# Patient Record
Sex: FEMALE | Race: WHITE | NOT HISPANIC OR LATINO | Employment: OTHER | URBAN - METROPOLITAN AREA
[De-identification: names, ages, dates, MRNs, and addresses within clinical notes are randomized per-mention and may not be internally consistent; named-entity substitution may affect disease eponyms.]

---

## 2017-01-02 ENCOUNTER — OFFICE VISIT (OUTPATIENT)
Dept: OPHTHALMOLOGY | Facility: CLINIC | Age: 62
End: 2017-01-02
Attending: OPHTHALMOLOGY
Payer: COMMERCIAL

## 2017-01-02 DIAGNOSIS — M06.09 RHEUMATOID ARTHRITIS OF MULTIPLE SITES WITHOUT RHEUMATOID FACTOR (H): Primary | ICD-10-CM

## 2017-01-02 DIAGNOSIS — H20.9 UVEITIS, ANTERIOR: ICD-10-CM

## 2017-01-02 DIAGNOSIS — H25.9 SENILE CATARACTS OF BOTH EYES: ICD-10-CM

## 2017-01-02 PROCEDURE — 92015 DETERMINE REFRACTIVE STATE: CPT | Mod: ZF

## 2017-01-02 PROCEDURE — 99212 OFFICE O/P EST SF 10 MIN: CPT | Mod: ZF

## 2017-01-02 ASSESSMENT — REFRACTION_MANIFEST
OS_CYLINDER: SPHERE
OS_ADD: +2.50
OD_AXIS: 175
OS_SPHERE: +0.50
OD_CYLINDER: +0.50
OD_ADD: +2.50
OD_SPHERE: +0.75

## 2017-01-02 ASSESSMENT — SLIT LAMP EXAM - LIDS
COMMENTS: NORMAL
COMMENTS: NORMAL

## 2017-01-02 ASSESSMENT — VISUAL ACUITY
OD_CC: J1+
OD_CC: 20/30
CORRECTION_TYPE: GLASSES
METHOD: SNELLEN - LINEAR
OS_CC: J1+
OS_CC: 20/20

## 2017-01-02 ASSESSMENT — REFRACTION_WEARINGRX
SPECS_TYPE: PAL
OD_CYLINDER: +0.75
OS_ADD: +2.25
OD_SPHERE: +1.25
OS_CYLINDER: +0.25
OD_AXIS: 105
OD_ADD: +2.25
OS_AXIS: 085
OS_SPHERE: +1.00

## 2017-01-02 ASSESSMENT — CUP TO DISC RATIO
OS_RATIO: 0.3
OD_RATIO: 0.45

## 2017-01-02 ASSESSMENT — CONF VISUAL FIELD
OS_NORMAL: 1
OD_NORMAL: 1

## 2017-01-02 ASSESSMENT — TONOMETRY
IOP_METHOD: ICARE
OD_IOP_MMHG: 21
OS_IOP_MMHG: 20

## 2017-01-02 NOTE — PROGRESS NOTES
CC:  Patient presents with:  Consult For: Uveitis      HPI:   Reina Conway is a 61 year old year-old patient who presents for evaluation due to history of panuveitis with CME OD in 2011 treated at [facility]. Referred here by the [facility] Rheumatology clinic (Dr. Frost). Uveitis was attributed to patients enbrel in setting of RA. This was subsequently stopped and patient has been taking adalimumab and methotrexate since. She was treated at that time with a oral steroid burst followed by PST kenalog and drops over the course of 1 month. She denies a history of recurrence. She notes significant glare and halo. Notes some mild discomfort, dryness and watering OD. Uses no drops.         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 H/o panuveitis and CME OD 2011 (no recurrence)   Quiescent today    Observe - mac OCT at f/u    2. Cataracts OU   Refracts to 20/20 OU, but may be symptomatic from glare OD due to small PSC and posterior synechiae may be contributing   Consider CE IOL with synechialysis OD    3. H/o Graves Disease   Does not appear to have active ANTELMO    Observe    4. Dry Eye Syndrome OU, mild   Artificial tears and WCs    5. Choroidal nevus OS   Small, flat, no fluid   Observe      RTC: 6 months, mac OCT OU    Butch Joy MD MPH   Ophthalmology Resident PGY-3    ~~~~~~~~~~~~~~~~~~~~~~~~~~~~~~~~~~~~~~~~~~~~~~~~~~~~~~~~~~~~~~~~    I have personally examined the patient and agree with the assessment and plan as delineated by the resident / fellow.  I have confirmed the contents of the chief complaint, history of present illness, review of systems, and medical / surgical history sections and edited the note as  needed.    Maximilian Terry MD

## 2017-01-02 NOTE — Clinical Note
1/2/2017       RE: Reina Conway  [address] [facility] [id number]     Dear Colleague,    Thank you for referring your patient, Reina Conway, to the EYE CLINIC at [facility]. Please see a copy of my visit note below.    CC:  Patient presents with:  Consult For: Uveitis      HPI:   Reina Conway is a 61 year old year-old patient who presents for evaluation due to history of panuveitis with CME OD in 2011 treated at [facility]. Referred here by the [facility] Rheumatology clinic (Dr. Frost). Uveitis was attributed to patients enbrel in setting of RA. This was subsequently stopped and patient has been taking adalimumab and methotrexate since. She was treated at that time with a oral steroid burst followed by PST kenalog and drops over the course of 1 month. She denies a history of recurrence. She notes significant glare and halo. Notes some mild discomfort, dryness and watering OD. Uses no drops.         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 H/o panuveitis and CME OD 2011 (no recurrence)   Quiescent today    Observe     2. Cataracts OU   Refracts to 20/20 OU, but may be symptomatic from glare OD due to small PSC and posterior synechiae may be contributing   Consider CE IOL with synechialysis OD    3. H/o Graves Disease   Does not appear to have active ANTELMO    Observe    4. Dry Eye Syndrome OU, mild   Artificial tears and WCs    5. Choroidal nevus OS   Small, flat, no fluid   Observe      RTC: 6 months      ~~~~~~~~~~~~~~~~~~~~~~~~~~~~~~~~~~~~~~~~~~~~~~~~~~~~~~~~~~~~~~~~    Again, thank you for allowing me to participate in the care of your patient.       Sincerely,    Maximilian Terry MD

## 2017-01-02 NOTE — NURSING NOTE
Chief Complaints and History of Present Illnesses   Patient presents with     Consult For     Uveitis     HPI    Affected eye(s):  Right   Symptoms:        Duration:  1 year   Frequency:  Constant       Do you have eye pain now?:  No      Comments:  Pt. States that she was treated at [place] in 2009 for uveitis.   Did have one injection which seemed to take care of all symptoms.   Would like to have a DFE to check retina today.  No c/o [facility].  Night driving has become more difficult.   Kasey Mckeon COT 1:52 PM January 2, 2017

## 2017-01-19 DIAGNOSIS — M25.532 PAIN IN BOTH WRISTS: Primary | ICD-10-CM

## 2017-01-19 DIAGNOSIS — M25.531 PAIN IN BOTH WRISTS: Primary | ICD-10-CM

## 2017-03-02 DIAGNOSIS — M06.00 SERONEGATIVE RHEUMATOID ARTHRITIS (H): ICD-10-CM

## 2017-03-02 DIAGNOSIS — Z79.899 HIGH RISK MEDICATIONS (NOT ANTICOAGULANTS) LONG-TERM USE: ICD-10-CM

## 2017-03-06 ENCOUNTER — TELEPHONE (OUTPATIENT)
Dept: RHEUMATOLOGY | Facility: CLINIC | Age: 62
End: 2017-03-06

## 2017-03-06 DIAGNOSIS — Z79.899 HIGH RISK MEDICATIONS (NOT ANTICOAGULANTS) LONG-TERM USE: ICD-10-CM

## 2017-03-06 DIAGNOSIS — M06.00 SERONEGATIVE RHEUMATOID ARTHRITIS (H): ICD-10-CM

## 2017-03-06 NOTE — TELEPHONE ENCOUNTER
Called patient and left a message regarding rescheduling her appointment [facility] Dr. Frost on 4/13/17 to be seen sooner with Tyson Alicea. LM x1

## 2017-03-11 ENCOUNTER — MYC MEDICAL ADVICE (OUTPATIENT)
Dept: PSYCHIATRY | Facility: CLINIC | Age: 62
End: 2017-03-11

## 2017-03-17 DIAGNOSIS — M06.00 SERONEGATIVE RHEUMATOID ARTHRITIS (H): ICD-10-CM

## 2017-03-17 DIAGNOSIS — Z79.899 ENCOUNTER FOR LONG-TERM CURRENT USE OF MEDICATION: ICD-10-CM

## 2017-03-17 LAB
ALBUMIN SERPL-MCNC: 3.6 G/DL (ref 3.4–5)
ALT SERPL W P-5'-P-CCNC: 23 U/L (ref 0–50)
AST SERPL W P-5'-P-CCNC: 17 U/L (ref 0–45)
CREAT SERPL-MCNC: 0.7 MG/DL (ref 0.52–1.04)
CRP SERPL-MCNC: 8.1 MG/L (ref 0–8)
ERYTHROCYTE [DISTWIDTH] IN BLOOD BY AUTOMATED COUNT: 13.7 % (ref 10–15)
GFR SERPL CREATININE-BSD FRML MDRD: 85 ML/MIN/1.7M2
HCT VFR BLD AUTO: 46.2 % (ref 35–47)
HGB BLD-MCNC: 15.8 G/DL (ref 11.7–15.7)
MCH RBC QN AUTO: 31.9 PG (ref 26.5–33)
MCHC RBC AUTO-ENTMCNC: 34.2 G/DL (ref 31.5–36.5)
MCV RBC AUTO: 93 FL (ref 78–100)
PLATELET # BLD AUTO: 277 10E9/L (ref 150–450)
RBC # BLD AUTO: 4.96 10E12/L (ref 3.8–5.2)
WBC # BLD AUTO: 7.9 10E9/L (ref 4–11)

## 2017-03-17 PROCEDURE — 82040 ASSAY OF SERUM ALBUMIN: CPT | Performed by: INTERNAL MEDICINE

## 2017-03-17 PROCEDURE — 84450 TRANSFERASE (AST) (SGOT): CPT | Performed by: INTERNAL MEDICINE

## 2017-03-17 PROCEDURE — 84460 ALANINE AMINO (ALT) (SGPT): CPT | Performed by: INTERNAL MEDICINE

## 2017-03-17 PROCEDURE — 82565 ASSAY OF CREATININE: CPT | Performed by: INTERNAL MEDICINE

## 2017-03-17 PROCEDURE — 85027 COMPLETE CBC AUTOMATED: CPT | Performed by: INTERNAL MEDICINE

## 2017-03-17 PROCEDURE — 86140 C-REACTIVE PROTEIN: CPT | Performed by: INTERNAL MEDICINE

## 2017-03-17 PROCEDURE — 36415 COLL VENOUS BLD VENIPUNCTURE: CPT | Performed by: INTERNAL MEDICINE

## 2017-04-13 ENCOUNTER — OFFICE VISIT (OUTPATIENT)
Dept: RHEUMATOLOGY | Facility: CLINIC | Age: 62
End: 2017-04-13
Attending: INTERNAL MEDICINE
Payer: COMMERCIAL

## 2017-04-13 VITALS
SYSTOLIC BLOOD PRESSURE: 133 MMHG | BODY MASS INDEX: 25.48 KG/M2 | HEART RATE: 74 BPM | WEIGHT: 172 LBS | DIASTOLIC BLOOD PRESSURE: 88 MMHG | HEIGHT: 69 IN | OXYGEN SATURATION: 95 %

## 2017-04-13 DIAGNOSIS — Z79.899 HIGH RISK MEDICATIONS (NOT ANTICOAGULANTS) LONG-TERM USE: ICD-10-CM

## 2017-04-13 DIAGNOSIS — M06.09 RHEUMATOID ARTHRITIS OF MULTIPLE SITES WITHOUT RHEUMATOID FACTOR (H): Primary | ICD-10-CM

## 2017-04-13 PROCEDURE — 99212 OFFICE O/P EST SF 10 MIN: CPT | Mod: ZF

## 2017-04-13 RX ORDER — FOLIC ACID 1 MG/1
1000 TABLET ORAL DAILY
Qty: 90 TABLET | Refills: 3 | Status: SHIPPED | OUTPATIENT
Start: 2017-04-13 | End: 2017-07-14

## 2017-04-13 ASSESSMENT — PAIN SCALES - GENERAL: PAINLEVEL: MODERATE PAIN (5)

## 2017-04-13 NOTE — NURSING NOTE
"Chief Complaint   Patient presents with     RECHECK     Follow up for RA       Initial /88  Pulse 74  Ht 1.753 m (5' 9\")  Wt 78 kg (172 lb)  SpO2 95%  BMI 25.4 kg/m2 Estimated body mass index is 25.4 kg/(m^2) as calculated from the following:    Height as of this encounter: 1.753 m (5' 9\").    Weight as of this encounter: 78 kg (172 lb).  Medication Reconciliation: complete   Fang Arredondo CMA    "

## 2017-04-13 NOTE — LETTER
4/13/2017      RE: Reina Conway  [address] [facility] [address]       Rheumatology Visit     Reina Conway MRN# [id number]   YOB: 1955 Age: 61 year old     Date of Visit: April 13, 2017  Primary care provider: Diana Orantes          Assessment and Plan:   60 yo female with Seronegative destructive RA (-RF/CCP/LASHAE panel, last repeat xrays [place] 3/2013; deformities, right wrist progressive worse function with dorsiflexion, hammertoes with bilateral toe subluxation, right knee contracture).    Episode of Uveitis that was attributed to Enbrel without recurrence now on Humira. She has not had followup in Ophthalmology due to her not scheduling it, but is asymptomatic  She has had variable symptoms and not in a remission on Humira monotherapy; she restarted Methotrexate at 10 mg weekly because of prior perceived SE of nausea and more joint pain; certainly the former would be common but the latter I think more likely due to ongoing RA activity. We increased to 15 mg due to persistent joint sx and flare and she is tolerating this fine.  She was gradually improving but at a plateau.  It appears that the majority of her symptoms if not all are not directly attributable to active RA.  2. She was abstinent from Alcohol but has returned to drinking wine regularly. She should discuss with Dr. Orantes  3.  Osteopenia by prior DEXA, did not start Fosamax Rx by Dr. Orantes.  Patient noted in past her chart now says Osteoporosis  2. Other medical problems as previously documented and as per EHR     PLAN: Discussed in detail with the patient..We went over many things that we have previously including lack of scientific data on diet or supplements, current RA treatment guidelines favoring combination Methotrexate and biologic, Methotrexate side effects.  1. Continue Humira 40 mg SQ Q 2 weeks.  Continue Methotrexate at 15 mg weekly with Folic acid,    --Keep up-to-date vaccinations per CDC  guidelines with PCP  --Labs today  2. Referral to Ophthalmology to establish care previously placed. Hx Anterior uveitis. She can reschedule this or per her wishes be seen elsewhere for eye care  3. RTC 3 mths, sooner prn with Tyson Alicea and 6 months with me.    4. I encouraged her to follow thru with other recommendations of Dr. Orantes. She will schedule appt with Dr. Orantes specifically to discuss her alcohol use and mental health  5. Prior referral to Orthopedic Hand Surgery for their opinion - she did not follow thru.     Guanakito Frost MD           History of Present Illness:   61 year old female who presents for continuing care for her seronegative RA. Seen by me until 2010 then swtiched to [facility], then re-established 3/2013 (xrays 2/2013 [place]). EPIC reviewed. Last seen by me in December 2016.   HISTORY CARRIED FORWARD:   Prior: Synovitis and joint deformities in 2008 was persistently seronegative but had active synovitis. Methotrexate helped but did not cause a remission/low disease state. She required Prednisone to control symptoms partially but still had significant ADL issues. We had persistently recommended anti TNF therapy which she resisted. She sought a second opinion at [place] who recommended the same things, and she continued care there as of August 2010, then switched back to Dr. Frost 3/2013. Begun on Enbrel at [facility]. 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 As no other etiology found it was felt it might be due to Enbrel and she was switched to Humira. She has remained on Methotrexate, primarily 25 mg weekly, decreased 3/2013 (not to go <15mg week due to risk of further deformities or uveitis) . FRAX 32% at [place]. Prior declined biphosphosphonate, [place] recommended IV.   Xrays 3/2013 [place]: See records. Hallux valgus right, hammertoes L>R, hindfoot valgus, pes planus. Dislocated left 2nd MTP, sublux left 3rd MTP, arth 2-3 MTP,  "rt 1st MTP. Mild DJD hands. Several DIP and 1st CMC. Subluxation left thumb IP and persistant dorsiflexion right wrist.   Previous visits discussed stress in her life. Her father in law passed away and there had been a lot of stress and she put her issues on the back burner. She had an episode of chest/epigastric pain and was seen at [facility]. She had apparent negative cardiac evaluation although did not followup with a stress test. She is taking OTC Zantac prn and thinks that helps. Found allergic to Wheat, avoid gluten and sugars. Feels much improved [heartburn, bloating, blood in stools, irregular stools] this really changed her digestive system. She went on a gluten free diet in December, and stopped all RA meds in early Jan 2015. See My Chart message.   Restarted Humira early April 2015 every 3 weeks. Noticed more migatory joints pains, swelling. Right hand, right knee, right wrist-associated swelling really in right 2nd MCP. 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 Lack of movement and coordinations, using her wrist brace. Uses this at night and daily prn.     At last visit she had continued on Humira without side effects and still feels it is helpful. She does have daily discomfort in several areas with either activity or prior damage, especially knees.     She continued to have stress in her life but is working through this and \"trying to get back on track\". She stopped drinking any alcohol for the past two months and was congratulated on this. She went to  but does not feel she is alcoholic.  Leonardo going to [facility]. She has been  for a long time. Both lost parents, and grieving. She says she's in less denial. She admits  is helping a lot.      She called in November due to increased joint pain and we restarted Methotrexate at 10 mg weekly as previously discussed.  She stopped it two weeks ago.  She noted more joint pain and also some nausea on day of dose.  We discussed this at length " and unlikely that MTX contributed to more joint symptoms.     At 2016 visit we readded Methotrexate to Humira in hopes of decreasing disease activity, using low dose due to prior side effect complaints.  She called later that month with flare symptoms requiring Prednisone bridge, and we increased Methotrexate to more standard dose of 15 mg weekly.      At her 2016 visit she had started improving.. She was tolerating Methotrexate this time.  She weaned off Prednisone.    Interval Hx:      She did have interim lab in March that was normal with mild CRP elevation to 8.1.    She has been less active due to the winter, is looking forward to spring.  She feels depressed some days but does not wish to see anyone for this.     She still has problems with stairs but less than during flares. They do plan to move to one level but this has not happened yet. They are selling properties of their  parents.     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     She is doing home exercise program now.     She did not follow thru on Ophthalmology referral from last visits, and has not seen Dr. Orantes.   She denies interval infections.     She is taking Tumeric supplements Ca++/MG++       Review of Systems:   Review Of Systems  Skin: negative  Eyes: negative  Ears/Nose/Throat: negative  Respiratory: No shortness of breath, dyspnea on exertion, cough, or hemoptysis  Cardiovascular: negative  Gastrointestinal: negative  Genitourinary: negative  Musculoskeletal: see HPI  Neurologic: negative  Psychiatric: negative  Hematologic/Lymphatic/Immunologic: negative  Endocrine: negative          Past Medical History:     Past Medical History:   Diagnosis Date     Anterior uveitis     secondary to Enbrel     Fibroid      high in  then normalized     History of Graves' disease      Hyperlipidemia LDL goal < 130     declined  "meication     Menopause 2008     Osteopenia 1/14/2016     Osteoporosis     Osteopenia borderline osteoporosis     Right posterior uveitis      Seronegative rheumatoid arthritis (H)     dx Dr. Frost     Uveitis        Patient Active Problem List    Diagnosis Date Noted     Mechanical limb problems 10/14/2016     Priority: Medium     Pain in both wrists 10/07/2016     Priority: Medium     Rheumatoid arthritis of multiple sites without rheumatoid factor (H) 02/19/2016     Priority: Medium     Osteopenia 01/14/2016     Priority: Medium     Situational mixed anxiety and depressive disorder 09/18/2014     Priority: Medium     Lump or mass in breast 09/02/2014     Priority: Medium     Bunion 09/02/2014     Priority: Medium     Hemorrhoids 07/15/2014     Priority: Medium     Uveitis, anterior 06/21/2013     Priority: Medium     Encounter for long-term current use of medication 03/21/2013     Priority: Medium     Problem list name updated by automated process. Provider to review       Hammer toe 02/21/2013     Priority: Medium             Past Surgical History:     Past Surgical History:   Procedure Laterality Date     Fibroidadenoma Left Breast       NO HISTORY OF SURGERY               Social History:     Social History   Substance Use Topics     Smoking status: Never Smoker     Smokeless tobacco: Never Used     Alcohol use 3.5 - 7.0 oz/week     7 - 14 Standard drinks or equivalent per week             Family History:     Family History   Problem Relation Age of Onset     Breast Cancer Mother      parkinsons     Breast Cancer Maternal Aunt      Other - See Comments       Inflammation joint in brother, maternal cousin      Glaucoma No family hx of      Macular Degeneration No family hx of      Retinal detachment No family hx of             Allergies:     Allergies   Allergen Reactions     Fosamax      Throat discomfort     No Clinical Screening - See Comments      Joints flared after getting possible \"white drink\" after " "CT/MRI in 2007              Medications:     Current Outpatient Prescriptions   Medication Sig Dispense Refill     methotrexate 2.5 MG tablet CHEMO Take 6 tablets (15 mg) by mouth once a week Monitoring labs required every 8 - 12 weeks for medication refills. 24 tablet 3     adalimumab (HUMIRA PEN) 40 MG/0.8ML pen kit Inject 0.8 mLs (40 mg) Subcutaneous every 14 days Hold for signs of infection, and then seek medical attention. SURECLICK PEN. 1 kit 1     folic acid (FOLVITE) 1 MG tablet Take 1 tablet (1,000 mcg) by mouth daily 90 tablet 3     ACETAMINOPHEN PO Take 500 mg by mouth daily as needed 2 tablets bid prn              Physical Exam:   /88  Pulse 74  Ht 1.753 m (5' 9\")  Wt 78 kg (172 lb)  SpO2 95%  BMI 25.4 kg/m2  Constitutional: WD-[id number] cooperative   Eyes: nl conjunctiva, sclera   ENT: nl external ears, nose, throat   No mucous membrane lesions, normal saliva pool   Neck: no mass or thyroid enlargement   MS: She has previously noted subluxation at the R wrist/mild in L wrist with decreased ROM, minimal pannus, swan in right hand right thumb deformity unchanged. Right 2-3nd MCP pannus no active swelling. Right hand ulnar deviation, right thumb drop , right 5th DIP subluxation, right wrist subluxation, hammertoes. Bunions bilaterally. Deformities of MTPs with palpable MTP heads. Pes planus. FROM hips. Right knee 1+ swelling with contracture but with 30 extension lag.--before was R knee cool with trace effusion but with 10 extension lag. Right foot bunion. All TMJ, neck, shoulder, elbow, wrist, MCP/PIP/DIP, spine, hip, knee, ankle, and foot MTP/IP joints were examined.   Skin: no nail pitting, alopecia, rash, nodules or lesions.   Neuro: nl cranial nerves, strength   Psych: nl affect.       Data:     Lab Results   Component Value Date    WBC 7.9 03/17/2017    WBC 6.6 12/08/2016    WBC 7.4 09/16/2016    HGB 15.8 (H) 03/17/2017    HGB 15.1 12/08/2016    HGB 14.6 09/16/2016    HCT 46.2 03/17/2017    " HCT 45.1 12/08/2016    HCT 44.2 09/16/2016    MCV 93 03/17/2017    MCV 94 12/08/2016    MCV 92 09/16/2016     03/17/2017     12/08/2016     09/16/2016     Lab Results   Component Value Date    BUN 13 09/14/2010    BUN 20 10/06/2009    BUN 14 04/03/2007     No components found for: SEDRATE  Lab Results   Component Value Date    TSH 0.58 09/14/2010    TSH 0.42 10/06/2009    TSH 0.60 09/11/2007     Lab Results   Component Value Date    AST 17 03/17/2017    AST 16 12/08/2016    AST 18 09/16/2016    ALT 23 03/17/2017    ALT 30 12/08/2016    ALT 34 09/16/2016    GGT 32 09/25/2014    ALKPHOS 82 09/25/2014    ALKPHOS 83 12/22/2010     Reviewed Rheumatology lab flowsheet        Guanakito Frost MD

## 2017-04-13 NOTE — PROGRESS NOTES
Rheumatology Visit     Reina Conway MRN# [id number]   YOB: 1955 Age: 61 year old     Date of Visit: April 13, 2017  Primary care provider: Diana Orantes          Assessment and Plan:   62 yo female with Seronegative destructive RA (-RF/CCP/LASHAE panel, last repeat xrays [place] 3/2013; deformities, right wrist progressive worse function with dorsiflexion, hammertoes with bilateral toe subluxation, right knee contracture).    Episode of Uveitis that was attributed to Enbrel without recurrence now on Humira. She has not had followup in Ophthalmology due to her not scheduling it, but is asymptomatic  She has had variable symptoms and not in a remission on Humira monotherapy; she restarted Methotrexate at 10 mg weekly because of prior perceived SE of nausea and more joint pain; certainly the former would be common but the latter I think more likely due to ongoing RA activity. We increased to 15 mg due to persistent joint sx and flare and she is tolerating this fine.  She was gradually improving but at a plateau.  It appears that the majority of her symptoms if not all are not directly attributable to active RA.  2. She was abstinent from Alcohol but has returned to drinking wine regularly. She should discuss with Dr. Orantes  3.  Osteopenia by prior DEXA, did not start Fosamax Rx by Dr. Orantes.  Patient noted in past her chart now says Osteoporosis  2. Other medical problems as previously documented and as per EHR     PLAN: Discussed in detail with the patient..We went over many things that we have previously including lack of scientific data on diet or supplements, current RA treatment guidelines favoring combination Methotrexate and biologic, Methotrexate side effects.  1. Continue Humira 40 mg SQ Q 2 weeks.  Continue Methotrexate at 15 mg weekly with Folic acid,    --Keep up-to-date vaccinations per CDC guidelines with PCP  --Labs today  2. Referral to Ophthalmology to establish care previously  placed. Hx Anterior uveitis. She can reschedule this or per her wishes be seen elsewhere for eye care  3. RTC 3 mths, sooner prn with Tyson Alicea and 6 months with me.    4. I encouraged her to follow thru with other recommendations of Dr. Orantes. She will schedule appt with Dr. Orantes specifically to discuss her alcohol use and mental health  5. Prior referral to Orthopedic Hand Surgery for their opinion - she did not follow thru.     Guanakito Frost MD           History of Present Illness:   61 year old female who presents for continuing care for her seronegative RA. Seen by me until 2010 then swtiched to [facility], then re-established 3/2013 (xrays 2/2013 [place]). EPIC reviewed. Last seen by me in December 2016.   HISTORY CARRIED FORWARD:   Prior: Synovitis and joint deformities in 2008 was persistently seronegative but had active synovitis. Methotrexate helped but did not cause a remission/low disease state. She required Prednisone to control symptoms partially but still had significant ADL issues. We had persistently recommended anti TNF therapy which she resisted. She sought a second opinion at [place] who recommended the same things, and she continued care there as of August 2010, then switched back to Dr. Frost 3/2013. Begun on Enbrel at [facility]. 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 As no other etiology found it was felt it might be due to Enbrel and she was switched to Humira. She has remained on Methotrexate, primarily 25 mg weekly, decreased 3/2013 (not to go <15mg week due to risk of further deformities or uveitis) . FRAX 32% at [place]. Prior declined biphosphosphonate, [place] recommended IV.   Xrays 3/2013 [place]: See records. Hallux valgus right, hammertoes L>R, hindfoot valgus, pes planus. Dislocated left 2nd MTP, sublux left 3rd MTP, arth 2-3 MTP, rt 1st MTP. Mild DJD hands. Several DIP and 1st CMC. Subluxation left thumb IP and persistant  "dorsiflexion right wrist.   Previous visits discussed stress in her life. Her father in law passed away and there had been a lot of stress and she put her issues on the back burner. She had an episode of chest/epigastric pain and was seen at [facility]. She had apparent negative cardiac evaluation although did not followup with a stress test. She is taking OTC Zantac prn and thinks that helps. Found allergic to Wheat, avoid gluten and sugars. Feels much improved [heartburn, bloating, blood in stools, irregular stools] this really changed her digestive system. She went on a gluten free diet in December, and stopped all RA meds in early Jan 2015. See My Chart message.   Restarted Humira early April 2015 every 3 weeks. Noticed more migatory joints pains, swelling. Right hand, right knee, right wrist-associated swelling really in right 2nd MCP. 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 Lack of movement and coordinations, using her wrist brace. Uses this at night and daily prn.     At last visit she had continued on Humira without side effects and still feels it is helpful. She does have daily discomfort in several areas with either activity or prior damage, especially knees.     She continued to have stress in her life but is working through this and \"trying to get back on track\". She stopped drinking any alcohol for the past two months and was congratulated on this. She went to  but does not feel she is alcoholic.  Leonardo going to [facility]. She has been  for a long time. Both lost parents, and grieving. She says she's in less denial. She admits  is helping a lot.      She called in November due to increased joint pain and we restarted Methotrexate at 10 mg weekly as previously discussed.  She stopped it two weeks ago.  She noted more joint pain and also some nausea on day of dose.  We discussed this at length and unlikely that MTX contributed to more joint symptoms.     At January 2016 visit we " readded Methotrexate to Humira in hopes of decreasing disease activity, using low dose due to prior side effect complaints.  She called later that month with flare symptoms requiring Prednisone bridge, and we increased Methotrexate to more standard dose of 15 mg weekly.      At her 2016 visit she had started improving.. She was tolerating Methotrexate this time.  She weaned off Prednisone.    Interval Hx:      She did have interim lab in March that was normal with mild CRP elevation to 8.1.    She has been less active due to the winter, is looking forward to spring.  She feels depressed some days but does not wish to see anyone for this.     She still has problems with stairs but less than during flares. They do plan to move to one level but this has not happened yet. They are selling properties of their  parents.     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     She is doing home exercise program now.     She did not follow thru on Ophthalmology referral from last visits, and has not seen Dr. Orantes.   She denies interval infections.     She is taking Tumeric supplements Ca++/MG++       Review of Systems:   Review Of Systems  Skin: negative  Eyes: negative  Ears/Nose/Throat: negative  Respiratory: No shortness of breath, dyspnea on exertion, cough, or hemoptysis  Cardiovascular: negative  Gastrointestinal: negative  Genitourinary: negative  Musculoskeletal: see HPI  Neurologic: negative  Psychiatric: negative  Hematologic/Lymphatic/Immunologic: negative  Endocrine: negative          Past Medical History:     Past Medical History:   Diagnosis Date     Anterior uveitis     secondary to Enbrel     Fibroid      high in  then normalized     History of Graves' disease      Hyperlipidemia LDL goal < 130     declined meication     Menopause 2008     Osteopenia 2016     Osteoporosis     Osteopenia  "borderline osteoporosis     Right posterior uveitis      Seronegative rheumatoid arthritis (H)     dx Dr. Frost     Uveitis        Patient Active Problem List    Diagnosis Date Noted     Mechanical limb problems 10/14/2016     Priority: Medium     Pain in both wrists 10/07/2016     Priority: Medium     Rheumatoid arthritis of multiple sites without rheumatoid factor (H) 02/19/2016     Priority: Medium     Osteopenia 01/14/2016     Priority: Medium     Situational mixed anxiety and depressive disorder 09/18/2014     Priority: Medium     Lump or mass in breast 09/02/2014     Priority: Medium     Bunion 09/02/2014     Priority: Medium     Hemorrhoids 07/15/2014     Priority: Medium     Uveitis, anterior 06/21/2013     Priority: Medium     Encounter for long-term current use of medication 03/21/2013     Priority: Medium     Problem list name updated by automated process. Provider to review       Hammer toe 02/21/2013     Priority: Medium             Past Surgical History:     Past Surgical History:   Procedure Laterality Date     Fibroidadenoma Left Breast       NO HISTORY OF SURGERY               Social History:     Social History   Substance Use Topics     Smoking status: Never Smoker     Smokeless tobacco: Never Used     Alcohol use 3.5 - 7.0 oz/week     7 - 14 Standard drinks or equivalent per week             Family History:     Family History   Problem Relation Age of Onset     Breast Cancer Mother      parkinsons     Breast Cancer Maternal Aunt      Other - See Comments       Inflammation joint in brother, maternal cousin      Glaucoma No family hx of      Macular Degeneration No family hx of      Retinal detachment No family hx of             Allergies:     Allergies   Allergen Reactions     Fosamax      Throat discomfort     No Clinical Screening - See Comments      Joints flared after getting possible \"white drink\" after CT/MRI in 2007              Medications:     Current Outpatient Prescriptions " "  Medication Sig Dispense Refill     methotrexate 2.5 MG tablet CHEMO Take 6 tablets (15 mg) by mouth once a week Monitoring labs required every 8 - 12 weeks for medication refills. 24 tablet 3     adalimumab (HUMIRA PEN) 40 MG/0.8ML pen kit Inject 0.8 mLs (40 mg) Subcutaneous every 14 days Hold for signs of infection, and then seek medical attention. SURECLICK PEN. 1 kit 1     folic acid (FOLVITE) 1 MG tablet Take 1 tablet (1,000 mcg) by mouth daily 90 tablet 3     ACETAMINOPHEN PO Take 500 mg by mouth daily as needed 2 tablets bid prn              Physical Exam:   /88  Pulse 74  Ht 1.753 m (5' 9\")  Wt 78 kg (172 lb)  SpO2 95%  BMI 25.4 kg/m2  Constitutional: WD-WN-WG cooperative   Eyes: nl conjunctiva, sclera   ENT: nl external ears, nose, throat   No mucous membrane lesions, normal saliva pool   Neck: no mass or thyroid enlargement   MS: She has previously noted subluxation at the R wrist/mild in L wrist with decreased ROM, minimal pannus, swan in right hand right thumb deformity unchanged. Right 2-3nd MCP pannus no active swelling. Right hand ulnar deviation, right thumb drop , right 5th DIP subluxation, right wrist subluxation, hammertoes. Bunions bilaterally. Deformities of MTPs with palpable MTP heads. Pes planus. FROM hips. Right knee 1+ swelling with contracture but with 30 extension lag.--before was R knee cool with trace effusion but with 10 extension lag. Right foot bunion. All TMJ, neck, shoulder, elbow, wrist, MCP/PIP/DIP, spine, hip, knee, ankle, and foot MTP/IP joints were examined.   Skin: no nail pitting, alopecia, rash, nodules or lesions.   Neuro: nl cranial nerves, strength   Psych: nl affect.       Data:     Lab Results   Component Value Date    WBC 7.9 03/17/2017    WBC 6.6 12/08/2016    WBC 7.4 09/16/2016    HGB 15.8 (H) 03/17/2017    HGB 15.1 12/08/2016    HGB 14.6 09/16/2016    HCT 46.2 03/17/2017    HCT 45.1 12/08/2016    HCT 44.2 09/16/2016    MCV 93 03/17/2017    MCV 94 " 12/08/2016    MCV 92 09/16/2016     03/17/2017     12/08/2016     09/16/2016     Lab Results   Component Value Date    BUN 13 09/14/2010    BUN 20 10/06/2009    BUN 14 04/03/2007     No components found for: SEDRATE  Lab Results   Component Value Date    TSH 0.58 09/14/2010    TSH 0.42 10/06/2009    TSH 0.60 09/11/2007     Lab Results   Component Value Date    AST 17 03/17/2017    AST 16 12/08/2016    AST 18 09/16/2016    ALT 23 03/17/2017    ALT 30 12/08/2016    ALT 34 09/16/2016    GGT 32 09/25/2014    ALKPHOS 82 09/25/2014    ALKPHOS 83 12/22/2010     Reviewed Rheumatology lab flowsheet    Guanakito Frost

## 2017-04-13 NOTE — MR AVS SNAPSHOT
After Visit Summary   4/13/2017    Reina Cnoway    MRN: [id number]           Patient Information     Date Of Birth          1955        Visit Information        Provider Department      4/13/2017 3:00 PM Guanakito Frost MD [facility] Rheumatology        Today's Diagnoses     Rheumatoid arthritis of multiple sites without rheumatoid factor (H)    -  1    High risk medications (not anticoagulants) long-term use           Follow-ups after your visit        Follow-up notes from your care team     Return in about 3 months (around 7/13/2017).      Your next 10 appointments already scheduled     Jul 14, 2017  3:30 PM CDT   (Arrive by 3:15 PM)   Return Visit with CASEY Galo CNP   [facility] Rheumatology ([facility])    [address] [place] [address]  [address]rd [facility] [address] [phone]            Jul 14, 2017  4:30 PM CDT   Lab with  LAB   [facility] ([facility])    [address] [place] [id number] [phone]            Oct 12, 2017  3:30 PM CDT   (Arrive by 3:15 PM)   Return Visit with Guanakito Frost MD   [facility] Rheumatology ([facility])    [address] [facility]  [address] [facility] [id number] [phone]              Future tests that were ordered for you today     Open Standing Orders        Priority Remaining Interval Expires Ordered    Albumin level Routine 6/6 Intervals 4/13/2018 4/13/2017    CRP inflammation Routine 6/6 Intervals 4/13/2018 4/13/2017    ALT Routine 6/6 Intervals 4/13/2018 4/13/2017    AST Routine 6/6 Intervals 4/13/2018 4/13/2017    CBC with platelets Routine 6/6 Intervals 4/13/2018 4/13/2017    Creatinine Routine 6/6 Intervals 4/13/2018 4/13/2017            Who to contact     If you have questions or need follow up information about today's clinic visit or your schedule please contact [facility] RHEUMATOLOGY directly at [phone].  Normal or  "non-critical lab and imaging results will be communicated to you by MyChart, letter or phone within 4 business days after the clinic has received the results. If you do not hear from us within 7 days, please contact the clinic through [facility] or phone. If you have a critical or abnormal lab result, we will notify you by phone as soon as possible.  Submit refill requests through [facility] or call your pharmacy and they will forward the refill request to us. Please allow 3 business days for your refill to be completed.          Additional Information About Your Visit        [employer] Information     [employer] gives you secure access to your electronic health record. If you see a primary care provider, you can also send messages to your care team and make appointments. If you have questions, please call your primary care clinic.  If you do not have a primary care provider, please call [phone] and they will assist you.        Care EveryWhere ID     This is your Care EveryWhere ID. This could be used by other organizations to access your [facility] medical records  [id number]        Your Vitals Were     Pulse Height Pulse Oximetry BMI (Body Mass Index)          74 1.753 m (5' 9\") 95% 25.4 kg/m2         Blood Pressure from Last 3 Encounters:   04/13/17 133/88   12/08/16 138/89   05/31/16 134/80    Weight from Last 3 Encounters:   04/13/17 78 kg (172 lb)   12/08/16 76.6 kg (168 lb 12.8 oz)   05/31/16 75.3 kg (165 lb 14.4 oz)                 Where to get your medicines      These medications were sent to [id number] - [place], [place] - [address] [facility]  [address] [facility] [address]     Phone:  [phone]     folic acid 1 MG tablet    methotrexate 2.5 MG tablet CHEMO          Primary Care Provider Office Phone # Fax #    Diana Orantes [phone]       [facility] [address] [address]        Thank you!     Thank you for choosing  [facility] " RHE[facility]AT[facility]  for your care. Our goal is always to provide you with excellent care. Hearing back from our patients is one way we can continue to improve our services. Please take a few minutes to complete the written survey that you may receive in the mail after your visit with us. Thank you!             Your Updated Medication List - Protect others around you: Learn how to safely use, store and throw away your medicines at www.disposemymeds.org.          This list is accurate as of: 4/13/17  3:35 PM.  Always use your most recent med list.                   Brand Name Dispense Instructions for use    ACETAMINOPHEN PO      Take 500 mg by mouth daily as needed 2 tablets bid prn       adalimumab 40 MG/0.8ML pen kit    HUMIRA PEN    1 kit    Inject 0.8 mLs (40 mg) Subcutaneous every 14 days Hold for signs of infection, and then seek medical attention. SURECLICK PEN.       folic acid 1 MG tablet    FOLVITE    90 tablet    Take 1 tablet (1,000 mcg) by mouth daily       methotrexate 2.5 MG tablet CHEMO     24 tablet    Take 6 tablets (15 mg) by mouth once a week Monitoring labs required every 8 - 12 weeks for medication refills.

## 2017-07-14 ENCOUNTER — OFFICE VISIT (OUTPATIENT)
Dept: RHEUMATOLOGY | Facility: CLINIC | Age: 62
End: 2017-07-14
Attending: NURSE PRACTITIONER
Payer: COMMERCIAL

## 2017-07-14 VITALS
HEIGHT: 69 IN | WEIGHT: 169.8 LBS | BODY MASS INDEX: 25.15 KG/M2 | DIASTOLIC BLOOD PRESSURE: 91 MMHG | HEART RATE: 94 BPM | SYSTOLIC BLOOD PRESSURE: 154 MMHG | TEMPERATURE: 98.4 F

## 2017-07-14 DIAGNOSIS — R53.82 CHRONIC FATIGUE: ICD-10-CM

## 2017-07-14 DIAGNOSIS — M06.09 RHEUMATOID ARTHRITIS OF MULTIPLE SITES WITHOUT RHEUMATOID FACTOR (H): ICD-10-CM

## 2017-07-14 DIAGNOSIS — R53.82 CHRONIC FATIGUE: Primary | ICD-10-CM

## 2017-07-14 DIAGNOSIS — Z79.899 HIGH RISK MEDICATIONS (NOT ANTICOAGULANTS) LONG-TERM USE: ICD-10-CM

## 2017-07-14 DIAGNOSIS — M06.00 SERONEGATIVE RHEUMATOID ARTHRITIS (H): ICD-10-CM

## 2017-07-14 LAB
ALBUMIN SERPL-MCNC: 3.8 G/DL (ref 3.4–5)
ALT SERPL W P-5'-P-CCNC: 19 U/L (ref 0–50)
AST SERPL W P-5'-P-CCNC: 13 U/L (ref 0–45)
CREAT SERPL-MCNC: 0.84 MG/DL (ref 0.52–1.04)
CRP SERPL-MCNC: 7.6 MG/L (ref 0–8)
ERYTHROCYTE [DISTWIDTH] IN BLOOD BY AUTOMATED COUNT: 14 % (ref 10–15)
GFR SERPL CREATININE-BSD FRML MDRD: 69 ML/MIN/1.7M2
HCT VFR BLD AUTO: 46.5 % (ref 35–47)
HGB BLD-MCNC: 15.3 G/DL (ref 11.7–15.7)
MCH RBC QN AUTO: 31.1 PG (ref 26.5–33)
MCHC RBC AUTO-ENTMCNC: 32.9 G/DL (ref 31.5–36.5)
MCV RBC AUTO: 95 FL (ref 78–100)
PLATELET # BLD AUTO: 254 10E9/L (ref 150–450)
RBC # BLD AUTO: 4.92 10E12/L (ref 3.8–5.2)
TSH SERPL DL<=0.005 MIU/L-ACNC: 0.62 MU/L (ref 0.4–4)
WBC # BLD AUTO: 7.7 10E9/L (ref 4–11)

## 2017-07-14 PROCEDURE — 99212 OFFICE O/P EST SF 10 MIN: CPT | Mod: ZF

## 2017-07-14 PROCEDURE — 82040 ASSAY OF SERUM ALBUMIN: CPT | Performed by: INTERNAL MEDICINE

## 2017-07-14 PROCEDURE — 85027 COMPLETE CBC AUTOMATED: CPT | Performed by: INTERNAL MEDICINE

## 2017-07-14 PROCEDURE — 84443 ASSAY THYROID STIM HORMONE: CPT | Performed by: INTERNAL MEDICINE

## 2017-07-14 PROCEDURE — 86140 C-REACTIVE PROTEIN: CPT | Performed by: INTERNAL MEDICINE

## 2017-07-14 PROCEDURE — 36415 COLL VENOUS BLD VENIPUNCTURE: CPT | Performed by: INTERNAL MEDICINE

## 2017-07-14 PROCEDURE — 82565 ASSAY OF CREATININE: CPT | Performed by: INTERNAL MEDICINE

## 2017-07-14 PROCEDURE — 84460 ALANINE AMINO (ALT) (SGPT): CPT | Performed by: INTERNAL MEDICINE

## 2017-07-14 PROCEDURE — 84450 TRANSFERASE (AST) (SGOT): CPT | Performed by: INTERNAL MEDICINE

## 2017-07-14 RX ORDER — FOLIC ACID 1 MG/1
2 TABLET ORAL DAILY
Qty: 180 TABLET | Refills: 3 | Status: SHIPPED | OUTPATIENT
Start: 2017-07-14 | End: 2018-07-18

## 2017-07-14 ASSESSMENT — JOINT PAIN: TOTAL NUMBER OF SWOLLEN JOINTS: 0

## 2017-07-14 ASSESSMENT — PAIN SCALES - GENERAL: PAINLEVEL: NO PAIN (0)

## 2017-07-14 ASSESSMENT — CLINICAL DISEASE ACTIVITY INDEX (CDAI): TOTAL_SCORE: 4

## 2017-07-14 NOTE — LETTER
"7/14/2017       RE: Reina Conway  [address] [facility] [address]     Dear Colleague,    Thank you for referring your patient, Reina Conway, to the [facility] RHEUMATOLOGY at [facility]. Please see a copy of my visit note below.    Rheumatology Visit     Reina Conway MRN# [id number]   YOB: 1955 Age: 62 year old     Date of Visit: July 14, 2017  Last visit: April 13, 2017  Primary care provider: Diana Orantes          Assessment and Plan:   1. Seronegative destructive RA (-RF/CCP/LASHAE panel, last repeat xrays [place] 3/2013; deformities, right wrist progressive worse function with dorsiflexion, hammertoes with bilateral toe subluxation, right knee contracture).    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 Seen ophth Dr. Terry 1-2017 (neg exam). No sx today   RA activity=low. Exam shows prior deformities, no RA flaring and no active arthritis. Labs today pending. Mild nausea, hairloss. Will ask her to routinely take FA 1 mg day (missing doses) but for 2 mths go to 2 mg day then report if improved. We will continue the humira and methotrexate at current doses.   2. Hairloss with fatigue. Due for physical for complete evaluation which she will do this should include cancer screening, mamogram, bone health, skin check and over check. Will check thyroid.   3. Alcohol use. Reports abstinent from Alcohol, but then \"occasionally\". She should discuss with Dr. Orantes. I discussed the risks with her today on the liver and MTX. She understands.   3.  Osteopenia by prior DEXA (done 2009), did not start Fosamax Rx by Dr. Orantes.  Patient noted in past her chart now says Osteoporosis. F/U PCP at annual. She agrees   2. Other medical problems as previously documented and as per EHR     PLAN: Discussed in detail with the patient.   1. Continue Humira 40 mg SQ Q 2 weeks.  Continue Methotrexate at 15 mg weekly " with 2mg day Folic acid    --Keep up-to-date vaccinations per CDC guidelines with PCP  --Labs every 8-12 weeks--discussed importance of labs every 12 weeks and hold MTX for any infections.    2 F/U Ophthalmology yearly and prn sx. Hx Anterior uveitis.   3. RTC 3 mths, sooner prn      4. I encouraged her to follow thru with other recommendations of Dr. Orantes. She will schedule appt with Dr. Orantes specifically to discuss her alcohol use and mental health  5. Prior referral to Orthopedic Hand Surgery for their opinion - she did not follow thru.Does not wish          History of Present Illness:   62 year old female who presents for continuing care for her seronegative RA. Seen Dr. Frost until 2010 then swtiched to [facility], then re-established 3/2013 (xrays 2/2013 [facility]). EPIC reviewed.    HISTORY CARRIED FORWARD:   Prior: Synovitis and joint deformities in 2008 was persistently seronegative but had active synovitis. Methotrexate helped but did not cause a remission/low disease state. She required Prednisone to control symptoms partially but still had significant ADL issues. We had persistently recommended anti TNF therapy which she resisted. She sought a second opinion at [facility] who recommended the same things, and she continued care there as of August 2010, then switched back to Dr. Frost 3/2013. Begun on Enbrel at [facility]. 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 As no other etiology found it was felt it might be due to Enbrel and she was switched to Humira. She has remained on Methotrexate, primarily 25 mg weekly, decreased 3/2013 (not to go <15mg week due to risk of further deformities or uveitis) . FRAX 32% at [facility]. Prior declined biphosphosphonate, [facility] recommended IV.   Xrays 3/2013 [facility]: See records. Hallux valgus right, hammertoes L>R, hindfoot valgus, pes planus. Dislocated left 2nd MTP, sublux left 3rd MTP, arth 2-3 MTP, rt 1st MTP. Mild  "DJD hands. Several DIP and 1st CMC. Subluxation left thumb IP and persistant dorsiflexion right wrist.   Previous visits discussed stress in her life. Her father in law passed away and there had been a lot of stress and she put her issues on the back burner. She had an episode of chest/epigastric pain and was seen at [facility]. She had apparent negative cardiac evaluation although did not followup with a stress test. She is taking OTC Zantac prn and thinks that helps. Found allergic to Wheat, avoid gluten and sugars. Feels much improved [heartburn, bloating, blood in stools, irregular stools] this really changed her digestive system. She went on a gluten free diet in December, and stopped all RA meds in early Jan 2015. See My Chart message.   Restarted Humira early April 2015 every 3 weeks. Noticed more migatory joints pains, swelling. Right hand, right knee, right wrist-associated swelling really in right 2nd MCP. 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 Lack of movement and coordinations, using her wrist brace. Uses this at night and daily prn.     At last visit she had continued on Humira without side effects and still feels it is helpful. She does have daily discomfort in several areas with either activity or prior damage, especially knees.     She continued to have stress in her life but is working through this and \"trying to get back on track\". She stopped drinking any alcohol for the past two months and was congratulated on this. She went to  but does not feel she is alcoholic.  Leonardo going to [facility]. She has been  for a long time. Both lost parents, and grieving. She says she's in less denial. She admits  is helping a lot.      She called in November due to increased joint pain and we restarted Methotrexate at 10 mg weekly as previously discussed.  She stopped it two weeks ago.  She noted more joint pain and also some nausea on day of dose.  We discussed this at length and unlikely " that MTX contributed to more joint symptoms.     At January 2016 visit we readded Methotrexate to Humira in hopes of decreasing disease activity, using low dose due to prior side effect complaints.  She called later that month with flare symptoms requiring Prednisone bridge, and we increased Methotrexate to more standard dose of 15 mg weekly.      At her April 2016 visit she had started improving.. She was tolerating Methotrexate this time.  She weaned off Prednisone.    July 14, 2017  Last seen Dr. Frost in April. MTX and humira were continued. Patient had not followed-up with ophthalmology nor Dr. Orantes  Humira 40 mg injections Q 2 weeks, Methotrexate 15 mg once Q Monday (slight appetite loss, hairloss mild, nausea x2 days after--no mouth sores), FA 1 mg day. No SLE symptoms. No cyclical wearing down actually improves end of cyclical. Will stretch out the the medications a few days out the end of the 4 weeks since doing so well. No SLE symptoms. No EAS, no joint pain or swelling. No changes in function of her hands. No changes in appearance joints. No infections. No changes in her health. Not been in the hospital. No uveititis symptoms. No eye pain no change in visions. Right hand is still  The hardest, stable but over the long term had progressed. Some stiffness in the morning right hand at time. MIld pain in left wrists, more harder to carry or lift with this hand. Knees are good. Feet are more pain under her hammertoes. No swelling more this pain due ot her prior deformities. Doing her artistry still. Due for her physical --some fatigue which she agrees to schedule. No falls or injuries. No night sweats. Appetite is good. No changes to her skin. Appetite is good. No NSAID. Tylenol prn           Review of Systems:   Review Of Systems  Skin: negative  Eyes: negative  Ears/Nose/Throat: negative  Respiratory: No shortness of breath, dyspnea on exertion, cough, or hemoptysis  Cardiovascular:  negative  Gastrointestinal: negative  Genitourinary: negative  Musculoskeletal: see HPI  Neurologic: negative  Psychiatric: negative  Hematologic/Lymphatic/Immunologic: negative  Endocrine: negative          Past Medical History:     Past Medical History:   Diagnosis Date     Anterior uveitis     secondary to Enbrel     Fibroid      high in 2007 then normalized     History of Graves' disease      Hyperlipidemia LDL goal < 130     declined meication     Menopause 2008     Osteopenia 1/14/2016     Osteoporosis     Osteopenia borderline osteoporosis     Right posterior uveitis      Seronegative rheumatoid arthritis (H)     dx Dr. Frost     Uveitis        Patient Active Problem List    Diagnosis Date Noted     Mechanical limb problems 10/14/2016     Priority: Medium     Pain in both wrists 10/07/2016     Priority: Medium     Rheumatoid arthritis of multiple sites without rheumatoid factor (H) 02/19/2016     Priority: Medium     Osteopenia 01/14/2016     Priority: Medium     Situational mixed anxiety and depressive disorder 09/18/2014     Priority: Medium     Lump or mass in breast 09/02/2014     Priority: Medium     Bunion 09/02/2014     Priority: Medium     Hemorrhoids 07/15/2014     Priority: Medium     Uveitis, anterior 06/21/2013     Priority: Medium     Encounter for long-term current use of medication 03/21/2013     Priority: Medium     Problem list name updated by automated process. Provider to review       Hammer toe 02/21/2013     Priority: Medium             Past Surgical History:     Past Surgical History:   Procedure Laterality Date     Fibroidadenoma Left Breast       NO HISTORY OF SURGERY               Social History:     Social History   Substance Use Topics     Smoking status: Never Smoker     Smokeless tobacco: Never Used     Alcohol use 3.5 - 7.0 oz/week     7 - 14 Standard drinks or equivalent per week     No alcohol use --occasional 2x weeks. Never smoker. . Retired           Family  "History:     Family History   Problem Relation Age of Onset     Breast Cancer Mother      parkinsons     Breast Cancer Maternal Aunt      Other - See Comments       Inflammation joint in brother, maternal cousin      Glaucoma No family hx of      Macular Degeneration No family hx of      Retinal detachment No family hx of             Allergies:     Allergies   Allergen Reactions     Fosamax      Throat discomfort     No Clinical Screening - See Comments      Joints flared after getting possible \"white drink\" after CT/MRI in 2007              Medications:     Current Outpatient Prescriptions   Medication Sig Dispense Refill     folic acid (FOLVITE) 1 MG tablet Take 1 tablet (1,000 mcg) by mouth daily 90 tablet 3     methotrexate 2.5 MG tablet CHEMO Take 6 tablets (15 mg) by mouth once a week Monitoring labs required every 8 - 12 weeks for medication refills. 24 tablet 4     adalimumab (HUMIRA PEN) 40 MG/0.8ML pen kit Inject 0.8 mLs (40 mg) Subcutaneous every 14 days Hold for signs of infection, and then seek medical attention. SURECLICK PEN. 1 kit 1     ACETAMINOPHEN PO Take 500 mg by mouth daily as needed 2 tablets bid prn              Physical Exam:   BP (!) 154/91  Pulse 94  Temp 98.4  F (36.9  C) (Oral)  Ht 1.753 m (5' 9\")  Wt 77 kg (169 lb 12.8 oz)  BMI 25.08 kg/m2  Wt Readings from Last 4 Encounters:   07/14/17 77 kg (169 lb 12.8 oz)   04/13/17 78 kg (172 lb)   12/08/16 76.6 kg (168 lb 12.8 oz)   05/31/16 75.3 kg (165 lb 14.4 oz)   Constitutional: WD-WN-[id number] cooperative   Eyes: nl conjunctiva, sclera   ENT: nl external ears, nose, throat   No mucous membrane lesions, normal saliva pool   LUNGS; CTA posteriorly  Cardiac: S1S2 no murmurs rubs or gallops   Neck: no mass or thyroid enlargement   MS: She has previously noted subluxation at the R wrist/mild in L wrist with decreased ROM, minimal pannus, swan in right hand right thumb deformity unchanged. Right 2-3nd MCP pannus no active swelling. Right hand ulnar " deviation, right thumb drop , right 5th DIP subluxation, right wrist subluxation, hammertoes. Bunions bilaterally. Deformities of MTPs with palpable MTP heads. Pes planus. FROM hips. Right knee 1+ cool (not red) swelling with contracture but with 30 extension lag.--before was R knee cool with trace effusion but with 10 extension lag. Right foot bunion. All TMJ, neck, shoulder, elbow, wrist, MCP/PIP/DIP, spine, hip, knee, ankle, and foot MTP/IP joints were examined.   Skin: no nail pitting, alopecia, rash, nodules or lesions.   Neuro: nl cranial nerves, strength   Psych: nl affect.       Data:     Reviewed Rheumatology lab flowsheet  Results for orders placed or performed in visit on 07/14/17   CBC with platelets   Result Value Ref Range    WBC 7.7 4.0 - 11.0 10e9/L    RBC Count 4.92 3.8 - 5.2 10e12/L    Hemoglobin 15.3 11.7 - 15.7 g/dL    Hematocrit 46.5 35.0 - 47.0 %    MCV 95 78 - 100 fl    MCH 31.1 26.5 - 33.0 pg    MCHC 32.9 31.5 - 36.5 g/dL    RDW 14.0 10.0 - 15.0 %    Platelet Count 254 150 - 450 10e9/L     Thank-you for allowing me to participate in your care.     Tyson PEÑA, CNP, MSN  [facility]  Department of Rheumatology & Autoimmune Disorders  [facility] Rheumatology: [phone] Opt 2, then Opt 1 Scheduling   [facility]: [phone]; [phone] Option 7 scheduling

## 2017-07-14 NOTE — MR AVS SNAPSHOT
After Visit Summary   7/14/2017    Reina Conway    MRN: [id number]           Patient Information     Date Of Birth          1955        Visit Information        Provider Department      7/14/2017 3:30 PM Tyson Alicea APRN CNP [facility] Rheumatology        Today's Diagnoses     Chronic fatigue    -  1    Rheumatoid arthritis of multiple sites without rheumatoid factor (H)        Seronegative rheumatoid arthritis (H)        High risk medications (not anticoagulants) long-term use           Follow-ups after your visit        Follow-up notes from your care team     Return for Labs every 8-12 weeks, Labs Today.      Your next 10 appointments already scheduled     Jul 14, 2017  4:00 PM CDT   Lab with  LAB   [facility] ([facility])    [address]  1st Floor  [facility] [id number] [phone]            Jul 24, 2017  2:20 PM CDT   (Arrive by 2:05 PM)   RETURN FOOT/ANKLE with Kenny Gao DPM   [facility] ([facility])    [address]  4th Floor  [facility] [id number] [phone]            Oct 12, 2017  3:30 PM CDT   (Arrive by 3:15 PM)   Return Visit with Guanakito Frost MD   [facility] Rheumatology ([facility])    [address]  3rd Floor  [facility] [id number] [phone]            Oct 12, 2017  4:00 PM CDT   Lab with  LAB   [facility] ([facility])    [address]  1st Floor  [facility] [id number] [phone]              Future tests that were ordered for you today     Open Future Orders        Priority Expected Expires Ordered    TSH with free T4 reflex Routine 7/14/2017 8/3/2017 7/14/2017            Who to contact     If you have questions or need follow up information about today's clinic visit or your schedule please contact [facility] RHEUMATOLOGY directly at [phone].  Normal or non-critical lab and  "imaging results will be communicated to you by MyChart, letter or phone within 4 business days after the clinic has received the results. If you do not hear from us within 7 days, please contact the clinic through [employer] or phone. If you have a critical or abnormal lab result, we will notify you by phone as soon as possible.  Submit refill requests through [employer] or call your pharmacy and they will forward the refill request to us. Please allow 3 business days for your refill to be completed.          Additional Information About Your Visit        [employer] Information     [employer] gives you secure access to your electronic health record. If you see a primary care provider, you can also send messages to your care team and make appointments. If you have questions, please call your primary care clinic.  If you do not have a primary care provider, please call [phone] and they will assist you.        Care EveryWhere ID     This is your Care EveryWhere ID. This could be used by other organizations to access your [place] medical records  [id number]        Your Vitals Were     Pulse Temperature Height BMI (Body Mass Index)          94 98.4  F (36.9  C) (Oral) 1.753 m (5' 9\") 25.08 kg/m2         Blood Pressure from Last 3 Encounters:   07/14/17 (!) 154/91   04/13/17 133/88   12/08/16 138/89    Weight from Last 3 Encounters:   07/14/17 77 kg (169 lb 12.8 oz)   04/13/17 78 kg (172 lb)   12/08/16 76.6 kg (168 lb 12.8 oz)              We Performed the Following     Albumin level     ALT     AST     CBC with platelets     Creatinine     CRP inflammation          Today's Medication Changes          These changes are accurate as of: 7/14/17  3:59 PM.  If you have any questions, ask your nurse or doctor.               These medicines have changed or have updated prescriptions.        Dose/Directions    folic acid 1 MG tablet   Commonly known as:  FOLVITE   This may have changed:  how much to take   Used for:  High risk " medications (not anticoagulants) long-term use, Rheumatoid arthritis of multiple sites without rheumatoid factor (H)   Changed by:  Tyson Alicea APRN CNP        Dose:  2 mg   Take 2 tablets (2 mg) by mouth daily   Quantity:  180 tablet   Refills:  3            Where to get your medicines      These medications were sent to [id number] - [place], [place] - [address] [facility]  [address] [facility] [address]     Phone:  [phone]     folic acid 1 MG tablet    methotrexate 2.5 MG tablet CHEMO         Call your pharmacy to confirm that your medication is ready for pickup. It may take up to 24 hours for them to receive the prescription. If the prescription is not ready within 3 business days, please contact your clinic or your provider.     We will let you know when these medications are ready. If you don't hear back within 3 business days, please contact us.     adalimumab 40 MG/0.8ML pen kit                Primary Care Provider Office Phone # Fax #    Diana Orantes [phone]       [facility] [address] [id number]        Equal Access to Services     [facility]: Hadii blake Browne, keyla cleaning, christine berry, chandana walden . So [facility] [phone].    ATENCIÓN: Si habla español, tiene a martinez disposición servicios gratuitos de asistencia lingüística. [facility] [phone].    We comply with applicable federal civil rights laws and [place] laws. We do not discriminate on the basis of race, color, national origin, age, disability sex, sexual orientation or gender identity.            Thank you!     Thank you for choosing [facility] RHEUMATOLOGY  for your care. Our goal is always to provide you with excellent care. Hearing back from our patients is one way we can continue to improve our services. Please take a few minutes to complete the written survey that you may receive in the mail  after your visit with us. Thank you!             Your Updated Medication List - Protect others around you: Learn how to safely use, store and throw away your medicines at www.disposemymeds.org.          This list is accurate as of: 7/14/17  3:59 PM.  Always use your most recent med list.                   Brand Name Dispense Instructions for use Diagnosis    ACETAMINOPHEN PO      Take 500 mg by mouth daily as needed 2 tablets bid prn    Seronegative rheumatoid arthritis (H), Encounter for long-term (current) use of other medications       adalimumab 40 MG/0.8ML pen kit    HUMIRA PEN    1 kit    Inject 0.8 mLs (40 mg) Subcutaneous every 14 days Hold for signs of infection, and then seek medical attention. SURECLICK PEN.    Seronegative rheumatoid arthritis (H)       folic acid 1 MG tablet    FOLVITE    180 tablet    Take 2 tablets (2 mg) by mouth daily    High risk medications (not anticoagulants) long-term use, Rheumatoid arthritis of multiple sites without rheumatoid factor (H)       methotrexate 2.5 MG tablet CHEMO     24 tablet    Take 6 tablets (15 mg) by mouth once a week Monitoring labs required every 8 - 12 weeks for medication refills.    High risk medications (not anticoagulants) long-term use, Rheumatoid arthritis of multiple sites without rheumatoid factor (H)

## 2017-07-14 NOTE — NURSING NOTE
"Chief Complaint   Patient presents with     RECHECK     3 month f/u   RA       Initial BP (!) 154/91  Pulse 94  Temp 98.4  F (36.9  C) (Oral)  Ht 1.753 m (5' 9\")  Wt 77 kg (169 lb 12.8 oz)  BMI 25.08 kg/m2 Estimated body mass index is 25.08 kg/(m^2) as calculated from the following:    Height as of this encounter: 1.753 m (5' 9\").    Weight as of this encounter: 77 kg (169 lb 12.8 oz).  Medication Reconciliation: complete  "

## 2017-07-14 NOTE — PROGRESS NOTES
"Rheumatology Visit     Reina Conway MRN# [id number]   YOB: 1955 Age: 62 year old     Date of Visit: July 14, 2017  Last visit: April 13, 2017  Primary care provider: Diana Orantes          Assessment and Plan:   1. Seronegative destructive RA (-RF/CCP/LASHAE panel, last repeat xrays [place] 3/2013; deformities, right wrist progressive worse function with dorsiflexion, hammertoes with bilateral toe subluxation, right knee contracture).    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 Seen ophth Dr. Terry 1-2017 (neg exam). No sx today   RA activity=low. Exam shows prior deformities, no RA flaring and no active arthritis. Labs today pending. Mild nausea, hairloss. Will ask her to routinely take FA 1 mg day (missing doses) but for 2 mths go to 2 mg day then report if improved. We will continue the humira and methotrexate at current doses.   2. Hairloss with fatigue. Due for physical for complete evaluation which she will do this should include cancer screening, mamogram, bone health, skin check and over check. Will check thyroid.   3. Alcohol use. Reports abstinent from Alcohol, but then \"occasionally\". She should discuss with Dr. Orantes. I discussed the risks with her today on the liver and MTX. She understands.   3.  Osteopenia by prior DEXA (done 2009), did not start Fosamax Rx by Dr. Orantes.  Patient noted in past her chart now says Osteoporosis. F/U PCP at annual. She agrees   2. Other medical problems as previously documented and as per EHR     PLAN: Discussed in detail with the patient.   1. Continue Humira 40 mg SQ Q 2 weeks.  Continue Methotrexate at 15 mg weekly with 2mg day Folic acid    --Keep up-to-date vaccinations per CDC guidelines with PCP  --Labs every 8-12 weeks--discussed importance of labs every 12 weeks and hold MTX for any infections.    2 F/U Ophthalmology yearly and prn sx. Hx Anterior uveitis.   3. RTC 3 mths, sooner prn      4. " I encouraged her to follow thru with other recommendations of Dr. Orantes. She will schedule appt with Dr. Orantes specifically to discuss her alcohol use and mental health  5. Prior referral to Orthopedic Hand Surgery for their opinion - she did not follow thru.Does not wish          History of Present Illness:   62 year old female who presents for continuing care for her seronegative RA. Seen Dr. Frost until 2010 then swtiched to [facility], then re-established 3/2013 (xrays 2/2013 [place]). EPIC reviewed.    HISTORY CARRIED FORWARD:   Prior: Synovitis and joint deformities in 2008 was persistently seronegative but had active synovitis. Methotrexate helped but did not cause a remission/low disease state. She required Prednisone to control symptoms partially but still had significant ADL issues. We had persistently recommended anti TNF therapy which she resisted. She sought a second opinion at [place] who recommended the same things, and she continued care there as of August 2010, then switched back to Dr. Frost 3/2013. Begun on Enbrel at [facility]. 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 As no other etiology found it was felt it might be due to Enbrel and she was switched to Humira. She has remained on Methotrexate, primarily 25 mg weekly, decreased 3/2013 (not to go <15mg week due to risk of further deformities or uveitis) . FRAX 32% at [place]. Prior declined biphosphosphonate, [place] recommended IV.   Xrays 3/2013 [place]: See records. Hallux valgus right, hammertoes L>R, hindfoot valgus, pes planus. Dislocated left 2nd MTP, sublux left 3rd MTP, arth 2-3 MTP, rt 1st MTP. Mild DJD hands. Several DIP and 1st CMC. Subluxation left thumb IP and persistant dorsiflexion right wrist.   Previous visits discussed stress in her life. Her father in law passed away and there had been a lot of stress and she put her issues on the back burner. She had an episode of  "chest/epigastric pain and was seen at [facility]. She had apparent negative cardiac evaluation although did not followup with a stress test. She is taking OTC Zantac prn and thinks that helps. Found allergic to Wheat, avoid gluten and sugars. Feels much improved [heartburn, bloating, blood in stools, irregular stools] this really changed her digestive system. She went on a gluten free diet in December, and stopped all RA meds in early Jan 2015. See My Chart message.   Restarted Humira early April 2015 every 3 weeks. Noticed more migatory joints pains, swelling. Right hand, right knee, right wrist-associated swelling really in right 2nd MCP. 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 Lack of movement and coordinations, using her wrist brace. Uses this at night and daily prn.     At last visit she had continued on Humira without side effects and still feels it is helpful. She does have daily discomfort in several areas with either activity or prior damage, especially knees.     She continued to have stress in her life but is working through this and \"trying to get back on track\". She stopped drinking any alcohol for the past two months and was congratulated on this. She went to  but does not feel she is alcoholic.  Leonardo going to [facility]. She has been  for a long time. Both lost parents, and grieving. She says she's in less denial. She admits  is helping a lot.      She called in November due to increased joint pain and we restarted Methotrexate at 10 mg weekly as previously discussed.  She stopped it two weeks ago.  She noted more joint pain and also some nausea on day of dose.  We discussed this at length and unlikely that MTX contributed to more joint symptoms.     At January 2016 visit we readded Methotrexate to Humira in hopes of decreasing disease activity, using low dose due to prior side effect complaints.  She called later that month with flare symptoms requiring Prednisone bridge, and we " increased Methotrexate to more standard dose of 15 mg weekly.      At her April 2016 visit she had started improving.. She was tolerating Methotrexate this time.  She weaned off Prednisone.    July 14, 2017  Last seen Dr. Frost in April. MTX and humira were continued. Patient had not followed-up with ophthalmology nor Dr. Orantes  Humira 40 mg injections Q 2 weeks, Methotrexate 15 mg once Q Monday (slight appetite loss, hairloss mild, nausea x2 days after--no mouth sores), FA 1 mg day. No SLE symptoms. No cyclical wearing down actually improves end of cyclical. Will stretch out the the medications a few days out the end of the 4 weeks since doing so well. No SLE symptoms. No EAS, no joint pain or swelling. No changes in function of her hands. No changes in appearance joints. No infections. No changes in her health. Not been in the hospital. No uveititis symptoms. No eye pain no change in visions. Right hand is still  The hardest, stable but over the long term had progressed. Some stiffness in the morning right hand at time. MIld pain in left wrists, more harder to carry or lift with this hand. Knees are good. Feet are more pain under her hammertoes. No swelling more this pain due ot her prior deformities. Doing her artistry still. Due for her physical --some fatigue which she agrees to schedule. No falls or injuries. No night sweats. Appetite is good. No changes to her skin. Appetite is good. No NSAID. Tylenol prn           Review of Systems:   Review Of Systems  Skin: negative  Eyes: negative  Ears/Nose/Throat: negative  Respiratory: No shortness of breath, dyspnea on exertion, cough, or hemoptysis  Cardiovascular: negative  Gastrointestinal: negative  Genitourinary: negative  Musculoskeletal: see HPI  Neurologic: negative  Psychiatric: negative  Hematologic/Lymphatic/Immunologic: negative  Endocrine: negative          Past Medical History:     Past Medical History:   Diagnosis Date     Anterior uveitis      secondary to Enbrel     Fibroid      high in 2007 then normalized     History of Graves' disease      Hyperlipidemia LDL goal < 130     declined meication     Menopause 2008     Osteopenia 1/14/2016     Osteoporosis     Osteopenia borderline osteoporosis     Right posterior uveitis      Seronegative rheumatoid arthritis (H)     dx Dr. Frost     Uveitis        Patient Active Problem List    Diagnosis Date Noted     Mechanical limb problems 10/14/2016     Priority: Medium     Pain in both wrists 10/07/2016     Priority: Medium     Rheumatoid arthritis of multiple sites without rheumatoid factor (H) 02/19/2016     Priority: Medium     Osteopenia 01/14/2016     Priority: Medium     Situational mixed anxiety and depressive disorder 09/18/2014     Priority: Medium     Lump or mass in breast 09/02/2014     Priority: Medium     Bunion 09/02/2014     Priority: Medium     Hemorrhoids 07/15/2014     Priority: Medium     Uveitis, anterior 06/21/2013     Priority: Medium     Encounter for long-term current use of medication 03/21/2013     Priority: Medium     Problem list name updated by automated process. Provider to review       Hammer toe 02/21/2013     Priority: Medium             Past Surgical History:     Past Surgical History:   Procedure Laterality Date     Fibroidadenoma Left Breast       NO HISTORY OF SURGERY               Social History:     Social History   Substance Use Topics     Smoking status: Never Smoker     Smokeless tobacco: Never Used     Alcohol use 3.5 - 7.0 oz/week     7 - 14 Standard drinks or equivalent per week     No alcohol use --occasional 2x weeks. Never smoker. . Retired           Family History:     Family History   Problem Relation Age of Onset     Breast Cancer Mother      parkinsons     Breast Cancer Maternal Aunt      Other - See Comments       Inflammation joint in brother, maternal cousin      Glaucoma No family hx of      Macular Degeneration No family hx of      Retinal  "detachment No family hx of             Allergies:     Allergies   Allergen Reactions     Fosamax      Throat discomfort     No Clinical Screening - See Comments      Joints flared after getting possible \"white drink\" after CT/MRI in 2007              Medications:     Current Outpatient Prescriptions   Medication Sig Dispense Refill     folic acid (FOLVITE) 1 MG tablet Take 1 tablet (1,000 mcg) by mouth daily 90 tablet 3     methotrexate 2.5 MG tablet CHEMO Take 6 tablets (15 mg) by mouth once a week Monitoring labs required every 8 - 12 weeks for medication refills. 24 tablet 4     adalimumab (HUMIRA PEN) 40 MG/0.8ML pen kit Inject 0.8 mLs (40 mg) Subcutaneous every 14 days Hold for signs of infection, and then seek medical attention. SURECLICK PEN. 1 kit 1     ACETAMINOPHEN PO Take 500 mg by mouth daily as needed 2 tablets bid prn              Physical Exam:   BP (!) 154/91  Pulse 94  Temp 98.4  F (36.9  C) (Oral)  Ht 1.753 m (5' 9\")  Wt 77 kg (169 lb 12.8 oz)  BMI 25.08 kg/m2  Wt Readings from Last 4 Encounters:   07/14/17 77 kg (169 lb 12.8 oz)   04/13/17 78 kg (172 lb)   12/08/16 76.6 kg (168 lb 12.8 oz)   05/31/16 75.3 kg (165 lb 14.4 oz)   Constitutional: WD-WN-WG cooperative   Eyes: nl conjunctiva, sclera   ENT: nl external ears, nose, throat   No mucous membrane lesions, normal saliva pool   LUNGS; CTA posteriorly  Cardiac: S1S2 no murmurs rubs or gallops   Neck: no mass or thyroid enlargement   MS: She has previously noted subluxation at the R wrist/mild in L wrist with decreased ROM, minimal pannus, swan in right hand right thumb deformity unchanged. Right 2-3nd MCP pannus no active swelling. Right hand ulnar deviation, right thumb drop , right 5th DIP subluxation, right wrist subluxation, hammertoes. Bunions bilaterally. Deformities of MTPs with palpable MTP heads. Pes planus. FROM hips. Right knee 1+ cool (not red) swelling with contracture but with 30 extension lag.--before was R knee cool with " trace effusion but with 10 extension lag. Right foot bunion. All TMJ, neck, shoulder, elbow, wrist, MCP/PIP/DIP, spine, hip, knee, ankle, and foot MTP/IP joints were examined.   Skin: no nail pitting, alopecia, rash, nodules or lesions.   Neuro: nl cranial nerves, strength   Psych: nl affect.       Data:     Reviewed Rheumatology lab flowsheet  Results for orders placed or performed in visit on 07/14/17   CBC with platelets   Result Value Ref Range    WBC 7.7 4.0 - 11.0 10e9/L    RBC Count 4.92 3.8 - 5.2 10e12/L    Hemoglobin 15.3 11.7 - 15.7 g/dL    Hematocrit 46.5 35.0 - 47.0 %    MCV 95 78 - 100 fl    MCH 31.1 26.5 - 33.0 pg    MCHC 32.9 31.5 - 36.5 g/dL    RDW 14.0 10.0 - 15.0 %    Platelet Count 254 150 - 450 10e9/L     Thank-you for allowing me to participate in your care.     Tyson Alicea APRN, CNP, MSN  [facility]  Department of Rheumatology & Autoimmune Disorders  [facility]: [phone] Opt 2, then Opt 1 Scheduling   [facility]: [phone]; [phone] Option 7 scheduling

## 2017-07-24 ENCOUNTER — OFFICE VISIT (OUTPATIENT)
Dept: ORTHOPEDICS | Facility: CLINIC | Age: 62
End: 2017-07-24

## 2017-07-24 DIAGNOSIS — L98.9 SKIN LESION: Primary | ICD-10-CM

## 2017-07-24 DIAGNOSIS — L84 TYLOMA: ICD-10-CM

## 2017-07-24 DIAGNOSIS — B35.1 DERMATOPHYTOSIS OF NAIL: ICD-10-CM

## 2017-07-24 DIAGNOSIS — M79.605 PAIN IN BOTH LOWER EXTREMITIES: ICD-10-CM

## 2017-07-24 DIAGNOSIS — M79.604 PAIN IN BOTH LOWER EXTREMITIES: ICD-10-CM

## 2017-07-24 NOTE — LETTER
7/24/2017       RE: Reina Conway  [address] [facility] [address]     Dear Colleague,    Thank you for referring your patient, Reina Conway, to the [facility] at [facility]. Please see a copy of my visit note below.    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 Frost     Uveitis      Patient Active Problem List   Diagnosis     Hammer toe     Encounter for long-term current use of medication     Uveitis, anterior     Hemorrhoids     Lump or mass in breast     Bunion     Situational mixed anxiety and depressive disorder     Osteopenia     Rheumatoid arthritis of multiple sites without rheumatoid factor (H)     Pain in both wrists     Mechanical limb problems     Past Surgical History:   Procedure Laterality Date     Fibroidadenoma Left Breast       NO HISTORY OF SURGERY       Social History     Social History     Marital status:      Spouse name: N/A     Number of children: N/A     Years of education: N/A     Occupational History     Not on file.     Social History Main Topics     Smoking status: Never Smoker     Smokeless tobacco: Never Used     Alcohol use 3.5 - 7.0 oz/week      Comment: social use prn     Drug use: No     Sexual activity: Not on file     Other Topics Concern     Not on file     Social History Narrative    How much exercise per week? Walking, stairs    How much calcium per day? 2-3 servings       How much caffeine per day? 2-3 cups coffee    How much vitamin D per day?      Do you/your family wear seatbelts?  Yes    Do you/your  family use safety helmets? No    Do you/your family use sunscreen? No    Do you/your family keep firearms in the home? No    Do you/your family have a smoke detector(s)? Yes        Do you feel safe in your home? Yes    Has anyone ever touched you in an unwanted manner? No     Explain         September 2, 2014 Kavonprudencio Villa TIMMY                 Family History   Problem Relation Age of Onset     Breast Cancer Mother      parkinsons     OSTEOPOROSIS Mother      Low Back Problems Mother      Breast Cancer Maternal Aunt      Other - See Comments Other      Inflammation joint in brother, maternal cousin      DIABETES Maternal Grandmother      Anxiety Disorder Father      MENTAL ILLNESS Cousin      Alcoholism Paternal Grandfather      Glaucoma No family hx of      Macular Degeneration No family hx of      Retinal detachment No family hx of      SUBJECTIVE FINDINGS: Reina Conway is a 62 year old female presents for toenails and calluses. Patient relates she has been having some pain secondary to callus buildup. She has been preforming self debridement of the calluses. She also relates that she has been having ongoing issues with toenail fungus. She has been using Tea tree oil.      OBJECTIVE FINDINGS: DP and PT 2/4, bilaterally. Nucleated hyperkeratotic tissue buildup along the 2nd-4th MPJs, left greater than the right, with pain on palpation of the left. Underlying ecchymosis of the left 2nd MPJ. Dorsally contracted digits, 2-5, bilaterally. Laterally deviated hallux with dorsal medial 1st MPJ, right foot. Dystrophic, discolored nails 1-5, right foot, and to a lesser degree the left. She has non raised skin dyscoloration on right dorsal foot, and hperkeratotic raised lesion on distal left Hallux.  No erythema, no edema, no odor, no calor bilaterally.        ASSESSMENT AND PLAN: Tylomas causing pain. Onychomycosis. Diagnosis and treatment options discussed with patient. Tylomas, bilaterally, were debrided with a #15 blade  upon consent. Right hallux nail was debrided and other nails reduced with a tissue cutter upon consent. The right Hallux nail bled upon debridement, lwc done upon consent and use d/w her.  Patient will return to clinic as needed.  Referral to Dermatology for skin lesions given and use d/w patient.      Sincerely,    Kenny Gao DPM

## 2017-07-24 NOTE — MR AVS SNAPSHOT
After Visit Summary   7/24/2017    Reina Conway    MRN: [id number]           Patient Information     Date Of Birth          1955        Visit Information        Provider Department      7/24/2017 2:20 PM Kenny Gao DPM [facility]        Today's Diagnoses     Skin lesion    -  1    Tyloma        Pain in both lower extremities        Dermatophytosis of nail           Follow-ups after your visit        Additional Services     DERMATOLOGY REFERRAL       Please evaluate and manage for skin lesion right dorsal foot and left distal Hallux.                  Your next 10 appointments already scheduled     Oct 12, 2017  3:30 PM CDT   (Arrive by 3:15 PM)   Return Visit with Guanakito Frost MD   [facility] Rheumat[facility] ([facility])    [address] [place] [address]  3rd [facility] [address] [phone]            Oct 12, 2017  4:00 PM CDT   Lab with  LAB   [facility] Lab ([facility])    [address] [place] [address] [phone]            Oct 17, 2017  1:30 PM CDT   (Arrive by 1:15 PM)   New Patient Visit with Xochitl Mendenhall MD   [facility] Dermatology ([facility])    [address] [place] [address] [phone]              Who to contact     Please call your clinic at [phone] to:    Ask questions about your health    Make or cancel appointments    Discuss your medicines    Learn about your test results    Speak to your doctor   If you have compliments or concerns about an experience at your clinic, or if you wish to file a complaint, please contact [facility] Patient Relations at [phone] or email us at [email or web address]         Additional Information About Your Visit        MyChart Information     MyChart gives you secure access to your electronic health record. If you see a primary  care provider, you can also send messages to your care team and make appointments. If you have questions, please call your primary care clinic.  If you do not have a primary care provider, please call [phone] and they will assist you.      [employer] is an electronic gateway that provides easy, online access to your medical records. With [employer], you can request a clinic appointment, read your test results, renew a prescription or communicate with your care team.     To access your existing account, please contact your [facility] or call [phone] for assistance.        Care EveryWhere ID     This is your Care EveryWhere ID. This could be used by other organizations to access your [facility] medical records  [id number]         Blood Pressure from Last 3 Encounters:   07/14/17 (!) 154/91   04/13/17 133/88   12/08/16 138/89    Weight from Last 3 Encounters:   07/14/17 77 kg (169 lb 12.8 oz)   04/13/17 78 kg (172 lb)   12/08/16 76.6 kg (168 lb 12.8 oz)              We Performed the Following     DEBRIDEMENT OF NAIL(S), 1-5     DERMATOLOGY REFERRAL     TRIM HYPERKERATOTIC SKIN LESION,2-4        Primary Care Provider Office Phone # Fax #    Diana Orantes [phone]       [facility] [address] [place] [address]        Equal Access to Services     CHRISTINE MONROE : Hadii blake ku hadasho Sodelaneyali, waaxda luqadaha, qaybta kaalmada jamal, chandana bruno. So [facility] [phone].    ATENCIÓN: Si habla español, tiene a martinez disposición servicios gratuitos de asistencia lingüística. Llcorrie al [phone].    We comply with applicable federal civil rights laws and [place] laws. We do not discriminate on the basis of race, color, national origin, age, disability sex, sexual orientation or gender identity.            Thank you!     Thank you for choosing [facility]  for your care. Our goal is always to  provide you with excellent care. Hearing back from our patients is one way we can continue to improve our services. Please take a few minutes to complete the written survey that you may receive in the mail after your visit with us. Thank you!             Your Updated Medication List - Protect others around you: Learn how to safely use, store and throw away your medicines at www.disposemymeds.org.          This list is accurate as of: 7/24/17  2:31 PM.  Always use your most recent med list.                   Brand Name Dispense Instructions for use Diagnosis    adalimumab 40 MG/0.8ML pen kit    HUMIRA PEN    1 kit    Inject 0.8 mLs (40 mg) Subcutaneous every 14 days Hold for signs of infection, and then seek medical attention. SURECLICK PEN.    Seronegative rheumatoid arthritis (H)       folic acid 1 MG tablet    FOLVITE    180 tablet    Take 2 tablets (2 mg) by mouth daily    High risk medications (not anticoagulants) long-term use, Rheumatoid arthritis of multiple sites without rheumatoid factor (H)       methotrexate 2.5 MG tablet CHEMO     24 tablet    Take 6 tablets (15 mg) by mouth once a week Monitoring labs required every 8 - 12 weeks for medication refills.    High risk medications (not anticoagulants) long-term use, Rheumatoid arthritis of multiple sites without rheumatoid factor (H)

## 2017-07-24 NOTE — NURSING NOTE
"Reason For Visit:   Chief Complaint   Patient presents with     RECHECK     Foot check. Pt stated that she has some concerns that she would like to talk about. Calluses, Bilateral feet. Toe naul fungus. Pt was last seen on 11-.        Pain Assessment  Patient Currently in Pain: Yes  Primary Pain Location: Foot  Pain Orientation: Right, Left  Aggravating Factors: Walking         Current Outpatient Prescriptions   Medication Sig Dispense Refill     methotrexate 2.5 MG tablet CHEMO Take 6 tablets (15 mg) by mouth once a week Monitoring labs required every 8 - 12 weeks for medication refills. 24 tablet 4     folic acid (FOLVITE) 1 MG tablet Take 2 tablets (2 mg) by mouth daily 180 tablet 3     adalimumab (HUMIRA PEN) 40 MG/0.8ML pen kit Inject 0.8 mLs (40 mg) Subcutaneous every 14 days Hold for signs of infection, and then seek medical attention. SURECLICK PEN. 1 kit 5          Allergies   Allergen Reactions     Fosamax      Throat discomfort     No Clinical Screening - See Comments      Joints flared after getting possible \"white drink\" after CT/MRI in 2007                "

## 2017-07-24 NOTE — PROGRESS NOTES
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 Frost     Uveitis      Patient Active Problem List   Diagnosis     Hammer toe     Encounter for long-term current use of medication     Uveitis, anterior     Hemorrhoids     Lump or mass in breast     Bunion     Situational mixed anxiety and depressive disorder     Osteopenia     Rheumatoid arthritis of multiple sites without rheumatoid factor (H)     Pain in both wrists     Mechanical limb problems     Past Surgical History:   Procedure Laterality Date     Fibroidadenoma Left Breast       NO HISTORY OF SURGERY       Social History     Social History     Marital status:      Spouse name: N/A     Number of children: N/A     Years of education: N/A     Occupational History     Not on file.     Social History Main Topics     Smoking status: Never Smoker     Smokeless tobacco: Never Used     Alcohol use 3.5 - 7.0 oz/week      Comment: social use prn     Drug use: No     Sexual activity: Not on file     Other Topics Concern     Not on file     Social History Narrative    How much exercise per week? Walking, stairs    How much calcium per day? 2-3 servings       How much caffeine per day? 2-3 cups coffee    How much vitamin D per day?      Do you/your family wear seatbelts?  Yes    Do you/your family use safety helmets? No    Do you/your family use sunscreen? No    Do you/your family keep firearms in the home? No    Do you/your family have a smoke detector(s)? Yes        Do you feel safe in your home? Yes    Has anyone ever touched you in an unwanted manner? No     Explain          September 2, 2014 Tamiko Villa TIMMY                 Family History   Problem Relation Age of Onset     Breast Cancer Mother      parkinsons     OSTEOPOROSIS Mother      Low Back Problems Mother      Breast Cancer Maternal Aunt      Other - See Comments Other      Inflammation joint in brother, maternal cousin      DIABETES Maternal Grandmother      Anxiety Disorder Father      MENTAL ILLNESS Cousin      Alcoholism Paternal Grandfather      Glaucoma No family hx of      Macular Degeneration No family hx of      Retinal detachment No family hx of      SUBJECTIVE FINDINGS: Reina Conway is a 62 year old female presents for toenails and calluses. Patient relates she has been having some pain secondary to callus buildup. She has been preforming self debridement of the calluses. She also relates that she has been having ongoing issues with toenail fungus. She has been using Tea tree oil.      OBJECTIVE FINDINGS: DP and PT 2/4, bilaterally. Nucleated hyperkeratotic tissue buildup along the 2nd-4th MPJs, left greater than the right, with pain on palpation of the left. Underlying ecchymosis of the left 2nd MPJ. Dorsally contracted digits, 2-5, bilaterally. Laterally deviated hallux with dorsal medial 1st MPJ, right foot. Dystrophic, discolored nails 1-5, right foot, and to a lesser degree the left. She has non raised skin dyscoloration on right dorsal foot, and hperkeratotic raised lesion on distal left Hallux.  No erythema, no edema, no odor, no calor bilaterally.        ASSESSMENT AND PLAN: Tylomas causing pain. Onychomycosis. Diagnosis and treatment options discussed with patient. Tylomas, bilaterally, were debrided with a #15 blade upon consent. Right hallux nail was debrided and other nails reduced with a tissue cutter upon consent. The right Hallux nail bled upon debridement, lwc done upon consent and use d/w her.  Patient will return to clinic as needed.  Referral to Dermatology for skin lesions given and use d/w  patient.

## 2017-10-12 ENCOUNTER — OFFICE VISIT (OUTPATIENT)
Dept: RHEUMATOLOGY | Facility: CLINIC | Age: 62
End: 2017-10-12
Attending: INTERNAL MEDICINE
Payer: COMMERCIAL

## 2017-10-12 VITALS
DIASTOLIC BLOOD PRESSURE: 81 MMHG | WEIGHT: 173.1 LBS | TEMPERATURE: 98 F | BODY MASS INDEX: 25.64 KG/M2 | RESPIRATION RATE: 18 BRPM | HEART RATE: 82 BPM | SYSTOLIC BLOOD PRESSURE: 139 MMHG | HEIGHT: 69 IN | OXYGEN SATURATION: 93 %

## 2017-10-12 DIAGNOSIS — Z79.899 HIGH RISK MEDICATIONS (NOT ANTICOAGULANTS) LONG-TERM USE: ICD-10-CM

## 2017-10-12 DIAGNOSIS — M06.09 RHEUMATOID ARTHRITIS OF MULTIPLE SITES WITHOUT RHEUMATOID FACTOR (H): ICD-10-CM

## 2017-10-12 LAB
ALBUMIN SERPL-MCNC: 3.8 G/DL (ref 3.4–5)
ALT SERPL W P-5'-P-CCNC: 20 U/L (ref 0–50)
AST SERPL W P-5'-P-CCNC: 15 U/L (ref 0–45)
CREAT SERPL-MCNC: 0.81 MG/DL (ref 0.52–1.04)
CRP SERPL-MCNC: 5.1 MG/L (ref 0–8)
ERYTHROCYTE [DISTWIDTH] IN BLOOD BY AUTOMATED COUNT: 13.2 % (ref 10–15)
GFR SERPL CREATININE-BSD FRML MDRD: 72 ML/MIN/1.7M2
HCT VFR BLD AUTO: 42.9 % (ref 35–47)
HGB BLD-MCNC: 14.3 G/DL (ref 11.7–15.7)
MCH RBC QN AUTO: 31.6 PG (ref 26.5–33)
MCHC RBC AUTO-ENTMCNC: 33.3 G/DL (ref 31.5–36.5)
MCV RBC AUTO: 95 FL (ref 78–100)
PLATELET # BLD AUTO: 234 10E9/L (ref 150–450)
RBC # BLD AUTO: 4.52 10E12/L (ref 3.8–5.2)
WBC # BLD AUTO: 8.4 10E9/L (ref 4–11)

## 2017-10-12 PROCEDURE — 82040 ASSAY OF SERUM ALBUMIN: CPT | Performed by: INTERNAL MEDICINE

## 2017-10-12 PROCEDURE — 86140 C-REACTIVE PROTEIN: CPT | Performed by: INTERNAL MEDICINE

## 2017-10-12 PROCEDURE — 36415 COLL VENOUS BLD VENIPUNCTURE: CPT | Performed by: INTERNAL MEDICINE

## 2017-10-12 PROCEDURE — 84450 TRANSFERASE (AST) (SGOT): CPT | Performed by: INTERNAL MEDICINE

## 2017-10-12 PROCEDURE — 82565 ASSAY OF CREATININE: CPT | Performed by: INTERNAL MEDICINE

## 2017-10-12 PROCEDURE — 85027 COMPLETE CBC AUTOMATED: CPT | Performed by: INTERNAL MEDICINE

## 2017-10-12 PROCEDURE — 99212 OFFICE O/P EST SF 10 MIN: CPT | Mod: ZF

## 2017-10-12 PROCEDURE — 84460 ALANINE AMINO (ALT) (SGPT): CPT | Performed by: INTERNAL MEDICINE

## 2017-10-12 RX ORDER — ACETAMINOPHEN 500 MG
500-1000 TABLET ORAL EVERY 6 HOURS PRN
COMMUNITY
End: 2020-09-22

## 2017-10-12 ASSESSMENT — PAIN SCALES - GENERAL: PAINLEVEL: NO PAIN (0)

## 2017-10-12 NOTE — PROGRESS NOTES
"Rheumatology Visit     Reina Conway MRN# [id number]   YOB: 1955 Age: 62 year old     Date of Visit: October 12, 2017  Primary care provider: Diana Orantes          Assessment and Plan:   1. Seronegative destructive RA (-RF/CCP/LASHAE panel, last repeat xrays [place] 3/2013; deformities, right wrist progressive worse function with dorsiflexion, hammertoes with bilateral toe subluxation, right knee contracture).    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 Seen ophth Dr. Terry 1-2017 (neg exam). No sx today   RA activity=low. Exam shows prior deformities, no RA flaring and no active arthritis. Labs today pending. Mild nausea, hairloss. Will ask her to routinely take FA 1 mg day (missing doses) but for 2 mths go to 2 mg day then report if improved. We will continue the humira and methotrexate at current doses.   2. Hairloss with fatigue. Due for physical for complete evaluation which she will do this should include cancer screening, mamogram, bone health, skin check and over check. Will check thyroid.   3. Alcohol use. Reports abstinent from Alcohol, but then \"occasionally\". She should discuss with Dr. Orantes. I discussed the risks with her today on the liver and MTX. She understands.   3.  Osteopenia by prior DEXA (done 2009), did not start Fosamax Rx by Dr. Orantes.  Patient noted in past her chart now says Osteoporosis. F/U PCP at annual. She agrees   2. Other medical problems as previously documented and as per EHR      PLAN: Discussed in detail with the patient.   1. Continue Humira 40 mg SQ Q 2 weeks.  Continue Methotrexate at 15 mg weekly with 2mg day Folic acid    --Keep up-to-date vaccinations per CDC guidelines with PCP. Flu shot this fall  --Labs every 8-12 weeks--discussed importance of labs every 12 weeks and hold MTX for any infections.    2 F/U Ophthalmology yearly and prn sx.   3. RTC 3 mths, sooner prn      4. I encouraged her to " follow thru with other recommendations of Dr. Orantes. She will schedule appt with Dr. Orantes specifically to discuss her alcohol use and mental health  5. Prior referral to Orthopedic Hand Surgery for their opinion - she did not follow thru.    Guanakito Frost MD           History of Present Illness:   62 year old female who presents for continuing care for her seronegative RA. Seen by Dr. Frost until 2010 then swtiched to [facility], then re-established 3/2013 (xrays 2/2013 [facility]). EPIC reviewed.  I saw her last in April, by Tyson in July.  HISTORY CARRIED FORWARD:   Prior: Synovitis and joint deformities in 2008 was persistently seronegative but had active synovitis. Methotrexate helped but did not cause a remission/low disease state. She required Prednisone to control symptoms partially but still had significant ADL issues. We had persistently recommended anti TNF therapy which she resisted. She sought a second opinion at [facility] who recommended the same things, and she continued care there as of August 2010, then switched back to Dr. Frost 3/2013. Begun on Enbrel at [facility]. 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 As no other etiology found it was felt it might be due to Enbrel and she was switched to Humira. She has remained on Methotrexate, primarily 25 mg weekly, decreased 3/2013 (not to go <15mg week due to risk of further deformities or uveitis) . FRAX 32% at [facility]. Prior declined biphosphosphonate, [facility] recommended IV.   Xrays 3/2013 [facility]: See records. Hallux valgus right, hammertoes L>R, hindfoot valgus, pes planus. Dislocated left 2nd MTP, sublux left 3rd MTP, arth 2-3 MTP, rt 1st MTP. Mild DJD hands. Several DIP and 1st CMC. Subluxation left thumb IP and persistant dorsiflexion right wrist.   Previous visits discussed stress in her life. Her father in law passed away and there had been a lot of stress and she put her issues on the back  "burner. She had an episode of chest/epigastric pain and was seen at [facility]. She had apparent negative cardiac evaluation although did not followup with a stress test. She is taking OTC Zantac prn and thinks that helps. Found allergic to Wheat, avoid gluten and sugars. Feels much improved [heartburn, bloating, blood in stools, irregular stools] this really changed her digestive system. She went on a gluten free diet in December, and stopped all RA meds in early Jan 2015. See My Chart message.   Restarted Humira early April 2015 every 3 weeks. Noticed more migatory joints pains, swelling. Right hand, right knee, right wrist-associated swelling really in right 2nd MCP. 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 Lack of movement and coordinations, using her wrist brace. Uses this at night and daily prn.      At last visit she had continued on Humira without side effects and still feels it is helpful. She does have daily discomfort in several areas with either activity or prior damage, especially knees.      She continued to have stress in her life but is working through this and \"trying to get back on track\". She stopped drinking any alcohol for the past two months and was congratulated on this. She went to  but does not feel she is alcoholic.  Leonardo going to [facility]. She has been  for a long time. Both lost parents, and grieving. She says she's in less denial. She admits  is helping a lot.       She called in November due to increased joint pain and we restarted Methotrexate at 10 mg weekly as previously discussed.  She stopped it two weeks ago.  She noted more joint pain and also some nausea on day of dose.  We discussed this at length and unlikely that MTX contributed to more joint symptoms.      At January 2016 visit we readded Methotrexate to Humira in hopes of decreasing disease activity, using low dose due to prior side effect complaints.  She called later that month with flare symptoms " requiring Prednisone bridge, and we increased Methotrexate to more standard dose of 15 mg weekly.       At her April 2016 visit she had started improving.. She was tolerating Methotrexate this time.  She weaned off Prednisone.     July 14, 2017  Last seen Dr. Frost in April. MTX and humira were continued. Patient had not followed-up with ophthalmology nor Dr. Orantes  Humira 40 mg injections Q 2 weeks, Methotrexate 15 mg once Q Monday (slight appetite loss, hairloss mild, nausea x2 days after--no mouth sores), FA 1 mg day. No SLE symptoms. No cyclical wearing down actually improves end of cyclical. Will stretch out the the medications a few days out the end of the 4 weeks since doing so well. No SLE symptoms. No EAS, no joint pain or swelling. No changes in function of her hands. No changes in appearance joints. No infections. No changes in her health. Not been in the hospital. No uveititis symptoms. No eye pain no change in visions. Right hand is still  The hardest, stable but over the long term had progressed. Some stiffness in the morning right hand at time. MIld pain in left wrists, more harder to carry or lift with this hand. Knees are good. Feet are more pain under her hammertoes. No swelling more this pain due ot her prior deformities. Doing her artistry still. Due for her physical --some fatigue which she agrees to schedule. No falls or injuries. No night sweats. Appetite is good. No changes to her skin. Appetite is good. No NSAID. Tylenol prn      INTERVAL HISTORY 10/12/17:  Lab in July was normal on DMARDs/Humira.  Her symptoms are little changed.  She denies side effects on the medications.   She denies interval illnesses.   ADLs stable.       Review of Systems:   Review Of Systems  Skin: negative  Eyes: negative  Ears/Nose/Throat: negative  Respiratory: No shortness of breath, dyspnea on exertion, cough, or hemoptysis  Cardiovascular: negative  Gastrointestinal: negative  Genitourinary:  negative  Musculoskeletal:see HPI  Neurologic: negative  Psychiatric: negative  Hematologic/Lymphatic/Immunologic: negative  Endocrine: negative          Past Medical History:     Past Medical History:   Diagnosis Date     Anterior uveitis     secondary to Enbrel     Fibroid      high in 2007 then normalized     History of Graves' disease      Hyperlipidemia LDL goal < 130     declined meication     Menopause 2008     Osteopenia 1/14/2016     Osteoporosis     Osteopenia borderline osteoporosis     RA (rheumatoid arthritis) (H)      Right posterior uveitis      Seronegative rheumatoid arthritis (H)     dx Dr. Frost     Uveitis        Patient Active Problem List    Diagnosis Date Noted     Mechanical limb problems 10/14/2016     Priority: Medium     Pain in both wrists 10/07/2016     Priority: Medium     Rheumatoid arthritis of multiple sites without rheumatoid factor (H) 02/19/2016     Priority: Medium     Osteopenia 01/14/2016     Priority: Medium     Situational mixed anxiety and depressive disorder 09/18/2014     Priority: Medium     Lump or mass in breast 09/02/2014     Priority: Medium     Bunion 09/02/2014     Priority: Medium     Hemorrhoids 07/15/2014     Priority: Medium     Uveitis, anterior 06/21/2013     Priority: Medium     Encounter for long-term current use of medication 03/21/2013     Priority: Medium     Problem list name updated by automated process. Provider to review       Hammer toe 02/21/2013     Priority: Medium             Past Surgical History:     Past Surgical History:   Procedure Laterality Date     Fibroidadenoma Left Breast       NO HISTORY OF SURGERY               Social History:     Social History   Substance Use Topics     Smoking status: Never Smoker     Smokeless tobacco: Never Used     Alcohol use 3.5 - 7.0 oz/week      Comment: social use prn             Family History:     Family History   Problem Relation Age of Onset     Breast Cancer Mother      parkinsons      "OSTEOPOROSIS Mother      Low Back Problems Mother      Breast Cancer Maternal Aunt      Other - See Comments Other      Inflammation joint in brother, maternal cousin      DIABETES Maternal Grandmother      Anxiety Disorder Father      MENTAL ILLNESS Cousin      Alcoholism Paternal Grandfather      Glaucoma No family hx of      Macular Degeneration No family hx of      Retinal detachment No family hx of             Allergies:     Allergies   Allergen Reactions     Fosamax      Throat discomfort     No Clinical Screening - See Comments      Joints flared after getting possible \"white drink\" after CT/MRI in 2007              Medications:     Current Outpatient Prescriptions   Medication Sig Dispense Refill     methotrexate 2.5 MG tablet CHEMO Take 6 tablets (15 mg) by mouth once a week Monitoring labs required every 8 - 12 weeks for medication refills. 24 tablet 4     folic acid (FOLVITE) 1 MG tablet Take 2 tablets (2 mg) by mouth daily 180 tablet 3     adalimumab (HUMIRA PEN) 40 MG/0.8ML pen kit Inject 0.8 mLs (40 mg) Subcutaneous every 14 days Hold for signs of infection, and then seek medical attention. SURECLICK PEN. 1 kit 5            Physical Exam:   /81 (BP Location: Right arm, Patient Position: Sitting, Cuff Size: Adult Regular)  Pulse 82  Temp 98  F (36.7  C) (Oral)  Resp 18  Ht 1.753 m (5' 9.02\")  Wt 78.5 kg (173 lb 1.6 oz)  SpO2 93%  BMI 25.55 kg/m2    Constitutional: [id number] cooperative   Eyes: nl conjunctiva, sclera   ENT: nl external ears, nose, throat   No mucous membrane lesions, normal saliva pool   Neck: no mass or thyroid enlargement   MS: She has previously noted subluxation at the R wrist/mild in L wrist with decreased ROM, minimal pannus, swan in right hand right thumb deformity unchanged. Right 2-3nd MCP pannus no active swelling. Right hand ulnar deviation, right thumb drop , right 5th DIP subluxation, right wrist subluxation, hammertoes. Bunions bilaterally. Deformities of MTPs " with palpable MTP heads. Right knee 1+ cool (not red) swelling with contracture but with 30 extension lag.--before was R knee cool with trace effusion but with 10 extension lag. Right foot bunion. All TMJ, neck, shoulder, elbow, wrist, MCP/PIP/DIP, spine, hip, knee, ankle, and foot MTP/IP joints were examined.   Skin: no nail pitting, alopecia, rash, nodules or lesions.   Neuro: nl cranial nerves, strength   Psych: nl affect.       Data:     Lab Results   Component Value Date    WBC 7.7 07/14/2017    WBC 7.9 03/17/2017    WBC 6.6 12/08/2016    HGB 15.3 07/14/2017    HGB 15.8 (H) 03/17/2017    HGB 15.1 12/08/2016    HCT 46.5 07/14/2017    HCT 46.2 03/17/2017    HCT 45.1 12/08/2016    MCV 95 07/14/2017    MCV 93 03/17/2017    MCV 94 12/08/2016     07/14/2017     03/17/2017     12/08/2016     Lab Results   Component Value Date    BUN 13 09/14/2010    BUN 20 10/06/2009    BUN 14 04/03/2007     No components found for: SEDRATE  Lab Results   Component Value Date    TSH 0.62 07/14/2017    TSH 0.58 09/14/2010    TSH 0.42 10/06/2009     Lab Results   Component Value Date    AST 13 07/14/2017    AST 17 03/17/2017    AST 16 12/08/2016    ALT 19 07/14/2017    ALT 23 03/17/2017    ALT 30 12/08/2016    GGT 32 09/25/2014    ALKPHOS 82 09/25/2014    ALKPHOS 83 12/22/2010     Reviewed Rheumatology lab flowsheet    Guanakito Frost

## 2017-10-12 NOTE — LETTER
"10/12/2017    RE: Reina Conway  [address] [facility] [address]       Rheumatology Visit     Reina Conway MRN# [id number]   YOB: 1955 Age: 62 year old     Date of Visit: October 12, 2017  Primary care provider: Diana Orantes          Assessment and Plan:   1. Seronegative destructive RA (-RF/CCP/LASHAE panel, last repeat xrays [place] 3/2013; deformities, right wrist progressive worse function with dorsiflexion, hammertoes with bilateral toe subluxation, right knee contracture).    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 Seen ophth Dr. Terry 1-2017 (neg exam). No sx today   RA activity=low. Exam shows prior deformities, no RA flaring and no active arthritis. Labs today pending. Mild nausea, hairloss. Will ask her to routinely take FA 1 mg day (missing doses) but for 2 mths go to 2 mg day then report if improved. We will continue the humira and methotrexate at current doses.   2. Hairloss with fatigue. Due for physical for complete evaluation which she will do this should include cancer screening, mamogram, bone health, skin check and over check. Will check thyroid.   3. Alcohol use. Reports abstinent from Alcohol, but then \"occasionally\". She should discuss with Dr. Orantes. I discussed the risks with her today on the liver and MTX. She understands.   3.  Osteopenia by prior DEXA (done 2009), did not start Fosamax Rx by Dr. Orantes.  Patient noted in past her chart now says Osteoporosis. F/U PCP at annual. She agrees   2. Other medical problems as previously documented and as per EHR      PLAN: Discussed in detail with the patient.   1. Continue Humira 40 mg SQ Q 2 weeks.  Continue Methotrexate at 15 mg weekly with 2mg day Folic acid    --Keep up-to-date vaccinations per CDC guidelines with PCP  --Labs every 8-12 weeks--discussed importance of labs every 12 weeks and hold MTX for any infections.    2 F/U Ophthalmology yearly and prn " sx. Hx Anterior uveitis.   3. RTC 3 mths, sooner prn      4. I encouraged her to follow thru with other recommendations of Dr. Orantes. She will schedule appt with Dr. Orantes specifically to discuss her alcohol use and mental health  5. Prior referral to Orthopedic Hand Surgery for their opinion - she did not follow thru.Does not wish at this time    Guanakito Frost MD           History of Present Illness:   62 year old female who presents for continuing care for her seronegative RA. Seen by Dr. Frost until 2010 then swtiched to [facility], then re-established 3/2013 (xrays 2/2013 [place]). EPIC reviewed.   I saw her last in April, by Tyson in July.  HISTORY CARRIED FORWARD:   Prior: Synovitis and joint deformities in 2008 was persistently seronegative but had active synovitis. Methotrexate helped but did not cause a remission/low disease state. She required Prednisone to control symptoms partially but still had significant ADL issues. We had persistently recommended anti TNF therapy which she resisted. She sought a second opinion at [facility] who recommended the same things, and she continued care there as of August 2010, then switched back to Dr. Frost 3/2013. Begun on Enbrel at [facility]. 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 As no other etiology found it was felt it might be due to Enbrel and she was switched to Humira. She has remained on Methotrexate, primarily 25 mg weekly, decreased 3/2013 (not to go <15mg week due to risk of further deformities or uveitis) . FRAX 32% at [facility]. Prior declined biphosphosphonate, [facility] recommended IV.   Xrays 3/2013 [place]: See records. Hallux valgus right, hammertoes L>R, hindfoot valgus, pes planus. Dislocated left 2nd MTP, sublux left 3rd MTP, arth 2-3 MTP, rt 1st MTP. Mild DJD hands. Several DIP and 1st CMC. Subluxation left thumb IP and persistant dorsiflexion right wrist.   Previous visits discussed stress in her  "life. Her father in law passed away and there had been a lot of stress and she put her issues on the back burner. She had an episode of chest/epigastric pain and was seen at [facility]. She had apparent negative cardiac evaluation although did not followup with a stress test. She is taking OTC Zantac prn and thinks that helps. Found allergic to Wheat, avoid gluten and sugars. Feels much improved [heartburn, bloating, blood in stools, irregular stools] this really changed her digestive system. She went on a gluten free diet in December, and stopped all RA meds in early Jan 2015. See My Chart message.   Restarted Humira early April 2015 every 3 weeks. Noticed more migatory joints pains, swelling. Right hand, right knee, right wrist-associated swelling really in right 2nd MCP. 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 Lack of movement and coordinations, using her wrist brace. Uses this at night and daily prn.      At last visit she had continued on Humira without side effects and still feels it is helpful. She does have daily discomfort in several areas with either activity or prior damage, especially knees.      She continued to have stress in her life but is working through this and \"trying to get back on track\". She stopped drinking any alcohol for the past two months and was congratulated on this. She went to  but does not feel she is alcoholic.  Leonardo going to [facility]. She has been  for a long time. Both lost parents, and grieving. She says she's in less denial. She admits  is helping a lot.       She called in November due to increased joint pain and we restarted Methotrexate at 10 mg weekly as previously discussed.  She stopped it two weeks ago.  She noted more joint pain and also some nausea on day of dose.  We discussed this at length and unlikely that MTX contributed to more joint symptoms.      At January 2016 visit we readded Methotrexate to Humira in hopes of decreasing disease " activity, using low dose due to prior side effect complaints.  She called later that month with flare symptoms requiring Prednisone bridge, and we increased Methotrexate to more standard dose of 15 mg weekly.       At her April 2016 visit she had started improving.. She was tolerating Methotrexate this time.  She weaned off Prednisone.     July 14, 2017  Last seen Dr. Frost in April. MTX and humira were continued. Patient had not followed-up with ophthalmology nor Dr. Orantes  Humira 40 mg injections Q 2 weeks, Methotrexate 15 mg once Q Monday (slight appetite loss, hairloss mild, nausea x2 days after--no mouth sores), FA 1 mg day. No SLE symptoms. No cyclical wearing down actually improves end of cyclical. Will stretch out the the medications a few days out the end of the 4 weeks since doing so well. No SLE symptoms. No EAS, no joint pain or swelling. No changes in function of her hands. No changes in appearance joints. No infections. No changes in her health. Not been in the hospital. No uveititis symptoms. No eye pain no change in visions. Right hand is still  The hardest, stable but over the long term had progressed. Some stiffness in the morning right hand at time. MIld pain in left wrists, more harder to carry or lift with this hand. Knees are good. Feet are more pain under her hammertoes. No swelling more this pain due ot her prior deformities. Doing her artistry still. Due for her physical --some fatigue which she agrees to schedule. No falls or injuries. No night sweats. Appetite is good. No changes to her skin. Appetite is good. No NSAID. Tylenol prn      INTERVAL HISTORY 10/11/17:  Lab in July was normal on DMARDs/Humira.       Review of Systems:   Review Of Systems  Skin: negative  Eyes: negative  Ears/Nose/Throat: negative  Respiratory: No shortness of breath, dyspnea on exertion, cough, or hemoptysis  Cardiovascular: negative  Gastrointestinal: negative  Genitourinary: negative  Musculoskeletal:see  HPI  Neurologic: negative  Psychiatric: negative  Hematologic/Lymphatic/Immunologic: negative  Endocrine: negative          Past Medical History:     Past Medical History:   Diagnosis Date     Anterior uveitis     secondary to Enbrel     Fibroid      high in 2007 then normalized     History of Graves' disease      Hyperlipidemia LDL goal < 130     declined meication     Menopause 2008     Osteopenia 1/14/2016     Osteoporosis     Osteopenia borderline osteoporosis     RA (rheumatoid arthritis) (H)      Right posterior uveitis      Seronegative rheumatoid arthritis (H)     dx Dr. Frost     Uveitis        Patient Active Problem List    Diagnosis Date Noted     Mechanical limb problems 10/14/2016     Priority: Medium     Pain in both wrists 10/07/2016     Priority: Medium     Rheumatoid arthritis of multiple sites without rheumatoid factor (H) 02/19/2016     Priority: Medium     Osteopenia 01/14/2016     Priority: Medium     Situational mixed anxiety and depressive disorder 09/18/2014     Priority: Medium     Lump or mass in breast 09/02/2014     Priority: Medium     Bunion 09/02/2014     Priority: Medium     Hemorrhoids 07/15/2014     Priority: Medium     Uveitis, anterior 06/21/2013     Priority: Medium     Encounter for long-term current use of medication 03/21/2013     Priority: Medium     Problem list name updated by automated process. Provider to review       Hammer toe 02/21/2013     Priority: Medium             Past Surgical History:     Past Surgical History:   Procedure Laterality Date     Fibroidadenoma Left Breast       NO HISTORY OF SURGERY               Social History:     Social History   Substance Use Topics     Smoking status: Never Smoker     Smokeless tobacco: Never Used     Alcohol use 3.5 - 7.0 oz/week      Comment: social use prn             Family History:     Family History   Problem Relation Age of Onset     Breast Cancer Mother      parkinsons     OSTEOPOROSIS Mother      Low Back  "Problems Mother      Breast Cancer Maternal Aunt      Other - See Comments Other      Inflammation joint in brother, maternal cousin      DIABETES Maternal Grandmother      Anxiety Disorder Father      MENTAL ILLNESS Cousin      Alcoholism Paternal Grandfather      Glaucoma No family hx of      Macular Degeneration No family hx of      Retinal detachment No family hx of             Allergies:     Allergies   Allergen Reactions     Fosamax      Throat discomfort     No Clinical Screening - See Comments      Joints flared after getting possible \"white drink\" after CT/MRI in 2007              Medications:     Current Outpatient Prescriptions   Medication Sig Dispense Refill     methotrexate 2.5 MG tablet CHEMO Take 6 tablets (15 mg) by mouth once a week Monitoring labs required every 8 - 12 weeks for medication refills. 24 tablet 4     folic acid (FOLVITE) 1 MG tablet Take 2 tablets (2 mg) by mouth daily 180 tablet 3     adalimumab (HUMIRA PEN) 40 MG/0.8ML pen kit Inject 0.8 mLs (40 mg) Subcutaneous every 14 days Hold for signs of infection, and then seek medical attention. SURECLICK PEN. 1 kit 5            Physical Exam:     Constitutional: [id number] cooperative   Eyes: nl conjunctiva, sclera   ENT: nl external ears, nose, throat   No mucous membrane lesions, normal saliva pool   LUNGS; CTA posteriorly  Cardiac: S1S2 no murmurs rubs or gallops   Neck: no mass or thyroid enlargement   MS: She has previously noted subluxation at the R wrist/mild in L wrist with decreased ROM, minimal pannus, swan in right hand right thumb deformity unchanged. Right 2-3nd MCP pannus no active swelling. Right hand ulnar deviation, right thumb drop , right 5th DIP subluxation, right wrist subluxation, hammertoes. Bunions bilaterally. Deformities of MTPs with palpable MTP heads. Pes planus. FROM hips. Right knee 1+ cool (not red) swelling with contracture but with 30 extension lag.--before was R knee cool with trace effusion but with 10 " extension lag. Right foot bunion. All TMJ, neck, shoulder, elbow, wrist, MCP/PIP/DIP, spine, hip, knee, ankle, and foot MTP/IP joints were examined.   Skin: no nail pitting, alopecia, rash, nodules or lesions.   Neuro: nl cranial nerves, strength   Psych: nl affect.       Data:     Lab Results   Component Value Date    WBC 7.7 07/14/2017    WBC 7.9 03/17/2017    WBC 6.6 12/08/2016    HGB 15.3 07/14/2017    HGB 15.8 (H) 03/17/2017    HGB 15.1 12/08/2016    HCT 46.5 07/14/2017    HCT 46.2 03/17/2017    HCT 45.1 12/08/2016    MCV 95 07/14/2017    MCV 93 03/17/2017    MCV 94 12/08/2016     07/14/2017     03/17/2017     12/08/2016     Lab Results   Component Value Date    BUN 13 09/14/2010    BUN 20 10/06/2009    BUN 14 04/03/2007     No components found for: SEDRATE  Lab Results   Component Value Date    TSH 0.62 07/14/2017    TSH 0.58 09/14/2010    TSH 0.42 10/06/2009     Lab Results   Component Value Date    AST 13 07/14/2017    AST 17 03/17/2017    AST 16 12/08/2016    ALT 19 07/14/2017    ALT 23 03/17/2017    ALT 30 12/08/2016    GGT 32 09/25/2014    ALKPHOS 82 09/25/2014    ALKPHOS 83 12/22/2010     Reviewed Rheumatology lab flowsheet    Guanakito Frost MD

## 2017-10-12 NOTE — NURSING NOTE
"Chief Complaint   Patient presents with     RECHECK     RA       Initial /81 (BP Location: Right arm, Patient Position: Sitting, Cuff Size: Adult Regular)  Pulse 82  Temp 98  F (36.7  C) (Oral)  Resp 18  Ht 1.753 m (5' 9.02\")  Wt 78.5 kg (173 lb 1.6 oz)  SpO2 93%  BMI 25.55 kg/m2 Estimated body mass index is 25.55 kg/(m^2) as calculated from the following:    Height as of this encounter: 1.753 m (5' 9.02\").    Weight as of this encounter: 78.5 kg (173 lb 1.6 oz).  Medication Reconciliation: complete     Eileen Cisneros CMA  10/12/2017 3:38 PM          "

## 2017-10-12 NOTE — MR AVS SNAPSHOT
After Visit Summary   10/12/2017    Reina Conway    MRN: [id number]           Patient Information     Date Of Birth          1955        Visit Information        Provider Department      10/12/2017 3:30 PM Guanakito Frost MD [facility] Rheumatology        Today's Diagnoses     High risk medications (not anticoagulants) long-term use        Rheumatoid arthritis of multiple sites without rheumatoid factor (H)           Follow-ups after your visit        Follow-up notes from your care team     Return in about 3 months (around 1/12/2018).      Your next 10 appointments already scheduled     Oct 17, 2017  1:30 PM CDT   (Arrive by 1:15 PM)   New Patient Visit with Xochitl Mendenhall MD   [facility] Dermatology ([facility])    [address] [place] [address] [phone]            Jan 11, 2018  3:00 PM CST   (Arrive by 2:45 PM)   Return Visit with CASEY Galo CNP   [facility] Rheumatology ([facility])    [address] [place] [id number] [phone]            Apr 12, 2018  3:00 PM CDT   (Arrive by 2:45 PM)   Return Visit with Guanakito Frost MD   [facility] Rheumatology ([facility])    [address] [place] [address] [phone]              Who to contact     If you have questions or need follow up information about today's clinic visit or your schedule please contact [facility] RHEUMATOLOGY directly at [phone].  Normal or non-critical lab and imaging results will be communicated to you by MyChart, letter or phone within 4 business days after the clinic has received the results. If you do not hear from us within 7 days, please contact the clinic through MyChart or phone. If you have a critical or abnormal lab result, we will notify you by phone as soon as possible.  Submit refill requests through [employer] or call your  "pharmacy and they will forward the refill request to us. Please allow 3 business days for your refill to be completed.          Additional Information About Your Visit        MyChart Information     [employer] gives you secure access to your electronic health record. If you see a primary care provider, you can also send messages to your care team and make appointments. If you have questions, please call your primary care clinic.  If you do not have a primary care provider, please call [phone] and they will assist you.        Care EveryWhere ID     This is your Care EveryWhere ID. This could be used by other organizations to access your [facility] medical records  [id number]        Your Vitals Were     Pulse Temperature Respirations Height Pulse Oximetry BMI (Body Mass Index)    82 98  F (36.7  C) (Oral) 18 1.753 m (5' 9.02\") 93% 25.55 kg/m2       Blood Pressure from Last 3 Encounters:   10/12/17 139/81   07/14/17 (!) 154/91   04/13/17 133/88    Weight from Last 3 Encounters:   10/12/17 78.5 kg (173 lb 1.6 oz)   07/14/17 77 kg (169 lb 12.8 oz)   04/13/17 78 kg (172 lb)              We Performed the Following     Albumin level     ALT     AST     CBC with platelets     Creatinine     CRP inflammation        Primary Care Provider Office Phone # Fax #    Diana Orantes [phone]       [address] [facility] [address]  [facility] [address]        Equal Access to Services     [facility] Hospital[facility] : Hadii aad ku hadasho Sodelaneyali, waaxda luqadaha, qaybta kaalmada adepadminiyada, waxay stefano walden . So [facility] [phone].    ATENCIÓN: Si habla español, tiene a martinez disposición servicios gratuitos de asistencia lingüística. Llame al [phone].    We comply with applicable federal civil rights laws and [place] laws. We do not discriminate on the basis of race, color, national origin, age, disability, sex, sexual orientation, or gender identity.            Thank you!     Thank you for choosing [facility] " RHEUMATOLOGY  for your care. Our goal is always to provide you with excellent care. Hearing back from our patients is one way we can continue to improve our services. Please take a few minutes to complete the written survey that you may receive in the mail after your visit with us. Thank you!             Your Updated Medication List - Protect others around you: Learn how to safely use, store and throw away your medicines at www.disposemymeds.org.          This list is accurate as of: 10/12/17  4:03 PM.  Always use your most recent med list.                   Brand Name Dispense Instructions for use Diagnosis    acetaminophen 500 MG tablet    TYLENOL     Take 500-1,000 mg by mouth every 6 hours as needed for mild pain (rarely takes)        adalimumab 40 MG/0.8ML pen kit    HUMIRA PEN    1 kit    Inject 0.8 mLs (40 mg) Subcutaneous every 14 days Hold for signs of infection, and then seek medical attention. SURECLICK PEN.    Seronegative rheumatoid arthritis (H)       folic acid 1 MG tablet    FOLVITE    180 tablet    Take 2 tablets (2 mg) by mouth daily    High risk medications (not anticoagulants) long-term use, Rheumatoid arthritis of multiple sites without rheumatoid factor (H)       methotrexate 2.5 MG tablet CHEMO     24 tablet    Take 6 tablets (15 mg) by mouth once a week Monitoring labs required every 8 - 12 weeks for medication refills.    High risk medications (not anticoagulants) long-term use, Rheumatoid arthritis of multiple sites without rheumatoid factor (H)

## 2017-10-17 ENCOUNTER — OFFICE VISIT (OUTPATIENT)
Dept: DERMATOLOGY | Facility: CLINIC | Age: 62
End: 2017-10-17

## 2017-10-17 VITALS — HEART RATE: 76 BPM | SYSTOLIC BLOOD PRESSURE: 136 MMHG | DIASTOLIC BLOOD PRESSURE: 88 MMHG

## 2017-10-17 DIAGNOSIS — L30.4 INTERTRIGO: ICD-10-CM

## 2017-10-17 DIAGNOSIS — B35.1 ONYCHOMYCOSIS: ICD-10-CM

## 2017-10-17 DIAGNOSIS — L81.4 SOLAR LENTIGINOSIS: ICD-10-CM

## 2017-10-17 DIAGNOSIS — L82.1 SK (SEBORRHEIC KERATOSIS): Primary | ICD-10-CM

## 2017-10-17 DIAGNOSIS — L85.3 XEROSIS CUTIS: ICD-10-CM

## 2017-10-17 ASSESSMENT — PAIN SCALES - GENERAL: PAINLEVEL: NO PAIN (0)

## 2017-10-17 NOTE — PROGRESS NOTES
[facility] Dermatology Note      Dermatology Problem List:  1. Onychomycosis  2. SKs  3. Xersosis Cutis  4. Solar lentigines  5. Inframammary Intertrigo  6. Lesion to monitor, right dorsal foot    Encounter Date: Oct 17, 2017    CC:   No chief complaint on file.        History of Present Illness:  Ms. Reina Conway is a 62 year old female who presents in consultation from her orthopedist who was concerned about a lesion on the dorsum of hte right foot which has been there for many years and is not changing. While here she would like several other areas checked including some brown spots on the abdomen and under the breasts b/l. She has a number of flat brown spots on the forehead and cheeks. She has not had any skin cancers in the past and has no new, changing lesions of concern today.    Past Medical History:   Patient Active Problem List   Diagnosis     Hammer toe     Encounter for long-term current use of medication     Uveitis, anterior     Hemorrhoids     Lump or mass in breast     Bunion     Situational mixed anxiety and depressive disorder     Osteopenia     Rheumatoid arthritis of multiple sites without rheumatoid factor (H)     Pain in both wrists     Mechanical limb problems     Past Medical History:   Diagnosis Date     Anterior uveitis     secondary to Enbrel     Fibroid      high in 2007 then normalized     History of Graves' disease      Hyperlipidemia LDL goal < 130     declined meication     Menopause 2008     Osteopenia 1/14/2016     Osteoporosis     Osteopenia borderline osteoporosis     RA (rheumatoid arthritis) (H)      Right posterior uveitis      Seronegative rheumatoid arthritis (H)     dx Dr. Frost     Uveitis      Past Surgical History:   Procedure Laterality Date     Fibroidadenoma Left Breast       NO HISTORY OF SURGERY         Social History:  The patient uses sunscreen.    Family History:  There is no family history of skin cancer.    Medications:  Current  "Outpatient Prescriptions   Medication Sig Dispense Refill     acetaminophen (TYLENOL) 500 MG tablet Take 500-1,000 mg by mouth every 6 hours as needed for mild pain (rarely takes)       methotrexate 2.5 MG tablet CHEMO Take 6 tablets (15 mg) by mouth once a week Monitoring labs required every 8 - 12 weeks for medication refills. 24 tablet 4     folic acid (FOLVITE) 1 MG tablet Take 2 tablets (2 mg) by mouth daily 180 tablet 3     adalimumab (HUMIRA PEN) 40 MG/0.8ML pen kit Inject 0.8 mLs (40 mg) Subcutaneous every 14 days Hold for signs of infection, and then seek medical attention. SURECLICK PEN. 1 kit 5        Allergies   Allergen Reactions     Fosamax      Throat discomfort     No Clinical Screening - See Comments      Joints flared after getting possible \"white drink\" after CT/MRI in 2007          Review of Systems:  -As per HPI  -Constitutional: The patient denies fatigue, fevers, chills, unintended weight loss, and night sweats.  -HEENT: Patient denies nonhealing oral sores.  -Skin: As above in HPI. No additional skin concerns.    Physical exam:  Vitals: There were no vitals taken for this visit.  GEN: This is a well developed, well-nourished female in no acute distress, in a pleasant mood.    SKIN: Full body skin excluding the undergarment areas was performed. The exam included the head/face, neck, both arms, chest, back, abdomen, both legs, digits and/or nails.   -Scattered brown macules on sun exposed areas.  -There is xerosis of the b/l upper and lower extremities.   -There are waxy stuck on pink to tan to brown papules on the inframammary folds, abdomen, b/l upper arms, back, chest.  -there are multiple yellow dystrophic toenails   -there are multiple morphologies of nevi including 1-3mm medium brown macules, 3-5mm pink to tan macules with central medium brown pigmentation  -on the dorsum of the right foot there is a medium brown 1-2mm medium brown macule with an adjacent 4mm smudged appearing pink macule " with central area of white pigment regression  -under the breasts there are light pink macules and papules without satellite lesions or maceration  -No other lesions of concern on areas examined.     Impression/Plan:  1. Multiple clinically benign nevi on the arms, legs, back and chest    No further intervention required. Patient to report changes.     2. Seborrheic keratosis, non irritated, including lesions of concern on the chest, abdomen    Seborrheic keratosis: Discussed benign nature of lesions.    3. Onychomycosis    Pt not interested in treatment at this time    4. Solar lentigines    Encouraged use of sunscreen and sun protective clothing    5.   Intertrigo under the breasts    - recommended clotrimazole BID OTC for flares    6. Lesion to monitor, right dorsal foot    - dermoscopy photo taken on Haiku as well as clinical photo     CC Dr. Kenny Gao on close of this encounter.    Follow-up in approx 6 mo, coordinate with ortho appt    Dr. Mendenhall staffed the patient.    Staff Involved:  Resident(Radha Nathan)/Staff(as above)      Patient was seen and examined with the dermatology resident. I agree with the history, review of systems, physical examination, assessments and plan.    Xochitl Mendenhall MD  Professor and  Chair  Department of Dermatology  [facility]

## 2017-10-17 NOTE — NURSING NOTE
Dermatology Rooming Note    Reina Conway's goals for this visit include:   Chief Complaint   Patient presents with     Derm Problem     Reina is here today for a skin check.  States she has concerning areas on the top of her right foot, her face, and her upper abdomen.       Vanessa Carolina, TIMMY

## 2017-10-17 NOTE — LETTER
10/17/2017       RE: Reina Conway  [address] [facility] [address]     Dear Colleague,    Thank you for referring your patient, Reina Conway, to the [facility] DERMATOLOGY at [facility]. Please see a copy of my visit note below.    [facility] Dermatology Note      Dermatology Problem List:  1. Onychomycosis  2. SKs  3. Xersosis Cutis  4. Solar lentigines  5. Inframammary Intertrigo  6. Lesion to monitor, right dorsal foot    Encounter Date: Oct 17, 2017    CC:   No chief complaint on file.        History of Present Illness:  Ms. Reina Conway is a 62 year old female who presents in consultation from her orthopedist who was concerned about a lesion on the dorsum of hte right foot which has been there for many years and is not changing. While here she would like several other areas checked including some brown spots on the abdomen and under the breasts b/l. She has a number of flat brown spots on the forehead and cheeks. She has not had any skin cancers in the past and has no new, changing lesions of concern today.    Past Medical History:   Patient Active Problem List   Diagnosis     Hammer toe     Encounter for long-term current use of medication     Uveitis, anterior     Hemorrhoids     Lump or mass in breast     Bunion     Situational mixed anxiety and depressive disorder     Osteopenia     Rheumatoid arthritis of multiple sites without rheumatoid factor (H)     Pain in both wrists     Mechanical limb problems     Past Medical History:   Diagnosis Date     Anterior uveitis     secondary to Enbrel     Fibroid      high in 2007 then normalized     History of Graves' disease      Hyperlipidemia LDL goal < 130     declined meication     Menopause 2008     Osteopenia 1/14/2016     Osteoporosis     Osteopenia borderline osteoporosis     RA (rheumatoid arthritis) (H)      Right posterior uveitis      Seronegative rheumatoid arthritis (H)      "dx Dr. Frost     Uveitis      Past Surgical History:   Procedure Laterality Date     Fibroidadenoma Left Breast       NO HISTORY OF SURGERY         Social History:  The patient uses sunscreen.    Family History:  There is no family history of skin cancer.    Medications:  Current Outpatient Prescriptions   Medication Sig Dispense Refill     acetaminophen (TYLENOL) 500 MG tablet Take 500-1,000 mg by mouth every 6 hours as needed for mild pain (rarely takes)       methotrexate 2.5 MG tablet CHEMO Take 6 tablets (15 mg) by mouth once a week Monitoring labs required every 8 - 12 weeks for medication refills. 24 tablet 4     folic acid (FOLVITE) 1 MG tablet Take 2 tablets (2 mg) by mouth daily 180 tablet 3     adalimumab (HUMIRA PEN) 40 MG/0.8ML pen kit Inject 0.8 mLs (40 mg) Subcutaneous every 14 days Hold for signs of infection, and then seek medical attention. SURECLICK PEN. 1 kit 5        Allergies   Allergen Reactions     Fosamax      Throat discomfort     No Clinical Screening - See Comments      Joints flared after getting possible \"white drink\" after CT/MRI in 2007          Review of Systems:  -As per HPI  -Constitutional: The patient denies fatigue, fevers, chills, unintended weight loss, and night sweats.  -HEENT: Patient denies nonhealing oral sores.  -Skin: As above in HPI. No additional skin concerns.    Physical exam:  Vitals: There were no vitals taken for this visit.  GEN: This is a well developed, well-nourished female in no acute distress, in a pleasant mood.    SKIN: Full body skin excluding the undergarment areas was performed. The exam included the head/face, neck, both arms, chest, back, abdomen, both legs, digits and/or nails.   -Scattered brown macules on sun exposed areas.  -There is xerosis of the b/l upper and lower extremities.   -There are waxy stuck on pink to tan to brown papules on the inframammary folds, abdomen, b/l upper arms, back, chest.  -there are multiple yellow dystrophic " toenails   -there are multiple morphologies of nevi including 1-3mm medium brown macules, 3-5mm pink to tan macules with central medium brown pigmentation  -on the dorsum of the right foot there is a medium brown 1-2mm medium brown macule with an adjacent 4mm smudged appearing pink macule with central area of white pigment regression  -under the breasts there are light pink macules and papules without satellite lesions or maceration  -No other lesions of concern on areas examined.     Impression/Plan:  1. Multiple clinically benign nevi on the arms, legs, back and chest    No further intervention required. Patient to report changes.     2. Seborrheic keratosis, non irritated, including lesions of concern on the chest, abdomen    Seborrheic keratosis: Discussed benign nature of lesions.    3. Onychomycosis    Pt not interested in treatment at this time    4. Solar lentigines    Encouraged use of sunscreen and sun protective clothing    5.   Intertrigo under the breasts    - recommended clotrimazole BID OTC for flares    6. Lesion to monitor, right dorsal foot    - dermoscopy photo taken on Haiku as well as clinical photo     CC Dr. Kenny Gao on close of this encounter.    Follow-up in approx 6 mo, coordinate with ortho appt    Dr. Mendenhall staffed the patient.    Staff Involved:  Resident(Radha Nathan)/Staff(as above)      Patient was seen and examined with the dermatology resident. I agree with the history, review of systems, physical examination, assessments and plan.    Xochitl Mendenhall MD  Professor and  Chair  Department of Dermatology  [facility]        Pictures were placed in Pt's chart today for future reference.      Again, thank you for allowing me to participate in the care of your patient.      Sincerely,    Xochitl Mendenhall MD

## 2017-10-17 NOTE — PATIENT INSTRUCTIONS
For dry skin:  Cerave, lubriderm, aquaphor, cetaphil for moiturizer or any bland fragrance free creme    For under the breasts: clotrimazole cream twice daily in the fungal area of the pharmacy over the counter

## 2017-10-17 NOTE — MR AVS SNAPSHOT
After Visit Summary   10/17/2017    Reina Conway    MRN: [id number]           Patient Information     Date Of Birth          1955        Visit Information        Provider Department      10/17/2017 1:30 PM Xochitl Mendenhall MD [facility] Dermatology        Care Instructions    For dry skin:  Cerave, lubriderm, aquaphor, cetaphil for moiturizer or any bland fragrance free creme    For under the breasts: clotrimazole cream twice daily in the fungal area of the pharmacy over the counter          Follow-ups after your visit        Your next 10 appointments already scheduled     Jan 11, 2018  3:00 PM CST   (Arrive by 2:45 PM)   Return Visit with CASEY Galo CNP   [facility] Rheumatology ([facility])    [address] [place] [id number] [phone]            Apr 12, 2018  3:00 PM CDT   (Arrive by 2:45 PM)   Return Visit with Guanakito Frost MD   [facility] Rheumatology [facility])    [address] [place] [id number] [phone]              Who to contact     Please call your clinic at [phone] to:    Ask questions about your health    Make or cancel appointments    Discuss your medicines    Learn about your test results    Speak to your doctor   If you have compliments or concerns about an experience at your clinic, or if you wish to file a complaint, please contact [facility] Patient Relations at [phone] or email us at [email or web address]         Additional Information About Your Visit        MyChart Information     [employer] gives you secure access to your electronic health record. If you see a primary care provider, you can also send messages to your care team and make appointments. If you have questions, please call your primary care clinic.  If you do not have a primary care provider, please call [phone] and they will  assist you.      [employer] is an electronic gateway that provides easy, online access to your medical records. With [employer], you can request a clinic appointment, read your test results, renew a prescription or communicate with your care team.     To access your existing account, please contact your [facility] or call [phone] for assistance.        Care EveryWhere ID     This is your Care EveryWhere ID. This could be used by other organizations to access your [facility] medical records  [id number]        Your Vitals Were     Pulse                   76            Blood Pressure from Last 3 Encounters:   10/17/17 136/88   10/12/17 139/81   07/14/17 (!) 154/91    Weight from Last 3 Encounters:   10/12/17 78.5 kg (173 lb 1.6 oz)   07/14/17 77 kg (169 lb 12.8 oz)   04/13/17 78 kg (172 lb)              Today, you had the following     No orders found for display       Primary Care Provider Office Phone # Fax #    Diana Orantes [phone]       [address] [place] [address]        Equal Access to Services     [facility] Center[facility] : Hadii blake ku hadasho Sofrancois, waaxda luqadaha, qaybta kaalmada jamal, chandana walden . So [facility] [phone].    ATENCIÓN: Si habla español, tiene a martinez disposición servicios gratAllena Pharmaceuticalsos de asistencia lingüística. Jose al [phone].    We comply with applicable federal civil rights laws and [place] laws. We do not discriminate on the basis of race, color, national origin, age, disability, sex, sexual orientation, or gender identity.            Thank you!     Thank you for choosing [facility] DERMATOLOGY  for your care. Our goal is always to provide you with excellent care. Hearing back from our patients is one way we can continue to improve our services. Please take a few minutes to complete the written survey that you may receive in the mail after your visit with us. Thank you!             Your Updated  Medication List - Protect others around you: Learn how to safely use, store and throw away your medicines at www.disposemymeds.org.          This list is accurate as of: 10/17/17  2:18 PM.  Always use your most recent med list.                   Brand Name Dispense Instructions for use Diagnosis    acetaminophen 500 MG tablet    TYLENOL     Take 500-1,000 mg by mouth every 6 hours as needed for mild pain (rarely takes)        adalimumab 40 MG/0.8ML pen kit    HUMIRA PEN    1 kit    Inject 0.8 mLs (40 mg) Subcutaneous every 14 days Hold for signs of infection, and then seek medical attention. SURECLICK PEN.    Seronegative rheumatoid arthritis (H)       folic acid 1 MG tablet    FOLVITE    180 tablet    Take 2 tablets (2 mg) by mouth daily    High risk medications (not anticoagulants) long-term use, Rheumatoid arthritis of multiple sites without rheumatoid factor (H)       methotrexate 2.5 MG tablet CHEMO     24 tablet    Take 6 tablets (15 mg) by mouth once a week Monitoring labs required every 8 - 12 weeks for medication refills.    High risk medications (not anticoagulants) long-term use, Rheumatoid arthritis of multiple sites without rheumatoid factor (H)

## 2017-11-07 DIAGNOSIS — Z79.899 HIGH RISK MEDICATIONS (NOT ANTICOAGULANTS) LONG-TERM USE: ICD-10-CM

## 2017-11-07 DIAGNOSIS — M06.09 RHEUMATOID ARTHRITIS OF MULTIPLE SITES WITHOUT RHEUMATOID FACTOR (H): ICD-10-CM

## 2017-11-09 NOTE — TELEPHONE ENCOUNTER
methotrexate 2.5 MG      Last Written Prescription Date:  7/14/17  Last Fill Quantity: 24,   # refills: 4  Last Office Visit: 10/12/17  Future Office visit:  1/11/18    CBC RESULTS:   Recent Labs   Lab Test  10/12/17   1625   WBC  8.4   RBC  4.52   HGB  14.3   HCT  42.9   MCV  95   MCH  31.6   MCHC  33.3   RDW  13.2   PLT  234       Creatinine   Date Value Ref Range Status   10/12/2017 0.81 0.52 - 1.04 mg/dL Final   ]    Liver Function Studies -   Recent Labs   Lab Test  10/12/17   1625   09/25/14   1334   12/22/10   1216   PROTTOTAL   --    --    --    --   8.5   ALBUMIN  3.8   < >  4.1   < >  4.7   BILITOTAL   --    --    --    --   1.7*   ALKPHOS   --    --   82   --   83   AST  15   < >  19   < >  28   ALT  20   < >  28   < >  30    < > = values in this interval not displayed.

## 2017-11-11 ENCOUNTER — HEALTH MAINTENANCE LETTER (OUTPATIENT)
Age: 62
End: 2017-11-11

## 2017-11-14 DIAGNOSIS — M06.09 RHEUMATOID ARTHRITIS OF MULTIPLE SITES WITHOUT RHEUMATOID FACTOR (H): ICD-10-CM

## 2017-11-14 DIAGNOSIS — Z79.899 HIGH RISK MEDICATIONS (NOT ANTICOAGULANTS) LONG-TERM USE: ICD-10-CM

## 2018-01-10 DIAGNOSIS — M06.00 SERONEGATIVE RHEUMATOID ARTHRITIS (H): ICD-10-CM

## 2018-01-10 NOTE — TELEPHONE ENCOUNTER
Humira  Last Written Prescription Date:  7/14/17  Last Fill Quantity: 1,   # refills: 5  Last Office Visit : 10/12/17  Future Office visit:  2/1/18

## 2018-01-31 ENCOUNTER — TELEPHONE (OUTPATIENT)
Dept: RHEUMATOLOGY | Facility: CLINIC | Age: 63
End: 2018-01-31

## 2018-01-31 NOTE — TELEPHONE ENCOUNTER
PA approved.  Effective date: 01/29/2018-01/30/2019  PA reference #: [id number]  Pt. notified:   Yes   Pt. informed directly.      [facility] Prior Authorization Team   Phone: [phone]  Fax: [phone]    *PA APPROVAL LETTER HAS NOT BEEN RECEIVED YET*

## 2018-02-22 DIAGNOSIS — Z79.899 HIGH RISK MEDICATIONS (NOT ANTICOAGULANTS) LONG-TERM USE: ICD-10-CM

## 2018-02-22 DIAGNOSIS — M06.09 RHEUMATOID ARTHRITIS OF MULTIPLE SITES WITHOUT RHEUMATOID FACTOR (H): ICD-10-CM

## 2018-02-23 NOTE — TELEPHONE ENCOUNTER
Call placed to pt and reminded her that she does need to have routine medication labs and the last ones we have were done 10/12/17. Pt verbalized understanding and said she would try to have them done next week.    JAY JAY NewmanN RN  Rheumatology RN Coordinator  [facility]

## 2018-02-23 NOTE — TELEPHONE ENCOUNTER
methotrexate 2.5 MG tablet CHEMO  Last Written Prescription Date:  11/9/17  Last Fill Quantity: 24,   # refills: 2  Last Office Visit: 10/12/17  Future Office visit:  4/13/18    CBC RESULTS:   Recent Labs   Lab Test  10/12/17   1625   WBC  8.4   RBC  4.52   HGB  14.3   HCT  42.9   MCV  95   MCH  31.6   MCHC  33.3   RDW  13.2   PLT  234       Creatinine   Date Value Ref Range Status   10/12/2017 0.81 0.52 - 1.04 mg/dL Final   ]    Liver Function Studies -   Recent Labs   Lab Test  10/12/17   1625   09/25/14   1334   12/22/10   1216   PROTTOTAL   --    --    --    --   8.5   ALBUMIN  3.8   < >  4.1   < >  4.7   BILITOTAL   --    --    --    --   1.7*   ALKPHOS   --    --   82   --   83   AST  15   < >  19   < >  28   ALT  20   < >  28   < >  30    < > = values in this interval not displayed.     Lab Results   Component Value Date    ALBUMIN 3.8 10/12/2017         Routing refill request to provider for review/approval because: labs past due

## 2018-04-06 ENCOUNTER — MYC REFILL (OUTPATIENT)
Dept: RHEUMATOLOGY | Facility: CLINIC | Age: 63
End: 2018-04-06

## 2018-04-06 DIAGNOSIS — M06.00 SERONEGATIVE RHEUMATOID ARTHRITIS (H): ICD-10-CM

## 2018-04-06 DIAGNOSIS — M06.09 RHEUMATOID ARTHRITIS OF MULTIPLE SITES WITHOUT RHEUMATOID FACTOR (H): ICD-10-CM

## 2018-04-06 DIAGNOSIS — Z79.899 HIGH RISK MEDICATIONS (NOT ANTICOAGULANTS) LONG-TERM USE: ICD-10-CM

## 2018-04-06 LAB
CRP SERPL-MCNC: 7.2 MG/L (ref 0–8)
ERYTHROCYTE [DISTWIDTH] IN BLOOD BY AUTOMATED COUNT: 14.2 % (ref 10–15)
HCT VFR BLD AUTO: 47.5 % (ref 35–47)
HGB BLD-MCNC: 16 G/DL (ref 11.7–15.7)
MCH RBC QN AUTO: 31.9 PG (ref 26.5–33)
MCHC RBC AUTO-ENTMCNC: 33.7 G/DL (ref 31.5–36.5)
MCV RBC AUTO: 95 FL (ref 78–100)
PLATELET # BLD AUTO: 301 10E9/L (ref 150–450)
RBC # BLD AUTO: 5.01 10E12/L (ref 3.8–5.2)
WBC # BLD AUTO: 8.2 10E9/L (ref 4–11)

## 2018-04-06 PROCEDURE — 86140 C-REACTIVE PROTEIN: CPT | Performed by: INTERNAL MEDICINE

## 2018-04-06 PROCEDURE — 36415 COLL VENOUS BLD VENIPUNCTURE: CPT | Performed by: INTERNAL MEDICINE

## 2018-04-06 PROCEDURE — 84450 TRANSFERASE (AST) (SGOT): CPT | Performed by: INTERNAL MEDICINE

## 2018-04-06 PROCEDURE — 82040 ASSAY OF SERUM ALBUMIN: CPT | Performed by: INTERNAL MEDICINE

## 2018-04-06 PROCEDURE — 85027 COMPLETE CBC AUTOMATED: CPT | Performed by: INTERNAL MEDICINE

## 2018-04-06 PROCEDURE — 82565 ASSAY OF CREATININE: CPT | Performed by: INTERNAL MEDICINE

## 2018-04-06 PROCEDURE — 84460 ALANINE AMINO (ALT) (SGPT): CPT | Performed by: INTERNAL MEDICINE

## 2018-04-06 NOTE — TELEPHONE ENCOUNTER
Last seen: 10/12/2017  Pending appt: 4/13/18  Medication: Methotrexate   Last filled: 2/23/18  Qty: 72  Lab:

## 2018-04-06 NOTE — TELEPHONE ENCOUNTER
Patient is calling to request a refill for methotrexate and humera.   Is completely out of medication.   Please call patient.   Thanks   Arina Thompson LPN

## 2018-04-06 NOTE — TELEPHONE ENCOUNTER
Last seen: 10/12/2017  Pending appt: 4/13/2018  Medication: Humira   Last filled: 1/10/2018  Qty: 6  Lab:

## 2018-04-07 LAB
ALBUMIN SERPL-MCNC: 4.2 G/DL (ref 3.4–5)
ALT SERPL W P-5'-P-CCNC: 18 U/L (ref 0–50)
AST SERPL W P-5'-P-CCNC: 16 U/L (ref 0–45)
CREAT SERPL-MCNC: 0.71 MG/DL (ref 0.52–1.04)
GFR SERPL CREATININE-BSD FRML MDRD: 84 ML/MIN/1.7M2

## 2018-04-13 ENCOUNTER — OFFICE VISIT (OUTPATIENT)
Dept: RHEUMATOLOGY | Facility: CLINIC | Age: 63
End: 2018-04-13
Attending: INTERNAL MEDICINE
Payer: COMMERCIAL

## 2018-04-13 VITALS
BODY MASS INDEX: 25.39 KG/M2 | SYSTOLIC BLOOD PRESSURE: 138 MMHG | TEMPERATURE: 97.9 F | OXYGEN SATURATION: 96 % | HEIGHT: 69 IN | HEART RATE: 71 BPM | DIASTOLIC BLOOD PRESSURE: 83 MMHG | WEIGHT: 171.4 LBS

## 2018-04-13 DIAGNOSIS — Z79.899 ENCOUNTER FOR LONG-TERM CURRENT USE OF MEDICATION: ICD-10-CM

## 2018-04-13 DIAGNOSIS — M06.09 RHEUMATOID ARTHRITIS OF MULTIPLE SITES WITHOUT RHEUMATOID FACTOR (H): Primary | ICD-10-CM

## 2018-04-13 PROCEDURE — G0463 HOSPITAL OUTPT CLINIC VISIT: HCPCS | Mod: ZF

## 2018-04-13 ASSESSMENT — PAIN SCALES - GENERAL: PAINLEVEL: NO PAIN (0)

## 2018-04-13 NOTE — PROGRESS NOTES
"Rheumatology Visit     Reina Conway MRN# [id number]   YOB: 1955 Age: 62 year old     Date of Visit: April 13, 2018  Primary care provider: Diana Orantes          Assessment and Plan:   1. Seronegative destructive RA (-RF/CCP/LASHAE panel, last repeat xrays [facility] 3/2013; deformities, right wrist progressive worse function with dorsiflexion, hammertoes with bilateral toe subluxation, right knee contracture).    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 Seen ophth Dr. Terry 1-2017 (neg exam). No sx today   RA activity=low. Exam shows prior deformities, no RA flaring and no active arthritis. Labs today pending. Mild nausea, hairloss. Will ask her to routinely take FA 1 mg day (missing doses) but for 2 mths go to 2 mg day then report if improved. We will continue the humira and methotrexate at current doses.   2. Hairloss with fatigue. Due for physical for complete evaluation which she will do this should include cancer screening, mamogram, bone health, skin check and over check. Will check thyroid.   3. Alcohol use. Reports abstinent from Alcohol, but then \"occasionally\". She should discuss with Dr. Orantes. I discussed the risks with her today on the liver and MTX. She understands.   3.  Osteopenia by prior DEXA (done 2009), did not start Fosamax Rx by Dr. Orantes.  Patient noted in past her chart now says Osteoporosis. F/U PCP at annual. She agrees   2. Other medical problems as previously documented and as per EHR      PLAN: Discussed in detail with the patient.   1. Continue Humira 40 mg SQ Q 2 weeks.  Continue Methotrexate at 15 mg weekly with 2mg day Folic acid    --Keep up-to-date vaccinations per CDC guidelines with PCP. Flu shot this fall  --Labs every 12 weeks--discussed importance of labs every 12 weeks and hold MTX for any infections.    2 F/U Ophthalmology yearly and prn sx.   3. RTC 3 mths with Tyson, sooner prn and six months with " me    4. She will schedule appt with Dr. Orantes for health maintenance  5. Prior referral to [facility] for their opinion - she did not follow thru.     Guanakito Frost MD           History of Present Illness:   62 year old female who presents for continuing care for her seronegative RA. Seen by Dr. Frost until 2010 then swtiched to [facility], then re-established 3/2013 (xrays 2/2013 [facility]). EPIC reviewed.  I saw her last in October 2017.  HISTORY CARRIED FORWARD:   Prior: Synovitis and joint deformities in 2008 was persistently seronegative but had active synovitis. Methotrexate helped but did not cause a remission/low disease state. She required Prednisone to control symptoms partially but still had significant ADL issues. We had persistently recommended anti TNF therapy which she resisted. She sought a second opinion at [facility] who recommended the same things, and she continued care there as of August 2010, then switched back to Dr. Frost 3/2013. Begun on Enbrel at [facility]. 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 As no other etiology found it was felt it might be due to Enbrel and she was switched to Humira. She has remained on Methotrexate, primarily 25 mg weekly, decreased 3/2013 (not to go <15mg week due to risk of further deformities or uveitis) . FRAX 32% at [facility]. Prior declined biphosphosphonate, [facility] recommended IV.   Xrays 3/2013 [facility]: See records. Hallux valgus right, hammertoes L>R, hindfoot valgus, pes planus. Dislocated left 2nd MTP, sublux left 3rd MTP, arth 2-3 MTP, rt 1st MTP. Mild DJD hands. Several DIP and 1st CMC. Subluxation left thumb IP and persistant dorsiflexion right wrist.   Previous visits discussed stress in her life. Her father in law passed away and there had been a lot of stress and she put her issues on the back burner. She had an episode of chest/epigastric pain and was seen at [facility]. She had  "apparent negative cardiac evaluation although did not followup with a stress test. She is taking OTC Zantac prn and thinks that helps. Found allergic to Wheat, avoid gluten and sugars. Feels much improved [heartburn, bloating, blood in stools, irregular stools] this really changed her digestive system. She went on a gluten free diet in December, and stopped all RA meds in early Jan 2015. See My Chart message.   Restarted Humira early April 2015 every 3 weeks. Noticed more migatory joints pains, swelling. Right hand, right knee, right wrist-associated swelling really in right 2nd MCP. 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 Lack of movement and coordinations, using her wrist brace. Uses this at night and daily prn.      At last visit she had continued on Humira without side effects and still feels it is helpful. She does have daily discomfort in several areas with either activity or prior damage, especially knees.      She continued to have stress in her life but is working through this and \"trying to get back on track\". She stopped drinking any alcohol for the past two months and was congratulated on this. She went to  but does not feel she is alcoholic.  Leonardo going to [facility]. She has been  for a long time. Both lost parents, and grieving. She says she's in less denial. She admits  is helping a lot.       She called in November due to increased joint pain and we restarted Methotrexate at 10 mg weekly as previously discussed.  She stopped it two weeks ago.  She noted more joint pain and also some nausea on day of dose.  We discussed this at length and unlikely that MTX contributed to more joint symptoms.      At January 2016 visit we readded Methotrexate to Humira in hopes of decreasing disease activity, using low dose due to prior side effect complaints.  She called later that month with flare symptoms requiring Prednisone bridge, and we increased Methotrexate to more standard dose of 15 mg weekly. "       At her April 2016 visit she had started improving.. She was tolerating Methotrexate this time.  She weaned off Prednisone.      INTERVAL HISTORY:  She missed an interval appointment but overall is unchanged.  She has not had worsening joint pain or new areas of concern.  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  Her R knee is bothersome and inhibits long ambulation unchanged.  They still have not moved from their multi level home but are planning to.  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  She denies SE on medications.  She had lab one week ago stable on Methotrexate. No interval infections.   She has not seen her PCP in almost two years.  She saw Derm last fall.  ROS is below.        Review of Systems:   Review Of Systems  Skin: negative  Eyes: negative  Ears/Nose/Throat: negative  Respiratory: No shortness of breath, dyspnea on exertion, cough, or hemoptysis  Cardiovascular: negative  Gastrointestinal: negative  Genitourinary: negative  Musculoskeletal: see HPI  Neurologic: negative  Psychiatric: negative  Hematologic/Lymphatic/Immunologic: negative  Endocrine: negative          Past Medical History:     Past Medical History:   Diagnosis Date     Anterior uveitis     secondary to Enbrel     Fibroid      high in 2007 then normalized     History of Graves' disease      Hyperlipidemia LDL goal < 130     declined meication     Menopause 2008     Osteopenia 1/14/2016     Osteoporosis     Osteopenia borderline osteoporosis     RA (rheumatoid arthritis) (H)      Right posterior uveitis      Seronegative rheumatoid arthritis (H)     dx Dr. Frost     Uveitis        Patient Active Problem List    Diagnosis Date Noted     Mechanical limb problems 10/14/2016     Priority: Medium     Pain in both wrists 10/07/2016     Priority: Medium     Rheumatoid arthritis of multiple sites without rheumatoid factor (H) 02/19/2016     Priority: Medium     Osteopenia  "01/14/2016     Priority: Medium     Situational mixed anxiety and depressive disorder 09/18/2014     Priority: Medium     Lump or mass in breast 09/02/2014     Priority: Medium     Bunion 09/02/2014     Priority: Medium     Hemorrhoids 07/15/2014     Priority: Medium     Uveitis, anterior 06/21/2013     Priority: Medium     Encounter for long-term current use of medication 03/21/2013     Priority: Medium     Problem list name updated by automated process. Provider to review       Hammer toe 02/21/2013     Priority: Medium             Past Surgical History:     Past Surgical History:   Procedure Laterality Date     Fibroidadenoma Left Breast       NO HISTORY OF SURGERY               Social History:     Social History   Substance Use Topics     Smoking status: Never Smoker     Smokeless tobacco: Never Used     Alcohol use 3.5 - 7.0 oz/week      Comment: social use prn             Family History:     Family History   Problem Relation Age of Onset     Breast Cancer Mother      parkinsons     OSTEOPOROSIS Mother      Low Back Problems Mother      Breast Cancer Maternal Aunt      Other - See Comments Other      Inflammation joint in brother, maternal cousin      DIABETES Maternal Grandmother      Anxiety Disorder Father      Skin Cancer Father      MENTAL ILLNESS Cousin      Alcoholism Paternal Grandfather      Glaucoma No family hx of      Macular Degeneration No family hx of      Retinal detachment No family hx of      Melanoma No family hx of             Allergies:     Allergies   Allergen Reactions     Fosamax      Throat discomfort     No Clinical Screening - See Comments      Joints flared after getting possible \"white drink\" after CT/MRI in 2007              Medications:     Current Outpatient Prescriptions   Medication Sig Dispense Refill     methotrexate 2.5 MG tablet CHEMO Take 6 tablets (15 mg) by mouth once a week 72 tablet 0     adalimumab (HUMIRA PEN) 40 MG/0.8ML pen kit Inject 0.8 mLs (40 mg) Subcutaneous " "every 14 days Hold for signs of infection, and then seek medical attention. SURECLICK PEN. 6 each 0     acetaminophen (TYLENOL) 500 MG tablet Take 500-1,000 mg by mouth every 6 hours as needed for mild pain (rarely takes)       methotrexate 2.5 MG tablet CHEMO Take 6 tablets (15 mg) by mouth once a week Monitoring labs required every 8 - 12 weeks for medication refills. 24 tablet 4     folic acid (FOLVITE) 1 MG tablet Take 2 tablets (2 mg) by mouth daily 180 tablet 3            Physical Exam:   Blood pressure 138/83, pulse 71, temperature 97.9  F (36.6  C), temperature source Oral, height 1.753 m (5' 9.02\"), weight 77.7 kg (171 lb 6.4 oz), SpO2 96 %.  Constitutional: [id number] cooperative   Eyes: nl conjunctiva, sclera   ENT: nl external ears, nose, throat   No mucous membrane lesions, normal saliva pool   Neck: no mass or thyroid enlargement   MS: She has previously noted subluxation at the R wrist/mild in L wrist with decreased ROM, minimal pannus, swan in right hand right thumb deformity unchanged. Right 2-3nd MCP pannus no active swelling. Right hand ulnar deviation, right thumb drop , right 5th DIP subluxation, right wrist subluxation, hammertoes. Bunions bilaterally. Deformities of MTPs with palpable MTP heads. Right knee 1+ cool swelling with 30 extension lag.unchanged. Right foot bunion. All TMJ, neck, shoulder, elbow, wrist, MCP/PIP/DIP, spine, hip, knee, ankle, and foot MTP/IP joints were examined.   Skin: no nail pitting, alopecia, rash, nodules or lesions.   Neuro: nl cranial nerves, strength   Psych: nl affect.       Data:     Lab Results   Component Value Date    WBC 8.2 04/06/2018    WBC 8.4 10/12/2017    WBC 7.7 07/14/2017    HGB 16.0 (H) 04/06/2018    HGB 14.3 10/12/2017    HGB 15.3 07/14/2017    HCT 47.5 (H) 04/06/2018    HCT 42.9 10/12/2017    HCT 46.5 07/14/2017    MCV 95 04/06/2018    MCV 95 10/12/2017    MCV 95 07/14/2017     04/06/2018     10/12/2017     07/14/2017     Lab " Results   Component Value Date    BUN 13 09/14/2010    BUN 20 10/06/2009    BUN 14 04/03/2007     No components found for: SEDRATE  Lab Results   Component Value Date    TSH 0.62 07/14/2017    TSH 0.58 09/14/2010    TSH 0.42 10/06/2009     Lab Results   Component Value Date    AST 16 04/06/2018    AST 15 10/12/2017    AST 13 07/14/2017    ALT 18 04/06/2018    ALT 20 10/12/2017    ALT 19 07/14/2017    GGT 32 09/25/2014    ALKPHOS 82 09/25/2014    ALKPHOS 83 12/22/2010     Reviewed Rheumatology lab flowsheet    Guanakito Frost

## 2018-04-13 NOTE — NURSING NOTE
"Chief Complaint   Patient presents with     RECHECK     RA       Initial /83  Pulse 71  Temp 97.9  F (36.6  C) (Oral)  Ht 1.753 m (5' 9.02\")  Wt 77.7 kg (171 lb 6.4 oz)  SpO2 96%  BMI 25.3 kg/m2 Estimated body mass index is 25.3 kg/(m^2) as calculated from the following:    Height as of this encounter: 1.753 m (5' 9.02\").    Weight as of this encounter: 77.7 kg (171 lb 6.4 oz).  Medication Reconciliation: complete  Venancio Redman MA  "

## 2018-04-13 NOTE — LETTER
"4/13/2018      RE: Reina Conway  [address] [facility] [address]       Rheumatology Visit     Reina Conway MRN# [id number]   YOB: 1955 Age: 62 year old     Date of Visit: April 13, 2018  Primary care provider: Diana Orantes          Assessment and Plan:   1. Seronegative destructive RA (-RF/CCP/LASHAE panel, last repeat xrays [place] 3/2013; deformities, right wrist progressive worse function with dorsiflexion, hammertoes with bilateral toe subluxation, right knee contracture).    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 Seen ophth Dr. Terry 1-2017 (neg exam). No sx today   RA activity=low. Exam shows prior deformities, no RA flaring and no active arthritis. Labs today pending. Mild nausea, hairloss. Will ask her to routinely take FA 1 mg day (missing doses) but for 2 mths go to 2 mg day then report if improved. We will continue the humira and methotrexate at current doses.   2. Hairloss with fatigue. Due for physical for complete evaluation which she will do this should include cancer screening, mamogram, bone health, skin check and over check. Will check thyroid.   3. Alcohol use. Reports abstinent from Alcohol, but then \"occasionally\". She should discuss with Dr. Orantes. I discussed the risks with her today on the liver and MTX. She understands.   3.  Osteopenia by prior DEXA (done 2009), did not start Fosamax Rx by Dr. Orantes.  Patient noted in past her chart now says Osteoporosis. F/U PCP at annual. She agrees   2. Other medical problems as previously documented and as per EHR      PLAN: Discussed in detail with the patient.   1. Continue Humira 40 mg SQ Q 2 weeks.  Continue Methotrexate at 15 mg weekly with 2mg day Folic acid    --Keep up-to-date vaccinations per CDC guidelines with PCP. Flu shot this fall  --Labs every 12 weeks--discussed importance of labs every 12 weeks and hold MTX for any infections.    2 F/U " Ophthalmology yearly and prn sx.   3. RTC 3 mths with Tyson, sooner prn and six months with me    4. She will schedule appt with Dr. Orantes for health maintenance  5. Prior referral to Orthopedic Hand Surgery for their opinion - she did not follow thru.     Guanakito Frost MD           History of Present Illness:   62 year old female who presents for continuing care for her seronegative RA. Seen by Dr. Frost until 2010 then swtiched to [facility], then re-established 3/2013 (xrays 2/2013 [facility]). EPIC reviewed.  I saw her last in October 2017.  HISTORY CARRIED FORWARD:   Prior: Synovitis and joint deformities in 2008 was persistently seronegative but had active synovitis. Methotrexate helped but did not cause a remission/low disease state. She required Prednisone to control symptoms partially but still had significant ADL issues. We had persistently recommended anti TNF therapy which she resisted. She sought a second opinion at [facility] who recommended the same things, and she continued care there as of August 2010, then switched back to Dr. Frost 3/2013. Begun on Enbrel at [facility]. 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 As no other etiology found it was felt it might be due to Enbrel and she was switched to Humira. She has remained on Methotrexate, primarily 25 mg weekly, decreased 3/2013 (not to go <15mg week due to risk of further deformities or uveitis) . FRAX 32% at [facility]. Prior declined biphosphosphonate, [facility] recommended IV.   Xrays 3/2013 [facility]: See records. Hallux valgus right, hammertoes L>R, hindfoot valgus, pes planus. Dislocated left 2nd MTP, sublux left 3rd MTP, arth 2-3 MTP, rt 1st MTP. Mild DJD hands. Several DIP and 1st CMC. Subluxation left thumb IP and persistant dorsiflexion right wrist.   Previous visits discussed stress in her life. Her father in law passed away and there had been a lot of stress and she put her issues on the back  "burner. She had an episode of chest/epigastric pain and was seen at [facility]. She had apparent negative cardiac evaluation although did not followup with a stress test. She is taking OTC Zantac prn and thinks that helps. Found allergic to Wheat, avoid gluten and sugars. Feels much improved [heartburn, bloating, blood in stools, irregular stools] this really changed her digestive system. She went on a gluten free diet in December, and stopped all RA meds in early Jan 2015. See My Chart message.   Restarted Humira early April 2015 every 3 weeks. Noticed more migatory joints pains, swelling. Right hand, right knee, right wrist-associated swelling really in right 2nd MCP. 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 Lack of movement and coordinations, using her wrist brace. Uses this at night and daily prn.      At last visit she had continued on Humira without side effects and still feels it is helpful. She does have daily discomfort in several areas with either activity or prior damage, especially knees.      She continued to have stress in her life but is working through this and \"trying to get back on track\". She stopped drinking any alcohol for the past two months and was congratulated on this. She went to  but does not feel she is alcoholic.  Leonardo going to [facility]. She has been  for a long time. Both lost parents, and grieving. She says she's in less denial. She admits  is helping a lot.       She called in November due to increased joint pain and we restarted Methotrexate at 10 mg weekly as previously discussed.  She stopped it two weeks ago.  She noted more joint pain and also some nausea on day of dose.  We discussed this at length and unlikely that MTX contributed to more joint symptoms.      At January 2016 visit we readded Methotrexate to Humira in hopes of decreasing disease activity, using low dose due to prior side effect complaints.  She called later that month with flare symptoms " requiring Prednisone bridge, and we increased Methotrexate to more standard dose of 15 mg weekly.       At her April 2016 visit she had started improving.. She was tolerating Methotrexate this time.  She weaned off Prednisone.      INTERVAL HISTORY:  She missed an interval appointment but overall is unchanged.  She has not had worsening joint pain or new areas of concern.  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  Her R knee is bothersome and inhibits long ambulation unchanged.  They still have not moved from their multi level home but are planning to.  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  She denies SE on medications.  She had lab one week ago stable on Methotrexate. No interval infections.   She has not seen her PCP in almost two years.  She saw Derm last fall.  ROS is below.        Review of Systems:   Review Of Systems  Skin: negative  Eyes: negative  Ears/Nose/Throat: negative  Respiratory: No shortness of breath, dyspnea on exertion, cough, or hemoptysis  Cardiovascular: negative  Gastrointestinal: negative  Genitourinary: negative  Musculoskeletal: see HPI  Neurologic: negative  Psychiatric: negative  Hematologic/Lymphatic/Immunologic: negative  Endocrine: negative          Past Medical History:     Past Medical History:   Diagnosis Date     Anterior uveitis     secondary to Enbrel     Fibroid      high in 2007 then normalized     History of Graves' disease      Hyperlipidemia LDL goal < 130     declined meication     Menopause 2008     Osteopenia 1/14/2016     Osteoporosis     Osteopenia borderline osteoporosis     RA (rheumatoid arthritis) (H)      Right posterior uveitis      Seronegative rheumatoid arthritis (H)     dx Dr. Frost     Uveitis        Patient Active Problem List    Diagnosis Date Noted     Mechanical limb problems 10/14/2016     Priority: Medium     Pain in both wrists 10/07/2016     Priority: Medium     Rheumatoid arthritis  "of multiple sites without rheumatoid factor (H) 02/19/2016     Priority: Medium     Osteopenia 01/14/2016     Priority: Medium     Situational mixed anxiety and depressive disorder 09/18/2014     Priority: Medium     Lump or mass in breast 09/02/2014     Priority: Medium     Bunion 09/02/2014     Priority: Medium     Hemorrhoids 07/15/2014     Priority: Medium     Uveitis, anterior 06/21/2013     Priority: Medium     Encounter for long-term current use of medication 03/21/2013     Priority: Medium     Problem list name updated by automated process. Provider to review       Hammer toe 02/21/2013     Priority: Medium             Past Surgical History:     Past Surgical History:   Procedure Laterality Date     Fibroidadenoma Left Breast       NO HISTORY OF SURGERY               Social History:     Social History   Substance Use Topics     Smoking status: Never Smoker     Smokeless tobacco: Never Used     Alcohol use 3.5 - 7.0 oz/week      Comment: social use prn             Family History:     Family History   Problem Relation Age of Onset     Breast Cancer Mother      parkinsons     OSTEOPOROSIS Mother      Low Back Problems Mother      Breast Cancer Maternal Aunt      Other - See Comments Other      Inflammation joint in brother, maternal cousin      DIABETES Maternal Grandmother      Anxiety Disorder Father      Skin Cancer Father      MENTAL ILLNESS Cousin      Alcoholism Paternal Grandfather      Glaucoma No family hx of      Macular Degeneration No family hx of      Retinal detachment No family hx of      Melanoma No family hx of             Allergies:     Allergies   Allergen Reactions     Fosamax      Throat discomfort     No Clinical Screening - See Comments      Joints flared after getting possible \"white drink\" after CT/MRI in 2007              Medications:     Current Outpatient Prescriptions   Medication Sig Dispense Refill     methotrexate 2.5 MG tablet CHEMO Take 6 tablets (15 mg) by mouth once a week 72 " "tablet 0     adalimumab (HUMIRA PEN) 40 MG/0.8ML pen kit Inject 0.8 mLs (40 mg) Subcutaneous every 14 days Hold for signs of infection, and then seek medical attention. DESHAUN PEN. 6 each 0     acetaminophen (TYLENOL) 500 MG tablet Take 500-1,000 mg by mouth every 6 hours as needed for mild pain (rarely takes)       methotrexate 2.5 MG tablet CHEMO Take 6 tablets (15 mg) by mouth once a week Monitoring labs required every 8 - 12 weeks for medication refills. 24 tablet 4     folic acid (FOLVITE) 1 MG tablet Take 2 tablets (2 mg) by mouth daily 180 tablet 3            Physical Exam:   Blood pressure 138/83, pulse 71, temperature 97.9  F (36.6  C), temperature source Oral, height 1.753 m (5' 9.02\"), weight 77.7 kg (171 lb 6.4 oz), SpO2 96 %.  Constitutional: [id number] cooperative   Eyes: nl conjunctiva, sclera   ENT: nl external ears, nose, throat   No mucous membrane lesions, normal saliva pool   Neck: no mass or thyroid enlargement   MS: She has previously noted subluxation at the R wrist/mild in L wrist with decreased ROM, minimal pannus, swan in right hand right thumb deformity unchanged. Right 2-3nd MCP pannus no active swelling. Right hand ulnar deviation, right thumb drop , right 5th DIP subluxation, right wrist subluxation, hammertoes. Bunions bilaterally. Deformities of MTPs with palpable MTP heads. Right knee 1+ cool swelling with 30 extension lag.unchanged. Right foot bunion. All TMJ, neck, shoulder, elbow, wrist, MCP/PIP/DIP, spine, hip, knee, ankle, and foot MTP/IP joints were examined.   Skin: no nail pitting, alopecia, rash, nodules or lesions.   Neuro: nl cranial nerves, strength   Psych: nl affect.       Data:     Lab Results   Component Value Date    WBC 8.2 04/06/2018    WBC 8.4 10/12/2017    WBC 7.7 07/14/2017    HGB 16.0 (H) 04/06/2018    HGB 14.3 10/12/2017    HGB 15.3 07/14/2017    HCT 47.5 (H) 04/06/2018    HCT 42.9 10/12/2017    HCT 46.5 07/14/2017    MCV 95 04/06/2018    MCV 95 10/12/2017    " MCV 95 07/14/2017     04/06/2018     10/12/2017     07/14/2017     Lab Results   Component Value Date    BUN 13 09/14/2010    BUN 20 10/06/2009    BUN 14 04/03/2007     No components found for: SEDRATE  Lab Results   Component Value Date    TSH 0.62 07/14/2017    TSH 0.58 09/14/2010    TSH 0.42 10/06/2009     Lab Results   Component Value Date    AST 16 04/06/2018    AST 15 10/12/2017    AST 13 07/14/2017    ALT 18 04/06/2018    ALT 20 10/12/2017    ALT 19 07/14/2017    GGT 32 09/25/2014    ALKPHOS 82 09/25/2014    ALKPHOS 83 12/22/2010     Reviewed Rheumatology lab flowsheet    Guanakito Frost

## 2018-04-13 NOTE — MR AVS SNAPSHOT
After Visit Summary   4/13/2018    Reina Conway    MRN: [id number]           Patient Information     Date Of Birth          1955        Visit Information        Provider Department      4/13/2018 1:30 PM Guanakito Frost MD [facility] Rheumatology         Follow-ups after your visit        Follow-up notes from your care team     Return in about 3 months (around 7/13/2018).      Your next 10 appointments already scheduled     Jul 18, 2018 11:30 AM CDT   (Arrive by 11:15 AM)   Return Visit with CASEY Galo CNP   [facility] Rheumatology ([facility])    [address]  [address]  [facility] [id number] [phone]            Oct 17, 2018 10:30 AM CDT   (Arrive by 10:15 AM)   Return Visit with Guanakito Frost MD   [facility] Rheumatology ([facility])    [address]  [address]  [place] [facility] [id number] [phone]              Who to contact     If you have questions or need follow up information about today's clinic visit or your schedule please contact [facility] RHEUMATOLOGY directly at [phone].  Normal or non-critical lab and imaging results will be communicated to you by MyChart, letter or phone within 4 business days after the clinic has received the results. If you do not hear from us within 7 days, please contact the clinic through [employer] or phone. If you have a critical or abnormal lab result, we will notify you by phone as soon as possible.  Submit refill requests through [employer] or call your pharmacy and they will forward the refill request to us. Please allow 3 business days for your refill to be completed.          Additional Information About Your Visit        MyChart Information     [employer] gives you secure access to your electronic health record. If you see a primary care provider, you can also send messages to your care team and make appointments. If you have questions, please call your primary  "care clinic.  If you do not have a primary care provider, please call [phone] and they will assist you.        Care EveryWhere ID     This is your Care EveryWhere ID. This could be used by other organizations to access your [facility] medical records  [id number]        Your Vitals Were     Pulse Temperature Height Pulse Oximetry BMI (Body Mass Index)       71 97.9  F (36.6  C) (Oral) 1.753 m (5' 9.02\") 96% 25.3 kg/m2        Blood Pressure from Last 3 Encounters:   04/13/18 138/83   10/17/17 136/88   10/12/17 139/81    Weight from Last 3 Encounters:   04/13/18 77.7 kg (171 lb 6.4 oz)   10/12/17 78.5 kg (173 lb 1.6 oz)   07/14/17 77 kg (169 lb 12.8 oz)              Today, you had the following     No orders found for display         Today's Medication Changes          These changes are accurate as of 4/13/18  1:57 PM.  If you have any questions, ask your nurse or doctor.               These medicines have changed or have updated prescriptions.        Dose/Directions    methotrexate 2.5 MG tablet CHEMO   This may have changed:  Another medication with the same name was removed. Continue taking this medication, and follow the directions you see here.   Used for:  High risk medications (not anticoagulants) long-term use, Rheumatoid arthritis of multiple sites without rheumatoid factor (H)   Changed by:  Guanakito Frost MD        Dose:  15 mg   Take 6 tablets (15 mg) by mouth once a week   Quantity:  72 tablet   Refills:  0                Primary Care Provider Office Phone # Fax #    Diana Orantes [phone]       [address] [address]        Equal Access to Services     [facility] AH: Hadsteven Browne, keyla cleaning, qaybta kaalmachandana bran. So [facility] [phone].    ATENCIÓN: Si habla español, tiene a martinez disposición servicios gratuitos de asistencia lingüística. Llame al [phone].    We comply with " applicable federal civil rights laws and [place] laws. We do not discriminate on the basis of race, color, national origin, age, disability, sex, sexual orientation, or gender identity.            Thank you!     Thank you for choosing [facility] RHEUMATOLOGY  for your care. Our goal is always to provide you with excellent care. Hearing back from our patients is one way we can continue to improve our services. Please take a few minutes to complete the written survey that you may receive in the mail after your visit with us. Thank you!             Your Updated Medication List - Protect others around you: Learn how to safely use, store and throw away your medicines at www.disposemymeds.org.          This list is accurate as of 4/13/18  1:57 PM.  Always use your most recent med list.                   Brand Name Dispense Instructions for use Diagnosis    acetaminophen 500 MG tablet    TYLENOL     Take 500-1,000 mg by mouth every 6 hours as needed for mild pain (rarely takes)        adalimumab 40 MG/0.8ML pen kit    HUMIRA PEN    6 each    Inject 0.8 mLs (40 mg) Subcutaneous every 14 days Hold for signs of infection, and then seek medical attention. SURECLICK PEN.    Seronegative rheumatoid arthritis (H)       folic acid 1 MG tablet    FOLVITE    180 tablet    Take 2 tablets (2 mg) by mouth daily    High risk medications (not anticoagulants) long-term use, Rheumatoid arthritis of multiple sites without rheumatoid factor (H)       methotrexate 2.5 MG tablet CHEMO     72 tablet    Take 6 tablets (15 mg) by mouth once a week    High risk medications (not anticoagulants) long-term use, Rheumatoid arthritis of multiple sites without rheumatoid factor (H)

## 2018-07-18 ENCOUNTER — TELEPHONE (OUTPATIENT)
Dept: RHEUMATOLOGY | Facility: CLINIC | Age: 63
End: 2018-07-18

## 2018-07-18 ENCOUNTER — OFFICE VISIT (OUTPATIENT)
Dept: RHEUMATOLOGY | Facility: CLINIC | Age: 63
End: 2018-07-18
Attending: NURSE PRACTITIONER
Payer: COMMERCIAL

## 2018-07-18 VITALS
DIASTOLIC BLOOD PRESSURE: 86 MMHG | HEART RATE: 71 BPM | BODY MASS INDEX: 24.34 KG/M2 | SYSTOLIC BLOOD PRESSURE: 133 MMHG | HEIGHT: 69 IN | OXYGEN SATURATION: 96 % | WEIGHT: 164.3 LBS

## 2018-07-18 DIAGNOSIS — M06.09 RHEUMATOID ARTHRITIS WITHOUT RHEUMATOID FACTOR, MULTIPLE SITES (H): Primary | ICD-10-CM

## 2018-07-18 DIAGNOSIS — M06.09 RHEUMATOID ARTHRITIS OF MULTIPLE SITES WITHOUT RHEUMATOID FACTOR (H): Primary | ICD-10-CM

## 2018-07-18 DIAGNOSIS — M54.2 NECK PAIN: ICD-10-CM

## 2018-07-18 DIAGNOSIS — Z79.899 HIGH RISK MEDICATIONS (NOT ANTICOAGULANTS) LONG-TERM USE: ICD-10-CM

## 2018-07-18 DIAGNOSIS — Z79.899 ENCOUNTER FOR LONG-TERM CURRENT USE OF MEDICATION: ICD-10-CM

## 2018-07-18 DIAGNOSIS — M21.941: ICD-10-CM

## 2018-07-18 DIAGNOSIS — M06.09 RHEUMATOID ARTHRITIS OF MULTIPLE SITES WITHOUT RHEUMATOID FACTOR (H): ICD-10-CM

## 2018-07-18 DIAGNOSIS — M81.0 OSTEOPOROSIS WITHOUT CURRENT PATHOLOGICAL FRACTURE, UNSPECIFIED OSTEOPOROSIS TYPE: ICD-10-CM

## 2018-07-18 DIAGNOSIS — M06.00 SERONEGATIVE RHEUMATOID ARTHRITIS (H): ICD-10-CM

## 2018-07-18 LAB
ALBUMIN SERPL-MCNC: 3.9 G/DL (ref 3.4–5)
ALT SERPL W P-5'-P-CCNC: 22 U/L (ref 0–50)
AST SERPL W P-5'-P-CCNC: 20 U/L (ref 0–45)
CREAT SERPL-MCNC: 0.8 MG/DL (ref 0.52–1.04)
CRP SERPL-MCNC: 17.9 MG/L (ref 0–8)
ERYTHROCYTE [DISTWIDTH] IN BLOOD BY AUTOMATED COUNT: 14.2 % (ref 10–15)
GFR SERPL CREATININE-BSD FRML MDRD: 72 ML/MIN/1.7M2
HCT VFR BLD AUTO: 47 % (ref 35–47)
HGB BLD-MCNC: 15.6 G/DL (ref 11.7–15.7)
MCH RBC QN AUTO: 31.6 PG (ref 26.5–33)
MCHC RBC AUTO-ENTMCNC: 33.2 G/DL (ref 31.5–36.5)
MCV RBC AUTO: 95 FL (ref 78–100)
PLATELET # BLD AUTO: 306 10E9/L (ref 150–450)
RBC # BLD AUTO: 4.93 10E12/L (ref 3.8–5.2)
WBC # BLD AUTO: 7.3 10E9/L (ref 4–11)

## 2018-07-18 PROCEDURE — 85027 COMPLETE CBC AUTOMATED: CPT | Performed by: NURSE PRACTITIONER

## 2018-07-18 PROCEDURE — 36415 COLL VENOUS BLD VENIPUNCTURE: CPT | Performed by: NURSE PRACTITIONER

## 2018-07-18 PROCEDURE — G0463 HOSPITAL OUTPT CLINIC VISIT: HCPCS | Mod: ZF

## 2018-07-18 PROCEDURE — 82565 ASSAY OF CREATININE: CPT | Performed by: NURSE PRACTITIONER

## 2018-07-18 PROCEDURE — 82040 ASSAY OF SERUM ALBUMIN: CPT | Performed by: NURSE PRACTITIONER

## 2018-07-18 PROCEDURE — 84450 TRANSFERASE (AST) (SGOT): CPT | Performed by: NURSE PRACTITIONER

## 2018-07-18 PROCEDURE — 84460 ALANINE AMINO (ALT) (SGPT): CPT | Performed by: NURSE PRACTITIONER

## 2018-07-18 PROCEDURE — 82306 VITAMIN D 25 HYDROXY: CPT | Performed by: NURSE PRACTITIONER

## 2018-07-18 PROCEDURE — 86140 C-REACTIVE PROTEIN: CPT | Performed by: NURSE PRACTITIONER

## 2018-07-18 RX ORDER — FOLIC ACID 1 MG/1
2 TABLET ORAL DAILY
Qty: 180 TABLET | Refills: 3 | Status: SHIPPED | OUTPATIENT
Start: 2018-07-18 | End: 2019-03-26

## 2018-07-18 ASSESSMENT — PAIN SCALES - GENERAL: PAINLEVEL: MILD PAIN (3)

## 2018-07-18 NOTE — PROGRESS NOTES
"Rheumatology Visit     Reina Conway MRN# [id number]   YOB: 1955 Age: 63 year old     Primary care provider: Diana Orantes          Assessment and Plan:   1. Seronegative destructive RA (-RF/CCP/LASHAE panel, last repeat xrays [place] 3/2013; deformities, right wrist progressive worse function with dorsiflexion, hammertoes with bilateral toe subluxation, right knee contracture).    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 Seen ophth Dr. Terry 1-2017 (neg exam). No sx today but will have her schedule a follow-up visit as been > 1 yr  RA activity=moderate. Exam shows prior deformities most notbaly in right hand, active synovitis not controlled with current therapy. We discussed increase MTX, switch to injectable MTX syringes (would not be able to do vial due to severe right hand deformities), hand therapy for splinting and braces. She wishes for the latter and no changes in her medications. Overall pain, right hip right knee pain with OA, she does not wish to see orthopedics or do imaging at this time. Agrees to meet with pharmacist MTM for formal visit as she has questions about herbs and interactions, we will continue the pool therapy and PT at [facility] and I will ask nrusing to help coordinate this. We talked about pain clinic here, [facility] or integretive medicine at [facility]. Labs due and annual physical with PCP.  We will continue the humira and methotrexate at current doses.   2.Neck pain, no radiculpathy. Declined cervical xrays. Educated her on the s/sx redflags.   3. Alcohol use. Reports abstinent from Alcohol, but then \"occasionally\". She should discuss with Dr. Orantes. I discussed the risks with her today on the liver and MTX. She understands.   3.  Osteopenia by prior DEXA (done 2009), did not start Fosamax Rx by Dr. Orantes we will check vitamin D and she will schedule formal visit with PCP.   4. Severe right hand deformities, would not be " able to do vial of MTX refer her to  Hand center   5. Other medical problems as previously documented and as per EHR     PLAN: Discussed in detail with the patient.   1. Continue Humira 40 mg SQ Q 2 weeks.  Continue Methotrexate at 15 mg weekly with 2mg day Folic acid    --Keep up-to-date vaccinations per CDC guidelines with PCP  --Labs every 8-12 weeks--discussed importance of labs every 12 weeks and hold MTX for any infections.    2 F/U Ophthalmology yearly due and prn sx. Hx Anterior uveitis.   3. RTC 3 mths, sooner prn with Dr. Frost             History of Present Illness:   63 year old female who presents for continuing care for her seronegative RA. Seen Dr. Frost until 2010 then swtiched to [facility], then re-established 3/2013 (xrays 2/2013 [place]). EPIC reviewed.    HISTORY CARRIED FORWARD:   Prior: Synovitis and joint deformities in 2008 was persistently seronegative but had active synovitis. Methotrexate helped but did not cause a remission/low disease state. She required Prednisone to control symptoms partially but still had significant ADL issues. We had persistently recommended anti TNF therapy which she resisted. She sought a second opinion at [place] who recommended the same things, and she continued care there as of August 2010, then switched back to Dr. Frost 3/2013. Begun on Enbrel at [facility]. 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 As no other etiology found it was felt it might be due to Enbrel and she was switched to Humira. She has remained on Methotrexate, primarily 25 mg weekly, decreased 3/2013 (not to go <15mg week due to risk of further deformities or uveitis) . FRAX 32% at [place]. Prior declined biphosphosphonate, [place] recommended IV.   Xrays 3/2013 [place]: See records. Hallux valgus right, hammertoes L>R, hindfoot valgus, pes planus. Dislocated left 2nd MTP, sublux left 3rd MTP, arth 2-3 MTP, rt 1st MTP. Mild DJD hands. Several  "DIP and 1st CMC. Subluxation left thumb IP and persistant dorsiflexion right wrist.   Previous visits discussed stress in her life. Her father in law passed away and there had been a lot of stress and she put her issues on the back burner. She had an episode of chest/epigastric pain and was seen at [facility]. She had apparent negative cardiac evaluation although did not followup with a stress test. She is taking OTC Zantac prn and thinks that helps. Found allergic to Wheat, avoid gluten and sugars. Feels much improved [heartburn, bloating, blood in stools, irregular stools] this really changed her digestive system. She went on a gluten free diet in December, and stopped all RA meds in early Jan 2015. See My Chart message.   Restarted Humira early April 2015 every 3 weeks. Noticed more migatory joints pains, swelling. Right hand, right knee, right wrist-associated swelling really in right 2nd MCP. 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 Lack of movement and coordinations, using her wrist brace. Uses this at night and daily prn.     At last visit she had continued on Humira without side effects and still feels it is helpful. She does have daily discomfort in several areas with either activity or prior damage, especially knees.     She continued to have stress in her life but is working through this and \"trying to get back on track\". She stopped drinking any alcohol for the past two months and was congratulated on this. She went to  but does not feel she is alcoholic.  Leonardo going to [facility]. She has been  for a long time. Both lost parents, and grieving. She says she's in less denial. She admits  is helping a lot.      She called in November due to increased joint pain and we restarted Methotrexate at 10 mg weekly as previously discussed.  She stopped it two weeks ago.  She noted more joint pain and also some nausea on day of dose.  We discussed this at length and unlikely that MTX contributed " to more joint symptoms.     At January 2016 visit we readded Methotrexate to Humira in hopes of decreasing disease activity, using low dose due to prior side effect complaints.  She called later that month with flare symptoms requiring Prednisone bridge, and we increased Methotrexate to more standard dose of 15 mg weekly.      At her April 2016 visit she had started improving.. She was tolerating Methotrexate this time.  She weaned off Prednisone.    July 14, 2017  Last seen Dr. Frost in April. MTX and humira were continued. Patient had not followed-up with ophthalmology nor Dr. Orantes  Humira 40 mg injections Q 2 weeks, Methotrexate 15 mg once Q Monday (slight appetite loss, hairloss mild, nausea x2 days after--no mouth sores), FA 1 mg day. No SLE symptoms. No cyclical wearing down actually improves end of cyclical. Will stretch out the the medications a few days out the end of the 4 weeks since doing so well. No SLE symptoms. No EAS, no joint pain or swelling. No changes in function of her hands. No changes in appearance joints. No infections. No changes in her health. Not been in the hospital. No uveititis symptoms. No eye pain no change in visions. Right hand is still  The hardest, stable but over the long term had progressed. Some stiffness in the morning right hand at time. MIld pain in left wrists, more harder to carry or lift with this hand. Knees are good. Feet are more pain under her hammertoes. No swelling more this pain due ot her prior deformities. Doing her artistry still. Due for her physical --some fatigue which she agrees to schedule. No falls or injuries. No night sweats. Appetite is good. No changes to her skin. Appetite is good. No NSAID. Tylenol prn      July 18, 2018  Taking humira 40 mg injections every 14 days last 2 days ago, may notice some symptoms prior to next dose (overall joints, more mobility issues) and taking methotrexate 15 mg once week, folic acid 2 mg day-improvement x1 day  "mild nausea. Eating less. Less active with mobility. Less dexrity right hand, swelling in joints right; left hand stiffness. Feet are toes right toes, right TMJ, right knee pain at times. Intermittent stiffness in neck at times more with movement (changes her pillow), catch at time. No neurological symptoms, no UE or LE or headaches. Healthy this year. Energy is fair. Here with Leonardo. 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 Mornings more stiffness \"once get moving it ok\". Slightly more thehn 4 month ago. Not sleeping well. In general with changes. 2 acteminophen at bedtime helps sleeps. No eye inflammation this year. No prednisone or NSAID. No LBP. Right hip pain with sitting, may catch with at times, gelling. Slipped in May off roller chair and seen in ER [facility]. Doing pool and PT therapy --doing this at [facility] in [place]. Wishes to use valerinan root for anxiety to see if compatible root.did see dermatology no ABNL lesion.  In sept will be moved.     No other changes PMSH          Review of Systems:   Review Of Systems  Skin: negative  Eyes: negative  Ears/Nose/Throat: negative  Respiratory: No shortness of breath, dyspnea on exertion, cough, or hemoptysis  Cardiovascular: negative  Gastrointestinal: negative  Genitourinary: negative  Musculoskeletal: see HPI  Neurologic: negative  Psychiatric: negative  Hematologic/Lymphatic/Immunologic: negative  Endocrine: negative          Past Medical History:     Past Medical History:   Diagnosis Date     Anterior uveitis     secondary to Enbrel     Fibroid      high in 2007 then normalized     History of Graves' disease      Hyperlipidemia LDL goal < 130     declined meication     Menopause 2008     Osteopenia 1/14/2016     Osteoporosis     Osteopenia borderline osteoporosis     RA (rheumatoid arthritis) (H)      Right posterior uveitis      Seronegative rheumatoid arthritis (H)     dx Dr. Frost     Uveitis        Patient Active Problem List "    Diagnosis Date Noted     Mechanical limb problems 10/14/2016     Priority: Medium     Pain in both wrists 10/07/2016     Priority: Medium     Rheumatoid arthritis of multiple sites without rheumatoid factor (H) 02/19/2016     Priority: Medium     Osteopenia 01/14/2016     Priority: Medium     Situational mixed anxiety and depressive disorder 09/18/2014     Priority: Medium     Lump or mass in breast 09/02/2014     Priority: Medium     Bunion 09/02/2014     Priority: Medium     Hemorrhoids 07/15/2014     Priority: Medium     Uveitis, anterior 06/21/2013     Priority: Medium     Encounter for long-term current use of medication 03/21/2013     Priority: Medium     Problem list name updated by automated process. Provider to review       Hammer toe 02/21/2013     Priority: Medium             Past Surgical History:     Past Surgical History:   Procedure Laterality Date     Fibroidadenoma Left Breast       NO HISTORY OF SURGERY               Social History:     Social History   Substance Use Topics     Smoking status: Never Smoker     Smokeless tobacco: Never Used     Alcohol use 3.5 - 7.0 oz/week      Comment: social use prn     No alcohol use --occasional 2x weeks. Never smoker. . Retired           Family History:     Family History   Problem Relation Age of Onset     Breast Cancer Mother      parkinsons     Osteoperosis Mother      Low Back Problems Mother      Breast Cancer Maternal Aunt      Other - See Comments Other      Inflammation joint in brother, maternal cousin      Diabetes Maternal Grandmother      Anxiety Disorder Father      Skin Cancer Father      Mental Illness Cousin      Alcoholism Paternal Grandfather      Glaucoma No family hx of      Macular Degeneration No family hx of      Retinal detachment No family hx of      Melanoma No family hx of             Allergies:     Allergies   Allergen Reactions     Fosamax      Throat discomfort     No Clinical Screening - See Comments      Joints flared  "after getting possible \"white drink\" after CT/MRI in 2007              Medications:     Current Outpatient Prescriptions   Medication Sig Dispense Refill     adalimumab (HUMIRA PEN) 40 MG/0.8ML pen kit Inject 0.8 mLs (40 mg) Subcutaneous every 14 days Hold for signs of infection, and then seek medical attention. SURECLICK PEN. 6 each 0     folic acid (FOLVITE) 1 MG tablet Take 2 tablets (2 mg) by mouth daily 180 tablet 3     methotrexate 2.5 MG tablet CHEMO Take 6 tablets (15 mg) by mouth once a week 72 tablet 0     acetaminophen (TYLENOL) 500 MG tablet Take 500-1,000 mg by mouth every 6 hours as needed for mild pain (rarely takes)              Physical Exam:   /86  Pulse 71  Ht 1.753 m (5' 9\")  Wt 74.5 kg (164 lb 4.8 oz)  SpO2 96%  BMI 24.26 kg/m2  Wt Readings from Last 4 Encounters:   07/18/18 74.5 kg (164 lb 4.8 oz)   04/13/18 77.7 kg (171 lb 6.4 oz)   10/12/17 78.5 kg (173 lb 1.6 oz)   07/14/17 77 kg (169 lb 12.8 oz)   Constitutional: [id number] cooperative   Eyes: nl conjunctiva, sclera   ENT: nl external ears, nose, throat   No mucous membrane lesions, normal saliva pool   LUNGS; CTA posteriorly  Cardiac: S1S2 no murmurs rubs or gallops   Neck: no mass or thyroid enlargement   MS: right hip reduced IR/ER, right knee halgus valgus, s/t wrists, right 1-3 MCP, swan deformities right hand, rt thumb deformities. She has previously noted subluxation at the R wrist/mild in L wrist with decreased ROM, minimal pannus. Right hand ulnar deviation, right thumb drop , right 5th DIP subluxation, right wrist subluxation, hammertoes. Bunions bilaterally. Deformities of MTPs with palpable MTP heads. Pes planus. FROM hips. Right knee 1+ cool (not red) swelling with contracture but with 30 extension lag.--before was R knee cool with trace effusion but with 10 extension lag and halgus valgux. Reduced lateral ROM neck and extension neck. Negative Lhermitte's sign. Feet bunion. All TMJ, neck, shoulder, elbow, wrist, " MCP/PIP/DIP, spine, hip, knee, ankle, and foot MTP/IP joints were examined.   Skin: no nail pitting, alopecia, rash, nodules or lesions.   Neuro: nl cranial nerves, strength   Psych: nl affect.       Data:     Reviewed Rheumatology lab flowsheet  Results for orders placed or performed in visit on 04/06/18   ALT   Result Value Ref Range    ALT 18 0 - 50 U/L   AST   Result Value Ref Range    AST 16 0 - 45 U/L   CBC with platelets   Result Value Ref Range    WBC 8.2 4.0 - 11.0 10e9/L    RBC Count 5.01 3.8 - 5.2 10e12/L    Hemoglobin 16.0 (H) 11.7 - 15.7 g/dL    Hematocrit 47.5 (H) 35.0 - 47.0 %    MCV 95 78 - 100 fl    MCH 31.9 26.5 - 33.0 pg    MCHC 33.7 31.5 - 36.5 g/dL    RDW 14.2 10.0 - 15.0 %    Platelet Count 301 150 - 450 10e9/L   Creatinine   Result Value Ref Range    Creatinine 0.71 0.52 - 1.04 mg/dL    GFR Estimate 84 >60 mL/min/1.7m2    GFR Estimate If Black >90 >60 mL/min/1.7m2   Albumin level   Result Value Ref Range    Albumin 4.2 3.4 - 5.0 g/dL   CRP inflammation   Result Value Ref Range    CRP Inflammation 7.2 0.0 - 8.0 mg/L     IMPRESSION:   1.  No acute fracture in the cervical spine.   Result Narrative   EXAM: CT CERVICAL SPINE WITHOUT CONTRAST, May 5, 2018    CLINICAL DATA: Trauma.    COMPARISON: None available    TECHNIQUE: Noncontrast axial, sagittal and coronal images reconstructed.    FINDINGS: Prevertebral soft tissues are not thickened. Acceptable alignment.    C1 relationships are maintained. No vertebral collapse. Posterior elements are aligned and intact. No acute fracture or dislocation.    Extreme lung apices are clear. Thyroid gland is prominent.     Result Impression   IMPRESSION:  2 views.    No acute displaced fracture. No visible pleural gas. No shift of the mediastinum.    Aortic contours are discrete. Normal heart size and pulmonary vascularity. No pleural effusion.    No pulmonary contusion.   Result Narrative   ICD 10: , ,    EXAM: CHEST RADIOGRAPH, 5/5/2018    CLINICAL DATA:  Trauma    COMPARISON: November 2, 2014     Result Impression   IMPRESSION:   1.  Right periorbital soft tissue swelling. No displaced fracture.   Result Narrative   EXAM: CT FACIAL BONES WITHOUT CONTRAST, May 5, 2018.    CLINICAL DATA: Fall. Trauma.    COMPARISON: None available    TECHNIQUE: Noncontrast axial, sagittal, and coronal reconstructed images.    FINDINGS: Zygomatic arches, pterygoid plates, and the hard palate are intact.    Right periorbital soft tissue swelling. The globe is intact. Retrobulbar fat is normal. No orbital fracture. No mandibular fracture or dislocation. No middle ear effusion. Nasal bones are intact.    Faint vascular calcifications.     Result Impression   IMPRESSION: AP, lateral, and oblique images acquired.      Overlying vascular catheter. Small osteophyte along the articular surface of the radial head. No visible fracture. No dislocation. No elevation of the posterior fat pad to indicate a joint effusion.   Result Narrative   EXAM: XR RIGHT ELBOW, 3 VIEWS, May 5, 2018    CLINICAL DATA: Trauma    COMPARISON: None available     Result Impression   IMPRESSION: AP pelvis with crosstable lateral view of the right hip acquired.      No acute fracture or dislocation. Pelvic lines are intact. No femoral collapse. SI joints and the pubic symphysis are not widened. Degenerative changes. Pelvic phleboliths.   Result Narrative   EXAM: XR PELVIS & RIGHT LATERAL HIP, May 5, 2018    CLINICAL DATA: Trauma    COMPARISON: None available   Status         Thank-you for allowing me to participate in your care.     Tyson PEÑA, CNP, MSN  [facility]  Department of Rheumatology & Autoimmune Disorders  [facility]: [phone] Opt 2, then Opt 1 Scheduling   [facility]: [phone]; [phone] Option 7 scheduling

## 2018-07-18 NOTE — PATIENT INSTRUCTIONS
Preventive Care:    Breast Cancer Screening: During our visit today, we discussed that it is recommended you receive breast cancer screening. Please call or make an appointment with your primary care provider to discuss this with them. You may also call the [facility] scheduling line ([phone]) to set up a mammography appointment at the Breast Center within the [facility].    MACIEL MONTANEZ -PHARMACIST

## 2018-07-18 NOTE — LETTER
"7/18/2018      RE: Reina Conway  [address] [facility] [address]       Rheumatology Visit     Reina Conway MRN# [id number]   YOB: 1955 Age: 63 year old     Primary care provider: Diana Orantes          Assessment and Plan:   1. Seronegative destructive RA (-RF/CCP/LASHAE panel, last repeat xrays [place] 3/2013; deformities, right wrist progressive worse function with dorsiflexion, hammertoes with bilateral toe subluxation, right knee contracture).    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 Seen ophth Dr. Terry 1-2017 (neg exam). No sx today but will have her schedule a follow-up visit as been > 1 yr  RA activity=moderate. Exam shows prior deformities most notbaly in right hand, active synovitis not controlled with current therapy. We discussed increase MTX, switch to injectable MTX syringes (would not be able to do vial due to severe right hand deformities), hand therapy for splinting and braces. She wishes for the latter and no changes in her medications. Overall pain, right hip right knee pain with OA, she does not wish to see orthopedics or do imaging at this time. Agrees to meet with pharmacist JAIMEE for formal visit as she has questions about herbs and interactions, we will continue the pool therapy and PT at [facility] and I will ask nrusing to help coordinate this. We talked about pain clinic [place], [facility] [facility] Labs due and annual physical with PCP.  We will continue the humira and methotrexate at current doses.   2.Neck pain, no radiculpathy. Declined cervical xrays. Educated her on the s/sx redflags.   3. Alcohol use. Reports abstinent from Alcohol, but then \"occasionally\". She should discuss with Dr. Orantes. I discussed the risks with her today on the liver and MTX. She understands.   3.  Osteopenia by prior DEXA (done 2009), did not start Fosamax Rx by Dr. Orantes we will check vitamin D and she " will schedule formal visit with PCP.   4. Severe right hand deformities, would not be able to do vial of MTX refer her to [facility]   5. Other medical problems as previously documented and as per EHR     PLAN: Discussed in detail with the patient.   1. Continue Humira 40 mg SQ Q 2 weeks.  Continue Methotrexate at 15 mg weekly with 2mg day Folic acid    --Keep up-to-date vaccinations per CDC guidelines with PCP  --Labs every 8-12 weeks--discussed importance of labs every 12 weeks and hold MTX for any infections.    2 F/U Ophthalmology yearly due and prn sx. Hx Anterior uveitis.   3. RTC 3 mths, sooner prn with Dr. Frost             History of Present Illness:   63 year old female who presents for continuing care for her seronegative RA. Seen Dr. Frost until 2010 then swtiched to [facility], then re-established 3/2013 (xrays 2/2013 [facility]). EPIC reviewed.    HISTORY CARRIED FORWARD:   Prior: Synovitis and joint deformities in 2008 was persistently seronegative but had active synovitis. Methotrexate helped but did not cause a remission/low disease state. She required Prednisone to control symptoms partially but still had significant ADL issues. We had persistently recommended anti TNF therapy which she resisted. She sought a second opinion at [facility] who recommended the same things, and she continued care there as of August 2010, then switched back to Dr. Frost 3/2013. Begun on Enbrel at [facility]. 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 As no other etiology found it was felt it might be due to Enbrel and she was switched to Humira. She has remained on Methotrexate, primarily 25 mg weekly, decreased 3/2013 (not to go <15mg week due to risk of further deformities or uveitis) . FRAX 32% at [facility]. Prior declined biphosphosphonate, [facility] recommended IV.   Xrays 3/2013 [facility]: See records. Hallux valgus right, hammertoes L>R, hindfoot valgus, pes planus.  "Dislocated left 2nd MTP, sublux left 3rd MTP, arth 2-3 MTP, rt 1st MTP. Mild DJD hands. Several DIP and 1st CMC. Subluxation left thumb IP and persistant dorsiflexion right wrist.   Previous visits discussed stress in her life. Her father in law passed away and there had been a lot of stress and she put her issues on the back burner. She had an episode of chest/epigastric pain and was seen at [facility]. She had apparent negative cardiac evaluation although did not followup with a stress test. She is taking OTC Zantac prn and thinks that helps. Found allergic to Wheat, avoid gluten and sugars. Feels much improved [heartburn, bloating, blood in stools, irregular stools] this really changed her digestive system. She went on a gluten free diet in December, and stopped all RA meds in early Jan 2015. See My Chart message.   Restarted Humira early April 2015 every 3 weeks. Noticed more migatory joints pains, swelling. Right hand, right knee, right wrist-associated swelling really in right 2nd MCP. 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 Lack of movement and coordinations, using her wrist brace. Uses this at night and daily prn.     At last visit she had continued on Humira without side effects and still feels it is helpful. She does have daily discomfort in several areas with either activity or prior damage, especially knees.     She continued to have stress in her life but is working through this and \"trying to get back on track\". She stopped drinking any alcohol for the past two months and was congratulated on this. She went to  but does not feel she is alcoholic.  Leonardo going to [facility]. She has been  for a long time. Both lost parents, and grieving. She says she's in less denial. She admits  is helping a lot.      She called in November due to increased joint pain and we restarted Methotrexate at 10 mg weekly as previously discussed.  She stopped it two weeks ago.  She noted more joint pain and " also some nausea on day of dose.  We discussed this at length and unlikely that MTX contributed to more joint symptoms.     At January 2016 visit we readded Methotrexate to Humira in hopes of decreasing disease activity, using low dose due to prior side effect complaints.  She called later that month with flare symptoms requiring Prednisone bridge, and we increased Methotrexate to more standard dose of 15 mg weekly.      At her April 2016 visit she had started improving.. She was tolerating Methotrexate this time.  She weaned off Prednisone.    July 14, 2017  Last seen Dr. Frost in April. MTX and humira were continued. Patient had not followed-up with ophthalmology nor Dr. Orantes  Humira 40 mg injections Q 2 weeks, Methotrexate 15 mg once Q Monday (slight appetite loss, hairloss mild, nausea x2 days after--no mouth sores), FA 1 mg day. No SLE symptoms. No cyclical wearing down actually improves end of cyclical. Will stretch out the the medications a few days out the end of the 4 weeks since doing so well. No SLE symptoms. No EAS, no joint pain or swelling. No changes in function of her hands. No changes in appearance joints. No infections. No changes in her health. Not been in the hospital. No uveititis symptoms. No eye pain no change in visions. Right hand is still  The hardest, stable but over the long term had progressed. Some stiffness in the morning right hand at time. MIld pain in left wrists, more harder to carry or lift with this hand. Knees are good. Feet are more pain under her hammertoes. No swelling more this pain due ot her prior deformities. Doing her artistry still. Due for her physical --some fatigue which she agrees to schedule. No falls or injuries. No night sweats. Appetite is good. No changes to her skin. Appetite is good. No NSAID. Tylenol prn      July 18, 2018  Taking humira 40 mg injections every 14 days last 2 days ago, may notice some symptoms prior to next dose (overall joints, more  "mobility issues) and taking methotrexate 15 mg once week, folic acid 2 mg day-improvement x1 day mild nausea. Eating less. Less active with mobility. Less dexrity right hand, swelling in joints right; left hand stiffness. Feet are toes right toes, right TMJ, right knee pain at times. Intermittent stiffness in neck at times more with movement (changes her pillow), catch at time. No neurological symptoms, no UE or LE or headaches. Healthy this year. Energy is fair. Here with Leonardo. 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 Mornings more stiffness \"once get moving it ok\". Slightly more thehn 4 month ago. Not sleeping well. In general with changes. 2 acteminophen at bedtime helps sleeps. No eye inflammation this year. No prednisone or NSAID. No LBP. Right hip pain with sitting, may catch with at times, gelling. Slipped in May off roller chair and seen in ER No [facility]. Doing pool and PT therapy --doing this at [facility] [place]. Wishes to use valerinan root for anxiety to see if compatible root.did see dermatology no ABNL lesion.  In sept will be moved.     No other changes PMSH          Review of Systems:   Review Of Systems  Skin: negative  Eyes: negative  Ears/Nose/Throat: negative  Respiratory: No shortness of breath, dyspnea on exertion, cough, or hemoptysis  Cardiovascular: negative  Gastrointestinal: negative  Genitourinary: negative  Musculoskeletal: see HPI  Neurologic: negative  Psychiatric: negative  Hematologic/Lymphatic/Immunologic: negative  Endocrine: negative          Past Medical History:     Past Medical History:   Diagnosis Date     Anterior uveitis     secondary to Enbrel     Fibroid      high in 2007 then normalized     History of Graves' disease      Hyperlipidemia LDL goal < 130     declined meication     Menopause 2008     Osteopenia 1/14/2016     Osteoporosis     Osteopenia borderline osteoporosis     RA (rheumatoid arthritis) (H)      Right posterior uveitis      " Seronegative rheumatoid arthritis (H)     dx Dr. rFost     Uveitis        Patient Active Problem List    Diagnosis Date Noted     Mechanical limb problems 10/14/2016     Priority: Medium     Pain in both wrists 10/07/2016     Priority: Medium     Rheumatoid arthritis of multiple sites without rheumatoid factor (H) 02/19/2016     Priority: Medium     Osteopenia 01/14/2016     Priority: Medium     Situational mixed anxiety and depressive disorder 09/18/2014     Priority: Medium     Lump or mass in breast 09/02/2014     Priority: Medium     Bunion 09/02/2014     Priority: Medium     Hemorrhoids 07/15/2014     Priority: Medium     Uveitis, anterior 06/21/2013     Priority: Medium     Encounter for long-term current use of medication 03/21/2013     Priority: Medium     Problem list name updated by automated process. Provider to review       Hammer toe 02/21/2013     Priority: Medium             Past Surgical History:     Past Surgical History:   Procedure Laterality Date     Fibroidadenoma Left Breast       NO HISTORY OF SURGERY               Social History:     Social History   Substance Use Topics     Smoking status: Never Smoker     Smokeless tobacco: Never Used     Alcohol use 3.5 - 7.0 oz/week      Comment: social use prn     No alcohol use --occasional 2x weeks. Never smoker. . Retired           Family History:     Family History   Problem Relation Age of Onset     Breast Cancer Mother      parkinsons     Osteoperosis Mother      Low Back Problems Mother      Breast Cancer Maternal Aunt      Other - See Comments Other      Inflammation joint in brother, maternal cousin      Diabetes Maternal Grandmother      Anxiety Disorder Father      Skin Cancer Father      Mental Illness Cousin      Alcoholism Paternal Grandfather      Glaucoma No family hx of      Macular Degeneration No family hx of      Retinal detachment No family hx of      Melanoma No family hx of             Allergies:     Allergies   Allergen  "Reactions     Fosamax      Throat discomfort     No Clinical Screening - See Comments      Joints flared after getting possible \"white drink\" after CT/MRI in 2007              Medications:     Current Outpatient Prescriptions   Medication Sig Dispense Refill     adalimumab (HUMIRA PEN) 40 MG/0.8ML pen kit Inject 0.8 mLs (40 mg) Subcutaneous every 14 days Hold for signs of infection, and then seek medical attention. SURECLICK PEN. 6 each 0     folic acid (FOLVITE) 1 MG tablet Take 2 tablets (2 mg) by mouth daily 180 tablet 3     methotrexate 2.5 MG tablet CHEMO Take 6 tablets (15 mg) by mouth once a week 72 tablet 0     acetaminophen (TYLENOL) 500 MG tablet Take 500-1,000 mg by mouth every 6 hours as needed for mild pain (rarely takes)              Physical Exam:   /86  Pulse 71  Ht 1.753 m (5' 9\")  Wt 74.5 kg (164 lb 4.8 oz)  SpO2 96%  BMI 24.26 kg/m2  Wt Readings from Last 4 Encounters:   07/18/18 74.5 kg (164 lb 4.8 oz)   04/13/18 77.7 kg (171 lb 6.4 oz)   10/12/17 78.5 kg (173 lb 1.6 oz)   07/14/17 77 kg (169 lb 12.8 oz)   Constitutional: WD-[id number] cooperative   Eyes: nl conjunctiva, sclera   ENT: nl external ears, nose, throat   No mucous membrane lesions, normal saliva pool   LUNGS; CTA posteriorly  Cardiac: S1S2 no murmurs rubs or gallops   Neck: no mass or thyroid enlargement   MS: right hip reduced IR/ER, right knee halgus valgus, s/t wrists, right 1-3 MCP, swan deformities right hand, rt thumb deformities. She has previously noted subluxation at the R wrist/mild in L wrist with decreased ROM, minimal pannus. Right hand ulnar deviation, right thumb drop , right 5th DIP subluxation, right wrist subluxation, hammertoes. Bunions bilaterally. Deformities of MTPs with palpable MTP heads. Pes planus. FROM hips. Right knee 1+ cool (not red) swelling with contracture but with 30 extension lag.--before was R knee cool with trace effusion but with 10 extension lag and halgus valgux. Reduced lateral ROM neck " and extension neck. Negative Lhermitte's sign. Feet bunion. All TMJ, neck, shoulder, elbow, wrist, MCP/PIP/DIP, spine, hip, knee, ankle, and foot MTP/IP joints were examined.   Skin: no nail pitting, alopecia, rash, nodules or lesions.   Neuro: nl cranial nerves, strength   Psych: nl affect.       Data:     Reviewed Rheumatology lab flowsheet  Results for orders placed or performed in visit on 04/06/18   ALT   Result Value Ref Range    ALT 18 0 - 50 U/L   AST   Result Value Ref Range    AST 16 0 - 45 U/L   CBC with platelets   Result Value Ref Range    WBC 8.2 4.0 - 11.0 10e9/L    RBC Count 5.01 3.8 - 5.2 10e12/L    Hemoglobin 16.0 (H) 11.7 - 15.7 g/dL    Hematocrit 47.5 (H) 35.0 - 47.0 %    MCV 95 78 - 100 fl    MCH 31.9 26.5 - 33.0 pg    MCHC 33.7 31.5 - 36.5 g/dL    RDW 14.2 10.0 - 15.0 %    Platelet Count 301 150 - 450 10e9/L   Creatinine   Result Value Ref Range    Creatinine 0.71 0.52 - 1.04 mg/dL    GFR Estimate 84 >60 mL/min/1.7m2    GFR Estimate If Black >90 >60 mL/min/1.7m2   Albumin level   Result Value Ref Range    Albumin 4.2 3.4 - 5.0 g/dL   CRP inflammation   Result Value Ref Range    CRP Inflammation 7.2 0.0 - 8.0 mg/L     IMPRESSION:   1.  No acute fracture in the cervical spine.   Result Narrative   EXAM: CT CERVICAL SPINE WITHOUT CONTRAST, May 5, 2018    CLINICAL DATA: Trauma.    COMPARISON: None available    TECHNIQUE: Noncontrast axial, sagittal and coronal images reconstructed.    FINDINGS: Prevertebral soft tissues are not thickened. Acceptable alignment.    C1 relationships are maintained. No vertebral collapse. Posterior elements are aligned and intact. No acute fracture or dislocation.    Extreme lung apices are clear. Thyroid gland is prominent.     Result Impression   IMPRESSION:  2 views.    No acute displaced fracture. No visible pleural gas. No shift of the mediastinum.    Aortic contours are discrete. Normal heart size and pulmonary vascularity. No pleural effusion.    No pulmonary  contusion.   Result Narrative   ICD 10: , ,    EXAM: CHEST RADIOGRAPH, 5/5/2018    CLINICAL DATA: Trauma    COMPARISON: November 2, 2014     Result Impression   IMPRESSION:   1.  Right periorbital soft tissue swelling. No displaced fracture.   Result Narrative   EXAM: CT FACIAL BONES WITHOUT CONTRAST, May 5, 2018.    CLINICAL DATA: Fall. Trauma.    COMPARISON: None available    TECHNIQUE: Noncontrast axial, sagittal, and coronal reconstructed images.    FINDINGS: Zygomatic arches, pterygoid plates, and the hard palate are intact.    Right periorbital soft tissue swelling. The globe is intact. Retrobulbar fat is normal. No orbital fracture. No mandibular fracture or dislocation. No middle ear effusion. Nasal bones are intact.    Faint vascular calcifications.     Result Impression   IMPRESSION: AP, lateral, and oblique images acquired.      Overlying vascular catheter. Small osteophyte along the articular surface of the radial head. No visible fracture. No dislocation. No elevation of the posterior fat pad to indicate a joint effusion.   Result Narrative   EXAM: XR RIGHT ELBOW, 3 VIEWS, May 5, 2018    CLINICAL DATA: Trauma    COMPARISON: None available     Result Impression   IMPRESSION: AP pelvis with crosstable lateral view of the right hip acquired.      No acute fracture or dislocation. Pelvic lines are intact. No femoral collapse. SI joints and the pubic symphysis are not widened. Degenerative changes. Pelvic phleboliths.   Result Narrative   EXAM: XR PELVIS & RIGHT LATERAL HIP, May 5, 2018    CLINICAL DATA: Trauma    COMPARISON: None available   Status         Thank-you for allowing me to participate in your care.     Tyson PEÑA, CNP, MSN  [facility]  Department of Rheumatology & Autoimmune Disorders  [facility]: [phone] Opt 2, then Opt 1 Scheduling   [facility] [place]: [phone]; [phone] Option 7 scheduling

## 2018-07-18 NOTE — MR AVS SNAPSHOT
After Visit Summary   7/18/2018    Reina Conway    MRN: [id number]           Patient Information     Date Of Birth          1955        Visit Information        Provider Department      7/18/2018 11:30 AM Tyson Alicea APRN [facility] Rheumatology        Today's Diagnoses     Rheumatoid arthritis of multiple sites without rheumatoid factor (H)    -  1    Encounter for long-term current use of medication        Osteoporosis without current pathological fracture, unspecified osteoporosis type        Hand deformities, right        Neck pain        Seronegative rheumatoid arthritis (H)        High risk medications (not anticoagulants) long-term use          Care Instructions    Preventive Care:    Breast Cancer Screening: During our visit today, we discussed that it is recommended you receive breast cancer screening. Please call or make an appointment with your primary care provider to discuss this with them. You may also call the [facility] scheduling line ([phone]) to set up a mammography appointment at the Breast Center within the [facility].    MACIEL MONTANEZ -PHARMACIST               Follow-ups after your visit        Additional Services     MED THERAPY MANAGE REFERRAL       Your provider has referred you to: **[facility] Scheduling (numerous locations) [phone]   [email or web address]  [facility]: Rheumatology [phone]   [email or web address]    Reason for Referral: MEDICATIONS     The [facility] department will contact you to schedule an appointment.  You may also schedule the appointment by calling [phone].  For [place] patients, please call [phone] to confirm/schedule your appointment on the next business day.    This service is designed to help you get the most from your medications.   "A specially trained Pharmacist will work closely with you and your providers to solve any questions, concerns, issues or problems related to your medications.    Please bring all of your prescription and non-prescription medications (such as vitamins, over-the-counter medications, and herbals) or a detailed medication list to your appointment.    If you have a glucose meter or other home monitoring information, please also bring this to your appointment (i.e. blood glucose log, blood pressure log, pain log, etc.).            OCCUPATIONAL THERAPY REFERRAL       *This therapy referral will be filtered to a centralized scheduling office at [facility] and the patient will receive a call to schedule an appointment at a [facility] location most convenient for them. *     [facility] provides Occupational Therapy evaluation and treatment and many specialty services across the [facility] system.  If requesting a specialty program, please choose from the list below.    If you have not heard from the scheduling office within 2 business days, please call [phone] for all locations, with the exception of [place], please call [phone] and [facility], please call [phone]    Treatment: Evaluation & Treatment  Special Instructions/Modalities:   Special Programs: [facility] hand therpay     Please be aware that coverage of these services is subject to the terms and limitations of your health insurance plan.  Call member services at your health plan with any benefit or coverage questions.      **Note to Provider:  If you are referring outside of [facility] for the therapy appointment, please list the name of the location in the \"special instructions\" above, print the referral and give to the patient to schedule the appointment.                  Follow-up notes from your care team     Return in about 3 months (around 10/18/2018) for Labs every 12 weeks, Labs Today, Annual Physical Exam " with Primary Care Provider.      Your next 10 appointments already scheduled     Jul 18, 2018  1:15 PM CDT   Lab with [facility]   [facility] ([facility])    [address]  1st Floor  [facility] [address]   [phone]            Aug 15, 2018 12:30 PM CDT   RETURN CORNEA with Maximilian Terry MD   Eye Clinic ([facility])    [facility]  [address] [place] [address] Se  9th Fl Clin 9a  [facility] [address]   [phone]            Sep 06, 2018  1:00 PM CDT   Annual Visit with Diana Orantes MD   [facility] ([facility])    [facility] [address]  [address]rd [address]  [address]  [address]  [facility] [address]   [phone]            Sep 10, 2018 12:00 PM CDT   (Arrive by 11:45 AM)   [facility]    [address]  4th Floor  [facility] [address] [phone]            Oct 17, 2018 10:30 AM CDT   (Arrive by 10:15 AM)   Return Visit with Guanakito Frost MD   [facility] ([facility])    [address]  [address]  [facility] [address]   [phone]              Future tests that were ordered for you today     Open Standing Orders        Priority Remaining Interval Expires Ordered    Albumin level Routine 6/6 every 10-12 weeks 7/18/2019 7/18/2018    Creatinine Routine 6/6 every 10-12 weeks 7/18/2019 7/18/2018    CRP inflammation Routine 6/6 every 10-12 weeks 7/18/2019 7/18/2018    CBC with platelets Routine 6/6 every 10-12 weeks 7/18/2019 7/18/2018    AST Routine 6/6 every 10-12 weeks 7/18/2019 7/18/2018    ALT Routine 6/6 every 10-12 weeks 7/18/2019 7/18/2018          Open Future Orders        Priority Expected Expires Ordered    XR Cervical Spine w Flex/Ext G/E 4 Views Routine 7/18/2018 10/18/2018 7/18/2018    Vitamin D Deficiency Routine 7/18/2018 8/18/2018 7/18/2018        "     Who to contact     If you have questions or need follow up information about today's clinic visit or your schedule please contact [facility] RHEUMATOLOGY directly at [phone].  Normal or non-critical lab and imaging results will be communicated to you by MyChart, letter or phone within 4 business days after the clinic has received the results. If you do not hear from us within 7 days, please contact the clinic through [employer] or phone. If you have a critical or abnormal lab result, we will notify you by phone as soon as possible.  Submit refill requests through [employer] or call your pharmacy and they will forward the refill request to us. Please allow 3 business days for your refill to be completed.          Additional Information About Your Visit        [employer] Information     [employer] gives you secure access to your electronic health record. If you see a primary care provider, you can also send messages to your care team and make appointments. If you have questions, please call your primary care clinic.  If you do not have a primary care provider, please call [phone] and they will assist you.        Care EveryWhere ID     This is your Care EveryWhere ID. This could be used by other organizations to access your [facility] medical records  [id number]        Your Vitals Were     Pulse Height Pulse Oximetry BMI (Body Mass Index)          71 1.753 m (5' 9\") 96% 24.26 kg/m2         Blood Pressure from Last 3 Encounters:   07/18/18 133/86   04/13/18 138/83   10/17/17 136/88    Weight from Last 3 Encounters:   07/18/18 74.5 kg (164 lb 4.8 oz)   04/13/18 77.7 kg (171 lb 6.4 oz)   10/12/17 78.5 kg (173 lb 1.6 oz)              We Performed the Following     MED THERAPY MANAGE REFERRAL     OCCUPATIONAL THERAPY REFERRAL          Where to get your medicines      These medications were sent to [id number] - [facility] [address] [facility]  [address] [facility] [address]     Phone:  [phone] "     folic acid 1 MG tablet    methotrexate 2.5 MG tablet CHEMO         These medications were sent to [facility] - [place], [place] - [address]  [address] [facility] [address]    Hours:  Mon-Fri 8:30am-5:00pm Toll Free [phone] Phone:  [phone]     adalimumab 40 MG/0.8ML pen kit          Primary Care Provider Office Phone # Fax #    Diana Orantes [phone]       [address] [facility] [address]  [facility] [address]        Equal Access to Services     [facility]: Hadii aad ku hadasho Soomaali, waaxda luqadaha, qaybta kaalmada adeegyada, chandana walden . So [facility] [phone].    ATENCIÓN: Si habla español, tiene a martinez disposición servicios gratuitos de asistencia lingüística. [facility] [phone].    We comply with applicable federal civil rights laws and [place] laws. We do not discriminate on the basis of race, color, national origin, age, disability, sex, sexual orientation, or gender identity.            Thank you!     Thank you for choosing [facility] RHEUMATOLOGY  for your care. Our goal is always to provide you with excellent care. Hearing back from our patients is one way we can continue to improve our services. Please take a few minutes to complete the written survey that you may receive in the mail after your visit with us. Thank you!             Your Updated Medication List - Protect others around you: Learn how to safely use, store and throw away your medicines at www.disposemymeds.org.          This list is accurate as of 7/18/18  1:07 PM.  Always use your most recent med list.                   Brand Name Dispense Instructions for use Diagnosis    acetaminophen 500 MG tablet    TYLENOL     Take 500-1,000 mg by mouth every 6 hours as needed for mild pain (rarely takes)        adalimumab 40 MG/0.8ML pen kit    HUMIRA PEN    1 kit    Inject 0.8 mLs (40 mg) Subcutaneous every 14 days Hold for signs of  infection, and then seek medical attention. DESHAUN PETERS.    Seronegative rheumatoid arthritis (H)       folic acid 1 MG tablet    FOLVITE    180 tablet    Take 2 tablets (2 mg) by mouth daily    High risk medications (not anticoagulants) long-term use, Rheumatoid arthritis of multiple sites without rheumatoid factor (H)       methotrexate 2.5 MG tablet CHEMO     72 tablet    Take 6 tablets (15 mg) by mouth once a week    High risk medications (not anticoagulants) long-term use, Rheumatoid arthritis of multiple sites without rheumatoid factor (H)

## 2018-07-18 NOTE — NURSING NOTE
"Chief Complaint   Patient presents with     RECHECK     RA      /86  Pulse 71  Ht 1.753 m (5' 9\")  Wt 74.5 kg (164 lb 4.8 oz)  SpO2 96%  BMI 24.26 kg/m2  Liz Gutierrez, ASTRID    "

## 2018-07-19 ENCOUNTER — TELEPHONE (OUTPATIENT)
Dept: PHARMACY | Facility: CLINIC | Age: 63
End: 2018-07-19

## 2018-07-19 LAB — DEPRECATED CALCIDIOL+CALCIFEROL SERPL-MC: 29 UG/L (ref 20–75)

## 2018-07-19 NOTE — PROGRESS NOTES
The blood counts, liver, kidney labs are normal. High CRP consistent with RA flaring and activity    Tyson PEÑA, CNP, MSN  7/19/2018  8:33 AM

## 2018-07-19 NOTE — TELEPHONE ENCOUNTER
Called pt at the request of Tyson Alicea as she had a supplement question at her appointment yesterday.    Is interested in trying valerian to help with her anxiety. Questions regarding interactions and safety.    Per [facility] interactions, valerian may slow elimination of Tylenol. Clarified use, she is not taking this very often. If she does, it is usually at night. Does not exceed 3,000 mg per day, takes 1,000 mg per dose. Encouraged her to continue safe Tylenol dosing.    Also mentions she uses ranitidine PRN. Will add to medication list. [facility] rates valerian as likely safe, discussed lack of long-term data beyond 28 days of use. Discussed uncertainty of efficacy for given indication. Expresses understanding of information presented and has no further questions.

## 2018-07-20 ENCOUNTER — TELEPHONE (OUTPATIENT)
Dept: PHARMACY | Facility: OTHER | Age: 63
End: 2018-07-20

## 2018-07-20 NOTE — TELEPHONE ENCOUNTER
MTM referral from: [facility] visit (referral by provider)    MTM referral outreach attempt #2 on July 20, 2018 at 3:55 PM      Outcome: Patient not reachable after several attempts, will route to MTM Pharmacist/Provider as an FYI. Thank you for the referral.    Dakota Cabrera, MTM Coordinator

## 2018-10-17 ENCOUNTER — OFFICE VISIT (OUTPATIENT)
Dept: RHEUMATOLOGY | Facility: CLINIC | Age: 63
End: 2018-10-17
Attending: INTERNAL MEDICINE
Payer: COMMERCIAL

## 2018-10-17 ENCOUNTER — APPOINTMENT (OUTPATIENT)
Dept: LAB | Facility: CLINIC | Age: 63
End: 2018-10-17
Payer: COMMERCIAL

## 2018-10-17 VITALS
BODY MASS INDEX: 24.12 KG/M2 | TEMPERATURE: 97.9 F | WEIGHT: 163.3 LBS | DIASTOLIC BLOOD PRESSURE: 87 MMHG | OXYGEN SATURATION: 95 % | HEART RATE: 74 BPM | SYSTOLIC BLOOD PRESSURE: 143 MMHG

## 2018-10-17 DIAGNOSIS — Z79.899 HIGH RISK MEDICATIONS (NOT ANTICOAGULANTS) LONG-TERM USE: ICD-10-CM

## 2018-10-17 DIAGNOSIS — Z79.899 ENCOUNTER FOR LONG-TERM CURRENT USE OF MEDICATION: ICD-10-CM

## 2018-10-17 DIAGNOSIS — M06.09 RHEUMATOID ARTHRITIS OF MULTIPLE SITES WITHOUT RHEUMATOID FACTOR (H): Primary | ICD-10-CM

## 2018-10-17 LAB
ALBUMIN SERPL-MCNC: 3.7 G/DL (ref 3.4–5)
ALT SERPL W P-5'-P-CCNC: 19 U/L (ref 0–50)
AST SERPL W P-5'-P-CCNC: 18 U/L (ref 0–45)
CREAT SERPL-MCNC: 0.79 MG/DL (ref 0.52–1.04)
CRP SERPL-MCNC: 14.2 MG/L (ref 0–8)
ERYTHROCYTE [DISTWIDTH] IN BLOOD BY AUTOMATED COUNT: 13.3 % (ref 10–15)
GFR SERPL CREATININE-BSD FRML MDRD: 73 ML/MIN/1.7M2
HCT VFR BLD AUTO: 48.3 % (ref 35–47)
HGB BLD-MCNC: 15.5 G/DL (ref 11.7–15.7)
MCH RBC QN AUTO: 30.7 PG (ref 26.5–33)
MCHC RBC AUTO-ENTMCNC: 32.1 G/DL (ref 31.5–36.5)
MCV RBC AUTO: 96 FL (ref 78–100)
PLATELET # BLD AUTO: 273 10E9/L (ref 150–450)
RBC # BLD AUTO: 5.05 10E12/L (ref 3.8–5.2)
WBC # BLD AUTO: 8.1 10E9/L (ref 4–11)

## 2018-10-17 PROCEDURE — 36415 COLL VENOUS BLD VENIPUNCTURE: CPT | Performed by: NURSE PRACTITIONER

## 2018-10-17 PROCEDURE — 85027 COMPLETE CBC AUTOMATED: CPT | Performed by: NURSE PRACTITIONER

## 2018-10-17 PROCEDURE — 82040 ASSAY OF SERUM ALBUMIN: CPT | Performed by: NURSE PRACTITIONER

## 2018-10-17 PROCEDURE — 82565 ASSAY OF CREATININE: CPT | Performed by: NURSE PRACTITIONER

## 2018-10-17 PROCEDURE — G0463 HOSPITAL OUTPT CLINIC VISIT: HCPCS | Mod: ZF

## 2018-10-17 PROCEDURE — 84450 TRANSFERASE (AST) (SGOT): CPT | Performed by: NURSE PRACTITIONER

## 2018-10-17 PROCEDURE — 84460 ALANINE AMINO (ALT) (SGPT): CPT | Performed by: NURSE PRACTITIONER

## 2018-10-17 PROCEDURE — 86140 C-REACTIVE PROTEIN: CPT | Performed by: NURSE PRACTITIONER

## 2018-10-17 ASSESSMENT — PAIN SCALES - GENERAL: PAINLEVEL: MILD PAIN (3)

## 2018-10-17 NOTE — PROGRESS NOTES
"Rheumatology Visit     Reina Conway MRN# [id number]   YOB: 1955 Age: 63 year old     Date of Visit: October 17, 2018  Primary care provider: Diana Orantes          Assessment and Plan:   64 yo female with :    1. Seronegative destructive RA (-RF/CCP/LASHAE panel, last repeat xrays [place] 3/2013; deformities, right wrist progressive worse function with dorsiflexion, hammertoes with bilateral toe subluxation, right knee contracture).    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 Seen ophth Dr. Terry 1-2017 (neg exam). No sx today   RA activity=low. Exam shows prior deformities, no RA flaring and no active arthritis. Labs today pending. Mild nausea, hairloss. Will ask her to routinely take FA 1 mg day (missing doses) but for 2 mths go to 2 mg day then report if improved. We will continue the humira and methotrexate at current doses.   2. Hairloss with fatigue. Due for physical for complete evaluation which she will do this should include cancer screening, mamogram, bone health, skin check and over check. Will check thyroid.   3. Alcohol use. Reports abstinent from Alcohol, but then \"occasionally\". She should discuss with Dr. Orantes. I discussed the risks with her today on the liver and MTX. She understands.   3.  Osteopenia by prior DEXA (done 2009), did not start Fosamax Rx by Dr. Orantes.  Patient noted in past her chart now says Osteoporosis. F/U PCP at annual. She agrees   2. Other medical problems as previously documented and as per EHR      PLAN: Discussed in detail with the patient.   1. Continue Humira 40 mg SQ Q 2 weeks.  Continue Methotrexate at 15 mg weekly with 2mg day Folic acid    --Keep up-to-date vaccinations per CDC guidelines with PCP. Flu shot this fall  --Labs every 12 weeks--discussed importance of labs every 12 weeks and hold MTX for any infections.    2 F/U Ophthalmology yearly and prn sx.   3. RTC 3 mths with Tyson, sooner prn " and six months with me    4. She will schedule appt with Dr. Orantes for health maintenance  5. Prior referral to Orthopedic [facility] Surgery for their opinion - she did not follow thru.      Guanakito Frost MD           History of Present Illness:   63 year old female who presents for continuing care for her seronegative RA. Seen by Dr. Frost until 2010 then swtiched to [facility], then re-established 3/2013 (xrays 2/2013 [place]). EPIC reviewed.  I saw her last in April 2018 and she saw Tyson Alicea in the interval in July.  HISTORY CARRIED FORWARD:   Prior: Synovitis and joint deformities in 2008 was persistently seronegative but had active synovitis. Methotrexate helped but did not cause a remission/low disease state. She required Prednisone to control symptoms partially but still had significant ADL issues. We had persistently recommended anti TNF therapy which she resisted. She sought a second opinion at [place] who recommended the same things, and she continued care there as of August 2010, then switched back to Dr. Frost 3/2013. Begun on Enbrel at [facility]. 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 As no other etiology found it was felt it might be due to Enbrel and she was switched to Humira. She has remained on Methotrexate, primarily 25 mg weekly, decreased 3/2013 (not to go <15mg week due to risk of further deformities or uveitis) . FRAX 32% at [place]. Prior declined biphosphosphonate, [place] recommended IV.   Xrays 3/2013 [place]: See records. Hallux valgus right, hammertoes L>R, hindfoot valgus, pes planus. Dislocated left 2nd MTP, sublux left 3rd MTP, arth 2-3 MTP, rt 1st MTP. Mild DJD hands. Several DIP and 1st CMC. Subluxation left thumb IP and persistant dorsiflexion right wrist.   Previous visits discussed stress in her life. Her father in law passed away and there had been a lot of stress and she put her issues on the back burner. She had an episode of  "chest/epigastric pain and was seen at [facility]. She had apparent negative cardiac evaluation although did not followup with a stress test. She is taking OTC Zantac prn and thinks that helps. Found allergic to Wheat, avoid gluten and sugars. Feels much improved [heartburn, bloating, blood in stools, irregular stools] this really changed her digestive system. She went on a gluten free diet in December, and stopped all RA meds in early Jan 2015. See My Chart message.   Restarted Humira early April 2015 every 3 weeks. Noticed more migatory joints pains, swelling. Right hand, right knee, right wrist-associated swelling really in right 2nd MCP. 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 Lack of movement and coordinations, using her wrist brace. Uses this at night and daily prn.      At last visit she had continued on Humira without side effects and still feels it is helpful. She does have daily discomfort in several areas with either activity or prior damage, especially knees.      She continued to have stress in her life but is working through this and \"trying to get back on track\". She stopped drinking any alcohol for the past two months and was congratulated on this. She went to  but does not feel she is alcoholic.  Leonardo going to [facility]. She has been  for a long time. Both lost parents, and grieving. She says she's in less denial. She admits  is helping a lot.       She called in November due to increased joint pain and we restarted Methotrexate at 10 mg weekly as previously discussed.  She stopped it two weeks ago.  She noted more joint pain and also some nausea on day of dose.  We discussed this at length and unlikely that MTX contributed to more joint symptoms.      At January 2016 visit we readded Methotrexate to Humira in hopes of decreasing disease activity, using low dose due to prior side effect complaints.  She called later that month with flare symptoms requiring Prednisone bridge, and " we increased Methotrexate to more standard dose of 15 mg weekly.       At her April 2016 visit she had started improving.. She was tolerating Methotrexate this time.  She weaned off Prednisone.      INTERVAL HISTORY:  She saw Tyson Alicea CNP in July per note.  She remains on combination Humira and Methotrexate without change.  She has not had worsening joint pain or new areas of concern.  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  Her R knee is bothersome and inhibits long ambulation unchanged.  They still have not moved from their multi level home but are planning to.  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  She denies SE on medications.  She had lab in July stable on Methotrexate. No interval infections.   She has not seen her PCP in over two years.  She saw Derm last fall.  ROS is below.        Review of Systems:   Review Of Systems  Skin: negative  Eyes: negative  Ears/Nose/Throat: negative  Respiratory: No shortness of breath, dyspnea on exertion, cough, or hemoptysis  Cardiovascular: negative  Gastrointestinal: negative  Genitourinary: negative  Musculoskeletal: see HPI  Neurologic: negative  Psychiatric: negative  Hematologic/Lymphatic/Immunologic: negative  Endocrine: negative          Past Medical History:     Past Medical History:   Diagnosis Date     Anterior uveitis     secondary to Enbrel     Fibroid      high in 2007 then normalized     History of Graves' disease      Hyperlipidemia LDL goal < 130     declined meication     Menopause 2008     Osteopenia 1/14/2016     Osteoporosis     Osteopenia borderline osteoporosis     RA (rheumatoid arthritis) (H)      Right posterior uveitis      Seronegative rheumatoid arthritis (H)     dx Dr. Frost     Uveitis        Patient Active Problem List    Diagnosis Date Noted     Mechanical limb problems 10/14/2016     Priority: Medium     Pain in both wrists 10/07/2016     Priority: Medium     Rheumatoid  "arthritis of multiple sites without rheumatoid factor (H) 02/19/2016     Priority: Medium     Osteopenia 01/14/2016     Priority: Medium     Situational mixed anxiety and depressive disorder 09/18/2014     Priority: Medium     Lump or mass in breast 09/02/2014     Priority: Medium     Bunion 09/02/2014     Priority: Medium     Hemorrhoids 07/15/2014     Priority: Medium     Uveitis, anterior 06/21/2013     Priority: Medium     Encounter for long-term current use of medication 03/21/2013     Priority: Medium     Problem list name updated by automated process. Provider to review       Hammer toe 02/21/2013     Priority: Medium             Past Surgical History:     Past Surgical History:   Procedure Laterality Date     Fibroidadenoma Left Breast       NO HISTORY OF SURGERY               Social History:     Social History   Substance Use Topics     Smoking status: Never Smoker     Smokeless tobacco: Never Used     Alcohol use 3.5 - 7.0 oz/week      Comment: social use prn             Family History:     Family History   Problem Relation Age of Onset     Breast Cancer Mother      parkinsons     Osteoporosis Mother      Low Back Problems Mother      Breast Cancer Maternal Aunt      Other - See Comments Other      Inflammation joint in brother, maternal cousin      Diabetes Maternal Grandmother      Anxiety Disorder Father      Skin Cancer Father      Mental Illness Cousin      Alcoholism Paternal Grandfather      Glaucoma No family hx of      Macular Degeneration No family hx of      Retinal detachment No family hx of      Melanoma No family hx of             Allergies:     Allergies   Allergen Reactions     Fosamax      Throat discomfort     No Clinical Screening - See Comments      Joints flared after getting possible \"white drink\" after CT/MRI in 2007              Medications:     Current Outpatient Prescriptions   Medication Sig Dispense Refill     acetaminophen (TYLENOL) 500 MG tablet Take 500-1,000 mg by mouth " every 6 hours as needed for mild pain (rarely takes)       adalimumab (HUMIRA PEN) 40 MG/0.8ML pen kit Inject 0.8 mLs (40 mg) Subcutaneous every 14 days Hold for signs of infection, and then seek medical attention. SURECLICK PEN. 1 kit 5     folic acid (FOLVITE) 1 MG tablet Take 2 tablets (2 mg) by mouth daily 180 tablet 3     methotrexate 2.5 MG tablet CHEMO Take 6 tablets (15 mg) by mouth once a week 72 tablet 0     ranitidine (ZANTAC) 150 MG tablet Take 150 mg by mouth daily as needed              Physical Exam:     Constitutional: [id number] cooperative   Eyes: nl conjunctiva, sclera   ENT: nl external ears, nose, throat   No mucous membrane lesions, normal saliva pool   Neck: no mass or thyroid enlargement   MS: She has previously noted subluxation at the R wrist/mild in L wrist with decreased ROM, minimal pannus, swan in right hand right thumb deformity unchanged. Right 2-3nd MCP pannus no active swelling. Right hand ulnar deviation, right thumb drop , right 5th DIP subluxation, right wrist subluxation, hammertoes. Bunions bilaterally. Deformities of MTPs with palpable MTP heads. Right knee 1+ cool swelling with 30 extension lag.unchanged. Right foot bunion. All TMJ, neck, shoulder, elbow, wrist, MCP/PIP/DIP, spine, hip, knee, ankle, and foot MTP/IP joints were examined.   Skin: no nail pitting, alopecia, rash, nodules or lesions.   Neuro: nl cranial nerves, strength   Psych: nl affect       Data:     Lab Results   Component Value Date    WBC 7.3 07/18/2018    WBC 8.2 04/06/2018    WBC 8.4 10/12/2017    HGB 15.6 07/18/2018    HGB 16.0 (H) 04/06/2018    HGB 14.3 10/12/2017    HCT 47.0 07/18/2018    HCT 47.5 (H) 04/06/2018    HCT 42.9 10/12/2017    MCV 95 07/18/2018    MCV 95 04/06/2018    MCV 95 10/12/2017     07/18/2018     04/06/2018     10/12/2017     Lab Results   Component Value Date    BUN 13 09/14/2010    BUN 20 10/06/2009    BUN 14 04/03/2007     No components found for: SEDRATE  Lab  Results   Component Value Date    TSH 0.62 07/14/2017    TSH 0.58 09/14/2010    TSH 0.42 10/06/2009     Lab Results   Component Value Date    AST 20 07/18/2018    AST 16 04/06/2018    AST 15 10/12/2017    ALT 22 07/18/2018    ALT 18 04/06/2018    ALT 20 10/12/2017    GGT 32 09/25/2014    ALKPHOS 82 09/25/2014    ALKPHOS 83 12/22/2010     Reviewed Rheumatology lab flowsheet    Guanakito Frost

## 2018-10-17 NOTE — LETTER
Patient:  Reina Conway  :   1955  MRN:     [id number]        Ms.Lydia KARINA Conway  [address] [address]        2018    Dear ,    We are writing to inform you of your test results when you seen Dr. Frost     Your CRP inflammation labs is mildly elevated   Your blood counts, liver and kidney tests are normal      Tyson Alicea APRN, CNP, MSN        Results for orders placed or performed in visit on 10/17/18   AST   Result Value Ref Range    AST 18 0 - 45 U/L   ALT   Result Value Ref Range    ALT 19 0 - 50 U/L   Albumin level   Result Value Ref Range    Albumin 3.7 3.4 - 5.0 g/dL   Creatinine   Result Value Ref Range    Creatinine 0.79 0.52 - 1.04 mg/dL    GFR Estimate 73 >60 mL/min/1.7m2    GFR Estimate If Black 89 >60 mL/min/1.7m2   CRP inflammation   Result Value Ref Range    CRP Inflammation 14.2 (H) 0.0 - 8.0 mg/L   CBC with platelets   Result Value Ref Range    WBC 8.1 4.0 - 11.0 10e9/L    RBC Count 5.05 3.8 - 5.2 10e12/L    Hemoglobin 15.5 11.7 - 15.7 g/dL    Hematocrit 48.3 (H) 35.0 - 47.0 %    MCV 96 78 - 100 fl    MCH 30.7 26.5 - 33.0 pg    MCHC 32.1 31.5 - 36.5 g/dL    RDW 13.3 10.0 - 15.0 %    Platelet Count 273 150 - 450 10e9/L       Guanakito Frost MD

## 2018-10-17 NOTE — NURSING NOTE
Chief Complaint   Patient presents with     RECHECK     RA     /87  Pulse 74  Temp 97.9  F (36.6  C) (Oral)  Wt 74.1 kg (163 lb 4.8 oz)  SpO2 95%  BMI 24.12 kg/m2  Leticia Henderson MA

## 2018-10-17 NOTE — MR AVS SNAPSHOT
After Visit Summary   10/17/2018    Reina Conway    MRN: [id number]           Patient Information     Date Of Birth          1955        Visit Information        Provider Department      10/17/2018 10:30 AM Guanakito Frost MD [facility] Rheumatology        Today's Diagnoses     Rheumatoid arthritis of multiple sites without rheumatoid factor (H)    -  1    Encounter for long-term current use of medication        High risk medications (not anticoagulants) long-term use           Follow-ups after your visit        Follow-up notes from your care team     Return in about 3 months (around 1/17/2019).      Your next 10 appointments already scheduled     Oct 17, 2018  3:15 PM CDT   Lab with  LAB   [facility] ([facility])    [address]  1st Floor  [place] [address]   [phone]            Oct 18, 2018  3:00 PM CDT   Annual Visit with Diana Orantes MD   [facility] ([facility])    [address]  [address]  [address]  [address]  [place] [address]   [phone]            Nov 15, 2018  1:30 PM CST   RETURN CORNEA with Maximilian Terry MD   Eye Clinic ([facility])    [address]  [address]  [place] [address]   [phone]            Dec 10, 2018 12:40 PM CST   (Arrive by 12:25 PM)   [facility] FO[facility]ANKLE [facility]    [address]  4th Floor  [place] [id number] [phone]            Jan 16, 2019  2:30 PM CST   (Arrive by 2:15 PM)   Return Visit with CASEY Galo [facility] ([facility])    [address]  [address]  [place] [address]   [phone]            Apr 18, 2019  2:00 PM CDT   (Arrive by 1:45 PM)   Return Visit with Guanakito Frost MD   [facility] Rheumat[facility] ([facility]  [facility])    [address]  [address]  [place] [facility] [address] [phone]              Who to contact     If you have questions or need follow up information about today's clinic visit or your schedule please contact [facility] RHEUMATOLOG[facility] directly at [phone].  Normal or non-critical lab and imaging results will be communicated to you by MyChart, letter or phone within 4 business days after the clinic has received the results. If you do not hear from us within 7 days, please contact the clinic through [employer]Jax[employer] or phone. If you have a critical or abnormal lab result, we will notify you by phone as soon as possible.  Submit refill requests through [employer] or call your pharmacy and they will forward the refill request to us. Please allow 3 business days for your refill to be completed.          Additional Information About Your Visit        MyChart Information     [employer] gives you secure access to your electronic health record. If you see a primary care provider, you can also send messages to your care team and make appointments. If you have questions, please call your primary care clinic.  If you do not have a primary care provider, please call [phone] and they will assist you.        Care EveryWhere ID     This is your Care EveryWhere ID. This could be used by other organizations to access your [place] medical records  [id number]        Your Vitals Were     Pulse Temperature Pulse Oximetry BMI (Body Mass Index)          74 97.9  F (36.6  C) (Oral) 95% 24.12 kg/m2         Blood Pressure from Last 3 Encounters:   10/17/18 143/87   07/18/18 133/86   04/13/18 138/83    Weight from Last 3 Encounters:   10/17/18 74.1 kg (163 lb 4.8 oz)   07/18/18 74.5 kg (164 lb 4.8 oz)   04/13/18 77.7 kg (171 lb 6.4 oz)              We Performed the Following     Albumin level     ALT     AST     CBC with platelets     Creatinine     CRP inflammation          Where to get your medicines       These medications were sent to [id number] - [place], [place] - [address]  [address] [facility] [address]     Phone:  [phone]     methotrexate 2.5 MG tablet CHEMO          Primary Care Provider Office Phone # Fax #    Diana Orantes [phone]       [address]  [facility] [address]        Equal Access to Services     CHRISTINE MONROE : Hadii aad ku hadasho Soomaali, waaxda luqadaha, qaybta kaalmada adeegyada, waxay idiin hayaan adeeg kharacourtney laceasar . So [facility] [phone].    ATENCIÓN: Si yaw villarreal, tiene a martinez disposición servicios gratuitos de asistencia lingüística. [facility] [phone].    We comply with applicable federal civil rights laws and [place] laws. We do not discriminate on the basis of race, color, national origin, age, disability, sex, sexual orientation, or gender identity.            Thank you!     Thank you for choosing [facility] RHEUMATOLOGY  for your care. Our goal is always to provide you with excellent care. Hearing back from our patients is one way we can continue to improve our services. Please take a few minutes to complete the written survey that you may receive in the mail after your visit with us. Thank you!             Your Updated Medication List - Protect others around you: Learn how to safely use, store and throw away your medicines at www.disposemymeds.org.          This list is accurate as of 10/17/18 11:05 AM.  Always use your most recent med list.                   Brand Name Dispense Instructions for use Diagnosis    acetaminophen 500 MG tablet    TYLENOL     Take 500-1,000 mg by mouth every 6 hours as needed for mild pain (rarely takes)        adalimumab 40 MG/0.8ML pen kit    HUMIRA PEN    1 kit    Inject 0.8 mLs (40 mg) Subcutaneous every 14 days Hold for signs of infection, and then seek medical attention. SURECLICK PEN.    Seronegative rheumatoid arthritis (H)       folic acid 1 MG tablet    FOLVITE    180  tablet    Take 2 tablets (2 mg) by mouth daily    High risk medications (not anticoagulants) long-term use, Rheumatoid arthritis of multiple sites without rheumatoid factor (H)       methotrexate 2.5 MG tablet CHEMO     72 tablet    Take 6 tablets (15 mg) by mouth once a week    High risk medications (not anticoagulants) long-term use, Rheumatoid arthritis of multiple sites without rheumatoid factor (H)       ranitidine 150 MG tablet    ZANTAC     Take 150 mg by mouth daily as needed

## 2018-10-17 NOTE — LETTER
"10/17/2018      RE: Reina Conway  [address] [facility] [address]       Rheumatology Visit     Reina Conway MRN# [id number]   YOB: 1955 Age: 63 year old     Date of Visit: October 17, 2018  Primary care provider: Diana Orantes          Assessment and Plan:   64 yo female with :    1. Seronegative destructive RA (-RF/CCP/LASHAE panel, last repeat xrays [facility] 3/2013; deformities, right wrist progressive worse function with dorsiflexion, hammertoes with bilateral toe subluxation, right knee contracture).    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 Seen ophth Dr. Terry 1-2017 (neg exam). No sx today   RA activity=low. Exam shows prior deformities, no RA flaring and no active arthritis. Labs today pending. Mild nausea, hairloss. Will ask her to routinely take FA 1 mg day (missing doses) but for 2 mths go to 2 mg day then report if improved. We will continue the humira and methotrexate at current doses.   2. Hairloss with fatigue. Due for physical for complete evaluation which she will do this should include cancer screening, mamogram, bone health, skin check and over check. Will check thyroid.   3. Alcohol use. Reports abstinent from Alcohol, but then \"occasionally\". She should discuss with Dr. Orantes. I discussed the risks with her today on the liver and MTX. She understands.   3.  Osteopenia by prior DEXA (done 2009), did not start Fosamax Rx by Dr. Orantes.  Patient noted in past her chart now says Osteoporosis. F/U PCP at annual. She agrees   2. Other medical problems as previously documented and as per EHR      PLAN: Discussed in detail with the patient.   1. Continue Humira 40 mg SQ Q 2 weeks.  Continue Methotrexate at 15 mg weekly with 2mg day Folic acid    --Keep up-to-date vaccinations per CDC guidelines with PCP. Flu shot this fall  --Labs every 12 weeks--discussed importance of labs every 12 weeks and hold MTX for any " infections.    2 F/U Ophthalmology yearly and prn sx.   3. RTC 3 mths with Tyson, sooner prn and six months with me    4. She will schedule appt with Dr. Orantes for health maintenance  5. Prior referral to Orthopedic Hand Surgery for their opinion - she did not follow thru.              History of Present Illness:   63 year old female who presents for continuing care for her seronegative RA. Seen by Dr. Frost until 2010 then swtiched to [facility], then re-established 3/2013 (xrays 2/2013 [facility]). EPIC reviewed.  I saw her last in April 2018 and she saw Tyson Alicea in the interval in July.  HISTORY CARRIED FORWARD:   Prior: Synovitis and joint deformities in 2008 was persistently seronegative but had active synovitis. Methotrexate helped but did not cause a remission/low disease state. She required Prednisone to control symptoms partially but still had significant ADL issues. We had persistently recommended anti TNF therapy which she resisted. She sought a second opinion at [facility] who recommended the same things, and she continued care there as of August 2010, then switched back to Dr. Frost 3/2013. Begun on Enbrel at [facility]. 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 As no other etiology found it was felt it might be due to Enbrel and she was switched to Humira. She has remained on Methotrexate, primarily 25 mg weekly, decreased 3/2013 (not to go <15mg week due to risk of further deformities or uveitis) . FRAX 32% at [facility]. Prior declined biphosphosphonate, [facility] recommended IV.   Xrays 3/2013 [facility]: See records. Hallux valgus right, hammertoes L>R, hindfoot valgus, pes planus. Dislocated left 2nd MTP, sublux left 3rd MTP, arth 2-3 MTP, rt 1st MTP. Mild DJD hands. Several DIP and 1st CMC. Subluxation left thumb IP and persistant dorsiflexion right wrist.   Previous visits discussed stress in her life. Her father in law passed away and there had been a lot  "of stress and she put her issues on the back burner. She had an episode of chest/epigastric pain and was seen at [facility]. She had apparent negative cardiac evaluation although did not followup with a stress test. She is taking OTC Zantac prn and thinks that helps. Found allergic to Wheat, avoid gluten and sugars. Feels much improved [heartburn, bloating, blood in stools, irregular stools] this really changed her digestive system. She went on a gluten free diet in December, and stopped all RA meds in early Jan 2015. See My Chart message.   Restarted Humira early April 2015 every 3 weeks. Noticed more migatory joints pains, swelling. Right hand, right knee, right wrist-associated swelling really in right 2nd MCP. 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 Lack of movement and coordinations, using her wrist brace. Uses this at night and daily prn.      At last visit she had continued on Humira without side effects and still feels it is helpful. She does have daily discomfort in several areas with either activity or prior damage, especially knees.      She continued to have stress in her life but is working through this and \"trying to get back on track\". She stopped drinking any alcohol for the past two months and was congratulated on this. She went to  but does not feel she is alcoholic.  Leonardo going to [facility]. She has been  for a long time. Both lost parents, and grieving. She says she's in less denial. She admits  is helping a lot.       She called in November due to increased joint pain and we restarted Methotrexate at 10 mg weekly as previously discussed.  She stopped it two weeks ago.  She noted more joint pain and also some nausea on day of dose.  We discussed this at length and unlikely that MTX contributed to more joint symptoms.      At January 2016 visit we readded Methotrexate to Humira in hopes of decreasing disease activity, using low dose due to prior side effect complaints.  She " called later that month with flare symptoms requiring Prednisone bridge, and we increased Methotrexate to more standard dose of 15 mg weekly.       At her April 2016 visit she had started improving.. She was tolerating Methotrexate this time.  She weaned off Prednisone.      INTERVAL HISTORY:  She saw Tyson Alicea CNP in July per note.  She remains on combination Humira and Methotrexate without change.  She has not had worsening joint pain or new areas of concern.  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  Her R knee is bothersome and inhibits long ambulation unchanged.  They still have not moved from their multi level home but are planning to.  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  She denies SE on medications.  She had lab in July stable on Methotrexate. No interval infections.   She has not seen her PCP in over two years.  She saw Derm last fall.  ROS is below.        Review of Systems:   Review Of Systems  Skin: negative  Eyes: negative  Ears/Nose/Throat: negative  Respiratory: No shortness of breath, dyspnea on exertion, cough, or hemoptysis  Cardiovascular: negative  Gastrointestinal: negative  Genitourinary: negative  Musculoskeletal: see HPI  Neurologic: negative  Psychiatric: negative  Hematologic/Lymphatic/Immunologic: negative  Endocrine: negative          Past Medical History:     Past Medical History:   Diagnosis Date     Anterior uveitis     secondary to Enbrel     Fibroid      high in 2007 then normalized     History of Graves' disease      Hyperlipidemia LDL goal < 130     declined meication     Menopause 2008     Osteopenia 1/14/2016     Osteoporosis     Osteopenia borderline osteoporosis     RA (rheumatoid arthritis) (H)      Right posterior uveitis      Seronegative rheumatoid arthritis (H)     dx Dr. Frost     Uveitis        Patient Active Problem List    Diagnosis Date Noted     Mechanical limb problems 10/14/2016     Priority:  "Medium     Pain in both wrists 10/07/2016     Priority: Medium     Rheumatoid arthritis of multiple sites without rheumatoid factor (H) 02/19/2016     Priority: Medium     Osteopenia 01/14/2016     Priority: Medium     Situational mixed anxiety and depressive disorder 09/18/2014     Priority: Medium     Lump or mass in breast 09/02/2014     Priority: Medium     Bunion 09/02/2014     Priority: Medium     Hemorrhoids 07/15/2014     Priority: Medium     Uveitis, anterior 06/21/2013     Priority: Medium     Encounter for long-term current use of medication 03/21/2013     Priority: Medium     Problem list name updated by automated process. Provider to review       Hammer toe 02/21/2013     Priority: Medium             Past Surgical History:     Past Surgical History:   Procedure Laterality Date     Fibroidadenoma Left Breast       NO HISTORY OF SURGERY               Social History:     Social History   Substance Use Topics     Smoking status: Never Smoker     Smokeless tobacco: Never Used     Alcohol use 3.5 - 7.0 oz/week      Comment: social use prn             Family History:     Family History   Problem Relation Age of Onset     Breast Cancer Mother      parkinsons     Osteoporosis Mother      Low Back Problems Mother      Breast Cancer Maternal Aunt      Other - See Comments Other      Inflammation joint in brother, maternal cousin      Diabetes Maternal Grandmother      Anxiety Disorder Father      Skin Cancer Father      Mental Illness Cousin      Alcoholism Paternal Grandfather      Glaucoma No family hx of      Macular Degeneration No family hx of      Retinal detachment No family hx of      Melanoma No family hx of             Allergies:     Allergies   Allergen Reactions     Fosamax      Throat discomfort     No Clinical Screening - See Comments      Joints flared after getting possible \"white drink\" after CT/MRI in 2007              Medications:     Current Outpatient Prescriptions   Medication Sig Dispense " Refill     acetaminophen (TYLENOL) 500 MG tablet Take 500-1,000 mg by mouth every 6 hours as needed for mild pain (rarely takes)       adalimumab (HUMIRA PEN) 40 MG/0.8ML pen kit Inject 0.8 mLs (40 mg) Subcutaneous every 14 days Hold for signs of infection, and then seek medical attention. SURECLICK PEN. 1 kit 5     folic acid (FOLVITE) 1 MG tablet Take 2 tablets (2 mg) by mouth daily 180 tablet 3     methotrexate 2.5 MG tablet CHEMO Take 6 tablets (15 mg) by mouth once a week 72 tablet 0     ranitidine (ZANTAC) 150 MG tablet Take 150 mg by mouth daily as needed              Physical Exam:     Constitutional: [id number] cooperative   Eyes: nl conjunctiva, sclera   ENT: nl external ears, nose, throat   No mucous membrane lesions, normal saliva pool   Neck: no mass or thyroid enlargement   MS: She has previously noted subluxation at the R wrist/mild in L wrist with decreased ROM, minimal pannus, swan in right hand right thumb deformity unchanged. Right 2-3nd MCP pannus no active swelling. Right hand ulnar deviation, right thumb drop , right 5th DIP subluxation, right wrist subluxation, hammertoes. Bunions bilaterally. Deformities of MTPs with palpable MTP heads. Right knee 1+ cool swelling with 30 extension lag.unchanged. Right foot bunion. All TMJ, neck, shoulder, elbow, wrist, MCP/PIP/DIP, spine, hip, knee, ankle, and foot MTP/IP joints were examined.   Skin: no nail pitting, alopecia, rash, nodules or lesions.   Neuro: nl cranial nerves, strength   Psych: nl affect       Data:     Lab Results   Component Value Date    WBC 7.3 07/18/2018    WBC 8.2 04/06/2018    WBC 8.4 10/12/2017    HGB 15.6 07/18/2018    HGB 16.0 (H) 04/06/2018    HGB 14.3 10/12/2017    HCT 47.0 07/18/2018    HCT 47.5 (H) 04/06/2018    HCT 42.9 10/12/2017    MCV 95 07/18/2018    MCV 95 04/06/2018    MCV 95 10/12/2017     07/18/2018     04/06/2018     10/12/2017     Lab Results   Component Value Date    BUN 13 09/14/2010    BUN  20 10/06/2009    BUN 14 04/03/2007     No components found for: SEDRATE  Lab Results   Component Value Date    TSH 0.62 07/14/2017    TSH 0.58 09/14/2010    TSH 0.42 10/06/2009     Lab Results   Component Value Date    AST 20 07/18/2018    AST 16 04/06/2018    AST 15 10/12/2017    ALT 22 07/18/2018    ALT 18 04/06/2018    ALT 20 10/12/2017    GGT 32 09/25/2014    ALKPHOS 82 09/25/2014    ALKPHOS 83 12/22/2010     Reviewed Rheumatology lab flowsheet      Guanakito Frost MD

## 2018-10-26 ENCOUNTER — TELEPHONE (OUTPATIENT)
Dept: OBGYN | Facility: CLINIC | Age: 63
End: 2018-10-26

## 2018-10-26 NOTE — TELEPHONE ENCOUNTER
Received call from Reina who reports she had some blood in her stool today and is concerned.    It was bright red but was only coming from 'one side' of her rectum. She acknowledges being constipated for the past few days.  Described annal fissures and she acknowledged her symptoms sound like that description.  Provided education that the fissures will heal on their own and to work on preventing constipation. If having so much bleeding you are filling the toilet, call back.  OK to wait to be seen until her regularly scheduled appointment next week. She indicated understanding and agreed with plan.

## 2018-10-30 ENCOUNTER — OFFICE VISIT (OUTPATIENT)
Dept: FAMILY MEDICINE | Facility: CLINIC | Age: 63
End: 2018-10-30
Attending: FAMILY MEDICINE
Payer: COMMERCIAL

## 2018-10-30 VITALS
SYSTOLIC BLOOD PRESSURE: 137 MMHG | DIASTOLIC BLOOD PRESSURE: 84 MMHG | HEART RATE: 86 BPM | HEIGHT: 68 IN | WEIGHT: 162 LBS | BODY MASS INDEX: 24.55 KG/M2

## 2018-10-30 DIAGNOSIS — F10.10 ETOH ABUSE: ICD-10-CM

## 2018-10-30 DIAGNOSIS — M06.062 RHEUMATOID ARTHRITIS INVOLVING BOTH KNEES WITH NEGATIVE RHEUMATOID FACTOR (H): Primary | ICD-10-CM

## 2018-10-30 DIAGNOSIS — M06.061 RHEUMATOID ARTHRITIS INVOLVING BOTH KNEES WITH NEGATIVE RHEUMATOID FACTOR (H): Primary | ICD-10-CM

## 2018-10-30 DIAGNOSIS — L98.9 SKIN LESION: ICD-10-CM

## 2018-10-30 PROCEDURE — G0463 HOSPITAL OUTPT CLINIC VISIT: HCPCS | Mod: ZF

## 2018-10-30 RX ORDER — CHOLECALCIFEROL (VITAMIN D3) 25 MCG
CAPSULE ORAL
COMMUNITY
End: 2020-03-12

## 2018-10-30 ASSESSMENT — PAIN SCALES - GENERAL: PAINLEVEL: NO PAIN (0)

## 2018-10-30 NOTE — LETTER
10/30/2018       RE: Reina Conway  [address] [facility] [address]     Dear Colleague,    Thank you for referring your patient, Reina Conway, to the [facility] at [facility]. Please see a copy of my visit note below.    Reina is a 63 year old female that presents today to discuss health issues: presents with her : leading concern is her ETOH intake is impacting her health.   HPI:  Fall in May 2018 when trying to get out of a roller chair.  Sustained multiple contusions but no fractures. Went to the ED     -  Fall associated with ETOH intake when she was home alone.     -   Some Right mandible pain persists  RA is manageable and follows with Dr. Frost:     Medication is very helpful: on Humira and Methotrexate     Continues to drink wine:  2 [4oz]red wine [typically] and sometimes more [never a bottle of wine].   often drinks the remainder of the bottle so she doesn't drink the entire bottle.  Went to AA once and didn't like the group.  Knows she needs to quit drinking but thinks that is unlikely     Some depression associated with this her RA as her mobility is restricted.    Lots of stress: [place] their [place] [address]. Living in their father-in laws house that they inherited.     Pain has impacted her life - Pool therapy helped and is hoping to get a referral to [facility]    Clean diet and mostly home cooking.      is stressed with Reina's condition and looking for care giver support   Depression    Finding a clinic that is closer to their home is a priority - [facility] is much more convenient.     Lots of losses with neighborhood, home, parents and pain exacerbation    Considering a therapist     PAST OB/GYN HISTORY:   1)  1 para 0 with LMP  .   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 Frost     Uveitis    HCM: Mammogram still needs to be updated. Can't tolerated the compression. Colonoscopy 9/2014. DTaP at [place] 2010.  PAP/HPV 9/2014.  SOCIAL HX:  Lost all four parents in the last few years.  Very stressful. 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  Exercise: very limited because of her RA.    Diet: no restriction but a clean diet   Family history:  Breast cancer Mother age 69, Maternal aunt in her 60s  Current Outpatient Prescriptions   Medication Sig Dispense Refill     acetaminophen (TYLENOL) 500 MG tablet Take 500-1,000 mg by mouth every 6 hours as needed for mild pain (rarely takes)       adalimumab (HUMIRA PEN) 40 MG/0.8ML pen kit Inject 0.8 mLs (40 mg) Subcutaneous every 14 days Hold for signs of infection, and then seek medical attention. SURECLICK PEN. 1 kit 5     folic acid (FOLVITE) 1 MG tablet Take 2 tablets (2 mg) by mouth daily 180 tablet 3     methotrexate 2.5 MG tablet CHEMO Take 6 tablets (15 mg) by mouth once a week 72 tablet 0     ranitidine (ZANTAC) 150 MG tablet Take 150 mg by mouth daily as needed       Allergies: Review of patient's allergies indicates no known allergies.  VITALS:  /84  Pulse 86  Ht 1.727 m (5' 8\")  Wt 73.5 kg (162 lb)  BMI 24.63 kg/m2  PHYSICAL EXAM:  Constitutional: woman who presents with .   Psychological: depressed  Eyes: anicteric, normal extra-ocular movements.  Neurological: slow  gait, no tremor.  Total of 45 minutes was spent in direct contact with the patient and >50% of the time in patient education and coordination of care.  Diagnoses and associated orders for this visit:  ETOH abuse:   - AA encouraged. Doesn't know how she " would/could quit.   - CD evaluation  [tried] and didn't find the [employer] system helpful   - Strongly encouraged a therapist to address ETOH abuse  Seronegative rheumatoid arthritis and pain component  -      Physical therapy at [facility] with referral given  -      Vitamin D 2000 international unit(s) daily  -      Therapist encouraged   -      Continue to see rheumatologist and labs recently completed   Depression:  -  Declined SSRI   -  Again discussed the need to stop drinking which is acknowledges is a problem but knows how hard it is not to drink as she believes it helps her pain, depression and stress level.   Advised that she follow up with dermatology and podiatry  Advised follow-up in 2 months for Annual Exam      Again, thank you for allowing me to participate in the care of your patient.      Sincerely,    Diana Orantes MD

## 2018-10-30 NOTE — NURSING NOTE
Chief Complaint   Patient presents with     RECHECK     Pt here to follow up with recent fall and RA symptom management.

## 2018-10-30 NOTE — PROGRESS NOTES
Reina is a 63 year old female that presents today to discuss health issues: presents with her : leading concern is her ETOH intake is impacting her health.   HPI:  Fall in May 2018 when trying to get out of a roller chair.  Sustained multiple contusions but no fractures. Went to the ED     -  Fall associated with ETOH intake when she was home alone.     -   Some Right mandible pain persists  RA is manageable and follows with Dr. Frost:     Medication is very helpful: on Humira and Methotrexate     Continues to drink wine:  2 [4oz]red wine [typically] and sometimes more [never a bottle of wine].   often drinks the remainder of the bottle so she doesn't drink the entire bottle.  Went to AA once and didn't like the group.  Knows she needs to quit drinking but thinks that is unlikely     Some depression associated with this her RA as her mobility is restricted.    Lots of stress[place] [address] Living in their father-in laws house that they inherited.     Pain has impacted her life - Pool therapy helped and [facility]    Clean diet and mostly home cooking.      is stressed with Reina's condition and looking for care giver support   Depression    Finding a clinic that is closer to their home is a priority - [facility] is much more convenient.     Lots of losses with neighborhood, home, parents and pain exacerbation    Considering a therapist   ROS:  General: feels fatigued.   Head/Eyes: visual difficulty  Ears/Nose/Throat: problems w/teeth and mandibular pain after fall or gums and cough  Cardiovascular: none  Respiratory:none  Gastrointestinal: blood in stools.   Breast: none  Genitourinary: none  Sexual Function: lowered libido  Musculoskeletal: joint pain markedly improved on medications.   Skin: none  Neurological: memory loss   Mental Health: anxiety and depression.  Lots of loss and uses ETOH for pain and mood  Endocrine:  "none  PAST OB/GYN HISTORY:   1)  1 para 0 with LMP  .   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 Frost     Uveitis    HCM: Mammogram still needs to be updated. Can't tolerated the compression. Colonoscopy 2014. DTaP at [place] .  PAP/HPV 2014.  SOCIAL HX:  Lost all four parents in the last few years.  Very stressful. 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  Exercise: very limited because of her RA.    Diet: no restriction but a clean diet   Family history:  Breast cancer Mother age 69, Maternal aunt in her 60s  Current Outpatient Prescriptions   Medication Sig Dispense Refill     acetaminophen (TYLENOL) 500 MG tablet Take 500-1,000 mg by mouth every 6 hours as needed for mild pain (rarely takes)       adalimumab (HUMIRA PEN) 40 MG/0.8ML pen kit Inject 0.8 mLs (40 mg) Subcutaneous every 14 days Hold for signs of infection, and then seek medical attention. SURECLICK PEN. 1 kit 5     folic acid (FOLVITE) 1 MG tablet Take 2 tablets (2 mg) by mouth daily 180 tablet 3     methotrexate 2.5 MG tablet CHEMO Take 6 tablets (15 mg) by mouth once a week 72 tablet 0     ranitidine (ZANTAC) 150 MG tablet Take 150 mg by mouth daily as needed       Allergies: Review of patient's allergies indicates no known allergies.  VITALS:  /84  Pulse 86  Ht 1.727 m (5' 8\")  Wt 73.5 kg (162 lb)  BMI 24.63 kg/m2  PHYSICAL EXAM:  Constitutional: woman who presents with .   Psychological: depressed  Eyes: anicteric, normal extra-ocular movements.  Neurological: slow  gait, no tremor.  Total of 45 minutes was spent in " direct contact with the patient and >50% of the time in patient education and coordination of care.  Diagnoses and associated orders for this visit:  ETOH abuse:   - AA encouraged. Doesn't know how she would/could quit.   - CD evaluation  [tried] and didn't find the [email or web address] system helpful   - Strongly encouraged a therapist to address ETOH abuse  Seronegative rheumatoid arthritis and pain component  -      Physical therapy at [facility] with referral given  -      Vitamin D 2000 international unit(s) daily  -      Therapist encouraged   -      Continue to see rheumatologist and labs recently completed   Depression:  -  Declined SSRI   -  Again discussed the need to stop drinking which is acknowledges is a problem but knows how hard it is not to drink as she believes it helps her pain, depression and stress level.   Advised that she follow up with dermatology and podiatry  Advised follow-up in 2 months for Annual Exam

## 2018-10-30 NOTE — MR AVS SNAPSHOT
After Visit Summary   10/30/2018    Reina Conway    MRN: [id number]           Patient Information     Date Of Birth          1955        Visit Information        Provider Department      10/30/2018 2:20 PM Diana Orantes MD [facility]        Today's Diagnoses     Rheumatoid arthritis involving both knees with negative rheumatoid factor (H)    -  1    Skin lesion        ETOH abuse          Care Instructions    Order products from [facility]  Call: [phone]  Code: Rolanda [id number]  On Line: [email or web address]    1) [facility] [M275WP]    Physical therapy - [facility]     Psychological counseling:     Dermatology consult:    -  Has seen Dr. Mendenhall               Follow-ups after your visit        Additional Services     PHYSICAL THERAPY REFERRAL       [facility]     Please be aware that coverage of these services is subject to the terms and limitations of your health insurance plan.  Call member services at your health plan with any benefit or coverage questions.                  Your next 10 appointments already scheduled     Nov 15, 2018  1:30 PM CST   RETURN CORNEA with Maximilian Terry MD   Eye Clinic ([facility])    [address]  [facility] [address]   [phone]            Dec 10, 2018 12:40 PM CST   (Arrive by 12:25 PM)   RETURN FOOT/ANKLE with Kenny Gao DPM   [facility] ([facility])    [address] [place] [address]  4th Floor  [facility] [id number] [phone]            Jan 16, 2019  2:30 PM CST   (Arrive by 2:15 PM)   Return Visit with CASEY Galo [facility] ([facility])    [address] [place] [address]  [address]  [facility] [id number] [phone]            Apr 18, 2019  2:00 PM CDT   (Arrive by 1:45 PM)   Return Visit with Guanakito Frost MD     [facility] ([facility])    [address]  [address]  [facility] [address] [phone]              Future tests that were ordered for you today     Open Future Orders        Priority Expected Expires Ordered    PHYSICAL THERAPY REFERRAL Routine  10/30/2019 10/30/2018            Who to contact     Please call your clinic at [phone] to:    Ask questions about your health    Make or cancel appointments    Discuss your medicines    Learn about your test results    Speak to your doctor            Additional Information About Your Visit        [employer] Information     [employer] gives you secure access to your electronic health record. If you see a primary care provider, you can also send messages to your care team and make appointments. If you have questions, please call your primary care clinic.  If you do not have a primary care provider, please call [phone] and they will assist you.      [employer] is an electronic gateway that provides easy, online access to your medical records. With [employer], you can request a clinic appointment, read your test results, renew a prescription or communicate with your care team.     To access your existing account, please contact your [facility] or call [phone] for assistance.        Care EveryWhere ID     This is your Care EveryWhere ID. This could be used by other organizations to access your [place] medical records  [id number]        Your Vitals Were     Pulse Height BMI (Body Mass Index)             86 1.727 m (5' 8\") 24.63 kg/m2          Blood Pressure from Last 3 Encounters:   10/30/18 137/84   10/17/18 143/87   07/18/18 133/86    Weight from Last 3 Encounters:   10/30/18 73.5 kg (162 lb)   10/17/18 74.1 kg (163 lb 4.8 oz)   07/18/18 74.5 kg (164 lb 4.8 oz)               Primary Care Provider Office Phone # Fax #    Diana Orantes [phone]       [address]  [facility] [address]        Equal Access to Services     [facility] FER : Hadii aad ku hadfreedomrita Siennaali, waborisda luqadaha, qalanceta kaalmaanahi berry, chandana bruno. So [facility] [phone].    ATENCIÓN: Si habla español, tiene a martinez disposición servicios gratuitos de asistencia lingüística. Jose al [phone].    We comply with applicable federal civil rights laws and [place] laws. We do not discriminate on the basis of race, color, national origin, age, disability, sex, sexual orientation, or gender identity.            Thank you!     Thank you for choosing [facility]  for your care. Our goal is always to provide you with excellent care. Hearing back from our patients is one way we can continue to improve our services. Please take a few minutes to complete the written survey that you may receive in the mail after your visit with us. Thank you!             Your Updated Medication List - Protect others around you: Learn how to safely use, store and throw away your medicines at www.disposemymeds.org.          This list is accurate as of 10/30/18  3:10 PM.  Always use your most recent med list.                   Brand Name Dispense Instructions for use Diagnosis    acetaminophen 500 MG tablet    TYLENOL     Take 500-1,000 mg by mouth every 6 hours as needed for mild pain (rarely takes)        adalimumab 40 MG/0.8ML pen kit    HUMIRA PEN    1 kit    Inject 0.8 mLs (40 mg) Subcutaneous every 14 days Hold for signs of infection, and then seek medical attention. SURECLICK PEN.    Seronegative rheumatoid arthritis (H)       folic acid 1 MG tablet    FOLVITE    180 tablet    Take 2 tablets (2 mg) by mouth daily    High risk medications (not anticoagulants) long-term use, Rheumatoid arthritis of multiple sites without rheumatoid factor (H)       methotrexate 2.5 MG tablet CHEMO     72 tablet    Take 6 tablets (15 mg) by mouth once a week    High risk medications (not  anticoagulants) long-term use, Rheumatoid arthritis of multiple sites without rheumatoid factor (H)       ranitidine 150 MG tablet    ZANTAC     Take 150 mg by mouth daily as needed        Vitamin D-3 1000 units Caps

## 2018-10-30 NOTE — PATIENT INSTRUCTIONS
Order products from [employer]  Call: [phone]  Code: Rolanda [id number]  On Line: [email or web address]    1) [facility] [M275WP]    Physical therapy - [facility]     Psychological counseling:     Dermatology consult:    -  Has seen Dr. Mendenhall

## 2018-12-10 ENCOUNTER — OFFICE VISIT (OUTPATIENT)
Dept: ORTHOPEDICS | Facility: CLINIC | Age: 63
End: 2018-12-10
Payer: COMMERCIAL

## 2018-12-10 DIAGNOSIS — B35.1 OM (ONYCHOMYCOSIS): Primary | ICD-10-CM

## 2018-12-10 RX ORDER — NYSTATIN AND TRIAMCINOLONE ACETONIDE 100000; 1 [USP'U]/G; MG/G
CREAM TOPICAL 2 TIMES DAILY
Qty: 60 G | Refills: 3 | Status: SHIPPED | OUTPATIENT
Start: 2018-12-10 | End: 2019-12-10

## 2018-12-10 NOTE — PROGRESS NOTES
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 Frost     Uveitis      Patient Active Problem List   Diagnosis     Hammer toe     Bunion     Osteopenia     Rheumatoid arthritis of multiple sites without rheumatoid factor (H)     Pain in both wrists     Mechanical limb problems     Past Surgical History:   Procedure Laterality Date     Fibroidadenoma Left Breast       NO HISTORY OF SURGERY       Social History     Socioeconomic History     Marital status:      Spouse name: Not on file     Number of children: Not on file     Years of education: Not on file     Highest education level: Not on file   Social Needs     Financial resource strain: Not on file     Food insecurity - worry: Not on file     Food insecurity - inability: Not on file     Transportation needs - medical: Not on file     Transportation needs - non-medical: Not on file   Occupational History     Not on file   Tobacco Use     Smoking status: Never Smoker     Smokeless tobacco: Never Used   Substance and Sexual Activity     Alcohol use: Yes     Alcohol/week: 3.5 - 7.0 oz     Comment: social use prn     Drug use: No     Sexual activity: Not on file   Other Topics Concern     Parent/sibling w/ CABG, MI or angioplasty before 65F 55M? Not Asked   Social History Narrative    How much exercise per week? Walking, stairs    How much calcium per day? 2-3 servings       How much caffeine per day? 2-3 cups coffee    How much vitamin D per day?      Do you/your family wear seatbelts?  Yes    Do you/your family use safety helmets? No    Do you/your family use sunscreen? No     Do you/your family keep firearms in the home? No    Do you/your family have a smoke detector(s)? Yes        Do you feel safe in your home? Yes    Has anyone ever touched you in an unwanted manner? No     Explain         September 2, 2014 Tamiko Villa LPN             Family History   Problem Relation Age of Onset     Breast Cancer Mother         parkinsons     Osteoporosis Mother      Low Back Problems Mother      Breast Cancer Maternal Aunt      Other - See Comments Other         Inflammation joint in brother, maternal cousin      Diabetes Maternal Grandmother      Anxiety Disorder Father      Skin Cancer Father      Mental Illness Cousin      Alcoholism Paternal Grandfather      Glaucoma No family hx of      Macular Degeneration No family hx of      Retinal detachment No family hx of      Melanoma No family hx of      SUBJECTIVE FINDINGS: Reina Conway is a 63 year old female presents for toenails and calluses. Patient relates she has been having some pain secondary to callus buildup. She has not been able to trim her nails or callus. She also relates that she has been having ongoing issues with toenail fungus.     OBJECTIVE FINDINGS: DP and PT 2/4, bilaterally. Nucleated hyperkeratotic tissue buildup with ecchymosis on the plantar 2nd-4th MPJs, left , with pain on palpation of the left.  Dorsally contracted digits, 2-5, bilaterally. Laterally deviated hallux with dorsal medial 1st MPJ, right foot. Dystrophic, discolored nails with subungual debris and thickening Hallux right and 5th toenail bilaterally. She has hyperkeratotic fungal skin on right Hallux toenail borders.  No erythema, no edema, no odor, no calor bilaterally.        ASSESSMENT AND PLAN: Tylomas causing pain. Onychomycosis. Diagnosis and treatment options discussed with patient. Tyloma left foot was debrided with a #15 blade upon consent. Right hallux  and 5th toenails were debrided and other nails reduced with a tissue cutter upon consent.   Patient will return to clinic in 3 months.  Prescription for Mycolog cream given and use d/w her.

## 2018-12-10 NOTE — LETTER
12/10/2018       RE: Reina Conway  [address] [facility] [address]     Dear Colleague,    Thank you for referring your patient, Reina Conway, [facility] the [facility]. Please see a copy of my visit note below.    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 Frost     Uveitis      Patient Active Problem List   Diagnosis     Hammer toe     Bunion     Osteopenia     Rheumatoid arthritis of multiple sites without rheumatoid factor (H)     Pain in both wrists     Mechanical limb problems     Past Surgical History:   Procedure Laterality Date     Fibroidadenoma Left Breast       NO HISTORY OF SURGERY       Social History     Socioeconomic History     Marital status:      Spouse name: Not on file     Number of children: Not on file     Years of education: Not on file     Highest education level: Not on file   Social Needs     Financial resource strain: Not on file     Food insecurity - worry: Not on file     Food insecurity - inability: Not on file     Transportation needs - medical: Not on file     Transportation needs - non-medical: Not on file   Occupational History     Not on file   Tobacco Use     Smoking status: Never Smoker     Smokeless tobacco: Never Used   Substance and Sexual Activity     Alcohol use: Yes     Alcohol/week: 3.5 - 7.0 oz     Comment: social use prn     Drug use: No     Sexual activity: Not on file   Other Topics Concern     Parent/sibling w/ CABG, MI or angioplasty before 65F 55M? Not Asked   Social History Narrative    How  much exercise per week? Walking, stairs    How much calcium per day? 2-3 servings       How much caffeine per day? 2-3 cups coffee    How much vitamin D per day?      Do you/your family wear seatbelts?  Yes    Do you/your family use safety helmets? No    Do you/your family use sunscreen? No    Do you/your family keep firearms in the home? No    Do you/your family have a smoke detector(s)? Yes        Do you feel safe in your home? Yes    Has anyone ever touched you in an unwanted manner? No     Explain         September 2, 2014 Tamiko Villa LPN             Family History   Problem Relation Age of Onset     Breast Cancer Mother         parkinsons     Osteoporosis Mother      Low Back Problems Mother      Breast Cancer Maternal Aunt      Other - See Comments Other         Inflammation joint in brother, maternal cousin      Diabetes Maternal Grandmother      Anxiety Disorder Father      Skin Cancer Father      Mental Illness Cousin      Alcoholism Paternal Grandfather      Glaucoma No family hx of      Macular Degeneration No family hx of      Retinal detachment No family hx of      Melanoma No family hx of      SUBJECTIVE FINDINGS: Reina Conway is a 63 year old female presents for toenails and calluses. Patient relates she has been having some pain secondary to callus buildup. She has not been able to trim her nails or callus. She also relates that she has been having ongoing issues with toenail fungus.     OBJECTIVE FINDINGS: DP and PT 2/4, bilaterally. Nucleated hyperkeratotic tissue buildup with ecchymosis on the plantar 2nd-4th MPJs, left , with pain on palpation of the left.  Dorsally contracted digits, 2-5, bilaterally. Laterally deviated hallux with dorsal medial 1st MPJ, right foot. Dystrophic, discolored nails with subungual debris and thickening Hallux right and 5th toenail bilaterally. She has hyperkeratotic fungal skin on right Hallux toenail borders.  No erythema, no edema, no odor, no calor  bilaterally.        ASSESSMENT AND PLAN: Tylomas causing pain. Onychomycosis. Diagnosis and treatment options discussed with patient. Tyloma left foot was debrided with a #15 blade upon consent. Right hallux  and 5th toenails were debrided and other nails reduced with a tissue cutter upon consent.  Patient will return to clinic in 3 months.  Prescription for Mycolog cream given and use d/w her.      Again, thank you for allowing me to participate in the care of your patient.      Sincerely,    Kenny Gao DPM

## 2018-12-10 NOTE — NURSING NOTE
"Reason For Visit:   Chief Complaint   Patient presents with     RECHECK     Foot check/care. Fungus, right foot. Patient stated that she has RA and it is hard to provide foot care for herself. Patient stated that she has some concerns with her feet.         Pain Assessment  Patient Currently in Pain: Yes  Primary Pain Location: Foot  Pain Orientation: Right, Left  Other Pain Locations: Bilateral ankles  Pain Descriptors: Pressure, Cramping            Current Outpatient Medications   Medication Sig Dispense Refill     acetaminophen (TYLENOL) 500 MG tablet Take 500-1,000 mg by mouth every 6 hours as needed for mild pain (rarely takes)       adalimumab (HUMIRA PEN) 40 MG/0.8ML pen kit Inject 0.8 mLs (40 mg) Subcutaneous every 14 days Hold for signs of infection, and then seek medical attention. SURECLICK PEN. 1 kit 5     Cholecalciferol (VITAMIN D-3) 1000 units CAPS        folic acid (FOLVITE) 1 MG tablet Take 2 tablets (2 mg) by mouth daily 180 tablet 3     methotrexate 2.5 MG tablet CHEMO Take 6 tablets (15 mg) by mouth once a week 72 tablet 0     ranitidine (ZANTAC) 150 MG tablet Take 150 mg by mouth daily as needed            Allergies   Allergen Reactions     Barium Sulfate      Fosamax      Throat discomfort     No Clinical Screening - See Comments Hives     shrimp  Joints flared after getting possible \"white drink\" after CT/MRI in 2007                "

## 2019-01-21 DIAGNOSIS — M06.00 SERONEGATIVE RHEUMATOID ARTHRITIS (H): ICD-10-CM

## 2019-01-29 ENCOUNTER — TELEPHONE (OUTPATIENT)
Dept: RHEUMATOLOGY | Facility: CLINIC | Age: 64
End: 2019-01-29

## 2019-01-29 DIAGNOSIS — Z79.899 HIGH RISK MEDICATIONS (NOT ANTICOAGULANTS) LONG-TERM USE: Primary | ICD-10-CM

## 2019-01-29 DIAGNOSIS — M06.09 RHEUMATOID ARTHRITIS OF MULTIPLE SITES WITHOUT RHEUMATOID FACTOR (H): ICD-10-CM

## 2019-02-01 DIAGNOSIS — M06.09 RHEUMATOID ARTHRITIS OF MULTIPLE SITES WITHOUT RHEUMATOID FACTOR (H): ICD-10-CM

## 2019-02-01 DIAGNOSIS — Z79.899 HIGH RISK MEDICATIONS (NOT ANTICOAGULANTS) LONG-TERM USE: ICD-10-CM

## 2019-02-01 LAB
BASOPHILS # BLD AUTO: 0.1 10E9/L (ref 0–0.2)
BASOPHILS NFR BLD AUTO: 0.6 %
DIFFERENTIAL METHOD BLD: NORMAL
EOSINOPHIL # BLD AUTO: 0.1 10E9/L (ref 0–0.7)
EOSINOPHIL NFR BLD AUTO: 0.8 %
ERYTHROCYTE [DISTWIDTH] IN BLOOD BY AUTOMATED COUNT: 13.9 % (ref 10–15)
HCT VFR BLD AUTO: 44.7 % (ref 35–47)
HGB BLD-MCNC: 14.8 G/DL (ref 11.7–15.7)
LYMPHOCYTES # BLD AUTO: 3.5 10E9/L (ref 0.8–5.3)
LYMPHOCYTES NFR BLD AUTO: 38.9 %
MCH RBC QN AUTO: 31.6 PG (ref 26.5–33)
MCHC RBC AUTO-ENTMCNC: 33.1 G/DL (ref 31.5–36.5)
MCV RBC AUTO: 95 FL (ref 78–100)
MONOCYTES # BLD AUTO: 0.5 10E9/L (ref 0–1.3)
MONOCYTES NFR BLD AUTO: 5.7 %
NEUTROPHILS # BLD AUTO: 4.8 10E9/L (ref 1.6–8.3)
NEUTROPHILS NFR BLD AUTO: 54 %
PLATELET # BLD AUTO: 285 10E9/L (ref 150–450)
RBC # BLD AUTO: 4.69 10E12/L (ref 3.8–5.2)
WBC # BLD AUTO: 8.9 10E9/L (ref 4–11)

## 2019-02-01 PROCEDURE — 82040 ASSAY OF SERUM ALBUMIN: CPT | Performed by: INTERNAL MEDICINE

## 2019-02-01 PROCEDURE — 85025 COMPLETE CBC W/AUTO DIFF WBC: CPT | Performed by: INTERNAL MEDICINE

## 2019-02-01 PROCEDURE — 36415 COLL VENOUS BLD VENIPUNCTURE: CPT | Performed by: INTERNAL MEDICINE

## 2019-02-01 PROCEDURE — 82565 ASSAY OF CREATININE: CPT | Performed by: INTERNAL MEDICINE

## 2019-02-01 PROCEDURE — 84460 ALANINE AMINO (ALT) (SGPT): CPT | Performed by: INTERNAL MEDICINE

## 2019-02-01 PROCEDURE — 84450 TRANSFERASE (AST) (SGOT): CPT | Performed by: INTERNAL MEDICINE

## 2019-02-01 NOTE — TELEPHONE ENCOUNTER
Left message letting pt know that her Methotrexate was filled for one month and with next refill, we can fill for 3 months.    Pt was also advised that Humira requires a prior authorization and we started on that today and will keep her in the loop.    Theresa Jang RN  Rheumatology Clinic

## 2019-02-01 NOTE — TELEPHONE ENCOUNTER
[facility] Call Center    Phone Message    May a detailed message be left on voicemail: yes    Reason for Call: Other: Pt need Theresa to  give her a calling to see if she need s to get blood work done before she can het her prescription filled, please call and follow up with pt, if possible she would like if she can get a call back today     Action Taken: Message routed to:  [facility] ([facility] Rheum

## 2019-02-01 NOTE — TELEPHONE ENCOUNTER
Called and spoke with pt, she does need labs, she is out of Methotrexate.  She will get labs done today.      She is wondering what is needed for her Humira, discussed with Norma PADGETT, pharmacy liaison, who states it does need a PA.  She is starting the PA.      Will call pt back before end of day with updates.    Theresa Jang RN  Rheumatology Clinic

## 2019-02-01 NOTE — TELEPHONE ENCOUNTER
Methotrexate      Last Written Prescription Date:  10/17/18  Last Fill Quantity: 72,   # refills: 0  Last Office Visit: 10/17/18   Future Office visit:  4/11/19    CBC RESULTS:   Recent Labs   Lab Test 10/17/18  1130   WBC 8.1   RBC 5.05   HGB 15.5   HCT 48.3*   MCV 96   MCH 30.7   MCHC 32.1   RDW 13.3          Creatinine   Date Value Ref Range Status   10/17/2018 0.79 0.52 - 1.04 mg/dL Final   ]    Liver Function Studies -   Recent Labs   Lab Test 10/17/18  1130  09/25/14  1334  12/22/10  1216   PROTTOTAL  --   --   --   --  8.5   ALBUMIN 3.7   < > 4.1   < > 4.7   BILITOTAL  --   --   --   --  1.7*   ALKPHOS  --   --  82  --  83   AST 18   < > 19   < > 28   ALT 19   < > 28   < > 30    < > = values in this interval not displayed.       Routing refill request to provider for review/approval because:  Drug not on the G, P or  Health refill protocol or controlled substance    Pt has been instructed to get labs. She will be getting them today, but is out of medication.  Have sent 1 month refill to provider.    Theresa Jang RN  Rheumatology Clinic

## 2019-02-02 LAB
ALBUMIN SERPL-MCNC: 4.2 G/DL (ref 3.4–5)
ALT SERPL W P-5'-P-CCNC: 34 U/L (ref 0–50)
AST SERPL W P-5'-P-CCNC: 23 U/L (ref 0–45)
CREAT SERPL-MCNC: 0.69 MG/DL (ref 0.52–1.04)
GFR SERPL CREATININE-BSD FRML MDRD: >90 ML/MIN/{1.73_M2}

## 2019-02-04 ENCOUNTER — TELEPHONE (OUTPATIENT)
Dept: RHEUMATOLOGY | Facility: CLINIC | Age: 64
End: 2019-02-04

## 2019-02-04 NOTE — TELEPHONE ENCOUNTER
Prior Authorization Approval    Authorization Effective Date: 1/5/2019  Authorization Expiration Date: 2/4/2020  Medication: ENBREL - URGENT   Approved Dose/Quantity: 2 FOR 28 DAYS  Reference #: [id number]   Insurance Company: [employer] - Phone [phone] Fax [phone]  Expected CoPay:       CoPay Card Available:      Foundation Assistance Needed:    Which Pharmacy is filling the prescription (Not needed for infusion/clinic administered): [facility] [place] [address]  Pharmacy Notified: Yes - emailed pharmacy   Patient Notified: Yes - via phone call

## 2019-02-04 NOTE — TELEPHONE ENCOUNTER
Received call back from pt to discuss message from Friday.  Pt understands about Methotrexate.  She is requesting that we complete the Humira PA as soon as possible.    Discussed with Norma PADGETT, PA has been approved, see her telephone encounter for resolution.    Theresa Jang RN  Rheumatology Clinic

## 2019-03-26 ENCOUNTER — OFFICE VISIT (OUTPATIENT)
Dept: RHEUMATOLOGY | Facility: CLINIC | Age: 64
End: 2019-03-26
Attending: INTERNAL MEDICINE
Payer: COMMERCIAL

## 2019-03-26 VITALS
SYSTOLIC BLOOD PRESSURE: 157 MMHG | HEIGHT: 68 IN | WEIGHT: 164.5 LBS | DIASTOLIC BLOOD PRESSURE: 100 MMHG | HEART RATE: 76 BPM | TEMPERATURE: 97.4 F | BODY MASS INDEX: 24.93 KG/M2 | OXYGEN SATURATION: 97 %

## 2019-03-26 DIAGNOSIS — Z79.899 HIGH RISK MEDICATIONS (NOT ANTICOAGULANTS) LONG-TERM USE: ICD-10-CM

## 2019-03-26 DIAGNOSIS — M06.09 RHEUMATOID ARTHRITIS OF MULTIPLE SITES WITHOUT RHEUMATOID FACTOR (H): ICD-10-CM

## 2019-03-26 PROCEDURE — G0463 HOSPITAL OUTPT CLINIC VISIT: HCPCS | Mod: ZF

## 2019-03-26 RX ORDER — AMOXICILLIN 500 MG/1
500 CAPSULE ORAL 2 TIMES DAILY
COMMUNITY
Start: 2019-03-22 | End: 2020-03-12

## 2019-03-26 RX ORDER — FOLIC ACID 1 MG/1
2 TABLET ORAL DAILY
Qty: 180 TABLET | Refills: 3 | Status: SHIPPED | OUTPATIENT
Start: 2019-03-26 | End: 2019-07-31

## 2019-03-26 ASSESSMENT — MIFFLIN-ST. JEOR: SCORE: 1349.67

## 2019-03-26 ASSESSMENT — PAIN SCALES - GENERAL: PAINLEVEL: NO PAIN (0)

## 2019-03-26 NOTE — PROGRESS NOTES
"Rheumatology Visit     Reina Conway MRN# [id number]   YOB: 1955 Age: 63 year old     Date of Visit: March 26, 2019  Primary care provider: Diana Orantes          Assessment and Plan:   64 yo female with :     1. Seronegative destructive RA (-RF/CCP/LASHAE panel, last repeat xrays [place] 3/2013; deformities, right wrist progressive worse function with dorsiflexion, hammertoes with bilateral toe subluxation, right knee contracture).    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 Seen ophth Dr. Terry 1-2017 (neg exam). No sx today   Exam shows prior deformities, no RA flaring and no active arthritis. Labs today pending. Mild nausea, hairloss. We will continue the humira and methotrexate at current doses.     Due to intercurrent tooth abscess will hold Methotrexate and Humira this week, recommend restart April 7 if root canal completed and off antibiotics.    2. Hairloss with fatigue. Due for physical for complete evaluation which she will do this should include cancer screening, mamogram, bone health, skin check and over check. Will check thyroid.   3. Alcohol use. Reports abstinent from Alcohol, but then \"occasionally\". She should discuss with Dr. Orantes. I discussed the risks with her today on the liver and MTX. She understands.   3.  Osteopenia by prior DEXA (done 2009), did not start Fosamax Rx by Dr. Orantes.  Patient noted in past her chart now says Osteoporosis. F/U PCP at annual. She agrees   2. Other medical problems as previously documented and as per EHR      PLAN: Discussed in detail with the patient.      1. Continue Humira 40 mg SQ Q 2 weeks.  Continue Methotrexate at 15 mg weekly with 2mg day Folic acid    --Keep up-to-date vaccinations per CDC guidelines with PCP. Flu shot is current  --Labs every 12 weeks--discussed importance of labs every 12 weeks and hold MTX for any infections.  Next due 5/1  2 F/U Ophthalmology yearly and prn sx. "   3. RTC six months with me    4. She will schedule appt with Dr. Orantes for health maintenance as needed  5. Prior referral to [facility] for their opinion - she did not follow thru.      Guanakito Frost MD           History of Present Illness:   63 year old female who presents for continuing care for her seronegative RA. Seen by Dr. Frost until 2010 then swtiched to [facility], then re-established 3/2013 (xrays 2/2013 [facility]). EPIC reviewed.  I saw her last in October.    HISTORY CARRIED FORWARD:      Prior: Synovitis and joint deformities in 2008 was persistently seronegative but had active synovitis. Methotrexate helped but did not cause a remission/low disease state. She required Prednisone to control symptoms partially but still had significant ADL issues. We had persistently recommended anti TNF therapy which she resisted. She sought a second opinion at [facility] who recommended the same things, and she continued care there as of August 2010, then switched back to Dr. Frost 3/2013. Begun on Enbrel at [facility]. 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 As no other etiology found it was felt it might be due to Enbrel and she was switched to Humira. She has remained on Methotrexate, primarily 25 mg weekly, decreased 3/2013 (not to go <15mg week due to risk of further deformities or uveitis) . FRAX 32% at [facility]. Prior declined biphosphosphonate, [facility] recommended IV.   Xrays 3/2013 [facility]: See records. Hallux valgus right, hammertoes L>R, hindfoot valgus, pes planus. Dislocated left 2nd MTP, sublux left 3rd MTP, arth 2-3 MTP, rt 1st MTP. Mild DJD hands. Several DIP and 1st CMC. Subluxation left thumb IP and persistant dorsiflexion right wrist.   Previous visits discussed stress in her life. Her father in law passed away and there had been a lot of stress and she put her issues on the back burner. She had an episode of chest/epigastric pain and was  "seen at [facility]. She had apparent negative cardiac evaluation although did not followup with a stress test. She is taking OTC Zantac prn and thinks that helps. Found allergic to Wheat, avoid gluten and sugars. Feels much improved [heartburn, bloating, blood in stools, irregular stools] this really changed her digestive system. She went on a gluten free diet in December, and stopped all RA meds in early Jan 2015. See My Chart message.   Restarted Humira early April 2015 every 3 weeks. Noticed more migatory joints pains, swelling. Right hand, right knee, right wrist-associated swelling really in right 2nd MCP. 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 Lack of movement and coordinations, using her wrist brace. Uses this at night and daily prn.      At last visit she had continued on Humira without side effects and still feels it is helpful. She does have daily discomfort in several areas with either activity or prior damage, especially knees.      She continued to have stress in her life but is working through this and \"trying to get back on track\". She stopped drinking any alcohol for the past two months and was congratulated on this. She went to  but does not feel she is alcoholic.  Leonardo going to [facility] She has been  for a long time. Both lost parents, and grieving. She says she's in less denial. She admits  is helping a lot.       She called in November due to increased joint pain and we restarted Methotrexate at 10 mg weekly as previously discussed.  She stopped it two weeks ago.  She noted more joint pain and also some nausea on day of dose.  We discussed this at length and unlikely that MTX contributed to more joint symptoms.      At January 2016 visit we readded Methotrexate to Humira in hopes of decreasing disease activity, using low dose due to prior side effect complaints.  She called later that month with flare symptoms requiring Prednisone bridge, and we increased Methotrexate to " more standard dose of 15 mg weekly.       At her April 2016 visit she had started improving.. She was tolerating Methotrexate this time.  She weaned off Prednisone.      INTERVAL HISTORY:    She remains on combination Humira and Methotrexate without change.  She is on Folic acid 2 mg daily.    She has a current tooth abscess and is on Amoxicillin. She held this week's Methotrexate and Humira per our instructions.  She is seeing an Endodontist hopefully soon.  She is taking Tylenol    She has not had overall worsening joint pain or new areas of concern.  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  Her R knee is bothersome and inhibits long ambulation unchanged.  They still have not moved from their multi level home but are planning to.  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    She denies SE on medications.  She had lab in February stable on Methotrexate. No interval infections.     She saw Dr. Orantes in October as per note.    ROS is below.        Review of Systems:   Review Of Systems  Skin: negative  Eyes: negative  Ears/Nose/Throat: see HPI  Respiratory: No shortness of breath, dyspnea on exertion, cough, or hemoptysis  Cardiovascular: negative  Gastrointestinal: negative  Genitourinary: negative  Musculoskeletal: see HPI  Neurologic: negative  Psychiatric: negative  Hematologic/Lymphatic/Immunologic: negative  Endocrine: negative          Past Medical History:     Past Medical History:   Diagnosis Date     Anterior uveitis     secondary to Enbrel     Fibroid      high in 2007 then normalized     History of Graves' disease      Hyperlipidemia LDL goal < 130     declined meication     Menopause 2008     Osteopenia 1/14/2016     Osteoporosis     Osteopenia borderline osteoporosis     RA (rheumatoid arthritis) (H)      Right posterior uveitis      Seronegative rheumatoid arthritis (H)     dx Dr. Frost     Uveitis        Patient Active Problem List     "Diagnosis Date Noted     Mechanical limb problems 10/14/2016     Priority: Medium     Pain in both wrists 10/07/2016     Priority: Medium     Rheumatoid arthritis of multiple sites without rheumatoid factor (H) 02/19/2016     Priority: Medium     Osteopenia 01/14/2016     Priority: Medium     Bunion 09/02/2014     Priority: Medium     Hammer toe 02/21/2013     Priority: Medium             Past Surgical History:     Past Surgical History:   Procedure Laterality Date     Fibroidadenoma Left Breast       NO HISTORY OF SURGERY               Social History:     Social History     Tobacco Use     Smoking status: Never Smoker     Smokeless tobacco: Never Used   Substance Use Topics     Alcohol use: Yes     Alcohol/week: 3.5 - 7.0 oz     Comment: social use prn             Family History:     Family History   Problem Relation Age of Onset     Breast Cancer Mother         parkinsons     Osteoporosis Mother      Low Back Problems Mother      Breast Cancer Maternal Aunt      Other - See Comments Other         Inflammation joint in brother, maternal cousin      Diabetes Maternal Grandmother      Anxiety Disorder Father      Skin Cancer Father      Mental Illness Cousin      Alcoholism Paternal Grandfather      Glaucoma No family hx of      Macular Degeneration No family hx of      Retinal detachment No family hx of      Melanoma No family hx of             Allergies:     Allergies   Allergen Reactions     Barium Sulfate      Fosamax      Throat discomfort     No Clinical Screening - See Comments Hives     shrimp  Joints flared after getting possible \"white drink\" after CT/MRI in 2007              Medications:     Current Outpatient Medications   Medication Sig Dispense Refill     acetaminophen (TYLENOL) 500 MG tablet Take 500-1,000 mg by mouth every 6 hours as needed for mild pain (rarely takes)       adalimumab (HUMIRA PEN) 40 MG/0.8ML pen kit Inject 0.8 mLs (40 mg) Subcutaneous every 14 days Hold for signs of infection and " "then seek medical attention 2 each 5     amoxicillin (AMOXIL) 500 MG capsule Take 500 mg by mouth 2 times daily       Cholecalciferol (VITAMIN D-3) 1000 units CAPS        folic acid (FOLVITE) 1 MG tablet Take 2 tablets (2 mg) by mouth daily 180 tablet 3     methotrexate 2.5 MG tablet Take 6 tablets (15 mg) by mouth once a week 24 tablet 0     nystatin-triamcinolone (MYCOLOG II) 302363-2.1 UNIT/GM-% external cream Apply topically 2 times daily To affected toenails. (Patient not taking: Reported on 3/26/2019) 60 g 3     ranitidine (ZANTAC) 150 MG tablet Take 150 mg by mouth daily as needed              Physical Exam:   Blood pressure (!) 157/100, pulse 76, temperature 97.4  F (36.3  C), temperature source Oral, height 1.727 m (5' 8\"), weight 74.6 kg (164 lb 8 oz), SpO2 97 %.  Constitutional: [id number] cooperative   Eyes: nl conjunctiva, sclera   ENT: nl external ears, nose, throat   No mucous membrane lesions, normal saliva pool   Neck: no mass or thyroid enlargement   MS: She has previously noted subluxation at the R wrist/mild in L wrist with decreased ROM, minimal pannus, swan in right hand right thumb deformity unchanged. Right 2-3nd MCP pannus no active swelling. Right hand ulnar deviation, right thumb drop , right 5th DIP subluxation, right wrist subluxation, hammertoes. Bunions bilaterally. Deformities of MTPs with palpable MTP heads. Right knee 1+ cool swelling with 30 extension lag.unchanged. Right foot bunion. All TMJ, neck, shoulder, elbow, wrist, MCP/PIP/DIP, spine, hip, knee, ankle, and foot MTP/IP joints were examined.   Skin: no nail pitting, alopecia, rash, nodules or lesions.   Neuro: nl cranial nerves, strength   Psych: nl affect         Data:     Lab Results   Component Value Date    WBC 8.9 02/01/2019    WBC 8.1 10/17/2018    WBC 7.3 07/18/2018    HGB 14.8 02/01/2019    HGB 15.5 10/17/2018    HGB 15.6 07/18/2018    HCT 44.7 02/01/2019    HCT 48.3 (H) 10/17/2018    HCT 47.0 07/18/2018    MCV 95 " 02/01/2019    MCV 96 10/17/2018    MCV 95 07/18/2018     02/01/2019     10/17/2018     07/18/2018     Lab Results   Component Value Date    BUN 13 09/14/2010    BUN 20 10/06/2009    BUN 14 04/03/2007     No components found for: SEDRATE  Lab Results   Component Value Date    TSH 0.62 07/14/2017    TSH 0.58 09/14/2010    TSH 0.42 10/06/2009     Lab Results   Component Value Date    AST 23 02/01/2019    AST 18 10/17/2018    AST 20 07/18/2018    ALT 34 02/01/2019    ALT 19 10/17/2018    ALT 22 07/18/2018    GGT 32 09/25/2014    ALKPHOS 82 09/25/2014    ALKPHOS 83 12/22/2010     Reviewed Rheumatology lab flowsheet    Guanakito Frost

## 2019-03-26 NOTE — LETTER
"3/26/2019      RE: Reina Conway  [address] [facility] [address]       Rheumatology Visit     Reina Conway MRN# [id number]   YOB: 1955 Age: 63 year old     Date of Visit: March 26, 2019  Primary care provider: Diana Orantes          Assessment and Plan:   64 yo female with :     1. Seronegative destructive RA (-RF/CCP/LASHAE panel, last repeat xrays [place] 3/2013; deformities, right wrist progressive worse function with dorsiflexion, hammertoes with bilateral toe subluxation, right knee contracture).    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 Seen ophth Dr. Terry 1-2017 (neg exam). No sx today   Exam shows prior deformities, no RA flaring and no active arthritis. Labs today pending. Mild nausea, hairloss. We will continue the humira and methotrexate at current doses.     Due to intercurrent tooth abscess will hold Methotrexate and Humira this week, recommend restart April 7 if root canal completed and off antibiotics.    2. Hairloss with fatigue. Due for physical for complete evaluation which she will do this should include cancer screening, mamogram, bone health, skin check and over check. Will check thyroid.   3. Alcohol use. Reports abstinent from Alcohol, but then \"occasionally\". She should discuss with Dr. Orantes. I discussed the risks with her today on the liver and MTX. She understands.   3.  Osteopenia by prior DEXA (done 2009), did not start Fosamax Rx by Dr. Orantes.  Patient noted in past her chart now says Osteoporosis. F/U PCP at annual. She agrees   2. Other medical problems as previously documented and as per EHR      PLAN: Discussed in detail with the patient.      1. Continue Humira 40 mg SQ Q 2 weeks.  Continue Methotrexate at 15 mg weekly with 2mg day Folic acid    --Keep up-to-date vaccinations per CDC guidelines with PCP. Flu shot is current  --Labs every 12 weeks--discussed importance of labs every 12 weeks " and hold MTX for any infections.  Next due 5/1  2 F/U Ophthalmology yearly and prn sx.   3. RTC six months with me    4. She will schedule appt with Dr. Orantes for health maintenance as needed  5. Prior referral to [facility] for their opinion - she did not follow thru.      Guanakito Frost MD           History of Present Illness:   63 year old female who presents for continuing care for her seronegative RA. Seen by Dr. Frost until 2010 then swtiched to [facility], then re-established 3/2013 (xrays 2/2013 [facility]). EPIC reviewed.  I saw her last in October.    HISTORY CARRIED FORWARD:      Prior: Synovitis and joint deformities in 2008 was persistently seronegative but had active synovitis. Methotrexate helped but did not cause a remission/low disease state. She required Prednisone to control symptoms partially but still had significant ADL issues. We had persistently recommended anti TNF therapy which she resisted. She sought a second opinion at [facility] who recommended the same things, and she continued care there as of August 2010, then switched back to Dr. Frost 3/2013. Begun on Enbrel at [facility]. 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 As no other etiology found it was felt it might be due to Enbrel and she was switched to Humira. She has remained on Methotrexate, primarily 25 mg weekly, decreased 3/2013 (not to go <15mg week due to risk of further deformities or uveitis) . FRAX 32% at [facility]. Prior declined biphosphosphonate, [facility] recommended IV.   Xrays 3/2013 [facility]: See records. Hallux valgus right, hammertoes L>R, hindfoot valgus, pes planus. Dislocated left 2nd MTP, sublux left 3rd MTP, arth 2-3 MTP, rt 1st MTP. Mild DJD hands. Several DIP and 1st CMC. Subluxation left thumb IP and persistant dorsiflexion right wrist.   Previous visits discussed stress in her life. Her father in law passed away and there had been a lot of stress and she  "put her issues on the back burner. She had an episode of chest/epigastric pain and was seen at [facility]. She had apparent negative cardiac evaluation although did not followup with a stress test. She is taking OTC Zantac prn and thinks that helps. Found allergic to Wheat, avoid gluten and sugars. Feels much improved [heartburn, bloating, blood in stools, irregular stools] this really changed her digestive system. She went on a gluten free diet in December, and stopped all RA meds in early Jan 2015. See My Chart message.   Restarted Humira early April 2015 every 3 weeks. Noticed more migatory joints pains, swelling. Right hand, right knee, right wrist-associated swelling really in right 2nd MCP. 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 Lack of movement and coordinations, using her wrist brace. Uses this at night and daily prn.      At last visit she had continued on Humira without side effects and still feels it is helpful. She does have daily discomfort in several areas with either activity or prior damage, especially knees.      She continued to have stress in her life but is working through this and \"trying to get back on track\". She stopped drinking any alcohol for the past two months and was congratulated on this. She went to  but does not feel she is alcoholic.  Leonardo going to [facility]. She has been  for a long time. Both lost parents, and grieving. She says she's in less denial. She admits  is helping a lot.       She called in November due to increased joint pain and we restarted Methotrexate at 10 mg weekly as previously discussed.  She stopped it two weeks ago.  She noted more joint pain and also some nausea on day of dose.  We discussed this at length and unlikely that MTX contributed to more joint symptoms.      At January 2016 visit we readded Methotrexate to Humira in hopes of decreasing disease activity, using low dose due to prior side effect complaints.  She called later that " month with flare symptoms requiring Prednisone bridge, and we increased Methotrexate to more standard dose of 15 mg weekly.       At her April 2016 visit she had started improving.. She was tolerating Methotrexate this time.  She weaned off Prednisone.      INTERVAL HISTORY:    She remains on combination Humira and Methotrexate without change.  She is on Folic acid 2 mg daily.    She has a current tooth abscess and is on Amoxicillin. She held this week's Methotrexate and Humira per our instructions.  She is seeing an Endodontist hopefully soon.  She is taking Tylenol    She has not had overall worsening joint pain or new areas of concern.  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  Her R knee is bothersome and inhibits long ambulation unchanged.  They still have not moved from their multi level home but are planning to.  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    She denies SE on medications.  She had lab in  February stable on Methotrexate. No interval infections.     She saw Dr. Orantes in October as per note.    ROS is below.        Review of Systems:   Review Of Systems  Skin: negative  Eyes: negative  Ears/Nose/Throat: see HPI  Respiratory: No shortness of breath, dyspnea on exertion, cough, or hemoptysis  Cardiovascular: negative  Gastrointestinal: negative  Genitourinary: negative  Musculoskeletal: see HPI  Neurologic: negative  Psychiatric: negative  Hematologic/Lymphatic/Immunologic: negative  Endocrine: negative          Past Medical History:     Past Medical History:   Diagnosis Date     Anterior uveitis     secondary to Enbrel     Fibroid      high in 2007 then normalized     History of Graves' disease      Hyperlipidemia LDL goal < 130     declined meication     Menopause 2008     Osteopenia 1/14/2016     Osteoporosis     Osteopenia borderline osteoporosis     RA (rheumatoid arthritis) (H)      Right posterior uveitis      Seronegative  "rheumatoid arthritis (H)     dx Dr. Frost     Uveitis        Patient Active Problem List    Diagnosis Date Noted     Mechanical limb problems 10/14/2016     Priority: Medium     Pain in both wrists 10/07/2016     Priority: Medium     Rheumatoid arthritis of multiple sites without rheumatoid factor (H) 02/19/2016     Priority: Medium     Osteopenia 01/14/2016     Priority: Medium     Bunion 09/02/2014     Priority: Medium     Hammer toe 02/21/2013     Priority: Medium             Past Surgical History:     Past Surgical History:   Procedure Laterality Date     Fibroidadenoma Left Breast       NO HISTORY OF SURGERY               Social History:     Social History     Tobacco Use     Smoking status: Never Smoker     Smokeless tobacco: Never Used   Substance Use Topics     Alcohol use: Yes     Alcohol/week: 3.5 - 7.0 oz     Comment: social use prn             Family History:     Family History   Problem Relation Age of Onset     Breast Cancer Mother         parkinsons     Osteoporosis Mother      Low Back Problems Mother      Breast Cancer Maternal Aunt      Other - See Comments Other         Inflammation joint in brother, maternal cousin      Diabetes Maternal Grandmother      Anxiety Disorder Father      Skin Cancer Father      Mental Illness Cousin      Alcoholism Paternal Grandfather      Glaucoma No family hx of      Macular Degeneration No family hx of      Retinal detachment No family hx of      Melanoma No family hx of             Allergies:     Allergies   Allergen Reactions     Barium Sulfate      Fosamax      Throat discomfort     No Clinical Screening - See Comments Hives     shrimp  Joints flared after getting possible \"white drink\" after CT/MRI in 2007              Medications:     Current Outpatient Medications   Medication Sig Dispense Refill     acetaminophen (TYLENOL) 500 MG tablet Take 500-1,000 mg by mouth every 6 hours as needed for mild pain (rarely takes)       adalimumab (HUMIRA PEN) 40 MG/0.8ML " "pen kit Inject 0.8 mLs (40 mg) Subcutaneous every 14 days Hold for signs of infection and then seek medical attention 2 each 5     amoxicillin (AMOXIL) 500 MG capsule Take 500 mg by mouth 2 times daily       Cholecalciferol (VITAMIN D-3) 1000 units CAPS        folic acid (FOLVITE) 1 MG tablet Take 2 tablets (2 mg) by mouth daily 180 tablet 3     methotrexate 2.5 MG tablet Take 6 tablets (15 mg) by mouth once a week 24 tablet 0     nystatin-triamcinolone (MYCOLOG II) 112672-9.1 UNIT/GM-% external cream Apply topically 2 times daily To affected toenails. (Patient not taking: Reported on 3/26/2019) 60 g 3     ranitidine (ZANTAC) 150 MG tablet Take 150 mg by mouth daily as needed              Physical Exam:   Blood pressure (!) 157/100, pulse 76, temperature 97.4  F (36.3  C), temperature source Oral, height 1.727 m (5' 8\"), weight 74.6 kg (164 lb 8 oz), SpO2 97 %.  Constitutional: [id number] cooperative   Eyes: nl conjunctiva, sclera   ENT: nl external ears, nose, throat   No mucous membrane lesions, normal saliva pool   Neck: no mass or thyroid enlargement   MS: She has previously noted subluxation at the R wrist/mild in L wrist with decreased ROM, minimal pannus, swan in right hand right thumb deformity unchanged. Right 2-3nd MCP pannus no active swelling. Right hand ulnar deviation, right thumb drop , right 5th DIP subluxation, right wrist subluxation, hammertoes. Bunions bilaterally. Deformities of MTPs with palpable MTP heads. Right knee 1+ cool swelling with 30 extension lag.unchanged. Right foot bunion. All TMJ, neck, shoulder, elbow, wrist, MCP/PIP/DIP, spine, hip, knee, ankle, and foot MTP/IP joints were examined.   Skin: no nail pitting, alopecia, rash, nodules or lesions.   Neuro: nl cranial nerves, strength   Psych: nl affect         Data:     Lab Results   Component Value Date    WBC 8.9 02/01/2019    WBC 8.1 10/17/2018    WBC 7.3 07/18/2018    HGB 14.8 02/01/2019    HGB 15.5 10/17/2018    HGB 15.6 " 07/18/2018    HCT 44.7 02/01/2019    HCT 48.3 (H) 10/17/2018    HCT 47.0 07/18/2018    MCV 95 02/01/2019    MCV 96 10/17/2018    MCV 95 07/18/2018     02/01/2019     10/17/2018     07/18/2018     Lab Results   Component Value Date    BUN 13 09/14/2010    BUN 20 10/06/2009    BUN 14 04/03/2007     No components found for: SEDRATE  Lab Results   Component Value Date    TSH 0.62 07/14/2017    TSH 0.58 09/14/2010    TSH 0.42 10/06/2009     Lab Results   Component Value Date    AST 23 02/01/2019    AST 18 10/17/2018    AST 20 07/18/2018    ALT 34 02/01/2019    ALT 19 10/17/2018    ALT 22 07/18/2018    GGT 32 09/25/2014    ALKPHOS 82 09/25/2014    ALKPHOS 83 12/22/2010     Reviewed Rheumatology lab flowsheet    Guanakito Frost MD

## 2019-03-26 NOTE — LETTER
"3/26/2019       RE: Reina Conway  [address] [facility] [address]     Dear Colleague,    Thank you for referring your patient, Reina Conway, to the [facility] RHEUMATOLOGY at [facility]. Please see a copy of my visit note below.    Rheumatology Visit     Reina Conway MRN# [id number]   YOB: 1955 Age: 63 year old     Date of Visit: March 26, 2019  Primary care provider: Diana Orantes          Assessment and Plan:   62 yo female with :     1. Seronegative destructive RA (-RF/CCP/LASHAE panel, last repeat xrays [place] 3/2013; deformities, right wrist progressive worse function with dorsiflexion, hammertoes with bilateral toe subluxation, right knee contracture).    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 Seen ophth Dr. Terry 1-2017 (neg exam). No sx today   Exam shows prior deformities, no RA flaring and no active arthritis. Labs today pending. Mild nausea, hairloss. We will continue the humira and methotrexate at current doses.     Due to intercurrent tooth abscess will hold Methotrexate and Humira this week, recommend restart April 7 if root canal completed and off antibiotics.    2. Hairloss with fatigue. Due for physical for complete evaluation which she will do this should include cancer screening, mamogram, bone health, skin check and over check. Will check thyroid.   3. Alcohol use. Reports abstinent from Alcohol, but then \"occasionally\". She should discuss with Dr. Orantes. I discussed the risks with her today on the liver and MTX. She understands.   3.  Osteopenia by prior DEXA (done 2009), did not start Fosamax Rx by Dr. Orantes.  Patient noted in past her chart now says Osteoporosis. F/U PCP at annual. She agrees   2. Other medical problems as previously documented and as per EHR      PLAN: Discussed in detail with the patient.      1. Continue Humira 40 mg SQ Q 2 weeks.  Continue " Methotrexate at 15 mg weekly with 2mg day Folic acid    --Keep up-to-date vaccinations per CDC guidelines with PCP. Flu shot is current  --Labs every 12 weeks--discussed importance of labs every 12 weeks and hold MTX for any infections.  Next due 5/1  2 F/U Ophthalmology yearly and prn sx.   3. RTC six months with me    4. She will schedule appt with Dr. Orantes for health maintenance as needed  5. Prior referral to Orthopedic Hand Surgery for their opinion - she did not follow thru.      Guanakito Frost MD           History of Present Illness:   63 year old female who presents for continuing care for her seronegative RA. Seen by Dr. Frost until 2010 then swtiched to [facility], then re-established 3/2013 (xrays 2/2013 [place]). EPIC reviewed.  I saw her last in October.    HISTORY CARRIED FORWARD:      Prior: Synovitis and joint deformities in 2008 was persistently seronegative but had active synovitis. Methotrexate helped but did not cause a remission/low disease state. She required Prednisone to control symptoms partially but still had significant ADL issues. We had persistently recommended anti TNF therapy which she resisted. She sought a second opinion at [place] who recommended the same things, and she continued care there as of August 2010, then switched back to Dr. Frost 3/2013. Begun on Enbrel at [facility]. 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 As no other etiology found it was felt it might be due to Enbrel and she was switched to Humira. She has remained on Methotrexate, primarily 25 mg weekly, decreased 3/2013 (not to go <15mg week due to risk of further deformities or uveitis) . FRAX 32% at [place]. Prior declined biphosphosphonate, [place] recommended IV.   Xrays 3/2013 [place]: See records. Hallux valgus right, hammertoes L>R, hindfoot valgus, pes planus. Dislocated left 2nd MTP, sublux left 3rd MTP, arth 2-3 MTP, rt 1st MTP. Mild DJD hands. Several  "DIP and 1st CMC. Subluxation left thumb IP and persistant dorsiflexion right wrist.   Previous visits discussed stress in her life. Her father in law passed away and there had been a lot of stress and she put her issues on the back burner. She had an episode of chest/epigastric pain and was seen at [facility]. She had apparent negative cardiac evaluation although did not followup with a stress test. She is taking OTC Zantac prn and thinks that helps. Found allergic to Wheat, avoid gluten and sugars. Feels much improved [heartburn, bloating, blood in stools, irregular stools] this really changed her digestive system. She went on a gluten free diet in December, and stopped all RA meds in early Jan 2015. See My Chart message.   Restarted Humira early April 2015 every 3 weeks. Noticed more migatory joints pains, swelling. Right hand, right knee, right wrist-associated swelling really in right 2nd MCP. 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 Lack of movement and coordinations, using her wrist brace. Uses this at night and daily prn.      At last visit she had continued on Humira without side effects and still feels it is helpful. She does have daily discomfort in several areas with either activity or prior damage, especially knees.      She continued to have stress in her life but is working through this and \"trying to get back on track\". She stopped drinking any alcohol for the past two months and was congratulated on this. She went to  but does not feel she is alcoholic.  Leonardo going to [facility]. She has been  for a long time. Both lost parents, and grieving. She says she's in less denial. She admits  is helping a lot.       She called in November due to increased joint pain and we restarted Methotrexate at 10 mg weekly as previously discussed.  She stopped it two weeks ago.  She noted more joint pain and also some nausea on day of dose.  We discussed this at length and unlikely that MTX " contributed to more joint symptoms.      At January 2016 visit we readded Methotrexate to Humira in hopes of decreasing disease activity, using low dose due to prior side effect complaints.  She called later that month with flare symptoms requiring Prednisone bridge, and we increased Methotrexate to more standard dose of 15 mg weekly.       At her April 2016 visit she had started improving.. She was tolerating Methotrexate this time.  She weaned off Prednisone.      INTERVAL HISTORY:    She remains on combination Humira and Methotrexate without change.  She is on Folic acid 2 mg daily.    She has a current tooth abscess and is on Amoxicillin. She held this week's Methotrexate and Humira per our instructions.  She is seeing an Endodontist hopefully soon.  She is taking Tylenol    She has not had overall worsening joint pain or new areas of concern.  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  Her R knee is bothersome and inhibits long ambulation unchanged.  They still have not moved from their multi level home but are planning to.  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    She denies SE on medications.  She had lab in  February stable on Methotrexate. No interval infections.     She saw Dr. Orantes in October as per note.    ROS is below.        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 Frost     Uveitis        Patient Active Problem List    Diagnosis Date Noted     Mechanical limb problems 10/14/2016     Priority: Medium     Pain in both wrists 10/07/2016     Priority: Medium     Rheumatoid arthritis of multiple sites without rheumatoid factor (H) 02/19/2016     Priority: Medium     Osteopenia 01/14/2016     Priority: Medium     Bunion 09/02/2014     Priority: Medium     Hammer toe 02/21/2013     Priority: Medium             Past Surgical History:     Past Surgical History:   Procedure Laterality Date     Fibroidadenoma Left Breast       NO HISTORY OF SURGERY               Social History:     Social History     Tobacco Use     Smoking status: Never Smoker     Smokeless tobacco: Never Used   Substance Use Topics     Alcohol use: Yes     Alcohol/week: 3.5 - 7.0 oz     Comment: social use prn             Family History:     Family History   Problem Relation Age of Onset     Breast Cancer Mother         parkinsons     Osteoporosis Mother      Low Back Problems Mother      Breast Cancer Maternal Aunt      Other - See Comments Other         Inflammation joint in brother, maternal cousin      Diabetes Maternal Grandmother      Anxiety Disorder Father      Skin Cancer Father      Mental Illness Cousin      Alcoholism Paternal Grandfather      Glaucoma No family hx of      Macular Degeneration No family hx of      Retinal detachment No family hx of      Melanoma No family hx of             Allergies:     Allergies   Allergen Reactions     Barium Sulfate      Fosamax      Throat discomfort     No Clinical Screening - See Comments Hives     shrimp  Joints flared after getting possible \"white drink\" after CT/MRI in 2007              Medications:     Current Outpatient " "Medications   Medication Sig Dispense Refill     acetaminophen (TYLENOL) 500 MG tablet Take 500-1,000 mg by mouth every 6 hours as needed for mild pain (rarely takes)       adalimumab (HUMIRA PEN) 40 MG/0.8ML pen kit Inject 0.8 mLs (40 mg) Subcutaneous every 14 days Hold for signs of infection and then seek medical attention 2 each 5     amoxicillin (AMOXIL) 500 MG capsule Take 500 mg by mouth 2 times daily       Cholecalciferol (VITAMIN D-3) 1000 units CAPS        folic acid (FOLVITE) 1 MG tablet Take 2 tablets (2 mg) by mouth daily 180 tablet 3     methotrexate 2.5 MG tablet Take 6 tablets (15 mg) by mouth once a week 24 tablet 0     nystatin-triamcinolone (MYCOLOG II) 384178-4.1 UNIT/GM-% external cream Apply topically 2 times daily To affected toenails. (Patient not taking: Reported on 3/26/2019) 60 g 3     ranitidine (ZANTAC) 150 MG tablet Take 150 mg by mouth daily as needed              Physical Exam:   Blood pressure (!) 157/100, pulse 76, temperature 97.4  F (36.3  C), temperature source Oral, height 1.727 m (5' 8\"), weight 74.6 kg (164 lb 8 oz), SpO2 97 %.  Constitutional: [id number] cooperative   Eyes: nl conjunctiva, sclera   ENT: nl external ears, nose, throat   No mucous membrane lesions, normal saliva pool   Neck: no mass or thyroid enlargement   MS: She has previously noted subluxation at the R wrist/mild in L wrist with decreased ROM, minimal pannus, swan in right hand right thumb deformity unchanged. Right 2-3nd MCP pannus no active swelling. Right hand ulnar deviation, right thumb drop , right 5th DIP subluxation, right wrist subluxation, hammertoes. Bunions bilaterally. Deformities of MTPs with palpable MTP heads. Right knee 1+ cool swelling with 30 extension lag.unchanged. Right foot bunion. All TMJ, neck, shoulder, elbow, wrist, MCP/PIP/DIP, spine, hip, knee, ankle, and foot MTP/IP joints were examined.   Skin: no nail pitting, alopecia, rash, nodules or lesions.   Neuro: nl cranial nerves, " strength   Psych: nl affect         Data:     Lab Results   Component Value Date    WBC 8.9 02/01/2019    WBC 8.1 10/17/2018    WBC 7.3 07/18/2018    HGB 14.8 02/01/2019    HGB 15.5 10/17/2018    HGB 15.6 07/18/2018    HCT 44.7 02/01/2019    HCT 48.3 (H) 10/17/2018    HCT 47.0 07/18/2018    MCV 95 02/01/2019    MCV 96 10/17/2018    MCV 95 07/18/2018     02/01/2019     10/17/2018     07/18/2018     Lab Results   Component Value Date    BUN 13 09/14/2010    BUN 20 10/06/2009    BUN 14 04/03/2007     No components found for: SEDRATE  Lab Results   Component Value Date    TSH 0.62 07/14/2017    TSH 0.58 09/14/2010    TSH 0.42 10/06/2009     Lab Results   Component Value Date    AST 23 02/01/2019    AST 18 10/17/2018    AST 20 07/18/2018    ALT 34 02/01/2019    ALT 19 10/17/2018    ALT 22 07/18/2018    GGT 32 09/25/2014    ALKPHOS 82 09/25/2014    ALKPHOS 83 12/22/2010     Reviewed Rheumatology lab flowsheet    Guanakito Frost

## 2019-03-26 NOTE — NURSING NOTE
"Chief Complaint   Patient presents with     RECHECK     RA     BP (!) 157/100   Pulse 76   Temp 97.4  F (36.3  C) (Oral)   Ht 1.727 m (5' 8\")   Wt 74.6 kg (164 lb 8 oz)   SpO2 97%   BMI 25.01 kg/m    Cindi James CMA    "

## 2019-04-02 ENCOUNTER — DOCUMENTATION ONLY (OUTPATIENT)
Dept: CARE COORDINATION | Facility: CLINIC | Age: 64
End: 2019-04-02

## 2019-04-22 ENCOUNTER — OFFICE VISIT (OUTPATIENT)
Dept: ORTHOPEDICS | Facility: CLINIC | Age: 64
End: 2019-04-22
Payer: COMMERCIAL

## 2019-04-22 DIAGNOSIS — M06.09 RHEUMATOID ARTHRITIS OF MULTIPLE SITES WITHOUT RHEUMATOID FACTOR (H): ICD-10-CM

## 2019-04-22 DIAGNOSIS — B35.1 OM (ONYCHOMYCOSIS): Primary | ICD-10-CM

## 2019-04-22 DIAGNOSIS — M79.672 LEFT FOOT PAIN: ICD-10-CM

## 2019-04-22 DIAGNOSIS — B07.0 PLANTAR WARTS: ICD-10-CM

## 2019-04-22 DIAGNOSIS — L84 TYLOMA: ICD-10-CM

## 2019-04-22 RX ORDER — CICLOPIROX OLAMINE 7.7 MG/G
CREAM TOPICAL 2 TIMES DAILY
Qty: 90 G | Refills: 3 | Status: SHIPPED | OUTPATIENT
Start: 2019-04-22 | End: 2020-03-12

## 2019-04-22 NOTE — LETTER
4/22/2019       RE: Reina Conway  [address] [facility] [address]     Dear Colleague,    Thank you for referring your patient, Reina Conway, to the [facility] at [facility]. Please see a copy of my visit note below.    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 Frost     Uveitis      Patient Active Problem List   Diagnosis     Hammer toe     Bunion     Osteopenia     Rheumatoid arthritis of multiple sites without rheumatoid factor (H)     Pain in both wrists     Mechanical limb problems     Past Surgical History:   Procedure Laterality Date     Fibroidadenoma Left Breast       NO HISTORY OF SURGERY       Social History     Socioeconomic History     Marital status:      Spouse name: Not on file     Number of children: Not on file     Years of education: Not on file     Highest education level: Not on file   Occupational History     Not on file   Social Needs     Financial resource strain: Not on file     Food insecurity:     Worry: Not on file     Inability: Not on file     Transportation needs:     Medical: Not on file     Non-medical: Not on file   Tobacco Use     Smoking status: Never Smoker     Smokeless tobacco: Never Used   Substance and Sexual Activity     Alcohol use: Yes     Alcohol/week: 3.5 - 7.0 oz     Comment: social use prn     Drug use: No     Sexual activity: Not on file   Lifestyle     Physical activity:     Days per week: Not on file     Minutes per session: Not on file     Stress: Not on file   Relationships     Social connections:      Talks on phone: Not on file     Gets together: Not on file     Attends Restorationism service: Not on file     Active member of club or organization: Not on file     Attends meetings of clubs or organizations: Not on file     Relationship status: Not on file     Intimate partner violence:     Fear of current or ex partner: Not on file     Emotionally abused: Not on file     Physically abused: Not on file     Forced sexual activity: Not on file   Other Topics Concern     Parent/sibling w/ CABG, MI or angioplasty before 65F 55M? Not Asked   Social History Narrative    How much exercise per week? Walking, stairs    How much calcium per day? 2-3 servings       How much caffeine per day? 2-3 cups coffee    How much vitamin D per day?      Do you/your family wear seatbelts?  Yes    Do you/your family use safety helmets? No    Do you/your family use sunscreen? No    Do you/your family keep firearms in the home? No    Do you/your family have a smoke detector(s)? Yes        Do you feel safe in your home? Yes    Has anyone ever touched you in an unwanted manner? No     Explain         September 2, 2014 Tamiko Villa LPN             Family History   Problem Relation Age of Onset     Breast Cancer Mother         parkinsons     Osteoporosis Mother      Low Back Problems Mother      Breast Cancer Maternal Aunt      Other - See Comments Other         Inflammation joint in brother, maternal cousin      Diabetes Maternal Grandmother      Anxiety Disorder Father      Skin Cancer Father      Mental Illness Cousin      Alcoholism Paternal Grandfather      Glaucoma No family hx of      Macular Degeneration No family hx of      Retinal detachment No family hx of      Melanoma No family hx of      SUBJECTIVE FINDINGS:  A 63-year-old female returns to clinic for callus and onychomycosis.  Relates that her toenails need to be cut, no specific injury.  She has not been using any lotion on her feet.      OBJECTIVE FINDINGS:  DP and PT are 2/4  bilaterally.  She has dorsally contracted digits.  She has nucleated hyperkeratotic tissue buildup MPJs with ecchymosis.  There is no erythema, no drainage, no odor, no calor.  She has left distal hallux lesion with pinpoint bleeding upon debridement from the left hallux.  She has dystrophic, thickened, brittle nails with subungual debris, dystrophy and discoloration to differing degrees bilaterally.        ASSESSMENT AND PLAN:  Onychauxis with onychomycosis.  Diagnosis and treatment options discussed with the patient.  She also has onychauxis.  She relates she cannot trim the nails anymore.  Plantar wart on distal left hallux.  All the nails and wart were debrided or reduced bilaterally upon consent.  Prescription given and use discussed with her.  Prescription for Compound-W given and use discussed with her.  She will return to clinic and see me in 2 months.           Again, thank you for allowing me to participate in the care of your patient.      Sincerely,    Kenny Gao DPM

## 2019-04-22 NOTE — PROGRESS NOTES
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 Frost     Uveitis      Patient Active Problem List   Diagnosis     Hammer toe     Bunion     Osteopenia     Rheumatoid arthritis of multiple sites without rheumatoid factor (H)     Pain in both wrists     Mechanical limb problems     Past Surgical History:   Procedure Laterality Date     Fibroidadenoma Left Breast       NO HISTORY OF SURGERY       Social History     Socioeconomic History     Marital status:      Spouse name: Not on file     Number of children: Not on file     Years of education: Not on file     Highest education level: Not on file   Occupational History     Not on file   Social Needs     Financial resource strain: Not on file     Food insecurity:     Worry: Not on file     Inability: Not on file     Transportation needs:     Medical: Not on file     Non-medical: Not on file   Tobacco Use     Smoking status: Never Smoker     Smokeless tobacco: Never Used   Substance and Sexual Activity     Alcohol use: Yes     Alcohol/week: 3.5 - 7.0 oz     Comment: social use prn     Drug use: No     Sexual activity: Not on file   Lifestyle     Physical activity:     Days per week: Not on file     Minutes per session: Not on file     Stress: Not on file   Relationships     Social connections:     Talks on phone: Not on file     Gets together: Not on file     Attends Orthodox service: Not on file     Active member of club or organization: Not on file     Attends meetings of clubs or organizations: Not on file     Relationship status: Not on file     Intimate partner violence:      Fear of current or ex partner: Not on file     Emotionally abused: Not on file     Physically abused: Not on file     Forced sexual activity: Not on file   Other Topics Concern     Parent/sibling w/ CABG, MI or angioplasty before 65F 55M? Not Asked   Social History Narrative    How much exercise per week? Walking, stairs    How much calcium per day? 2-3 servings       How much caffeine per day? 2-3 cups coffee    How much vitamin D per day?      Do you/your family wear seatbelts?  Yes    Do you/your family use safety helmets? No    Do you/your family use sunscreen? No    Do you/your family keep firearms in the home? No    Do you/your family have a smoke detector(s)? Yes        Do you feel safe in your home? Yes    Has anyone ever touched you in an unwanted manner? No     Explain         September 2, 2014 Tamiko Villa LPN             Family History   Problem Relation Age of Onset     Breast Cancer Mother         parkinsons     Osteoporosis Mother      Low Back Problems Mother      Breast Cancer Maternal Aunt      Other - See Comments Other         Inflammation joint in brother, maternal cousin      Diabetes Maternal Grandmother      Anxiety Disorder Father      Skin Cancer Father      Mental Illness Cousin      Alcoholism Paternal Grandfather      Glaucoma No family hx of      Macular Degeneration No family hx of      Retinal detachment No family hx of      Melanoma No family hx of      SUBJECTIVE FINDINGS:  A 63-year-old female returns to clinic for callus and onychomycosis.  Relates that her toenails need to be cut, no specific injury.  She has not been using any lotion on her feet.      OBJECTIVE FINDINGS:  DP and PT are 2/4 bilaterally.  She has dorsally contracted digits.  She has nucleated hyperkeratotic tissue buildup MPJs with ecchymosis.  There is no erythema, no drainage, no odor, no calor.  She has left distal hallux lesion with pinpoint bleeding upon debridement from the left hallux.  She has  dystrophic, thickened, brittle nails with subungual debris, dystrophy and discoloration to differing degrees bilaterally.        ASSESSMENT AND PLAN:  Onychauxis with onychomycosis.  Diagnosis and treatment options discussed with the patient.  She also has onychauxis.  She relates she cannot trim the nails anymore.  Plantar wart on distal left hallux.  All the nails and wart were debrided or reduced bilaterally upon consent.  Prescription given and use discussed with her.  Prescription for Compound-W given and use discussed with her.  She will return to clinic and see me in 2 months.

## 2019-04-22 NOTE — NURSING NOTE
"Reason For Visit:   Chief Complaint   Patient presents with     RECHECK     3 month follow up. Callus and toe nail trimming.        Pain Assessment  Patient Currently in Pain: Yes  Primary Pain Location: Foot(Bilateral )  Aggravating Factors: Walking(intermittent)        Allergies   Allergen Reactions     Barium Sulfate      Fosamax      Throat discomfort     No Clinical Screening - See Comments Hives     shrimp  Joints flared after getting possible \"white drink\" after CT/MRI in 2007            Frieda Menchaca LPN    "

## 2019-07-30 ENCOUNTER — MYC REFILL (OUTPATIENT)
Dept: RHEUMATOLOGY | Facility: CLINIC | Age: 64
End: 2019-07-30

## 2019-07-30 DIAGNOSIS — M06.00 SERONEGATIVE RHEUMATOID ARTHRITIS (H): ICD-10-CM

## 2019-08-07 DIAGNOSIS — M06.09 RHEUMATOID ARTHRITIS OF MULTIPLE SITES WITHOUT RHEUMATOID FACTOR (H): ICD-10-CM

## 2019-08-07 DIAGNOSIS — Z79.899 HIGH RISK MEDICATIONS (NOT ANTICOAGULANTS) LONG-TERM USE: ICD-10-CM

## 2019-08-07 LAB
ALBUMIN SERPL-MCNC: 3.9 G/DL (ref 3.4–5)
ALT SERPL W P-5'-P-CCNC: 43 U/L (ref 0–50)
AST SERPL W P-5'-P-CCNC: 30 U/L (ref 0–45)
BASOPHILS # BLD AUTO: 0.1 10E9/L (ref 0–0.2)
BASOPHILS NFR BLD AUTO: 0.7 %
CREAT SERPL-MCNC: 0.64 MG/DL (ref 0.52–1.04)
DIFFERENTIAL METHOD BLD: NORMAL
EOSINOPHIL # BLD AUTO: 0 10E9/L (ref 0–0.7)
EOSINOPHIL NFR BLD AUTO: 0.5 %
ERYTHROCYTE [DISTWIDTH] IN BLOOD BY AUTOMATED COUNT: 14.7 % (ref 10–15)
GFR SERPL CREATININE-BSD FRML MDRD: >90 ML/MIN/{1.73_M2}
HCT VFR BLD AUTO: 44.7 % (ref 35–47)
HGB BLD-MCNC: 15 G/DL (ref 11.7–15.7)
LYMPHOCYTES # BLD AUTO: 2.9 10E9/L (ref 0.8–5.3)
LYMPHOCYTES NFR BLD AUTO: 38.9 %
MCH RBC QN AUTO: 31.5 PG (ref 26.5–33)
MCHC RBC AUTO-ENTMCNC: 33.6 G/DL (ref 31.5–36.5)
MCV RBC AUTO: 94 FL (ref 78–100)
MONOCYTES # BLD AUTO: 0.4 10E9/L (ref 0–1.3)
MONOCYTES NFR BLD AUTO: 5.7 %
NEUTROPHILS # BLD AUTO: 4.1 10E9/L (ref 1.6–8.3)
NEUTROPHILS NFR BLD AUTO: 54.2 %
PLATELET # BLD AUTO: 279 10E9/L (ref 150–450)
RBC # BLD AUTO: 4.76 10E12/L (ref 3.8–5.2)
WBC # BLD AUTO: 7.5 10E9/L (ref 4–11)

## 2019-08-07 PROCEDURE — 82565 ASSAY OF CREATININE: CPT | Performed by: INTERNAL MEDICINE

## 2019-08-07 PROCEDURE — 82040 ASSAY OF SERUM ALBUMIN: CPT | Performed by: INTERNAL MEDICINE

## 2019-08-07 PROCEDURE — 84450 TRANSFERASE (AST) (SGOT): CPT | Performed by: INTERNAL MEDICINE

## 2019-08-07 PROCEDURE — 84460 ALANINE AMINO (ALT) (SGPT): CPT | Performed by: INTERNAL MEDICINE

## 2019-08-07 PROCEDURE — 85025 COMPLETE CBC W/AUTO DIFF WBC: CPT | Performed by: INTERNAL MEDICINE

## 2019-08-07 PROCEDURE — 36415 COLL VENOUS BLD VENIPUNCTURE: CPT | Performed by: INTERNAL MEDICINE

## 2019-08-12 DIAGNOSIS — R07.0 THROAT PAIN: Primary | ICD-10-CM

## 2019-08-12 DIAGNOSIS — R68.84 JAW PAIN: ICD-10-CM

## 2019-08-14 NOTE — TELEPHONE ENCOUNTER
FUTURE VISIT INFORMATION      FUTURE VISIT INFORMATION:    Date: 8/15/19    Time: 1PM    Location: [facility]  REFERRAL INFORMATION:    Referring provider:  Diana Orantes MD    Referring providers clinic:  [facility]     Reason for visit/diagnosis Throat pain / Jaw pain     RECORDS REQUESTED FROM:       Clinic name Comments Records Status Imaging Status   [facility]  8/12/19 referral [facility]  3/26/19 notes with Dr Guanakito RAMIREZ    [facility] imaging 5/5/18 CT Cervical spine, CT Facial Bones, C head  5/5/18 XR Chest  Care Everywhere req 8/14 - PACS                       8/14/19 3:19PM sent a fax to [facility] for images - [facility]   8/15/19 9:43AM images in PACS from [facility] - [facility]

## 2019-08-15 ENCOUNTER — OFFICE VISIT (OUTPATIENT)
Dept: OTOLARYNGOLOGY | Facility: CLINIC | Age: 64
End: 2019-08-15
Payer: COMMERCIAL

## 2019-08-15 ENCOUNTER — PRE VISIT (OUTPATIENT)
Dept: OTOLARYNGOLOGY | Facility: CLINIC | Age: 64
End: 2019-08-15

## 2019-08-15 VITALS — WEIGHT: 164 LBS | BODY MASS INDEX: 24.86 KG/M2 | HEIGHT: 68 IN

## 2019-08-15 DIAGNOSIS — R49.0 MUSCLE TENSION DYSPHONIA: Primary | ICD-10-CM

## 2019-08-15 DIAGNOSIS — R13.10 ODYNOPHAGIA: Primary | ICD-10-CM

## 2019-08-15 DIAGNOSIS — M79.18 MYOFASCIAL PAIN: ICD-10-CM

## 2019-08-15 DIAGNOSIS — R13.13 PHARYNGEAL DYSPHAGIA: ICD-10-CM

## 2019-08-15 DIAGNOSIS — R07.0 THROAT PAIN: ICD-10-CM

## 2019-08-15 ASSESSMENT — MIFFLIN-ST. JEOR: SCORE: 1342.4

## 2019-08-15 NOTE — PROGRESS NOTES
HISTORY OF PRESENT ILLNESS: Reina Conway is a 64 year old female with a history of right neck pain for the last 2 months.  She had a a root canal treatment on the tooth #19 and left mandibular molar approximately 4 months ago.  She noted some pain in the right side of her face 3 months ago and she is also noted some pain in the midportion of her neck on the right side.  She talks a moderate amount but not excessively.  She notes some tightness with swallowing.  She has no airway difficulties and minimal to no voice difficulties.  She notes she is been under stress recently has increased difficulties with rheumatoid arthritis.    Last 2 Scores for Patient-Answered VHI Questionnaire  No flowsheet data found.    Last 2 Scores for Patient-Answered CSI Questionnaire  No flowsheet data found.      Last 2 Scores for Patient-Answered EAT Questionnaire  No flowsheet data found.        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 Frost     Uveitis        PAST SURGICAL HISTORY:   Past Surgical History:   Procedure Laterality Date     Fibroidadenoma Left Breast       NO HISTORY OF SURGERY         FAMILY HISTORY:   Family History   Problem Relation Age of Onset     Breast Cancer Mother         parkinsons     Osteoporosis Mother      Low Back Problems Mother      Breast Cancer Maternal Aunt      Other - See Comments Other         Inflammation joint in brother, maternal cousin      Diabetes Maternal Grandmother      Anxiety Disorder Father      Skin Cancer Father      Mental Illness Cousin      Alcoholism  Paternal Grandfather      Glaucoma No family hx of      Macular Degeneration No family hx of      Retinal detachment No family hx of      Melanoma No family hx of        SOCIAL HISTORY:   Social History     Tobacco Use     Smoking status: Never Smoker     Smokeless tobacco: Never Used   Substance Use Topics     Alcohol use: Yes     Alcohol/week: 3.5 - 7.0 oz     Comment: social use prn       REVIEW OF SYSTEMS: Ten point review of systems was performed and is negative except for:   [facility] ROS 8/15/2019   Constitutional Problems with sleep   Neurology Headache   Psychology Frequently feeling depressed or sad, Frequently feeling anxious   Ears, Nose, Throat Ringing/noise in ears, Nasal congestion or drainage, Sore throat, Trouble swallowing   Cardiopulmonary Cough   Gastrointestinal/Genitourinary Heartburn/indigestion, Constipation, Diarrhea   Musculoskeletal Sore or stiff joints, Swollen joints, Neck pain   Allergy/Immunology Skin changes, Rash   Skin Change in moles        ALLERGIES: Barium sulfate; Fosamax; and No clinical screening - see comments    MEDICATIONS:   Current Outpatient Medications   Medication Sig Dispense Refill     acetaminophen (TYLENOL) 500 MG tablet Take 500-1,000 mg by mouth every 6 hours as needed for mild pain (rarely takes)       adalimumab (HUMIRA PEN) 40 MG/0.8ML pen kit Inject 0.8 mLs (40 mg) Subcutaneous every 14 days Hold for signs of infection and then seek medical attention 2 each 5     amoxicillin (AMOXIL) 500 MG capsule Take 500 mg by mouth 2 times daily       Cholecalciferol (VITAMIN D-3) 1000 units CAPS        ciclopirox (LOPROX) 0.77 % cream Apply topically 2 times daily To toenails. 90 g 3     folic acid (FOLVITE) 1 MG tablet Take 2 tablets (2 mg) by mouth daily 180 tablet 3     methotrexate 2.5 MG tablet Take 6 tablets (15 mg) by mouth every 7 days Labs due now for refills 24 tablet 0     ranitidine (ZANTAC) 150 MG tablet Take 150 mg by mouth daily as needed       salicylic acid  17 % LIQD Externally apply 1 dose. topically daily To left big toe wart daily. 1 Bottle 1     nystatin-triamcinolone (MYCOLOG II) 490573-9.1 UNIT/GM-% external cream Apply topically 2 times daily To affected toenails. (Patient not taking: Reported on 3/26/2019) 60 g 3         PHYSICAL EXAMINATION:  She  is awake, alert and in no apparent distress.    Her tympanic membranes are clear and intact bilaterally. External auditory canals are clear.  Nasal exam shows a mild septal deviation without obstruction.  Examination of the oral cavity shows no suspicious lesions.  There is symmetric movement of the tongue and soft palate.  Palpation of the muscles of mastication including the lateral pterygoid masseter and temporalis muscle on the right side were tender.  They were not tender on the left side.  The oropharynx is clear.  Her neck is supple without significant adenopathy.  There is tenderness to palpation of the thyrohyoid muscle almost exclusively on the right side.  Pulse is regular.  Upper airway is clear.  Cranial nerves II-XII are grossly intact.       PROCEDURE: A flexible laryngoscopy  was performed.  Informed consent was obtained and a time out was performed. 3% lidocaine and 0.25% phenylephrine was sprayed into the nasal cavity and allowed 3 to 5 minutes for effect. The scope was passed through the left sided nostril. Examination showed the vocal folds to be mobile and meet in the midline.  No nodules, polyps or ulcerations are seen.  There is mild inflammation or erythema of the supraglottic or glottic larynx.  With phonation there is moderate contraction of the supraglottic larynx.  The hypopharynx is otherwise clear as is the subglottis.       At Rest        Phonation        IMPRESSION/PLAN: My impression she has  myofascial inflammation of the muscles of mastication on the right side.  She also has thyrohyoid muscle inflammation associated with a mild muscle tension dysphonia.  I am recommending a trial  of speech therapy.  Of note she does have some neck pain.  I advised her to further discuss this with her primary care and perhaps consider physical therapy if no other work-up is felt necessary.

## 2019-08-15 NOTE — LETTER
8/15/2019       RE: Reina Conway  [address] [facility] [address]     Dear Colleague,    Thank you for referring your patient, Reina Conway, to the [facility] EAR NOSE AND THROAT at [facility]. Please see a copy of my visit note below.      HISTORY OF PRESENT ILLNESS: Reina Conway is a 64 year old female with a history of right neck pain for the last 2 months.  She had a a root canal treatment on the tooth #19 and left mandibular molar approximately 4 months ago.  She noted some pain in the right side of her face 3 months ago and she is also noted some pain in the midportion of her neck on the right side.  She talks a moderate amount but not excessively.  She notes some tightness with swallowing.  She has no airway difficulties and minimal to no voice difficulties.  She notes she is been under stress recently has increased difficulties with rheumatoid arthritis.    Last 2 Scores for Patient-Answered VHI Questionnaire  No flowsheet data found.    Last 2 Scores for Patient-Answered CSI Questionnaire  No flowsheet data found.      Last 2 Scores for Patient-Answered EAT Questionnaire  No flowsheet data found.        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 Frost     Uveitis        PAST SURGICAL HISTORY:   Past Surgical History:   Procedure Laterality Date     Fibroidadenoma Left Breast       NO HISTORY OF SURGERY         FAMILY HISTORY:   Family History   Problem Relation Age of Onset     Breast Cancer Mother         parkinsons     Osteoporosis  Mother      Low Back Problems Mother      Breast Cancer Maternal Aunt      Other - See Comments Other         Inflammation joint in brother, maternal cousin      Diabetes Maternal Grandmother      Anxiety Disorder Father      Skin Cancer Father      Mental Illness Cousin      Alcoholism Paternal Grandfather      Glaucoma No family hx of      Macular Degeneration No family hx of      Retinal detachment No family hx of      Melanoma No family hx of        SOCIAL HISTORY:   Social History     Tobacco Use     Smoking status: Never Smoker     Smokeless tobacco: Never Used   Substance Use Topics     Alcohol use: Yes     Alcohol/week: 3.5 - 7.0 oz     Comment: social use prn       REVIEW OF SYSTEMS: Ten point review of systems was performed and is negative except for:   [facility] ROS 8/15/2019   Constitutional Problems with sleep   Neurology Headache   Psychology Frequently feeling depressed or sad, Frequently feeling anxious   Ears, Nose, Throat Ringing/noise in ears, Nasal congestion or drainage, Sore throat, Trouble swallowing   Cardiopulmonary Cough   Gastrointestinal/Genitourinary Heartburn/indigestion, Constipation, Diarrhea   Musculoskeletal Sore or stiff joints, Swollen joints, Neck pain   Allergy/Immunology Skin changes, Rash   Skin Change in moles        ALLERGIES: Barium sulfate; Fosamax; and No clinical screening - see comments    MEDICATIONS:   Current Outpatient Medications   Medication Sig Dispense Refill     acetaminophen (TYLENOL) 500 MG tablet Take 500-1,000 mg by mouth every 6 hours as needed for mild pain (rarely takes)       adalimumab (HUMIRA PEN) 40 MG/0.8ML pen kit Inject 0.8 mLs (40 mg) Subcutaneous every 14 days Hold for signs of infection and then seek medical attention 2 each 5     amoxicillin (AMOXIL) 500 MG capsule Take 500 mg by mouth 2 times daily       Cholecalciferol (VITAMIN D-3) 1000 units CAPS        ciclopirox (LOPROX) 0.77 % cream Apply topically 2 times daily To toenails. 90 g 3      folic acid (FOLVITE) 1 MG tablet Take 2 tablets (2 mg) by mouth daily 180 tablet 3     methotrexate 2.5 MG tablet Take 6 tablets (15 mg) by mouth every 7 days Labs due now for refills 24 tablet 0     ranitidine (ZANTAC) 150 MG tablet Take 150 mg by mouth daily as needed       salicylic acid 17 % LIQD Externally apply 1 dose. topically daily To left big toe wart daily. 1 Bottle 1     nystatin-triamcinolone (MYCOLOG II) 260119-7.1 UNIT/GM-% external cream Apply topically 2 times daily To affected toenails. (Patient not taking: Reported on 3/26/2019) 60 g 3         PHYSICAL EXAMINATION:  She  is awake, alert and in no apparent distress.    Her tympanic membranes are clear and intact bilaterally. External auditory canals are clear.  Nasal exam shows a mild septal deviation without obstruction.  Examination of the oral cavity shows no suspicious lesions.  There is symmetric movement of the tongue and soft palate.  Palpation of the muscles of mastication including the lateral pterygoid masseter and temporalis muscle on the right side were tender.  They were not tender on the left side.  The oropharynx is clear.  Her neck is supple without significant adenopathy.  There is tenderness to palpation of the thyrohyoid muscle almost exclusively on the right side.  Pulse is regular.  Upper airway is clear.  Cranial nerves II-XII are grossly intact.       PROCEDURE: A flexible laryngoscopy  was performed.  Informed consent was obtained and a time out was performed. 3% lidocaine and 0.25% phenylephrine was sprayed into the nasal cavity and allowed 3 to 5 minutes for effect. The scope was passed through the left sided nostril. Examination showed the vocal folds to be mobile and meet in the midline.  No nodules, polyps or ulcerations are seen.  There is mild inflammation or erythema of the supraglottic or glottic larynx.  With phonation there is moderate contraction of the supraglottic larynx.  The hypopharynx is otherwise clear as  is the subglottis.       At Rest        Phonation        IMPRESSION/PLAN: My impression she has  myofascial inflammation of the muscles of mastication on the right side.  She also has thyrohyoid muscle inflammation associated with a mild muscle tension dysphonia.  I am recommending a trial of speech therapy.  Of note she does have some neck pain.  I advised her to further discuss this with her primary care and perhaps consider physical therapy if no other work-up is felt necessary.    Again, thank you for allowing me to participate in the care of your patient.      Sincerely,    Reinaldo Hill MD

## 2019-08-16 PROBLEM — M79.18 MYOFASCIAL PAIN: Status: ACTIVE | Noted: 2019-08-16

## 2019-08-16 PROBLEM — R49.0 MUSCLE TENSION DYSPHONIA: Status: ACTIVE | Noted: 2019-08-16

## 2019-08-22 NOTE — PROGRESS NOTES
[facility] VOICE [facility]  Reinaldo Hill Jr., M.D., F.A.C.S.  Gregoria Morocho M.D., M.P.H.  Yanet Tapia, Ph.D., CCC/SLP  Wen Grady M.M. (voice), M.DONI., CCC/SLP  Yunier Henning M.M. (voice), M.A., CCC/SLP    [facility]  VOICE/SPEECH/BREATHING EVALUATION AND LARYNGEAL EXAMINATION REPORT    Patient: Reina Conway  Date of Visit: 8/15/2019    Clinician: Yanet Tapia, Ph.D., CCC/SLP  Seen in conjunction with: Dr. Hill  Referring Physician: James    CHIEF COMPLAINT:  Pain with swallow    HISTORY  Reina Conway is a 64 year old presenting today for evaluation of throat pain and odynophagia.  Please refer to Dr. Hill s dictation for a more complete history and impressions.   Salient details of her history are as follows:    Onset: 4 months ago, at the time of a root canal that resulted in painful swallow    She also had a fall in 2018 that resulted in jaw pain; thinks this may be related    She is most interested in knowing if this may be related to living near the garbage burning facility in [place], and if there is anything concerning about her pain      DIAGNOSIS/REASON FOR REFERRAL  Dysphonia/ Evaluate, perform laryngeal exam, treat as appropriate    CURRENT PATIENT COMPLAINTS    Primary: painful swallow    Secondary: throat pain    Denies significant dyspnea, dysphagia, or dysphonia    OTHER PERTINENT HISTORY    Rheumatoid arthritis; results in low energy and depression    Significant grief for past few years    Please also see Dr. Hill's note      OBJECTIVE FINDINGS  VOICE/ SPEECH/ NON-COMMUNICATIVE LARYNGEAL BEHAVIORS EVALUATION  An evaluation of the voice and upper airway status was accomplished today; salient features are as follows:    Palpation of the laryngeal area shows:    Marked tenderness of the thyrohyoid area     Breathing pattern:    Generally appears within normal limits and adequate, but often shallow inhalations    No overt tension is evident, but paucity of jaw  and facial movement    Very careful, tight movement when she tries to turn her head, she says due to neck pain    Voice quality is characterized by    WNL; no roughness, breathiness, strain, or transient vocal disturbances    Habitual pitch was not formally tested, but is judged to be WNL and appropriate    Loudness is WNL and appropriate for the setting    LARYNGEAL EXAMINATION    Endoscopic laryngeal examination was performed by:  Reinaldo Hill MD  I provided technical support, and provided the protocol of instructions for the patient.  Type of exam:   Flexible endoscopy with chip-tip technology   This exam shows:    Laryngeal and Vocal Fold Mucosa    Essentially healthy laryngeal mucosa    Mild to moderate presence of thickened white secretions on the vocal folds and throughout the laryngeal area    Status of vocal fold mucosa:   o Within normal limits, with no lesions and straight vibratory margins    Neurological and Functional Integrity of the Larynx    Vocal folds are mobile and meet at midline; movement is brisk and symmetric; exam is neurologically normal     Normal function is evident during a task of 20 quickly repeated vowels    Elongation of the vocal folds for pitch increase is WNL    On phonation, glottic closure is WNL    Supraglottic Function and Therapy Probes    Mild 4-way constriction of the supraglottic larynx during phonatory tasks    Mild hyperadduction of the posterior glottis during phonation  o The left arytenoid is noted to squeeze past midline    Stroboscopy was not warranted.    Dr. Hill and I reviewed this laryngeal exam with Ms. Conway today, and I provided pertinent explanations, as well as written information:    Endoscopic findings are consistent with audio-perceptual assessment and patient Hx/complaints.    her symptoms are accounted for by mild muscular imbalance or hyperfunction noted with phonation     It seems like that the jaw pain was exacerbated by the root canal, and  "there is more tenderness from \"guarding\" the muscles    She had an \"aha\" moment when I explained that the thyrohyoid muscle is the same muscle used to \"choke back a sob;\" she has had significant crying recently    ASSESSMENT / PLAN  IMPRESSIONS:  This evaluation has resulted in the following diagnosis/diagnoses for Ms. Conway  R07.0 (Throat Pain)  R13.10 (Odynophagia).      Laryngeal evaluation demonstrated mild mucosal irritation and mild muscular hyperfunction    Perceptual evaluation demonstrated normal voice production, but stiffness and \"guarding\" of upper body musculature, as well as tenderness to palpation  RECOMMENDATIONS:     I provided connect informaton for Lora Hand DPT, a physical therapist who specializes in jaw pain      I gave suggestions for simple massage and stretch that she can feel safe to do    I offered speech therapy if she does not have adequate resolution of her symptoms, to address her throat pain    No therapy is planned, therefore there are no goals and Ms. Conway is discharged from this service.    TOTAL SERVICE TIME: 60 minutes  EVALUATION OF VOICE AND RESONANCE (10685)  NO CHARGE FACILITY FEE (60709)      Yanet Tapia, Ph.D., [facility]-SLP  Speech-Language Pathologist  Director, [facility]  [phone]            "

## 2019-09-03 ENCOUNTER — TELEPHONE (OUTPATIENT)
Dept: RHEUMATOLOGY | Facility: CLINIC | Age: 64
End: 2019-09-03

## 2019-09-03 NOTE — TELEPHONE ENCOUNTER
Patient contacted and reminded of upcoming appointment.  Patient confirmed they will be attending.  Patient instructed to bring updated medications list to appointment.

## 2019-09-05 ENCOUNTER — OFFICE VISIT (OUTPATIENT)
Dept: RHEUMATOLOGY | Facility: CLINIC | Age: 64
End: 2019-09-05
Attending: NURSE PRACTITIONER
Payer: COMMERCIAL

## 2019-09-05 VITALS
BODY MASS INDEX: 25.04 KG/M2 | WEIGHT: 165.2 LBS | HEIGHT: 68 IN | HEART RATE: 71 BPM | RESPIRATION RATE: 18 BRPM | SYSTOLIC BLOOD PRESSURE: 125 MMHG | DIASTOLIC BLOOD PRESSURE: 83 MMHG | OXYGEN SATURATION: 96 % | TEMPERATURE: 98.7 F

## 2019-09-05 DIAGNOSIS — Z79.899 HIGH RISK MEDICATIONS (NOT ANTICOAGULANTS) LONG-TERM USE: ICD-10-CM

## 2019-09-05 DIAGNOSIS — M06.00 SERONEGATIVE RHEUMATOID ARTHRITIS (H): ICD-10-CM

## 2019-09-05 PROCEDURE — G0463 HOSPITAL OUTPT CLINIC VISIT: HCPCS | Mod: ZF

## 2019-09-05 ASSESSMENT — MIFFLIN-ST. JEOR: SCORE: 1347.84

## 2019-09-05 ASSESSMENT — PAIN SCALES - GENERAL: PAINLEVEL: MILD PAIN (3)

## 2019-09-05 NOTE — LETTER
9/5/2019       RE: Reina Conway  [address] [facility] [address]     Dear Colleague,    Thank you for referring your patient, Reina Conway, to the [facility] RHEUMATOLOGY at [facility]. Please see a copy of my visit note below.    [facility] - Rheumatology Clinic Visit    Reina Conway  is a 64 year old here for transfer of care for rheumatoid arthritis (RA) deforming.     Review-Seen Dr. Frost until 2010 then swtiched to [facility], then re-established 3/2013 (xrays 2/2013 [place]). Dr. 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 Methotrexate helped but did not cause a remission/low disease state. She required Prednisone to control symptoms partially but still had significant ADL issues. We had persistently recommended anti TNF therapy which she resisted. She sought a second opinion at [place] who recommended the same things, and she continued care there as of August 2010, then switched back to Dr. Frost 3/2013. Begun on Enbrel at [facility]. 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 As no other etiology found it was felt it might be due to Enbrel and she was switched to Humira. She has remained on Methotrexate, primarily 25 mg weekly, decreased 3/2013 (not to go <15mg week due to risk of further deformities or uveitis) . FRAX 32% at [place]. Prior declined biphosphosphonate, [place] recommended IV.   Xrays 3/2013 [place]: See records. Hallux valgus right, hammertoes L>R, hindfoot valgus, pes planus. Dislocated left 2nd MTP, sublux left 3rd MTP, arth 2-3 MTP, rt 1st MTP. Mild DJD hands. Several DIP and 1st CMC. Subluxation left thumb IP and persistant dorsiflexion right wrist. Previous visits  "discussed stress in her life. Her father in law passed away and there had been a lot of stress and she put her issues on the back burner. She had an episode of chest/epigastric pain and was seen at [facility]. She had apparent negative cardiac evaluation although did not followup with a stress test. She is taking OTC Zantac prn and thinks that helps. Found allergic to Wheat, avoid gluten and sugars. Feels much improved [heartburn, bloating, blood in stools, irregular stools] this really changed her digestive system. She went on a gluten free diet in December, and stopped all RA meds in early Jan 2015. See My Chart message. Restarted Humira early April 2015 every 3 weeks. Noticed more migatory joints pains, swelling. Right hand, right knee, right wrist-associated swelling really in right 2nd MCP. 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 Lack of movement and coordinations, using her wrist brace. Uses this at night and daily prn. At last visit she had continued on Humira without side effects and still feels it is helpful. She does have daily discomfort in several areas with either activity or prior damage, especially knees. She continued to have stress in her life but is working through this and \"trying to get back on track\". She stopped drinking any alcohol for the past two months and was congratulated on this. She went to  but does not feel she is alcoholic.  Leonardo going to [facility]. She has been  for a long time. Both lost parents, and grieving. She says she's in less denial. She admits  is helping a lot. She called in November due to increased joint pain and we restarted Methotrexate at 10 mg weekly as previously discussed.  She stopped it two weeks ago.  She noted more joint pain and also some nausea on day of dose.  We discussed this at length and unlikely that MTX contributed to more joint symptoms. At January 2016 visit we readded Methotrexate to Humira in hopes of decreasing disease " "activity, using low dose due to prior side effect complaints.  She called later that month with flare symptoms requiring Prednisone bridge, and we increased Methotrexate to more standard dose of 15 mg weekly. At her April 2016 visit she had started improving.. She was tolerating Methotrexate this time.  She weaned off Prednisone.    September 5, 2019  Tyson Alicea CASEY:   Reports rheumatoid arthritis (RA) is controlled overall and feels her deformities are stable. Some gelling of the knees, contractures and right hand contracture deformities, and rigidity of the neck. This does affect her mobility, fine motor and dexterity, and recently rightTMJ MSK pain felt due to the root canal and neck. Gelling of joint sit to stand. At times knees do swelling. No big rheumatoid arthritis (RA) flares and no infections. Denies any fever, chills, SOB, LEE, night sweats, or chest pain, or cough. Reports healthy. No cyclical wearing down. Denies any n/t arms, legs or loss BB or weakness. No redflags. No neck pain.     Continues adalimumab (humira) 40 mg injection every 14 days, methotrexate  15 mg every 7 days MON folic acid  2 mg day tolerating no side-effects. No prednisone or NSAID use.       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 History grave's, menopause, osteoporosis borderline, rheumatoid arthritis (RA), pharyngeal dysphagia, hammertoe    Surgical-  Past Surgical History:   Procedure Laterality Date     Fibroidadenoma Left Breast       NO HISTORY OF SURGERY         FH:  No autoimmune disorders, psoriasis, UC, crohn's, SLE, RA, PsA, gout, autoimmune thyroid.  No MS, heart disease in family  Mother-breast cancer, parkinson, osteoporosis , low back issues-passed    Father-anxiety, dementia-passed after hip fracture   Siblings-brother \"not live well\"   Children-None   M aunt breast cancer   Cousin mental illness " "    SH:  Occasionally moderate Alcohol 1 drink few times a week not M-W . No Smoking. No IVDU. . Retired      [facility] personally reviewed and updated by me.    ROS:  Negative raynaud s phenomena, hairloss, sun sensitivities, keratoconjunctivitis sicca, pleurisy, inflammatory joint symptoms, significant rashes like malar, oral/nasal or ulcerations, inflammatory eye disease, inflammatory bowel disease, dactylitis, tenosynovitis, or enthespathy. Negative blood clots, gout, psoriasis, UC, crohn's. No temporal headache, no jaw claudication, no scalp tenderness, vision changes, carotidynia, cough. No Parotid swelling.    CONSTITUTIONAL: No fevers, night sweats or unintentional weight change. No acute distress, swollen glands  EYES: No vision change, diplopia, pain in eyes or red eyes   EARS, NOSE, MOUTH, THROAT: No tinnitus or hearing change, no epistaxis or nasal discharge, no oral lesions, throat clear. Normal saliva pool.  No drymouth. No thyroid enlargement.   CARDIOVASCULAR: No chest pain, palpitations, or pain with walking, no orthopnea or PND   RESPIRATORY: No dyspnea, cough, shortness of breath or wheezing. No pleurisy.   GI: No nausea, vomiting, diarrhea or constipation, no abdominal pain, or blood in stools.   : No change in urine, no dysuria or hematuria   MUSCKL: see above.   INTEGUMENTARY: No concerning lesions or moles   NEURO: No loss of strength or sensation, no numbness or tingling, no tremor, no dizziness, no headache. No falls   ENDO: No polyuria or polydipsia, no temperature intolerance   HEME/LYMPH:No concerning bumps, bleeding problems, or swollen lymph nodes. No recent infections, hospitalizations or new illnesses.   Otherwise 14 point ROS obtained, reviewed and found negative.     Physical exam:  Vitals: Blood pressure 125/83, pulse 71, temperature 98.7  F (37.1  C), temperature source Oral, resp. rate 18, height 1.727 m (5' 8\"), weight 74.9 kg (165 lb 3.2 oz), SpO2 96 %.  Wt Readings from " Last 4 Encounters:   09/05/19 74.9 kg (165 lb 3.2 oz)   08/15/19 74.4 kg (164 lb)   03/26/19 74.6 kg (164 lb 8 oz)   10/30/18 73.5 kg (162 lb)     Constitutional: WD-WN-WG cooperative  Eyes: nl EOM, PERRLA, vision, conjunctiva, sclera, No Unilateral or bilateral external ear inflammation   ENT: nl external ears, nose, hearing, lips, teeth, gums, throat. No saddle nose   Neck: no mass or thyroid enlargement  Resp: lungs clear to auscultation, nl to palpation, nl effort  CV: RRR, no murmurs, rubs or gallops, no edema  GI: no ABD mass or tenderness, no HSM  : not tested  Lymph: no cervical or epitrochlear nodes  MS: right hip reduced IR/ER, right knee halgus valgus, right 1-3 MCP, swan deformities right hand with severe deformities, z-thumbs. Bilateral subluxation wrists with decreased ROM, minimal pannus. Right hand ulnar deviation, right thumb drop , right 5th DIP subluxation, right wrist subluxation, hammertoes. Bunions bilaterally. Deformities of MTPs with palpable MTP heads. Pes planus. contracture of knees with lack of full extension. Right knee 1+ cool (not red) swelling. Nil ROM neck or extension neck. Very restricted in neck ROM. All TMJ, neck, shoulder, elbow, wrist, hand, spine, hip, knee, ankle, and foot joints were examined and otherwise found normal. No periuncle erythema  Skin: no nail pitting, alopecia, rash  Neuro: nl cranial nerves, strength, sensation, DTRs.   Psych: nl judgement, orientation, memory, affect.      Labs/Imaging:  Results for orders placed or performed in visit on 08/07/19   Creatinine   Result Value Ref Range    Creatinine 0.64 0.52 - 1.04 mg/dL    GFR Estimate >90 >60 mL/min/[1.73_m2]    GFR Estimate If Black >90 >60 mL/min/[1.73_m2]   Albumin level   Result Value Ref Range    Albumin 3.9 3.4 - 5.0 g/dL   ALT   Result Value Ref Range    ALT 43 0 - 50 U/L   AST   Result Value Ref Range    AST 30 0 - 45 U/L   CBC with platelets differential   Result Value Ref Range    WBC 7.5 4.0 -  11.0 10e9/L    RBC Count 4.76 3.8 - 5.2 10e12/L    Hemoglobin 15.0 11.7 - 15.7 g/dL    Hematocrit 44.7 35.0 - 47.0 %    MCV 94 78 - 100 fl    MCH 31.5 26.5 - 33.0 pg    MCHC 33.6 31.5 - 36.5 g/dL    RDW 14.7 10.0 - 15.0 %    Platelet Count 279 150 - 450 10e9/L    % Neutrophils 54.2 %    % Lymphocytes 38.9 %    % Monocytes 5.7 %    % Eosinophils 0.5 %    % Basophils 0.7 %    Absolute Neutrophil 4.1 1.6 - 8.3 10e9/L    Absolute Lymphocytes 2.9 0.8 - 5.3 10e9/L    Absolute Monocytes 0.4 0.0 - 1.3 10e9/L    Absolute Eosinophils 0.0 0.0 - 0.7 10e9/L    Absolute Basophils 0.1 0.0 - 0.2 10e9/L    Diff Method Automated Method        Impression/Plan:  1. Seronegative destructive deforming contractures RA (-RF/CCP/LASHAE panel) .   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 Rheumatoid arthritis (RA) activity=low with stable deformities.   High risk medication monitoring, labs every 12 weeks.   2. Neck rigidity no radiculpathy. Declined cervical xrays. Educated her on the s/sx redflags.   3. Alcohol use. Reports abstinent from Alcohol. I discussed the risks with her today on the liver and MTX. She understands.   4. Severe right hand deformities, would not be able to do vial of methotrexate      PLAN: Discussed in detail with the patient.   1. Continue adalimumab (humira) 40 mg SQ Q 2 weeks.  Continue Methotrexate at 15 mg every 7 days weekly with 2 mg day Folic acid  --hold for infections and seek immediate care if and signs or symptoms   --Labs every 12 weeks--discussed importance of labs every 12 weeks and hold MTX for any infections.    2 F/U Ophthalmology yearly due and prn sx. Hx Anterior uveitis.   3. RTC 6 mths, sooner prn; Rheum MD in 1 year       The patient understood the rationale for the diagnosis and treatment plan. Patient shared in the decision making. All questions were answered to best of my ability and the patient's satisfaction and agrees with the plan.     Tyson  Deanne PEÑA, CNP, MSN  [facility]  Department of Rheumatology & Autoimmune Disorders    1. Immunization: Reviewed CDC guidelines with patient updated, information provided to patient based on CDC guidelines for vaccination recommendations, patient will discuss with primary provider  2. Bone Health:Educated on adequate calcium and vitamin D intake, Educated on exercise, Glucocorticoid education discussed risks, benefits & potential long term side effects from use  3. Discussed routine annual physicals, cancer screening, cardiac risk monitoring, bone health and primary care with primary care provider. Included is glucocorticosteroid increase change of infections.   4. Educated on diagnosis, prognosis, labs, imaging, treatment options (including risks, benefits, risk of no treatment), medications (use, dose, side-effects, risks of medications including infection/cancer/bone marrow suppression, lab monitoring), infections what to do, plan of cares, goals of treatment. Clinic cards given. Websites given for resources and education   5. Educated in Rheumatology we do not prescribe narcotics or manage chronic pain, the patient will need to discuss with primary care or go to a pain clinic for management.        Again, thank you for allowing me to participate in the care of your patient.      Sincerely,    CASEY Mac CNP

## 2019-09-05 NOTE — NURSING NOTE
"Chief Complaint   Patient presents with     RECHECK     RA       Vital signs:  Temp: 98.7  F (37.1  C) Temp src: Oral BP: 125/83 Pulse: 71   Resp: 18 SpO2: 96 %     Height: 172.7 cm (5' 8\") Weight: 74.9 kg (165 lb 3.2 oz)  Estimated body mass index is 25.12 kg/m  as calculated from the following:    Height as of this encounter: 1.727 m (5' 8\").    Weight as of this encounter: 74.9 kg (165 lb 3.2 oz).          Eileen Cisneros, [facility]  9/5/2019 1:08 PM      "

## 2019-09-05 NOTE — LETTER
9/5/2019      RE: Reina Conway  [address] [facility] [address]       [facility] - Rheumatology Clinic Visit    Reina Conway  is a 64 year old here for transfer of care for rheumatoid arthritis (RA) deforming.     Review-Seen Dr. Frost until 2010 then [facility], then re-established 3/2013 (xrays 2/2013 [facility]). Dr. 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 Methotrexate helped but did not cause a remission/low disease state. She required Prednisone to control symptoms partially but still had significant ADL issues. We had persistently recommended anti TNF therapy which she resisted. She sought a second opinion at [facility] who recommended the same things, and she continued care there as of August 2010, then switched back to Dr. Frost 3/2013. Begun on Enbrel at [facility]. 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 As no other etiology found it was felt it might be due to Enbrel and she was switched to Humira. She has remained on Methotrexate, primarily 25 mg weekly, decreased 3/2013 (not to go <15mg week due to risk of further deformities or uveitis) . FRAX 32% at [facility]. Prior declined biphosphosphonate, [facility] recommended IV.   Xrays 3/2013 [facility]: See records. Hallux valgus right, hammertoes L>R, hindfoot valgus, pes planus. Dislocated left 2nd MTP, sublux left 3rd MTP, arth 2-3 MTP, rt 1st MTP. Mild DJD hands. Several DIP and 1st CMC. Subluxation left thumb IP and persistant dorsiflexion right wrist. Previous visits discussed stress in her life. Her father in law passed away and there had been a lot of stress and she put her issues on the back burner. She had an episode of chest/epigastric pain and was seen at  "[facility]. She had apparent negative cardiac evaluation although did not followup with a stress test. She is taking OTC Zantac prn and thinks that helps. Found allergic to Wheat, avoid gluten and sugars. Feels much improved [heartburn, bloating, blood in stools, irregular stools] this really changed her digestive system. She went on a gluten free diet in December, and stopped all RA meds in early Jan 2015. See My Chart message. Restarted Humira early April 2015 every 3 weeks. Noticed more migatory joints pains, swelling. Right hand, right knee, right wrist-associated swelling really in right 2nd MCP. 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 Lack of movement and coordinations, using her wrist brace. Uses this at night and daily prn. At last visit she had continued on Humira without side effects and still feels it is helpful. She does have daily discomfort in several areas with either activity or prior damage, especially knees. She continued to have stress in her life but is working through this and \"trying to get back on track\". She stopped drinking any alcohol for the past two months and was congratulated on this. She went to  but does not feel she is alcoholic.  Leonardo going to [facility]. She has been  for a long time. Both lost parents, and grieving. She says she's in less denial. She admits  is helping a lot. She called in November due to increased joint pain and we restarted Methotrexate at 10 mg weekly as previously discussed.  She stopped it two weeks ago.  She noted more joint pain and also some nausea on day of dose.  We discussed this at length and unlikely that MTX contributed to more joint symptoms. At January 2016 visit we readded Methotrexate to Humira in hopes of decreasing disease activity, using low dose due to prior side effect complaints.  She called later that month with flare symptoms requiring Prednisone bridge, and we increased Methotrexate to more standard dose of 15 mg " "weekly. At her April 2016 visit she had started improving.. She was tolerating Methotrexate this time.  She weaned off Prednisone.    September 5, 2019  Tyson Deanne APRN:   Reports rheumatoid arthritis (RA) is controlled overall and feels her deformities are stable. Some gelling of the knees, contractures and right hand contracture deformities, and rigidity of the neck. This does affect her mobility, fine motor and dexterity, and recently rightTMJ MSK pain felt due to the root canal and neck. Gelling of joint sit to stand. At times knees do swelling. No big rheumatoid arthritis (RA) flares and no infections. Denies any fever, chills, SOB, LEE, night sweats, or chest pain, or cough. Reports healthy. No cyclical wearing down. Denies any n/t arms, legs or loss BB or weakness. No redflags. No neck pain.     Continues adalimumab (humira) 40 mg injection every 14 days, methotrexate  15 mg every 7 days MON folic acid  2 mg day tolerating no side-effects. No prednisone or NSAID use.       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 History grave's, menopause, osteoporosis borderline, rheumatoid arthritis (RA), pharyngeal dysphagia, hammertoe    Surgical-  Past Surgical History:   Procedure Laterality Date     Fibroidadenoma Left Breast       NO HISTORY OF SURGERY         FH:  No autoimmune disorders, psoriasis, UC, crohn's, SLE, RA, PsA, gout, autoimmune thyroid.  No MS, heart disease in family  Mother-breast cancer, parkinson, osteoporosis , low back issues-passed    Father-anxiety, dementia-passed after hip fracture   Siblings-brother \"not live well\"   Children-None   M aunt breast cancer   Cousin mental illness     SH:  Occasionally moderate Alcohol 1 drink few times a week not M-W . No Smoking. No IVDU. . Retired      PMSH personally reviewed and updated by me.    ROS:  Negative raynaud s phenomena, hairloss, sun " "sensitivities, keratoconjunctivitis sicca, pleurisy, inflammatory joint symptoms, significant rashes like malar, oral/nasal or ulcerations, inflammatory eye disease, inflammatory bowel disease, dactylitis, tenosynovitis, or enthespathy. Negative blood clots, gout, psoriasis, UC, crohn's. No temporal headache, no jaw claudication, no scalp tenderness, vision changes, carotidynia, cough. No Parotid swelling.    CONSTITUTIONAL: No fevers, night sweats or unintentional weight change. No acute distress, swollen glands  EYES: No vision change, diplopia, pain in eyes or red eyes   EARS, NOSE, MOUTH, THROAT: No tinnitus or hearing change, no epistaxis or nasal discharge, no oral lesions, throat clear. Normal saliva pool.  No drymouth. No thyroid enlargement.   CARDIOVASCULAR: No chest pain, palpitations, or pain with walking, no orthopnea or PND   RESPIRATORY: No dyspnea, cough, shortness of breath or wheezing. No pleurisy.   GI: No nausea, vomiting, diarrhea or constipation, no abdominal pain, or blood in stools.   : No change in urine, no dysuria or hematuria   MUSCKL: see above.   INTEGUMENTARY: No concerning lesions or moles   NEURO: No loss of strength or sensation, no numbness or tingling, no tremor, no dizziness, no headache. No falls   ENDO: No polyuria or polydipsia, no temperature intolerance   HEME/LYMPH:No concerning bumps, bleeding problems, or swollen lymph nodes. No recent infections, hospitalizations or new illnesses.   Otherwise 14 point ROS obtained, reviewed and found negative.     Physical exam:  Vitals: Blood pressure 125/83, pulse 71, temperature 98.7  F (37.1  C), temperature source Oral, resp. rate 18, height 1.727 m (5' 8\"), weight 74.9 kg (165 lb 3.2 oz), SpO2 96 %.  Wt Readings from Last 4 Encounters:   09/05/19 74.9 kg (165 lb 3.2 oz)   08/15/19 74.4 kg (164 lb)   03/26/19 74.6 kg (164 lb 8 oz)   10/30/18 73.5 kg (162 lb)     Constitutional: WD-WN-WG cooperative  Eyes: nl EOM, PERRLA, vision, " conjunctiva, sclera, No Unilateral or bilateral external ear inflammation   ENT: nl external ears, nose, hearing, lips, teeth, gums, throat. No saddle nose   Neck: no mass or thyroid enlargement  Resp: lungs clear to auscultation, nl to palpation, nl effort  CV: RRR, no murmurs, rubs or gallops, no edema  GI: no ABD mass or tenderness, no HSM  : not tested  Lymph: no cervical or epitrochlear nodes  MS: right hip reduced IR/ER, right knee halgus valgus, right 1-3 MCP, swan deformities right hand with severe deformities, z-thumbs. Bilateral subluxation wrists with decreased ROM, minimal pannus. Right hand ulnar deviation, right thumb drop , right 5th DIP subluxation, right wrist subluxation, hammertoes. Bunions bilaterally. Deformities of MTPs with palpable MTP heads. Pes planus. contracture of knees with lack of full extension. Right knee 1+ cool (not red) swelling. Nil ROM neck or extension neck. Very restricted in neck ROM. All TMJ, neck, shoulder, elbow, wrist, hand, spine, hip, knee, ankle, and foot joints were examined and otherwise found normal. No periuncle erythema  Skin: no nail pitting, alopecia, rash  Neuro: nl cranial nerves, strength, sensation, DTRs.   Psych: nl judgement, orientation, memory, affect.      Labs/Imaging:  Results for orders placed or performed in visit on 08/07/19   Creatinine   Result Value Ref Range    Creatinine 0.64 0.52 - 1.04 mg/dL    GFR Estimate >90 >60 mL/min/[1.73_m2]    GFR Estimate If Black >90 >60 mL/min/[1.73_m2]   Albumin level   Result Value Ref Range    Albumin 3.9 3.4 - 5.0 g/dL   ALT   Result Value Ref Range    ALT 43 0 - 50 U/L   AST   Result Value Ref Range    AST 30 0 - 45 U/L   CBC with platelets differential   Result Value Ref Range    WBC 7.5 4.0 - 11.0 10e9/L    RBC Count 4.76 3.8 - 5.2 10e12/L    Hemoglobin 15.0 11.7 - 15.7 g/dL    Hematocrit 44.7 35.0 - 47.0 %    MCV 94 78 - 100 fl    MCH 31.5 26.5 - 33.0 pg    MCHC 33.6 31.5 - 36.5 g/dL    RDW 14.7 10.0 -  15.0 %    Platelet Count 279 150 - 450 10e9/L    % Neutrophils 54.2 %    % Lymphocytes 38.9 %    % Monocytes 5.7 %    % Eosinophils 0.5 %    % Basophils 0.7 %    Absolute Neutrophil 4.1 1.6 - 8.3 10e9/L    Absolute Lymphocytes 2.9 0.8 - 5.3 10e9/L    Absolute Monocytes 0.4 0.0 - 1.3 10e9/L    Absolute Eosinophils 0.0 0.0 - 0.7 10e9/L    Absolute Basophils 0.1 0.0 - 0.2 10e9/L    Diff Method Automated Method        Impression/Plan:  1. Seronegative destructive deforming contractures RA (-RF/CCP/LASHAE panel) .   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 Rheumatoid arthritis (RA) activity=low with stable deformities.   High risk medication monitoring, labs every 12 weeks.   2. Neck rigidity no radiculpathy. Declined cervical xrays. Educated her on the s/sx redflags.   3. Alcohol use. Reports abstinent from Alcohol. I discussed the risks with her today on the liver and MTX. She understands.   4. Severe right hand deformities, would not be able to do vial of methotrexate      PLAN: Discussed in detail with the patient.   1. Continue adalimumab (humira) 40 mg SQ Q 2 weeks.  Continue Methotrexate at 15 mg every 7 days weekly with 2 mg day Folic acid  --hold for infections and seek immediate care if and signs or symptoms   --Labs every 12 weeks--discussed importance of labs every 12 weeks and hold MTX for any infections.    2 F/U Ophthalmology yearly due and prn sx. Hx Anterior uveitis.   3. RTC 6 mths, sooner prn; Rheum MD in 1 year       The patient understood the rationale for the diagnosis and treatment plan. Patient shared in the decision making. All questions were answered to best of my ability and the patient's satisfaction and agrees with the plan.     Tyson Alicea APRN, CNP, MSN  [facility]  Department of Rheumatology & Autoimmune Disorders    1. Immunization: Reviewed CDC guidelines with patient updated, information provided to patient based on  CDC guidelines for vaccination recommendations, patient will discuss with primary provider  2. Bone Health:Educated on adequate calcium and vitamin D intake, Educated on exercise, Glucocorticoid education discussed risks, benefits & potential long term side effects from use  3. Discussed routine annual physicals, cancer screening, cardiac risk monitoring, bone health and primary care with primary care provider. Included is glucocorticosteroid increase change of infections.   4. Educated on diagnosis, prognosis, labs, imaging, treatment options (including risks, benefits, risk of no treatment), medications (use, dose, side-effects, risks of medications including infection/cancer/bone marrow suppression, lab monitoring), infections what to do, plan of cares, goals of treatment. Clinic cards given. Websites given for resources and education   5. Educated in Rheumatology we do not prescribe narcotics or manage chronic pain, the patient will need to discuss with primary care or go to a pain clinic for management.        CASEY Mac CNP

## 2019-09-05 NOTE — PATIENT INSTRUCTIONS
Preventive Care:    Breast Cancer Screening: During our visit today, we discussed that it is recommended you receive breast cancer screening. Please call or make an appointment with your primary care provider to discuss this with them. You may also call the [facility] scheduling line ([phone]) to set up a mammography appointment at the [facility] within the [facility].    Thank-you for allowing me to participate in your care.     Tyson PEÑA, CNP, MSN  [facility]  Department of Rheumatology & Autoimmune Disorders  [facility] Rheumatology: [phone] Opt 2, then Opt 1 Scheduling

## 2019-09-05 NOTE — PROGRESS NOTES
[facility] - Rheumatology Clinic Visit    Reina Conway  is a 64 year old here for transfer of care for rheumatoid arthritis (RA) deforming.     Review-Seen Dr. Frost until 2010 then swtiched to [facility], then re-established 3/2013 (xrays 2/2013 [facility]). Dr. 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 Methotrexate helped but did not cause a remission/low disease state. She required Prednisone to control symptoms partially but still had significant ADL issues. We had persistently recommended anti TNF therapy which she resisted. She sought a second opinion at [facility] who recommended the same things, and she continued care there as of August 2010, then switched back to Dr. Frost 3/2013. Begun on Enbrel at [facility]. 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 As no other etiology found it was felt it might be due to Enbrel and she was switched to Humira. She has remained on Methotrexate, primarily 25 mg weekly, decreased 3/2013 (not to go <15mg week due to risk of further deformities or uveitis) . FRAX 32% at [facility]. Prior declined biphosphosphonate, [facility] recommended IV.   Xrays 3/2013 [facility]: See records. Hallux valgus right, hammertoes L>R, hindfoot valgus, pes planus. Dislocated left 2nd MTP, sublux left 3rd MTP, arth 2-3 MTP, rt 1st MTP. Mild DJD hands. Several DIP and 1st CMC. Subluxation left thumb IP and persistant dorsiflexion right wrist. Previous visits discussed stress in her life. Her father in law passed away and there had been a lot of stress and she put her issues on the back burner. She had an episode of chest/epigastric pain and was seen at [facility]. She had apparent negative cardiac evaluation although did not  "followup with a stress test. She is taking OTC Zantac prn and thinks that helps. Found allergic to Wheat, avoid gluten and sugars. Feels much improved [heartburn, bloating, blood in stools, irregular stools] this really changed her digestive system. She went on a gluten free diet in December, and stopped all RA meds in early Jan 2015. See My Chart message. Restarted Humira early April 2015 every 3 weeks. Noticed more migatory joints pains, swelling. Right hand, right knee, right wrist-associated swelling really in right 2nd MCP. 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 Lack of movement and coordinations, using her wrist brace. Uses this at night and daily prn. At last visit she had continued on Humira without side effects and still feels it is helpful. She does have daily discomfort in several areas with either activity or prior damage, especially knees. She continued to have stress in her life but is working through this and \"trying to get back on track\". She stopped drinking any alcohol for the past two months and was congratulated on this. She went to  but does not feel she is alcoholic.  Leonardo going to [facility]. She has been  for a long time. Both lost parents, and grieving. She says she's in less denial. She admits  is helping a lot. She called in November due to increased joint pain and we restarted Methotrexate at 10 mg weekly as previously discussed.  She stopped it two weeks ago.  She noted more joint pain and also some nausea on day of dose.  We discussed this at length and unlikely that MTX contributed to more joint symptoms. At January 2016 visit we readded Methotrexate to Humira in hopes of decreasing disease activity, using low dose due to prior side effect complaints.  She called later that month with flare symptoms requiring Prednisone bridge, and we increased Methotrexate to more standard dose of 15 mg weekly. At her April 2016 visit she had started improving.. She was tolerating " "Methotrexate this time.  She weaned off Prednisone.    September 5, 2019  Tyson Alicea APRN:   Reports rheumatoid arthritis (RA) is controlled overall and feels her deformities are stable. Some gelling of the knees, contractures and right hand contracture deformities, and rigidity of the neck. This does affect her mobility, fine motor and dexterity, and recently rightTMJ MSK pain felt due to the root canal and neck. Gelling of joint sit to stand. At times knees do swelling. No big rheumatoid arthritis (RA) flares and no infections. Denies any fever, chills, SOB, LEE, night sweats, or chest pain, or cough. Reports healthy. No cyclical wearing down. Denies any n/t arms, legs or loss BB or weakness. No redflags. No neck pain.     Continues adalimumab (humira) 40 mg injection every 14 days, methotrexate  15 mg every 7 days MON folic acid  2 mg day tolerating no side-effects. No prednisone or NSAID use.       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 History grave's, menopause, osteoporosis borderline, rheumatoid arthritis (RA), pharyngeal dysphagia, hammertoe    Surgical-  Past Surgical History:   Procedure Laterality Date     Fibroidadenoma Left Breast       NO HISTORY OF SURGERY         FH:  No autoimmune disorders, psoriasis, UC, crohn's, SLE, RA, PsA, gout, autoimmune thyroid.  No MS, heart disease in family  Mother-breast cancer, parkinson, osteoporosis , low back issues-passed    Father-anxiety, dementia-passed after hip fracture   Siblings-brother \"not live well\"   Children-None   M aunt breast cancer   Cousin mental illness     SH:  Occasionally moderate Alcohol 1 drink few times a week not M-W . No Smoking. No IVDU. . Retired      PMSH personally reviewed and updated by me.    ROS:  Negative raynaud s phenomena, hairloss, sun sensitivities, keratoconjunctivitis sicca, pleurisy, inflammatory joint symptoms, " "significant rashes like malar, oral/nasal or ulcerations, inflammatory eye disease, inflammatory bowel disease, dactylitis, tenosynovitis, or enthespathy. Negative blood clots, gout, psoriasis, UC, crohn's. No temporal headache, no jaw claudication, no scalp tenderness, vision changes, carotidynia, cough. No Parotid swelling.    CONSTITUTIONAL: No fevers, night sweats or unintentional weight change. No acute distress, swollen glands  EYES: No vision change, diplopia, pain in eyes or red eyes   EARS, NOSE, MOUTH, THROAT: No tinnitus or hearing change, no epistaxis or nasal discharge, no oral lesions, throat clear. Normal saliva pool.  No drymouth. No thyroid enlargement.   CARDIOVASCULAR: No chest pain, palpitations, or pain with walking, no orthopnea or PND   RESPIRATORY: No dyspnea, cough, shortness of breath or wheezing. No pleurisy.   GI: No nausea, vomiting, diarrhea or constipation, no abdominal pain, or blood in stools.   : No change in urine, no dysuria or hematuria   MUSCKL: see above.   INTEGUMENTARY: No concerning lesions or moles   NEURO: No loss of strength or sensation, no numbness or tingling, no tremor, no dizziness, no headache. No falls   ENDO: No polyuria or polydipsia, no temperature intolerance   HEME/LYMPH:No concerning bumps, bleeding problems, or swollen lymph nodes. No recent infections, hospitalizations or new illnesses.   Otherwise 14 point ROS obtained, reviewed and found negative.     Physical exam:  Vitals: Blood pressure 125/83, pulse 71, temperature 98.7  F (37.1  C), temperature source Oral, resp. rate 18, height 1.727 m (5' 8\"), weight 74.9 kg (165 lb 3.2 oz), SpO2 96 %.  Wt Readings from Last 4 Encounters:   09/05/19 74.9 kg (165 lb 3.2 oz)   08/15/19 74.4 kg (164 lb)   03/26/19 74.6 kg (164 lb 8 oz)   10/30/18 73.5 kg (162 lb)     Constitutional: WD-WN-WG cooperative  Eyes: nl EOM, PERRLA, vision, conjunctiva, sclera, No Unilateral or bilateral external ear inflammation   ENT: nl " external ears, nose, hearing, lips, teeth, gums, throat. No saddle nose   Neck: no mass or thyroid enlargement  Resp: lungs clear to auscultation, nl to palpation, nl effort  CV: RRR, no murmurs, rubs or gallops, no edema  GI: no ABD mass or tenderness, no HSM  : not tested  Lymph: no cervical or epitrochlear nodes  MS: right hip reduced IR/ER, right knee halgus valgus, right 1-3 MCP, swan deformities right hand with severe deformities, z-thumbs. Bilateral subluxation wrists with decreased ROM, minimal pannus. Right hand ulnar deviation, right thumb drop , right 5th DIP subluxation, right wrist subluxation, hammertoes. Bunions bilaterally. Deformities of MTPs with palpable MTP heads. Pes planus. contracture of knees with lack of full extension. Right knee 1+ cool (not red) swelling. Nil ROM neck or extension neck. Very restricted in neck ROM. All TMJ, neck, shoulder, elbow, wrist, hand, spine, hip, knee, ankle, and foot joints were examined and otherwise found normal. No periuncle erythema  Skin: no nail pitting, alopecia, rash  Neuro: nl cranial nerves, strength, sensation, DTRs.   Psych: nl judgement, orientation, memory, affect.      Labs/Imaging:  Results for orders placed or performed in visit on 08/07/19   Creatinine   Result Value Ref Range    Creatinine 0.64 0.52 - 1.04 mg/dL    GFR Estimate >90 >60 mL/min/[1.73_m2]    GFR Estimate If Black >90 >60 mL/min/[1.73_m2]   Albumin level   Result Value Ref Range    Albumin 3.9 3.4 - 5.0 g/dL   ALT   Result Value Ref Range    ALT 43 0 - 50 U/L   AST   Result Value Ref Range    AST 30 0 - 45 U/L   CBC with platelets differential   Result Value Ref Range    WBC 7.5 4.0 - 11.0 10e9/L    RBC Count 4.76 3.8 - 5.2 10e12/L    Hemoglobin 15.0 11.7 - 15.7 g/dL    Hematocrit 44.7 35.0 - 47.0 %    MCV 94 78 - 100 fl    MCH 31.5 26.5 - 33.0 pg    MCHC 33.6 31.5 - 36.5 g/dL    RDW 14.7 10.0 - 15.0 %    Platelet Count 279 150 - 450 10e9/L    % Neutrophils 54.2 %    %  Lymphocytes 38.9 %    % Monocytes 5.7 %    % Eosinophils 0.5 %    % Basophils 0.7 %    Absolute Neutrophil 4.1 1.6 - 8.3 10e9/L    Absolute Lymphocytes 2.9 0.8 - 5.3 10e9/L    Absolute Monocytes 0.4 0.0 - 1.3 10e9/L    Absolute Eosinophils 0.0 0.0 - 0.7 10e9/L    Absolute Basophils 0.1 0.0 - 0.2 10e9/L    Diff Method Automated Method        Impression/Plan:  1. Seronegative destructive deforming contractures RA (-RF/CCP/LASHAE panel) .   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 Rheumatoid arthritis (RA) activity=low with stable deformities.   High risk medication monitoring, labs every 12 weeks.   2. Neck rigidity no radiculpathy. Declined cervical xrays. Educated her on the s/sx redflags.   3. Alcohol use. Reports abstinent from Alcohol. I discussed the risks with her today on the liver and MTX. She understands.   4. Severe right hand deformities, would not be able to do vial of methotrexate      PLAN: Discussed in detail with the patient.   1. Continue adalimumab (humira) 40 mg SQ Q 2 weeks.  Continue Methotrexate at 15 mg every 7 days weekly with 2 mg day Folic acid  --hold for infections and seek immediate care if and signs or symptoms   --Labs every 12 weeks--discussed importance of labs every 12 weeks and hold MTX for any infections.    2 F/U Ophthalmology yearly due and prn sx. Hx Anterior uveitis.   3. RTC 6 mths, sooner prn; Rheum MD in 1 year       The patient understood the rationale for the diagnosis and treatment plan. Patient shared in the decision making. All questions were answered to best of my ability and the patient's satisfaction and agrees with the plan.     Tyson Alicea APRN, CNP, MSN  [facility]  Department of Rheumatology & Autoimmune Disorders    1. Immunization: Reviewed CDC guidelines with patient updated, information provided to patient based on CDC guidelines for vaccination recommendations, patient will discuss with  primary provider  2. Bone Health:Educated on adequate calcium and vitamin D intake, Educated on exercise, Glucocorticoid education discussed risks, benefits & potential long term side effects from use  3. Discussed routine annual physicals, cancer screening, cardiac risk monitoring, bone health and primary care with primary care provider. Included is glucocorticosteroid increase change of infections.   4. Educated on diagnosis, prognosis, labs, imaging, treatment options (including risks, benefits, risk of no treatment), medications (use, dose, side-effects, risks of medications including infection/cancer/bone marrow suppression, lab monitoring), infections what to do, plan of cares, goals of treatment. Clinic cards given. Websites given for resources and education   5. Educated in Rheumatology we do not prescribe narcotics or manage chronic pain, the patient will need to discuss with primary care or go to a pain clinic for management.

## 2019-09-30 ENCOUNTER — TELEPHONE (OUTPATIENT)
Dept: FAMILY MEDICINE | Facility: CLINIC | Age: 64
End: 2019-09-30

## 2019-09-30 NOTE — TELEPHONE ENCOUNTER
[facility] Call Center    Phone Message    May a detailed message be left on voicemail: yes    Reason for Call: Other: Patient's  called said the pt fell and he would like to speak to Rolanda. Patient was ssen in the ER but need some more information on caring for the pt.     Action Taken: Other: [facility]

## 2019-10-01 NOTE — TELEPHONE ENCOUNTER
Spoke with Reina who handed the phone to her spouse, Leonardo.   Reina has been bedridden since her fall. She has a commode by her bed and has been able to use that but does not walk around due to the pain in her hip.  She was at [facility] ED (recs in Care Everywhere) where it was noted there was NO break or dislocation.  She has also developed pain on her right side under her arm and she would like an xray to be sure its not broken.  They were told to follow-up with Dr. Orantes.  They were also given a referral to an ortho doctor in the [facility].    Ad[facility] is the best place to go. If you cannot get in today with the doctor that you were referred to, can try [facility] Walk-In clinic. Can also try [facility] or [facility] which both have several locations across the [facility]. They indicated understanding and agreed with plan.

## 2019-10-02 ENCOUNTER — HEALTH MAINTENANCE LETTER (OUTPATIENT)
Age: 64
End: 2019-10-02

## 2019-10-23 ENCOUNTER — TRANSFERRED RECORDS (OUTPATIENT)
Dept: HEALTH INFORMATION MANAGEMENT | Facility: CLINIC | Age: 64
End: 2019-10-23

## 2019-11-20 ENCOUNTER — TRANSFERRED RECORDS (OUTPATIENT)
Dept: HEALTH INFORMATION MANAGEMENT | Facility: CLINIC | Age: 64
End: 2019-11-20

## 2019-12-16 ENCOUNTER — HEALTH MAINTENANCE LETTER (OUTPATIENT)
Age: 64
End: 2019-12-16

## 2019-12-19 DIAGNOSIS — Z79.899 HIGH RISK MEDICATIONS (NOT ANTICOAGULANTS) LONG-TERM USE: ICD-10-CM

## 2019-12-19 LAB
ALBUMIN SERPL-MCNC: 3.8 G/DL (ref 3.4–5)
ALT SERPL W P-5'-P-CCNC: 48 U/L (ref 0–50)
AST SERPL W P-5'-P-CCNC: 29 U/L (ref 0–45)
BASOPHILS # BLD AUTO: 0 10E9/L (ref 0–0.2)
BASOPHILS NFR BLD AUTO: 0.5 %
CREAT SERPL-MCNC: 0.71 MG/DL (ref 0.52–1.04)
DIFFERENTIAL METHOD BLD: NORMAL
EOSINOPHIL # BLD AUTO: 0.1 10E9/L (ref 0–0.7)
EOSINOPHIL NFR BLD AUTO: 1.2 %
ERYTHROCYTE [DISTWIDTH] IN BLOOD BY AUTOMATED COUNT: 13.9 % (ref 10–15)
GFR SERPL CREATININE-BSD FRML MDRD: 89 ML/MIN/{1.73_M2}
HCT VFR BLD AUTO: 44.1 % (ref 35–47)
HGB BLD-MCNC: 14.5 G/DL (ref 11.7–15.7)
LYMPHOCYTES # BLD AUTO: 3.7 10E9/L (ref 0.8–5.3)
LYMPHOCYTES NFR BLD AUTO: 47.9 %
MCH RBC QN AUTO: 31.5 PG (ref 26.5–33)
MCHC RBC AUTO-ENTMCNC: 32.9 G/DL (ref 31.5–36.5)
MCV RBC AUTO: 96 FL (ref 78–100)
MONOCYTES # BLD AUTO: 0.5 10E9/L (ref 0–1.3)
MONOCYTES NFR BLD AUTO: 5.8 %
NEUTROPHILS # BLD AUTO: 3.5 10E9/L (ref 1.6–8.3)
NEUTROPHILS NFR BLD AUTO: 44.6 %
PLATELET # BLD AUTO: 292 10E9/L (ref 150–450)
RBC # BLD AUTO: 4.6 10E12/L (ref 3.8–5.2)
WBC # BLD AUTO: 7.8 10E9/L (ref 4–11)

## 2019-12-19 PROCEDURE — 84460 ALANINE AMINO (ALT) (SGPT): CPT | Performed by: NURSE PRACTITIONER

## 2019-12-19 PROCEDURE — 82040 ASSAY OF SERUM ALBUMIN: CPT | Performed by: NURSE PRACTITIONER

## 2019-12-19 PROCEDURE — 82565 ASSAY OF CREATININE: CPT | Performed by: NURSE PRACTITIONER

## 2019-12-19 PROCEDURE — 36415 COLL VENOUS BLD VENIPUNCTURE: CPT | Performed by: NURSE PRACTITIONER

## 2019-12-19 PROCEDURE — 85025 COMPLETE CBC W/AUTO DIFF WBC: CPT | Performed by: NURSE PRACTITIONER

## 2019-12-19 PROCEDURE — 84450 TRANSFERASE (AST) (SGOT): CPT | Performed by: NURSE PRACTITIONER

## 2019-12-30 ENCOUNTER — DOCUMENTATION ONLY (OUTPATIENT)
Dept: CARE COORDINATION | Facility: CLINIC | Age: 64
End: 2019-12-30

## 2020-02-12 ENCOUNTER — TELEPHONE (OUTPATIENT)
Dept: RHEUMATOLOGY | Facility: CLINIC | Age: 65
End: 2020-02-12

## 2020-02-12 NOTE — TELEPHONE ENCOUNTER
Prior Authorization Approval    Authorization Effective Date: 1/13/2020  Authorization Expiration Date: 2/11/2021  Medication: Prior Auth Renewal (Humira)  Approved Dose/Quantity: 1 pen every 14 days  Reference #: [id number]   Insurance Company:    Expected CoPay: $25.00     CoPay Card Available:      Foundation Assistance Needed:    Which Pharmacy is filling the prescription (Not needed for infusion/clinic administered): [facility] - [place], [place] - [address]  Pharmacy Notified: Yes  Patient Notified: Yes

## 2020-02-12 NOTE — TELEPHONE ENCOUNTER
PA Initiation    Medication: Prior Auth Renewal (Humira)  Insurance Company:    Pharmacy Filling the Rx: [facility] - [place], [place] - [facility] [address]  Filling Pharmacy Phone: [phone]  Filling Pharmacy Fax: [phone]  Start Date: 2/12/2020

## 2020-02-25 ENCOUNTER — OFFICE VISIT (OUTPATIENT)
Dept: RHEUMATOLOGY | Facility: CLINIC | Age: 65
End: 2020-02-25
Attending: INTERNAL MEDICINE
Payer: COMMERCIAL

## 2020-02-25 VITALS
HEART RATE: 78 BPM | BODY MASS INDEX: 25.18 KG/M2 | DIASTOLIC BLOOD PRESSURE: 87 MMHG | WEIGHT: 165.6 LBS | OXYGEN SATURATION: 96 % | SYSTOLIC BLOOD PRESSURE: 149 MMHG | TEMPERATURE: 98 F

## 2020-02-25 DIAGNOSIS — Z79.899 ENCOUNTER FOR LONG-TERM CURRENT USE OF MEDICATION: ICD-10-CM

## 2020-02-25 DIAGNOSIS — Z79.899 HIGH RISK MEDICATIONS (NOT ANTICOAGULANTS) LONG-TERM USE: Primary | ICD-10-CM

## 2020-02-25 DIAGNOSIS — M06.09 RHEUMATOID ARTHRITIS OF MULTIPLE SITES WITHOUT RHEUMATOID FACTOR (H): ICD-10-CM

## 2020-02-25 DIAGNOSIS — M21.941: ICD-10-CM

## 2020-02-25 PROCEDURE — G0463 HOSPITAL OUTPT CLINIC VISIT: HCPCS | Mod: ZF

## 2020-02-25 ASSESSMENT — PAIN SCALES - GENERAL: PAINLEVEL: MILD PAIN (2)

## 2020-02-25 NOTE — PROGRESS NOTES
[facility]  Rheumatology Clinic  Figueroa Batista MD  2020     Name: Reina Conway  MRN: [id number]  Age: 64 year old  : 1955  Referring provider: Diana Orantes     Problem List:  1. Seronegative destructive deforming contractures RA (-RF/CCP/LASHAE panel) .   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 Rheumatoid arthritis (RA) activity=low with stable deformities.   High risk medication monitoring, labs every 12 weeks.   2. Neck rigidity no radiculpathy. Declined cervical xrays. Educated her on the s/sx redflags.   3. Alcohol use. Reports abstinent from Alcohol. I discussed the risks with her today on the liver and MTX. She understands.   4. Severe right hand deformities, would not be able to do vial of methotrexate       Assessment and Plan:     Reina Conway is a 64 year old here for transfer of care for rheumatoid arthritis (RA) deforming.     Patient relates that she is overall doing well in terms of her rheumatoid arthritis. I will not make any changes to her current regimen. She will continue with Humira 40mg subcutaneous q every two weeks, and methotrexate at 15mg weekly with 2mg folic acid. She will try to be more consistent with these medications and notice changes. This is important to know if her current regimen is working, since  She has quitemarked swelling and  imited range of motion in her wrists and may subsequently lose function and mobility in her hands.    Patient drinks 2 glasses of red wine daily and reports. Regardless, her labs have been okay. I counseled her that she should be careful with alcohol intake while on methotrexate, and REC no more than 1 drink/d, and not on day of or after Methotrexate. She will complete routine labs. We will plan to complete X-Rays of the hands, feet, knees and elbows at follow up visit in two months.     She has a risk for infection in her feet. We discussed that she may consider seeing podiatry.  She has difficulty cutting her toe nails, and if they are too long they may form ulceration on nearby skin and she will have great difficulty with healing those. She is wearing proper supportive shoes. She should avoid terbinafine as the medication may be toxic to the liver and she is already on methotrexate which has the same risk .    Patient wants to start physical therapy to help her mobility after her recent hip injury from falling out of a broken chair in December 2019. I think this would be beneficial for her.     Orders:  - ALT  - AST  - Albumin level  - Creatinine  - CBC with platelets differential  - CRP inflammation  - Erythrocyte sedimentation rate auto  - XR Hand Bilateral 2 Views  - XR Elbow Bilateral 2 Views  - XR Foot Bilateral 2 Views  - XR Knee Bilateral 3 Views     Follow-up: Follow up with [facility] Alicea in two months. I think it is very unlikely her disease is going to be adequately controlled on the current regimen; she will likely need a switch to IL-6 or MAP kinase inhibition. She would then also stop Methotrexate.     EARLINE Batista MD, PhD    Rheumatology      HPI:   Reina Conway is a 64 year old here for transfer of care for rheumatoid arthritis (RA) deforming.     Today, she has had a slight sore throat the past few weeks. She has stopped Humira and methotrexate as a result. She has morning stiffness for 10 to 15 minutes. She occasionally takes Acetaminophen, usually at the end of her Humira cycle, and finds it helpful. She had a recent accident in December 2019 from sitting on a broken chair and subsequently for falling on her hip. She had bed rest for two months. She was using a walker around her house for two months. She no longer uses the cane in her house. She wants to start physical therapy to help her mobility.     She denies Raynaud's color changes. Her hands are worse in the cold due to rheumatoid arthritis She denies difficulty with breathing. She francy sicca  symptoms. She has had anterior uveitis years ago. She has been off of methotrexate for three weeks, and is has been irregularly taking Humira recently.      Review-Seen Dr. Frost until 2010 then swtiched to [facility], then re-established 3/2013 (xrays 2/2013 [place]). Dr. Frost retired 8-2019   Past-pan uveitis in 2011 etanercept (enbrel) uveitis, adalimumab (humira) and methotrexate       HISTORY CARRIED FORWARD FROM [facility] Alicea.   Prior: Synovitis and joint deformities in 2008 was persistently seronegative but had active synovitis. Methotrexate helped but did not cause a remission/low disease state. She required Prednisone to control symptoms partially but still had significant ADL issues. We had persistently recommended anti TNF therapy which she resisted. She sought a second opinion at [place] who recommended the same things, and she continued care there as of August 2010, then switched back to Dr. Frost 3/2013. Begun on Enbrel at [facility]. 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 As no other etiology found it was felt it might be due to Enbrel and she was switched to Humira. She has remained on Methotrexate, primarily 25 mg weekly, decreased 3/2013 (not to go <15mg week due to risk of further deformities or uveitis) . FRAX 32% at [place]. Prior declined biphosphosphonate, [place] recommended IV.   Xrays 3/2013 [place]: See records. Hallux valgus right, hammertoes L>R, hindfoot valgus, pes planus. Dislocated left 2nd MTP, sublux left 3rd MTP, arth 2-3 MTP, rt 1st MTP. Mild DJD hands. Several DIP and 1st CMC. Subluxation left thumb IP and persistant dorsiflexion right wrist. Previous visits discussed stress in her life. Her father in law passed away and there had been a lot of stress and she put her issues on the back burner. She had an episode of chest/epigastric pain and was seen at [facility]. She had apparent negative cardiac evaluation although did  "not followup with a stress test. She is taking OTC Zantac prn and thinks that helps. Found allergic to Wheat, avoid gluten and sugars. Feels much improved [heartburn, bloating, blood in stools, irregular stools] this really changed her digestive system. She went on a gluten free diet in December, and stopped all RA meds in early Jan 2015. See My Chart message. Restarted Humira early April 2015 every 3 weeks. Noticed more migatory joints pains, swelling. Right hand, right knee, right wrist-associated swelling really in right 2nd MCP. 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 Lack of movement and coordinations, using her wrist brace. Uses this at night and daily prn. At last visit she had continued on Humira without side effects and still feels it is helpful. She does have daily discomfort in several areas with either activity or prior damage, especially knees. She continued to have stress in her life but is working through this and \"trying to get back on track\". She stopped drinking any alcohol for the past two months and was congratulated on this. She went to  but does not feel she is alcoholic.  Leonardo going to [facility]. She has been  for a long time. Both lost parents, and grieving. She says she's in less denial. She admits  is helping a lot. She called in November due to increased joint pain and we restarted Methotrexate at 10 mg weekly as previously discussed.  She stopped it two weeks ago.  She noted more joint pain and also some nausea on day of dose.  We discussed this at length and unlikely that MTX contributed to more joint symptoms. At January 2016 visit we readded Methotrexate to Humira in hopes of decreasing disease activity, using low dose due to prior side effect complaints.  She called later that month with flare symptoms requiring Prednisone bridge, and we increased Methotrexate to more standard dose of 15 mg weekly. At her April 2016 visit she had started improving.. She was tolerating " Methotrexate this time.  She weaned off Prednisone.    Review of Systems:   Pertinent items are noted in HPI or as below, remainder of complete ROS is negative.      No recent problems with hearing or vision. No swallowing problems.   No breathing difficulty, shortness of breath, coughing, or wheezing  No chest pain or palpitations  No heart burn, indigestion, abdominal pain, nausea, vomiting, diarrhea  No urination problems, no bloody, cloudy urine, no dysuria  No numbing, tingling, weakness  No headaches or confusion  No rashes. No easy bleeding or bruising.     Active Medications:     Current Outpatient Medications:      acetaminophen (TYLENOL) 500 MG tablet, Take 500-1,000 mg by mouth every 6 hours as needed for mild pain (rarely takes), Disp: , Rfl:      adalimumab (HUMIRA PEN) 40 MG/0.8ML pen kit, Inject 0.8 mLs (40 mg) Subcutaneous every 14 days Hold for signs of infection, then seek medical attention., Disp: 1 kit, Rfl: 5     adalimumab (HUMIRA PEN) 40 MG/0.8ML pen kit, Inject 0.8 mLs (40 mg) Subcutaneous every 14 days Hold for signs of infection and then seek medical attention, Disp: 2 each, Rfl: 5     folic acid (FOLVITE) 1 MG tablet, Take 2 tablets (2 mg) by mouth daily, Disp: 180 tablet, Rfl: 3     methotrexate 2.5 MG tablet, Take 6 tablets (15 mg) by mouth every 7 days Labs every 10-12 week, Disp: 72 tablet, Rfl: 0     amoxicillin (AMOXIL) 500 MG capsule, Take 500 mg by mouth 2 times daily, Disp: , Rfl:      Cholecalciferol (VITAMIN D-3) 1000 units CAPS, , Disp: , Rfl:      ciclopirox (LOPROX) 0.77 % cream, Apply topically 2 times daily To toenails. (Patient not taking: Reported on 9/5/2019), Disp: 90 g, Rfl: 3     ranitidine (ZANTAC) 150 MG tablet, Take 150 mg by mouth daily as needed, Disp: , Rfl:      salicylic acid 17 % LIQD, Externally apply 1 dose. topically daily To left big toe wart daily. 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 History grave's, menopause, osteoporosis borderline, rheumatoid arthritis (RA), pharyngeal dysphagia, hammertoe    Surgical-  Fibroid adenoma Left Breast     FH:  No autoimmune disorders, psoriasis, UC, crohn's, SLE, RA, PsA, gout, autoimmune thyroid.  No MS, heart disease in family  Mother-breast cancer, parkinson, osteoporosis , low back issues-passed    Father-anxiety, dementia-passed after hip fracture   Siblings-brother \"not live well\"   Children-None   M aunt breast cancer   Cousin mental illness      SH:  Occasionally moderate Alcohol 1 drink few times a week not M-W . No Smoking. No IVDU. . Retired      Physical Exam:   BP (!) 149/87 (BP Location: Right arm, Patient Position: Sitting, Cuff Size: Adult Regular)   Pulse 78   Temp 98  F (36.7  C) (Oral)   Wt 75.1 kg (165 lb 9.6 oz)   SpO2 96%   BMI 25.18 kg/m     Wt Readings from Last 4 Encounters:   02/25/20 75.1 kg (165 lb 9.6 oz)   09/05/19 74.9 kg (165 lb 3.2 oz)   08/15/19 74.4 kg (164 lb)   03/26/19 74.6 kg (164 lb 8 oz)     Constitutional: Well-developed, appearing stated age; cooperative  Eyes: Normal EOM, PERRLA, vision, conjunctiva, sclera  ENT: Normal external ears, nose, hearing, lips, teeth, gums, throat. No mucous membrane lesions, normal saliva pool  Neck: No mass or thyroid enlargement  Resp: Lungs clear to auscultation, nl to palpation  CV: RRR, no murmurs, rubs or gallops, no edema  GI: No ABD mass or tenderness, no HSM  : Not tested  Lymph: No cervical, supraclavicular, inguinal or epitrochlear nodes  MS: The TMJ, neck, shoulder, elbow, wrist, MCP/PIP/DIP, spine, hip, knee, ankle, and foot MTP/IP joints were examined and found normal. No active synovitis or altered joint anatomy. Full joint ROM. Normal  strength. No dactylitis,  " tenosynovitis, enthesopathy.  Low grade synovitis in both elbows. 5 degree contractures in the elbows.   15 degree contractures in right knee with moderate size effusions.   1-3rd MCPS with singificant synovitis, at least 1+ maybe 2+ in both right and left hands.   Wrists have 20-30 degrees of extension, and 10-15 degrees of flexion  Left wrist, very tender, and 2+ swelling  Some subluxation in both wrists. 2+ synovitis in both wrists  Trace swelling in elbows.   Right sided MCPs with very little extension.   Fibular deviation  2nd to 4th Hammer toes bilateral  Severe onychomycosis of the right great toe  Ulnar deviation  Swelling in the right knee.   Skin: No nail pitting, alopecia, rash, nodules or lesions  Neuro: Normal cranial nerves, strength, sensation, DTRs.   Psych: Normal judgement, orientation, memory, affect.     Laboratory:   RHEUM RESULTS Latest Ref Rng & Units 2/1/2019 8/7/2019 12/19/2019   SED RATE 0 - 30 mm/h - - -   CRP, INFLAMMATION 0.0 - 8.0 mg/L - - -   CYCLIC CIT PEPT IGG <5 U/mL - - -   RHEUMATOID FACTOR <20 IU/mL - - -   CATALINO SCREEN BY EIA 0 - 1.0 - - -   AST 0 - 45 U/L 23 30 29   ALT 0 - 50 U/L 34 43 48   ALBUMIN 3.4 - 5.0 g/dL 4.2 3.9 3.8   WBC 4.0 - 11.0 10e9/L 8.9 7.5 7.8   RBC 3.8 - 5.2 10e12/L 4.69 4.76 4.60   HGB 11.7 - 15.7 g/dL 14.8 15.0 14.5   HCT 35.0 - 47.0 % 44.7 44.7 44.1   MCV 78 - 100 fl 95 94 96   MCHC 31.5 - 36.5 g/dL 33.1 33.6 32.9   RDW 10.0 - 15.0 % 13.9 14.7 13.9    - 450 10e9/L 285 279 292   CREATININE 0.52 - 1.04 mg/dL 0.69 0.64 0.71   GFR ESTIMATE, IF BLACK >60 mL/min/[1.73:m2] >90 >90 >90   GFR ESTIMATE >60 mL/min/[1.73:m2] >90 >90 89   HEPATITIS C ANTIBODY NEG - - -       Rheumatoid Factor   Date Value Ref Range Status   12/17/2013 <20 <20 IU/mL Final   ,  ,   Cyclic Cit Pept IgG/IgA   Date Value Ref Range Status   12/17/2013 <20  Interpretation:  Negative <20 UNITS Final   ,   Cyclic Citrullinated Peptide IgG   Date Value Ref Range Status   11/18/2009 <2 <5  U/mL Final     Comment:     Interpretation:  Negative   ,  ,   SSA (RO) Antibody IgG   Date Value Ref Range Status   11/18/2009 5  Final     Comment:     Reference range: 0 to 40  Unit: AU/mL  (Note)  REFERENCE INTERVALS: SSA (Ro) (LASHAE) Ab, IgG   29 AU/mL or Less ............. Negative   30 - 40 AU/mL ................ Equivocal   41 AU/mL or Greater .......... Positive    SSA (Ro) antibody is seen in 70-75% of Sjogren syndrome  cases, 30-40% of systemic lupus erythematosus (SLE) and  5-10% of progressive systemic sclerosis (PSS).  Performed by [facility],  [address] [place] [address] [phone]  [email or web address], Anne Arellano MD, Lab. Director     SSB (LA) Antibody IgG   Date Value Ref Range Status   11/18/2009 0  Final     Comment:     Reference range: 0 to 40  Unit: AU/mL  (Note)  REFERENCE INTERVALS: SSB (La) (LASHAE) Ab, IgG   29 AU/mL or Less ............. Negative   30 - 40 AU/mL ................ Equivocal   41 AU/mL or Greater .......... Positive    SSB (La) antibody is seen in 50-60% of Sjogren syndrome  cases and is specific if it is the only LASHAE antibody  present. 15-25% of patients with systemic lupus  erythematosus (SLE) and 5-10% of patients with progressive  systemic sclerosis (PSS) also have this antibody.  Performed by [facility],  [address] [place] [id number] [phone]  www[email or web address], Anne Arellano MD, Lab. Director   ,  ,   CATALINO Screen by EIA   Date Value Ref Range Status   11/18/2009 <1.0 0 - 1.0 Final     Comment:     Interpretation:  Negative   ,  ,  ,  ,  ,  ,  ,   Hepatitis B Core Desiree   Date Value Ref Range Status   12/17/2013 Negative NEG Final   ,   Hep B Surface Agn   Date Value Ref Range Status   12/17/2013 Negative NEG Final     Scribe Disclosure:  I, Pamela Sadineni, am serving as a scribe to document services personally performed by Figueroa Batista MD at this visit, based upon the provider's statements to me. All documentation has been reviewed by the  aforementioned provider prior to being entered into the official medical record.

## 2020-02-25 NOTE — PROGRESS NOTES
[facility]  Rheumatology Clinic  Figueroa Batista MD  2020     Name: Reina Conway  MRN: [id number]  Age: 64 year old  : 1955  Referring provider: Diana Orantes     Problem List:  1. Seronegative destructive deforming contractures RA (-RF/CCP/LASHAE panel) .   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 Rheumatoid arthritis (RA) activity=low with stable deformities.   High risk medication monitoring, labs every 12 weeks.   2. Neck rigidity no radiculpathy. Declined cervical xrays. Educated her on the s/sx redflags.   3. Alcohol use. Reports abstinent from Alcohol. I discussed the risks with her today on the liver and MTX. She understands.   4. Severe right hand deformities, would not be able to do vial of methotrexate       Assessment and Plan:  There are no diagnoses linked to this encounter.     Patient wants to start physical therapy to help her mobility after her recent hip injury from falling out of a broken chair in 2019. I think this would be beneficial for her.     Patient relates that she is overall doing well in terms of her rheumatoid arthritis. I will not make any changes to her current regiment. She will continue with Humira 40mg subcutaneous q every two weeks, and methotrexate at 15mg weekly with 2mg folic acid. She will complete routine labs.      Follow-up: Follow up with Tyson Alicea in six months.      HPI:   Reina Conway is a 64 year old here for transfer of care for rheumatoid arthritis (RA) deforming.     Today, she has had a slight sore throat the past few weeks. She has stopped Humira and methotrexate as a result. She has morning stiffness for 10 to 15 minutes. She occasionally takes Acetaminophen, usually at the end of her Humira cycle, and finds it helpful. She had a recent accident in 2019 from sitting on a broken chair and subsequently for falling on her hip. She had bed rest for two months. She was using a  walker around her house for two months. She no longer uses the cane in her house. She wants to start physical therapy to help her mobility.     She denies Raynaud's color changes. Her hands are worse in the cold due to rheumatoid arthritis She denies difficulty with breathing. She francy sicca symptoms. She has had anterior uveitis years ago.      Review-Seen Dr. Frost until 2010 then swtiched to [facility], then re-established 3/2013 (xrays 2/2013 [facility]). Dr. Frost retired 8-2019   Past-pan uveitis in 2011 etanercept (enbrel) uveitis, adalimumab (humira) and methotrexate       HISTORY CARRIED FORWARD FROM Tyson Alicea.   Prior: Synovitis and joint deformities in 2008 was persistently seronegative but had active synovitis. Methotrexate helped but did not cause a remission/low disease state. She required Prednisone to control symptoms partially but still had significant ADL issues. We had persistently recommended anti TNF therapy which she resisted. She sought a second opinion at [facility] who recommended the same things, and she continued care there as of August 2010, then switched back to Dr. Frost 3/2013. Begun on Enbrel at [facility]. 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 As no other etiology found it was felt it might be due to Enbrel and she was switched to Humira. She has remained on Methotrexate, primarily 25 mg weekly, decreased 3/2013 (not to go <15mg week due to risk of further deformities or uveitis) . FRAX 32% at [facility]. Prior declined biphosphosphonate, [facility] recommended IV.   Xrays 3/2013 [facility]: See records. Hallux valgus right, hammertoes L>R, hindfoot valgus, pes planus. Dislocated left 2nd MTP, sublux left 3rd MTP, arth 2-3 MTP, rt 1st MTP. Mild DJD hands. Several DIP and 1st CMC. Subluxation left thumb IP and persistant dorsiflexion right wrist. Previous visits discussed stress in her life. Her father in law passed away and there had been a lot  "of stress and she put her issues on the back burner. She had an episode of chest/epigastric pain and was seen at [facility]. She had apparent negative cardiac evaluation although did not followup with a stress test. She is taking OTC Zantac prn and thinks that helps. Found allergic to Wheat, avoid gluten and sugars. Feels much improved [heartburn, bloating, blood in stools, irregular stools] this really changed her digestive system. She went on a gluten free diet in December, and stopped all RA meds in early Jan 2015. See My Chart message. Restarted Humira early April 2015 every 3 weeks. Noticed more migatory joints pains, swelling. Right hand, right knee, right wrist-associated swelling really in right 2nd MCP. 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 Lack of movement and coordinations, using her wrist brace. Uses this at night and daily prn. At last visit she had continued on Humira without side effects and still feels it is helpful. She does have daily discomfort in several areas with either activity or prior damage, especially knees. She continued to have stress in her life but is working through this and \"trying to get back on track\". She stopped drinking any alcohol for the past two months and was congratulated on this. She went to  but does not feel she is alcoholic.  Leonardo going to [facility]. She has been  for a long time. Both lost parents, and grieving. She says she's in less denial. She admits  is helping a lot. She called in November due to increased joint pain and we restarted Methotrexate at 10 mg weekly as previously discussed.  She stopped it two weeks ago.  She noted more joint pain and also some nausea on day of dose.  We discussed this at length and unlikely that MTX contributed to more joint symptoms. At January 2016 visit we readded Methotrexate to Humira in hopes of decreasing disease activity, using low dose due to prior side effect complaints.  She called later that month " with flare symptoms requiring Prednisone bridge, and we increased Methotrexate to more standard dose of 15 mg weekly. At her April 2016 visit she had started improving.. She was tolerating Methotrexate this time.  She weaned off Prednisone.    Review of Systems:   Pertinent items are noted in HPI or as below, remainder of complete ROS is negative.      No recent problems with hearing or vision. No swallowing problems.   No breathing difficulty, shortness of breath, coughing, or wheezing  No chest pain or palpitations  No heart burn, indigestion, abdominal pain, nausea, vomiting, diarrhea  No urination problems, no bloody, cloudy urine, no dysuria  No numbing, tingling, weakness  No headaches or confusion  No rashes. No easy bleeding or bruising.     Active Medications:     Current Outpatient Medications:      acetaminophen (TYLENOL) 500 MG tablet, Take 500-1,000 mg by mouth every 6 hours as needed for mild pain (rarely takes), Disp: , Rfl:      adalimumab (HUMIRA PEN) 40 MG/0.8ML pen kit, Inject 0.8 mLs (40 mg) Subcutaneous every 14 days Hold for signs of infection, then seek medical attention., Disp: 1 kit, Rfl: 5     adalimumab (HUMIRA PEN) 40 MG/0.8ML pen kit, Inject 0.8 mLs (40 mg) Subcutaneous every 14 days Hold for signs of infection and then seek medical attention, Disp: 2 each, Rfl: 5     folic acid (FOLVITE) 1 MG tablet, Take 2 tablets (2 mg) by mouth daily, Disp: 180 tablet, Rfl: 3     methotrexate 2.5 MG tablet, Take 6 tablets (15 mg) by mouth every 7 days Labs every 10-12 week, Disp: 72 tablet, Rfl: 0     amoxicillin (AMOXIL) 500 MG capsule, Take 500 mg by mouth 2 times daily, Disp: , Rfl:      Cholecalciferol (VITAMIN D-3) 1000 units CAPS, , Disp: , Rfl:      ciclopirox (LOPROX) 0.77 % cream, Apply topically 2 times daily To toenails. (Patient not taking: Reported on 9/5/2019), Disp: 90 g, Rfl: 3     ranitidine (ZANTAC) 150 MG tablet, Take 150 mg by mouth daily as needed, Disp: , Rfl:      salicylic  "acid 17 % LIQD, Externally apply 1 dose. topically daily To left big toe wart daily. (Patient not taking: Reported on 9/5/2019), Disp: 1 Bottle, Rfl: 1      Allergies:   Barium sulfate; Fosamax; and No clinical screening - see comments      PMH:  Injury-  Medical-right anterior uveitis (felt secondary to etanercept (enbrel), fibroid  high in 2007 then normal, hyperlipidemia. History grave's, menopause, osteoporosis borderline, rheumatoid arthritis (RA), pharyngeal dysphagia, hammertoe    Surgical-  Fibroid adenoma Left Breast     FH:  No autoimmune disorders, psoriasis, UC, crohn's, SLE, RA, PsA, gout, autoimmune thyroid.  No MS, heart disease in family  Mother-breast cancer, parkinson, osteoporosis , low back issues-passed    Father-anxiety, dementia-passed after hip fracture   Siblings-brother \"not live well\"   Children-None   M aunt breast cancer   Cousin mental illness      SH:  Occasionally moderate Alcohol 1 drink few times a week not M-W . No Smoking. No IVDU. . Retired      Physical Exam:   BP (!) 149/87 (BP Location: Right arm, Patient Position: Sitting, Cuff Size: Adult Regular)   Pulse 78   Temp 98  F (36.7  C) (Oral)   Wt 75.1 kg (165 lb 9.6 oz)   SpO2 96%   BMI 25.18 kg/m     Wt Readings from Last 4 Encounters:   02/25/20 75.1 kg (165 lb 9.6 oz)   09/05/19 74.9 kg (165 lb 3.2 oz)   08/15/19 74.4 kg (164 lb)   03/26/19 74.6 kg (164 lb 8 oz)     Constitutional: Well-developed, appearing stated age; cooperative  Eyes: Normal EOM, PERRLA, vision, conjunctiva, sclera  ENT: Normal external ears, nose, hearing, lips, teeth, gums, throat. No mucous membrane lesions, normal saliva pool  Neck: No mass or thyroid enlargement  Resp: Lungs clear to auscultation, nl to palpation  CV: RRR, no murmurs, rubs or gallops, no edema  GI: No ABD mass or tenderness, no HSM  : Not tested  Lymph: No cervical, supraclavicular, inguinal or epitrochlear nodes  MS: The TMJ, neck, shoulder, elbow, wrist, " MCP/PIP/DIP, spine, hip, knee, ankle, and foot MTP/IP joints were examined and found normal. No active synovitis or altered joint anatomy. Full joint ROM. Normal  strength. No dactylitis,  tenosynovitis, enthesopathy.  Skin: No nail pitting, alopecia, rash, nodules or lesions  Neuro: Normal cranial nerves, strength, sensation, DTRs.   Psych: Normal judgement, orientation, memory, affect.   Low grade synovitis in both elebows   5 degree contractures in the elbows.   15 degree contracurws in right knee with moderate size effusions  1-3rd MCPS with singificant synovitsi, at least 1+maybe 2+ IN BOTH right and left hands.     Laboratory:   RHEUM RESULTS Latest Ref Rng & Units 2/1/2019 8/7/2019 12/19/2019   SED RATE 0 - 30 mm/h - - -   CRP, INFLAMMATION 0.0 - 8.0 mg/L - - -   CYCLIC CIT PEPT IGG <5 U/mL - - -   RHEUMATOID FACTOR <20 IU/mL - - -   CATALINO SCREEN BY EIA 0 - 1.0 - - -   AST 0 - 45 U/L 23 30 29   ALT 0 - 50 U/L 34 43 48   ALBUMIN 3.4 - 5.0 g/dL 4.2 3.9 3.8   WBC 4.0 - 11.0 10e9/L 8.9 7.5 7.8   RBC 3.8 - 5.2 10e12/L 4.69 4.76 4.60   HGB 11.7 - 15.7 g/dL 14.8 15.0 14.5   HCT 35.0 - 47.0 % 44.7 44.7 44.1   MCV 78 - 100 fl 95 94 96   MCHC 31.5 - 36.5 g/dL 33.1 33.6 32.9   RDW 10.0 - 15.0 % 13.9 14.7 13.9    - 450 10e9/L 285 279 292   CREATININE 0.52 - 1.04 mg/dL 0.69 0.64 0.71   GFR ESTIMATE, IF BLACK >60 mL/min/[1.73:m2] >90 >90 >90   GFR ESTIMATE >60 mL/min/[1.73:m2] >90 >90 89   HEPATITIS C ANTIBODY NEG - - -       Rheumatoid Factor   Date Value Ref Range Status   12/17/2013 <20 <20 IU/mL Final   ,  ,   Cyclic Cit Pept IgG/IgA   Date Value Ref Range Status   12/17/2013 <20  Interpretation:  Negative <20 UNITS Final   ,   Cyclic Citrullinated Peptide IgG   Date Value Ref Range Status   11/18/2009 <2 <5 U/mL Final     Comment:     Interpretation:  Negative   ,  ,   SSA (RO) Antibody IgG   Date Value Ref Range Status   11/18/2009 5  Final     Comment:     Reference range: 0 to 40  Unit:  AU/mL  (Note)  REFERENCE INTERVALS: SSA (Ro) (LASHAE) Ab, IgG   29 AU/mL or Less ............. Negative   30 - 40 AU/mL ................ Equivocal   41 AU/mL or Greater .......... Positive    SSA (Ro) antibody is seen in 70-75% of Sjogren syndrome  cases, 30-40% of systemic lupus erythematosus (SLE) and  5-10% of progressive systemic sclerosis (PSS).  Performed by [facility],  [address] [place] [id number] [phone]  [email or web address], Anne Arellano MD, Lab. Director     SSB (LA) Antibody IgG   Date Value Ref Range Status   11/18/2009 0  Final     Comment:     Reference range: 0 to 40  Unit: AU/mL  (Note)  REFERENCE INTERVALS: SSB (La) (LASHAE) Ab, IgG   29 AU/mL or Less ............. Negative   30 - 40 AU/mL ................ Equivocal   41 AU/mL or Greater .......... Positive    SSB (La) antibody is seen in 50-60% of Sjogren syndrome  cases and is specific if it is the only LASHAE antibody  present. 15-25% of patients with systemic lupus  erythematosus (SLE) and 5-10% of patients with progressive  systemic sclerosis (PSS) also have this antibody.  Performed by [facility],  [address] [place] [address] [phone]  [email or web address], Anne Arellano MD, Lab. Director   ,  ,   CATALINO Screen by EIA   Date Value Ref Range Status   11/18/2009 <1.0 0 - 1.0 Final     Comment:     Interpretation:  Negative   ,  ,  ,  ,  ,  ,  ,   Hepatitis B Core Desiree   Date Value Ref Range Status   12/17/2013 Negative NEG Final   ,   Hep B Surface Agn   Date Value Ref Range Status   12/17/2013 Negative NEG Final     Scribe Disclosure:  I, Pamela Barnett, am serving as a scribe to document services personally performed by Figueroa Batista MD at this visit, based upon the provider's statements to me. All documentation has been reviewed by the aforementioned provider prior to being entered into the official medical record.

## 2020-02-25 NOTE — LETTER
2020      RE: Reina Conway  [address] [facility] [address]       [facility]  Rheumatology Clinic  Figueroa Batista MD  2020     Name: Reina Conway  MRN: [id number]  Age: 64 year old  : 1955  Referring provider: Diana Orantes     Problem List:  1. Seronegative destructive deforming contractures RA (-RF/CCP/LASHAE panel) .   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 Rheumatoid arthritis (RA) activity=low with stable deformities.   High risk medication monitoring, labs every 12 weeks.   2. Neck rigidity no radiculpathy. Declined cervical xrays. Educated her on the s/sx redflags.   3. Alcohol use. Reports abstinent from Alcohol. I discussed the risks with her today on the liver and MTX. She understands.   4. Severe right hand deformities, would not be able to do vial of methotrexate       Assessment and Plan:     Reina Conway is a 64 year old here for transfer of care for rheumatoid arthritis (RA) deforming.     Patient relates that she is overall doing well in terms of her rheumatoid arthritis. I will not make any changes to her current regimen. She will continue with Humira 40mg subcutaneous q every two weeks, and methotrexate at 15mg weekly with 2mg folic acid. She will try to be more consistent with these medications and notice changes. This is important to know if her current regimen is working, since  She has quitemarked swelling and  imited range of motion in her wrists and may subsequently lose function and mobility in her hands.    Patient drinks 2 glasses of red wine daily and reports. Regardless, her labs have been okay. I counseled her that she should be careful with alcohol intake while on methotrexate, and REC no more than 1 drink/d, and not on day of or after Methotrexate. She will complete routine labs. We will plan to complete X-Rays of the hands, feet, knees and elbows at follow up visit in two months.     She has  a risk for infection in her feet. We discussed that she may consider seeing podiatry. She has difficulty cutting her toe nails, and if they are too long they may form ulceration on nearby skin and she will have great difficulty with healing those. She is wearing proper supportive shoes. She should avoid terbinafine as the medication may be toxic to the liver and she is already on methotrexate which has the same risk .    Patient wants to start physical therapy to help her mobility after her recent hip injury from falling out of a broken chair in December 2019. I think this would be beneficial for her.     Orders:  - ALT  - AST  - Albumin level  - Creatinine  - CBC with platelets differential  - CRP inflammation  - Erythrocyte sedimentation rate auto  - XR Hand Bilateral 2 Views  - XR Elbow Bilateral 2 Views  - XR Foot Bilateral 2 Views  - XR Knee Bilateral 3 Views     Follow-up: Follow up with Tyson Alicea in two months. I think it is very unlikely her disease is going to be adequately controlled on the current regimen; she will likely need a switch to IL-6 or MAP kinase inhibition. She would then also stop Methotrexate.     EARLINE Batista MD, PhD    Rheumatology      HPI:   Reina Conway is a 64 year old here for transfer of care for rheumatoid arthritis (RA) deforming.     Today, she has had a slight sore throat the past few weeks. She has stopped Humira and methotrexate as a result. She has morning stiffness for 10 to 15 minutes. She occasionally takes Acetaminophen, usually at the end of her Humira cycle, and finds it helpful. She had a recent accident in December 2019 from sitting on a broken chair and subsequently for falling on her hip. She had bed rest for two months. She was using a walker around her house for two months. She no longer uses the cane in her house. She wants to start physical therapy to help her mobility.     She denies Raynaud's color changes. Her hands are worse in the  cold due to rheumatoid arthritis She denies difficulty with breathing. She francy sicca symptoms. She has had anterior uveitis years ago. She has been off of methotrexate for three weeks, and is has been irregularly taking Humira recently.      Review-Seen Dr. Frost until 2010 then swtiched to [facility], then re-established 3/2013 (xrays 2/2013 [facility]). Dr. Frost retired 8-2019   Past-pan uveitis in 2011 etanercept (enbrel) uveitis, adalimumab (humira) and methotrexate       HISTORY CARRIED FORWARD FROM Tyson Alicea.   Prior: Synovitis and joint deformities in 2008 was persistently seronegative but had active synovitis. Methotrexate helped but did not cause a remission/low disease state. She required Prednisone to control symptoms partially but still had significant ADL issues. We had persistently recommended anti TNF therapy which she resisted. She sought a second opinion at [facility] who recommended the same things, and she continued care there as of August 2010, then switched back to Dr. Frost 3/2013. Begun on Enbrel at [facility]. 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 As no other etiology found it was felt it might be due to Enbrel and she was switched to Humira. She has remained on Methotrexate, primarily 25 mg weekly, decreased 3/2013 (not to go <15mg week due to risk of further deformities or uveitis) . FRAX 32% at [facility]. Prior declined biphosphosphonate, [facility] recommended IV.   Xrays 3/2013 [facility]: See records. Hallux valgus right, hammertoes L>R, hindfoot valgus, pes planus. Dislocated left 2nd MTP, sublux left 3rd MTP, arth 2-3 MTP, rt 1st MTP. Mild DJD hands. Several DIP and 1st CMC. Subluxation left thumb IP and persistant dorsiflexion right wrist. Previous visits discussed stress in her life. Her father in law passed away and there had been a lot of stress and she put her issues on the back burner. She had an episode of chest/epigastric pain and was  "seen at [facility]. She had apparent negative cardiac evaluation although did not followup with a stress test. She is taking OTC Zantac prn and thinks that helps. Found allergic to Wheat, avoid gluten and sugars. Feels much improved [heartburn, bloating, blood in stools, irregular stools] this really changed her digestive system. She went on a gluten free diet in December, and stopped all RA meds in early Jan 2015. See My Chart message. Restarted Humira early April 2015 every 3 weeks. Noticed more migatory joints pains, swelling. Right hand, right knee, right wrist-associated swelling really in right 2nd MCP. 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 Lack of movement and coordinations, using her wrist brace. Uses this at night and daily prn. At last visit she had continued on Humira without side effects and still feels it is helpful. She does have daily discomfort in several areas with either activity or prior damage, especially knees. She continued to have stress in her life but is working through this and \"trying to get back on track\". She stopped drinking any alcohol for the past two months and was congratulated on this. She went to  but does not feel she is alcoholic.  Leonardo going to [facility]. She has been  for a long time. Both lost parents, and grieving. She says she's in less denial. She admits  is helping a lot. She called in November due to increased joint pain and we restarted Methotrexate at 10 mg weekly as previously discussed.  She stopped it two weeks ago.  She noted more joint pain and also some nausea on day of dose.  We discussed this at length and unlikely that MTX contributed to more joint symptoms. At January 2016 visit we readded Methotrexate to Humira in hopes of decreasing disease activity, using low dose due to prior side effect complaints.  She called later that month with flare symptoms requiring Prednisone bridge, and we increased Methotrexate to more standard dose of 15 " mg weekly. At her April 2016 visit she had started improving.. She was tolerating Methotrexate this time.  She weaned off Prednisone.    Review of Systems:   Pertinent items are noted in HPI or as below, remainder of complete ROS is negative.      No recent problems with hearing or vision. No swallowing problems.   No breathing difficulty, shortness of breath, coughing, or wheezing  No chest pain or palpitations  No heart burn, indigestion, abdominal pain, nausea, vomiting, diarrhea  No urination problems, no bloody, cloudy urine, no dysuria  No numbing, tingling, weakness  No headaches or confusion  No rashes. No easy bleeding or bruising.     Active Medications:     Current Outpatient Medications:      acetaminophen (TYLENOL) 500 MG tablet, Take 500-1,000 mg by mouth every 6 hours as needed for mild pain (rarely takes), Disp: , Rfl:      adalimumab (HUMIRA PEN) 40 MG/0.8ML pen kit, Inject 0.8 mLs (40 mg) Subcutaneous every 14 days Hold for signs of infection, then seek medical attention., Disp: 1 kit, Rfl: 5     adalimumab (HUMIRA PEN) 40 MG/0.8ML pen kit, Inject 0.8 mLs (40 mg) Subcutaneous every 14 days Hold for signs of infection and then seek medical attention, Disp: 2 each, Rfl: 5     folic acid (FOLVITE) 1 MG tablet, Take 2 tablets (2 mg) by mouth daily, Disp: 180 tablet, Rfl: 3     methotrexate 2.5 MG tablet, Take 6 tablets (15 mg) by mouth every 7 days Labs every 10-12 week, Disp: 72 tablet, Rfl: 0     amoxicillin (AMOXIL) 500 MG capsule, Take 500 mg by mouth 2 times daily, Disp: , Rfl:      Cholecalciferol (VITAMIN D-3) 1000 units CAPS, , Disp: , Rfl:      ciclopirox (LOPROX) 0.77 % cream, Apply topically 2 times daily To toenails. (Patient not taking: Reported on 9/5/2019), Disp: 90 g, Rfl: 3     ranitidine (ZANTAC) 150 MG tablet, Take 150 mg by mouth daily as needed, Disp: , Rfl:      salicylic acid 17 % LIQD, Externally apply 1 dose. topically daily To left big toe wart daily. 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 History grave's, menopause, osteoporosis borderline, rheumatoid arthritis (RA), pharyngeal dysphagia, hammertoe    Surgical-  Fibroid adenoma Left Breast     FH:  No autoimmune disorders, psoriasis, UC, crohn's, SLE, RA, PsA, gout, autoimmune thyroid.  No MS, heart disease in family  Mother-breast cancer, parkinson, osteoporosis , low back issues-passed    Father-anxiety, dementia-passed after hip fracture   Siblings-brother \"not live well\"   Children-None   M aunt breast cancer   Cousin mental illness      SH:  Occasionally moderate Alcohol 1 drink few times a week not M-W . No Smoking. No IVDU. . Retired      Physical Exam:   BP (!) 149/87 (BP Location: Right arm, Patient Position: Sitting, Cuff Size: Adult Regular)   Pulse 78   Temp 98  F (36.7  C) (Oral)   Wt 75.1 kg (165 lb 9.6 oz)   SpO2 96%   BMI 25.18 kg/m      Wt Readings from Last 4 Encounters:   02/25/20 75.1 kg (165 lb 9.6 oz)   09/05/19 74.9 kg (165 lb 3.2 oz)   08/15/19 74.4 kg (164 lb)   03/26/19 74.6 kg (164 lb 8 oz)     Constitutional: Well-developed, appearing stated age; cooperative  Eyes: Normal EOM, PERRLA, vision, conjunctiva, sclera  ENT: Normal external ears, nose, hearing, lips, teeth, gums, throat. No mucous membrane lesions, normal saliva pool  Neck: No mass or thyroid enlargement  Resp: Lungs clear to auscultation, nl to palpation  CV: RRR, no murmurs, rubs or gallops, no edema  GI: No ABD mass or tenderness, no HSM  : Not tested  Lymph: No cervical, supraclavicular, inguinal or epitrochlear nodes  MS: The TMJ, neck, shoulder, elbow, wrist, MCP/PIP/DIP, spine, hip, knee, ankle, and foot MTP/IP joints were examined and found normal. No active synovitis " or altered joint anatomy. Full joint ROM. Normal  strength. No dactylitis,  tenosynovitis, enthesopathy.  Low grade synovitis in both elbows. 5 degree contractures in the elbows.   15 degree contractures in right knee with moderate size effusions.   1-3rd MCPS with singificant synovitis, at least 1+ maybe 2+ in both right and left hands.   Wrists have 20-30 degrees of extension, and 10-15 degrees of flexion  Left wrist, very tender, and 2+ swelling  Some subluxation in both wrists. 2+ synovitis in both wrists  Trace swelling in elbows.   Right sided MCPs with very little extension.   Fibular deviation  2nd to 4th Hammer toes bilateral  Severe onychomycosis of the right great toe  Ulnar deviation  Swelling in the right knee.   Skin: No nail pitting, alopecia, rash, nodules or lesions  Neuro: Normal cranial nerves, strength, sensation, DTRs.   Psych: Normal judgement, orientation, memory, affect.     Laboratory:   RHEUM RESULTS Latest Ref Rng & Units 2/1/2019 8/7/2019 12/19/2019   SED RATE 0 - 30 mm/h - - -   CRP, INFLAMMATION 0.0 - 8.0 mg/L - - -   CYCLIC CIT PEPT IGG <5 U/mL - - -   RHEUMATOID FACTOR <20 IU/mL - - -   CATALINO SCREEN BY EIA 0 - 1.0 - - -   AST 0 - 45 U/L 23 30 29   ALT 0 - 50 U/L 34 43 48   ALBUMIN 3.4 - 5.0 g/dL 4.2 3.9 3.8   WBC 4.0 - 11.0 10e9/L 8.9 7.5 7.8   RBC 3.8 - 5.2 10e12/L 4.69 4.76 4.60   HGB 11.7 - 15.7 g/dL 14.8 15.0 14.5   HCT 35.0 - 47.0 % 44.7 44.7 44.1   MCV 78 - 100 fl 95 94 96   MCHC 31.5 - 36.5 g/dL 33.1 33.6 32.9   RDW 10.0 - 15.0 % 13.9 14.7 13.9    - 450 10e9/L 285 279 292   CREATININE 0.52 - 1.04 mg/dL 0.69 0.64 0.71   GFR ESTIMATE, IF BLACK >60 mL/min/[1.73:m2] >90 >90 >90   GFR ESTIMATE >60 mL/min/[1.73:m2] >90 >90 89   HEPATITIS C ANTIBODY NEG - - -       Rheumatoid Factor   Date Value Ref Range Status   12/17/2013 <20 <20 IU/mL Final   ,  ,   Cyclic Cit Pept IgG/IgA   Date Value Ref Range Status   12/17/2013 <20  Interpretation:  Negative <20 UNITS Final   ,    Cyclic Citrullinated Peptide IgG   Date Value Ref Range Status   11/18/2009 <2 <5 U/mL Final     Comment:     Interpretation:  Negative   ,  ,   SSA (RO) Antibody IgG   Date Value Ref Range Status   11/18/2009 5  Final     Comment:     Reference range: 0 to 40  Unit: AU/mL  (Note)  REFERENCE INTERVALS: SSA (Ro) (LASHAE) Ab, IgG   29 AU/mL or Less ............. Negative   30 - 40 AU/mL ................ Equivocal   41 AU/mL or Greater .......... Positive    SSA (Ro) antibody is seen in 70-75% of Sjogren syndrome  cases, 30-40% of systemic lupus erythematosus (SLE) and  5-10% of progressive systemic sclerosis (PSS).  Performed by "[employer]",  [address],[place] [id number] [phone]  [email or web address], Anne Arellano MD, Lab. Director     SSB (LA) Antibody IgG   Date Value Ref Range Status   11/18/2009 0  Final     Comment:     Reference range: 0 to 40  Unit: AU/mL  (Note)  REFERENCE INTERVALS: SSB (La) (LASHAE) Ab, IgG   29 AU/mL or Less ............. Negative   30 - 40 AU/mL ................ Equivocal   41 AU/mL or Greater .......... Positive    SSB (La) antibody is seen in 50-60% of Sjogren syndrome  cases and is specific if it is the only LASHAE antibody  present. 15-25% of patients with systemic lupus  erythematosus (SLE) and 5-10% of patients with progressive  systemic sclerosis (PSS) also have this antibody.  Performed by "[employer]",  [address],[place] [id number] [phone]  [email or web address], Anne Arellano MD, Lab. Director   ,  ,   CATALINO Screen by EIA   Date Value Ref Range Status   11/18/2009 <1.0 0 - 1.0 Final     Comment:     Interpretation:  Negative   ,  ,  ,  ,  ,  ,  ,   Hepatitis B Core Desiree   Date Value Ref Range Status   12/17/2013 Negative NEG Final   ,   Hep B Surface Agn   Date Value Ref Range Status   12/17/2013 Negative NEG Final     Scribe Disclosure:  I, Pamela Barnett, am serving as a scribe to document services personally performed by Figueroa Batista MD at this visit,  based upon the provider's statements to me. All documentation has been reviewed by the aforementioned provider prior to being entered into the official medical record.    Figueroa Batista MD

## 2020-02-25 NOTE — NURSING NOTE
Chief Complaint   Patient presents with     RECHECK     RA             BP (!) 149/87 (BP Location: Right arm, Patient Position: Sitting, Cuff Size: Adult Regular)   Pulse 78   Temp 98  F (36.7  C) (Oral)   Wt 75.1 kg (165 lb 9.6 oz)   SpO2 96%   BMI 25.18 kg/m            Jeannette Mathur CMA    2/25/2020 4:33 PM

## 2020-03-09 ENCOUNTER — MYC MEDICAL ADVICE (OUTPATIENT)
Dept: RHEUMATOLOGY | Facility: CLINIC | Age: 65
End: 2020-03-09

## 2020-03-12 NOTE — TELEPHONE ENCOUNTER
Medications removed per pt request. According to office note dated 2/25/2020 pt is to be taking 2 mg of Folic Acid daily, this was left. [employer] message sent to pt informing her this has been completed. Recommended she discuss the Folic acid dose with Tyson at her next appointment.    JAY JAY NewmanN RN  Rheumatology Care Coordinator  [facility]

## 2020-04-09 ENCOUNTER — MYC MEDICAL ADVICE (OUTPATIENT)
Dept: RHEUMATOLOGY | Facility: CLINIC | Age: 65
End: 2020-04-09

## 2020-04-09 DIAGNOSIS — M06.09 RHEUMATOID ARTHRITIS OF MULTIPLE SITES WITHOUT RHEUMATOID FACTOR (H): ICD-10-CM

## 2020-04-09 DIAGNOSIS — Z79.899 HIGH RISK MEDICATIONS (NOT ANTICOAGULANTS) LONG-TERM USE: ICD-10-CM

## 2020-04-09 RX ORDER — FOLIC ACID 1 MG/1
2 TABLET ORAL DAILY
Qty: 180 TABLET | Refills: 2 | Status: SHIPPED | OUTPATIENT
Start: 2020-04-09 | End: 2022-04-28

## 2020-04-22 ENCOUNTER — VIRTUAL VISIT (OUTPATIENT)
Dept: RHEUMATOLOGY | Facility: CLINIC | Age: 65
End: 2020-04-22
Attending: NURSE PRACTITIONER
Payer: COMMERCIAL

## 2020-04-22 DIAGNOSIS — M06.09 RHEUMATOID ARTHRITIS OF MULTIPLE SITES WITHOUT RHEUMATOID FACTOR (H): ICD-10-CM

## 2020-04-22 DIAGNOSIS — M06.00 SERONEGATIVE RHEUMATOID ARTHRITIS (H): ICD-10-CM

## 2020-04-22 DIAGNOSIS — Z79.899 HIGH RISK MEDICATIONS (NOT ANTICOAGULANTS) LONG-TERM USE: ICD-10-CM

## 2020-04-22 DIAGNOSIS — M21.941: Primary | ICD-10-CM

## 2020-04-22 NOTE — PROGRESS NOTES
"Reina Conway is a 64 year old female who is being evaluated via a billable telephone visit.  Verified with 2 patient identifiers. Talked to patient. She was having a hard time loading video visit.      The patient has been notified of following:     \"This telephone visit will be conducted via a call between you and your physician/provider. We have found that certain health care needs can be provided without the need for a physical exam.  This service lets us provide the care you need with a short phone conversation.  If a prescription is necessary we can send it directly to your pharmacy.  If lab work is needed we can place an order for that and you can then stop by our lab to have the test done at a later time. Telephone visits are billed at different rates depending on your insurance coverage. During this emergency period, for some insurers they may be billed the same as an in-person visit.  Please reach out to your insurance provider with any questions.  If during the course of the call the physician/provider feels a telephone visit is not appropriate, you will not be charged for this service.\"    Patient has given verbal consent for Telephone visit?  Yes    Reina Conway complains of    Chief Complaint   Patient presents with     RECHECK     f/u       I have reviewed and updated the patient's Past Medical History, Social History, Family History and Medication List.    ALLERGIES  Barium sulfate; Fosamax; and No clinical screening - see comments    Additional provider notes:   [facility] - Rheumatology Clinic Visit     Reina Conway  is a 64 year old here for transfer of care for rheumatoid arthritis (RA) deforming.      Review-Seen Dr. Frost until 2010 then swtiched to [facility], then re-established 3/2013 (xrays 2/2013 [place]). Dr. Frost retired 8-2019, co-manage with Dr. 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 Methotrexate helped but did not cause a remission/low disease state. She required Prednisone to control symptoms partially but still had significant ADL issues. We had persistently recommended anti TNF therapy which she resisted. She sought a second opinion at [facility] who recommended the same things, and she continued care there as of August 2010, then switched back to Dr. Frost 3/2013. Begun on Enbrel at [facility]. 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 As no other etiology found it was felt it might be due to Enbrel and she was switched to Humira. She has remained on Methotrexate, primarily 25 mg weekly, decreased 3/2013 (not to go <15mg week due to risk of further deformities or uveitis) . FRAX 32% at [facility]. Prior declined biphosphosphonate, [facility] recommended IV.     Xrays 3/2013 [facility]: See records. Hallux valgus right, hammertoes L>R, hindfoot valgus, pes planus. Dislocated left 2nd MTP, sublux left 3rd MTP, arth 2-3 MTP, rt 1st MTP. Mild DJD hands. Several DIP and 1st CMC. Subluxation left thumb IP and persistant dorsiflexion right wrist. Previous visits discussed stress in her life. Her father in law passed away and there had been a lot of stress and she put her issues on the back burner. She had an episode of chest/epigastric pain and was seen at [facility]. She had apparent negative cardiac evaluation although did not followup with a stress test. She is taking OTC Zantac prn and thinks that helps. Found allergic to Wheat, avoid gluten and sugars. Feels much improved [heartburn, bloating, blood in stools, irregular stools] this really changed her digestive system. She went on a gluten free diet in December, and stopped all RA meds in early Jan 2015. See My Chart message. Restarted Humira early April 2015 every 3  "weeks. Noticed more migatory joints pains, swelling. Right hand, right knee, right wrist-associated swelling really in right 2nd MCP. 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 Lack of movement and coordinations, using her wrist brace. Uses this at night and daily prn. At last visit she had continued on Humira without side effects and still feels it is helpful. She does have daily discomfort in several areas with either activity or prior damage, especially knees. She continued to have stress in her life but is working through this and \"trying to get back on track\". She stopped drinking any alcohol for the past two months and was congratulated on this. She went to [place] but does not feel she is alcoholic.  [facility] She has been  for a long time. Both lost parents, and grieving. She says she's in less denial. She admits  is helping a lot. She called in November due to increased joint pain and we restarted Methotrexate at 10 mg weekly as previously discussed.  She stopped it two weeks ago.  She noted more joint pain and also some nausea on day of dose.  We discussed this at length and unlikely that MTX contributed to more joint symptoms. At January 2016 visit we readded Methotrexate to Humira in hopes of decreasing disease activity, using low dose due to prior side effect complaints.  She called later that month with flare symptoms requiring Prednisone bridge, and we increased Methotrexate to more standard dose of 15 mg weekly. At her April 2016 visit she had started improving.. She was tolerating Methotrexate this time.  She weaned off Prednisone.     Copy forward initial visit September 5, 2019  Tyson Alicea APRN:   Reports rheumatoid arthritis (RA) is controlled overall and feels her deformities are stable. Some gelling of the knees, contractures and right hand contracture deformities, and rigidity of the neck. This does affect her mobility, fine motor and dexterity, and recently rightJ MSK " "pain felt due to the root canal and neck. Gelling of joint sit to stand. At times knees do swelling. No big rheumatoid arthritis (RA) flares and no infections. Denies any fever, chills, SOB, LEE, night sweats, or chest pain, or cough. Reports healthy. No cyclical wearing down. Denies any n/t arms, legs or loss BB or weakness. No redflags. No neck pain.      Continues adalimumab (humira) 40 mg injection every 14 days, methotrexate  15 mg every 7 days MON folic acid  2 mg day tolerating no side-effects. No prednisone or NSAID use.    April 22, 2020  Rheumatoid arthritis (RA) active in right wrist, deformities stable. But hard to get her feet fungus and foot care or to do due to her hand deformities. Does not have PCP. Not had mammogram. Fell Sept dx with right femur fracture at [facility] Dr. Villatoro, did not need surgery but was chair bound for 2 month. Walking is better. Numb in that area is improved. Feels going good and walking better.     Denies any fever, chills, SOB, LEE, night sweats, or chest pain, high fever, cough, travel in last 14 days or exposure to covid-19 (coronavirus). Reports healthy and can speak normally in full sentences.     Continues adalimumab (humira) 40 mg injection every 14 days, methotrexate  15 mg every 7 days MON folic acid  2 mg day tolerating no side-effects. No prednisone or NSAID use.       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 History grave's, menopause, osteoporosis borderline, rheumatoid arthritis (RA), pharyngeal dysphagia, hammertoe      Surgical-fibroid left breast    FH:  No autoimmune disorders, psoriasis, UC, crohn's, SLE, RA, PsA, gout, autoimmune thyroid. No MS, heart disease in family  Mother-breast cancer, parkinson, osteoporosis , low back issues-passed    Father-anxiety, dementia-passed after hip fracture   Siblings-brother \"not live well\" "   Children-None   M aunt breast cancer   Cousin mental illness      SH:  Occasionally moderate Alcohol 1 drink few times a week not M-W . No Smoking. No IVDU. . Retired       PMSH personally reviewed and updated by me.    ROS:  Negative raynaud s phenomena, hairloss, sun sensitivities, keratoconjunctivitis sicca, pleurisy, significant rashes like malar, oral/nasal or ulcerations, inflammatory eye disease, inflammatory bowel disease, dactylitis, tenosynovitis, or enthespathy. Negative blood clots, gout, psoriasis, UC, crohn's. No temporal headache, no jaw claudication, no scalp tenderness, vision changes, carotidynia, cough. No Parotid swelling.     CONSTITUTIONAL: No fevers, night sweats or unintentional weight change. No acute distress, swollen glands  EYES: No vision change, diplopia, pain in eyes or red eyes   EARS, NOSE, MOUTH, THROAT: No tinnitus or hearing change, no epistaxis or nasal discharge, no oral lesions, throat clear. Normal saliva pool.  No drymouth. No thyroid enlargement.   CARDIOVASCULAR: No chest pain, palpitations, or pain with walking, no orthopnea or PND   RESPIRATORY: No dyspnea, cough, shortness of breath or wheezing. No pleurisy.   GI: No nausea, vomiting, diarrhea or constipation, no abdominal pain, or blood in stools.   : No change in urine, no dysuria or hematuria   MUSCKL: see above.   INTEGUMENTARY: No concerning lesions or moles   NEURO: No loss of strength or sensation, no numbness or tingling, no tremor, no dizziness, no headache. No falls   ENDO: No polyuria or polydipsia, no temperature intolerance   HEME/LYMPH:No concerning bumps, bleeding problems, or swollen lymph nodes. No recent infections, hospitalizations or new illnesses.   Otherwise 14 point ROS obtained, reviewed and found negative.     Telephone objective:  PSYCH: Alert and oriented times 3; coherent speech, normal   rate and volume, able to articulate logical thoughts, able   to abstract reason, no tangential  thoughts, no hallucinations   or delusions  His affect is normal  RESP: No cough, no audible wheezing, able to talk in full sentences  Remainder of exam unable to be completed due to telephone visits    Labs/Imaging:  I have reviewed EHR  Component      Latest Ref Rng & Units 12/19/2019   WBC      4.0 - 11.0 10e9/L 7.8   RBC Count      3.8 - 5.2 10e12/L 4.60   Hemoglobin      11.7 - 15.7 g/dL 14.5   Hematocrit      35.0 - 47.0 % 44.1   MCV      78 - 100 fl 96   MCH      26.5 - 33.0 pg 31.5   MCHC      31.5 - 36.5 g/dL 32.9   RDW      10.0 - 15.0 % 13.9   Platelet Count      150 - 450 10e9/L 292   % Neutrophils      % 44.6   % Lymphocytes      % 47.9   % Monocytes      % 5.8   % Eosinophils      % 1.2   % Basophils      % 0.5   Absolute Neutrophil      1.6 - 8.3 10e9/L 3.5   Absolute Lymphocytes      0.8 - 5.3 10e9/L 3.7   Absolute Monocytes      0.0 - 1.3 10e9/L 0.5   Absolute Eosinophils      0.0 - 0.7 10e9/L 0.1   Absolute Basophils      0.0 - 0.2 10e9/L 0.0   Diff Method       Automated Method   Creatinine      0.52 - 1.04 mg/dL 0.71   GFR Estimate      >60 mL/min/1.73:m2 89   GFR Estimate If Black      >60 mL/min/1.73:m2 >90   AST      0 - 45 U/L 29   ALT      0 - 50 U/L 48   Albumin      3.4 - 5.0 g/dL 3.8     Assessment/Plan:  Impression/Plan:  1. Seronegative destructive deforming contractures RA (-RF/CCP/LASHAE panel) .   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 Rheumatoid arthritis (RA) is not fully controlled and plan was to do x-rays with labs today, then determine if switch to switch to IL-6 or MAP kinase inhibition then stop methotrexate. Given pandemic and not able to do virtual or face to face, we agreed to continue this adalimumab (humira) and methotrexate then re-evaluate when comes in July with Dr. Batista. She is also going on Medicare the next 3 month so it is uncertain what will be covered.      2. High risk medication monitoring, labs every 12  weeks. Due next month with x-rays     3. Alcohol use. Reports abstinent from Alcohol. I discussed the risks with her today on the liver and MTX. She understands. counseled her that she should be careful with alcohol intake while on methotrexate, and REC no more than 1 drink/d, and not on day of or after Methotrexate    4. Severe right hand deformities, would not be able to do vial of methotrexate    5. Due for complete physical with new PCP. Given femur fracture in Sept and not up-to-date with cancer screenings bone health or vaccinations, we discussed her getting complete physical. I am concerned she has osteoporosis. Recommend pneumonia vaccinations, annual flu vaccine and shingrix if not done.        PLAN: Discussed in detail with the patient.   1. Continue adalimumab (humira) 40 mg SQ Q 2 weeks and methotrexate at 15 mg every 7 days weekly with 2 mg day Folic acid  --Labs every 12 weeks--discussed importance of labs every 12 weeks and hold MTX for any infections.    2 F/U Ophthalmology yearly due and prn sx. Hx Anterior uveitis.   3. RTC 3 mths, sooner prn Dr. Batista, me in 6 month sooner if he wishes       The patient understood the rationale for the diagnosis and treatment plan. Patient shared in the decision making. All questions were answered to best of my ability and the patient's satisfaction and agrees with the plan.      Tyson PEÑA, JOSHUA, MSN  [facility]  Department of Rheumatology & Autoimmune Disorders      Orders:  No orders of the defined types were placed in this encounter.      I have reviewed the note as documented above.  This accurately captures the substance of my conversation with the patient. Instructions given to the patient including prescriptions, follow-up appointments and plan, follow-up labs and orders.       Phone call contact time  Call Started at 200 PM  Call Ended at 220PM  Duration 20 min  Ready for check-out follow-up      Thank-you for allowing me to  participate in your care.     Tyson PEÑA, CNP, MSN  [facility]  Department of Rheumatology & Autoimmune Disorders  [facility] Rheumatology: [phone] Opt 2, then Opt 1 Scheduling     Education:   1. Immunization: Reviewed CDC guidelines with patient updated, information provided to patient based on CDC guidelines for vaccination recommendations, patient will discuss with primary provider  2. Bone Health:Educated on adequate calcium and vitamin D intake, Educated on exercise, Glucocorticoid education discussed risks, benefits & potential long term side effects from use per primary provider  3. Discussed routine annual physicals, cancer screening, cardiac risk monitoring, bone health and primary care with primary care provider. Also, educated glucocorticoid increase change of infections including shingles.   4. Educated on diagnosis, prognosis, labs, imaging, treatment options (including risks, benefits, risk of no treatment), medications (use, dose, side-effects, risks of medications including infection/cancer/bone marrow suppression, lab monitoring), infections what to do, plan of cares, goals of treatment. Clinic information given.    5. Educated in Rheumatology we do not prescribe narcotics or manage chronic pain, the patient will need to discuss with primary care or go to a pain clinic for management.   6. Discussed corovid-19 prevention, CDC guidelines/resources and what to do for exposure signs or symptoms and where to go, to follow CDC/[facility] guidelines.

## 2020-04-22 NOTE — PATIENT INSTRUCTIONS
Establish with primary care here, schedule her for complete physical with internal medicine (cancer screening,bone health, pneumonia vaccines, physical)

## 2020-04-23 NOTE — TELEPHONE ENCOUNTER
Appointment was completed yesterday as scheduled.    JAY JAY NewmanN RN  Rheumatology Care Coordinator  [facility]

## 2020-04-27 ENCOUNTER — TELEPHONE (OUTPATIENT)
Dept: RHEUMATOLOGY | Facility: CLINIC | Age: 65
End: 2020-04-27

## 2020-07-07 ENCOUNTER — VIRTUAL VISIT (OUTPATIENT)
Dept: RHEUMATOLOGY | Facility: CLINIC | Age: 65
End: 2020-07-07
Attending: INTERNAL MEDICINE
Payer: COMMERCIAL

## 2020-07-07 DIAGNOSIS — Z79.899 HIGH RISK MEDICATIONS (NOT ANTICOAGULANTS) LONG-TERM USE: ICD-10-CM

## 2020-07-07 DIAGNOSIS — M06.09 RHEUMATOID ARTHRITIS OF MULTIPLE SITES WITHOUT RHEUMATOID FACTOR (H): ICD-10-CM

## 2020-07-07 DIAGNOSIS — M06.00 SERONEGATIVE RHEUMATOID ARTHRITIS (H): ICD-10-CM

## 2020-07-07 DIAGNOSIS — H53.9 VISION DISTURBANCE: ICD-10-CM

## 2020-07-07 DIAGNOSIS — M06.00 SERONEGATIVE RHEUMATOID ARTHRITIS (H): Primary | ICD-10-CM

## 2020-07-07 ASSESSMENT — PAIN SCALES - GENERAL: PAINLEVEL: NO PAIN (0)

## 2020-07-07 NOTE — LETTER
7/7/2020       RE: Reina Conway  [address] [facility] [address]     Dear Colleague,    Thank you for referring your patient, Reina Conway, to the [facility] RHEUMATOLOGY at [facility]. Please see a copy of my visit note below.    Patient does have some questions for provider about the Humira and Methotrexate - her last dose for both was 6/15.      Reina Conway is a 65 year old female who is being evaluated via a billable video visit.        Video-Visit Details    Type of service:  Video Visit    Originating Location (pt. Location): Home    Distant Location (provider location):  [facility] RHEUMATOLOGY     Platform used for Video Visit: AmWell     ALLERGIES  Barium sulfate; Fosamax; and No clinical screening - see comments     Additional provider notes:   [facility] - Rheumatology Clinic Visit     Reina Conway  is a 64 year old here for transfer of care for rheumatoid arthritis (RA) deforming.      Review-Seen Dr. Frost until 2010 then swtiched to [facility], then re-established 3/2013 (xrays 2/2013 [place]). Dr. Frost retired 8-2019, co-manage with Dr. 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 Methotrexate helped but did not cause a remission/low disease state. She required Prednisone to control symptoms partially but still had significant ADL issues. We had persistently recommended anti TNF therapy which she resisted. She sought a second opinion at [place] who recommended the same things, and she continued care there as of August 2010, then switched back to Dr. Frost 3/2013. Begun on Enbrel at [facility]. 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 As no other etiology found it was felt it  "might be due to Enbrel and she was switched to Humira. She has remained on Methotrexate, primarily 25 mg weekly, decreased 3/2013 (not to go <15mg week due to risk of further deformities or uveitis) . FRAX 32% at [facility]. Prior declined biphosphosphonate, [facility] recommended IV.      Xrays 3/2013 [facility]: See records. Hallux valgus right, hammertoes L>R, hindfoot valgus, pes planus. Dislocated left 2nd MTP, sublux left 3rd MTP, arth 2-3 MTP, rt 1st MTP. Mild DJD hands. Several DIP and 1st CMC. Subluxation left thumb IP and persistant dorsiflexion right wrist. Previous visits discussed stress in her life. Her father in law passed away and there had been a lot of stress and she put her issues on the back burner. She had an episode of chest/epigastric pain and was seen at [facility]. She had apparent negative cardiac evaluation although did not followup with a stress test. She is taking OTC Zantac prn and thinks that helps. Found allergic to Wheat, avoid gluten and sugars. Feels much improved [heartburn, bloating, blood in stools, irregular stools] this really changed her digestive system. She went on a gluten free diet in December, and stopped all RA meds in early Jan 2015. See My Chart message. Restarted Humira early April 2015 every 3 weeks. Noticed more migatory joints pains, swelling. Right hand, right knee, right wrist-associated swelling really in right 2nd MCP. 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 Lack of movement and coordinations, using her wrist brace. Uses this at night and daily prn. At last visit she had continued on Humira without side effects and still feels it is helpful. She does have daily discomfort in several areas with either activity or prior damage, especially knees. She continued to have stress in her life but is working through this and \"trying to get back on track\". She stopped drinking any alcohol for the past two months and was congratulated on this. She went to  but does not feel she is " alcoholic.  Leonardo going to [place]. She has been  for a long time. Both lost parents, and grieving. She says she's in less denial. She admits  is helping a lot. She called in November due to increased joint pain and we restarted Methotrexate at 10 mg weekly as previously discussed.  She stopped it two weeks ago.  She noted more joint pain and also some nausea on day of dose.  We discussed this at length and unlikely that MTX contributed to more joint symptoms. At January 2016 visit we readded Methotrexate to Humira in hopes of decreasing disease activity, using low dose due to prior side effect complaints.  She called later that month with flare symptoms requiring Prednisone bridge, and we increased Methotrexate to more standard dose of 15 mg weekly. At her April 2016 visit she had started improving.. She was tolerating Methotrexate this time.  She weaned off Prednisone.     Copy forward initial visit September 5, 2019  Tyson PEÑA:   Reports rheumatoid arthritis (RA) is controlled overall and feels her deformities are stable. Some gelling of the knees, contractures and right hand contracture deformities, and rigidity of the neck. This does affect her mobility, fine motor and dexterity, and recently rightTMJ MSK pain felt due to the root canal and neck. Gelling of joint sit to stand. At times knees do swelling. No big rheumatoid arthritis (RA) flares and no infections. Denies any fever, chills, SOB, LEE, night sweats, or chest pain, or cough. Reports healthy. No cyclical wearing down. Denies any n/t arms, legs or loss BB or weakness. No redflags. No neck pain.      Continues adalimumab (humira) 40 mg injection every 14 days, methotrexate  15 mg every 7 days MON folic acid  2 mg day tolerating no side-effects. No prednisone or NSAID use.     April 22, 2020  Rheumatoid arthritis (RA) active in right wrist, deformities stable. But hard to get her feet fungus and foot care or to do due to her  "hand deformities. Does not have PCP. Not had mammogram. Fell Sept dx with right femur fracture at [facility] Villator[facility], did not need surgery but was chair bound for 2 month. Walking is better. Numb in that area is improved. Feels going good and walking better.      Denies any fever, chills, SOB, LEE, night sweats, or chest pain, high fever, cough, travel in last 14 days or exposure to covid-19 (coronavirus). Reports healthy and can speak normally in full sentences.     Continues adalimumab (humira) 40 mg injection every 14 days, methotrexate  15 mg every 7 days MON folic acid  2 mg day tolerating no side-effects. No prednisone or NSAID use.        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 History grave's, menopause, osteoporosis borderline, rheumatoid arthritis (RA), pharyngeal dysphagia, hammertoe       Surgical-fibroid left breast     FH:  No autoimmune disorders, psoriasis, UC, crohn's, SLE, RA, PsA, gout, autoimmune thyroid. No MS, heart disease in family  Mother-breast cancer, parkinson, osteoporosis , low back issues-passed    Father-anxiety, dementia-passed after hip fracture   Siblings-brother \"not live well\"   Children-None   M aunt breast cancer   Cousin mental illness      SH:  Occasionally moderate Alcohol 1 drink few times a week not M-W . No Smoking. No IVDU. . Retired       PMSH personally reviewed and updated by me.     ROS:  Negative raynaud s phenomena, hairloss, sun sensitivities, keratoconjunctivitis sicca, pleurisy, significant rashes like malar, oral/nasal or ulcerations, inflammatory eye disease, inflammatory bowel disease, dactylitis, tenosynovitis, or enthespathy. Negative blood clots, gout, psoriasis, UC, crohn's. No temporal headache, no jaw claudication, no scalp tenderness, vision changes, carotidynia, cough. No Parotid swelling.     CONSTITUTIONAL: No fevers, night " sweats or unintentional weight change. No acute distress, swollen glands  EYES: No vision change, diplopia, pain in eyes or red eyes   EARS, NOSE, MOUTH, THROAT: No tinnitus or hearing change, no epistaxis or nasal discharge, no oral lesions, throat clear. Normal saliva pool.  No drymouth. No thyroid enlargement.   CARDIOVASCULAR: No chest pain, palpitations, or pain with walking, no orthopnea or PND   RESPIRATORY: No dyspnea, cough, shortness of breath or wheezing. No pleurisy.   GI: No nausea, vomiting, diarrhea or constipation, no abdominal pain, or blood in stools.   : No change in urine, no dysuria or hematuria   MUSCKL: see above.   INTEGUMENTARY: No concerning lesions or moles   NEURO: No loss of strength or sensation, no numbness or tingling, no tremor, no dizziness, no headache. No falls   ENDO: No polyuria or polydipsia, no temperature intolerance   HEME/LYMPH:No concerning bumps, bleeding problems, or swollen lymph nodes. No recent infections, hospitalizations or new illnesses.   Otherwise 14 point ROS obtained, reviewed and found negative.     July 7, 2020 interval history:    Patient is currently been off of her Humira for several weeks due to a concern of possible infection because of a sore throat.  She believes from looking at her records the Solange 15 was her last dose.  She got particular concerned about the coronavirus because she works at home and has really been pretty much in isolation.    Despite being off it for a few weeks she is noticed only some very slight swelling in her right second and third MCPs.  Morning stiffness remains under 10 to 15 minutes.  Her right hand has been the most problematic and she needs to use that the most so that is of concern to her but she is able to do pretty much everything she needs to do.    She is having some cloudiness in her vision of her right eye.  That is a concern to her because she was diagnosed in 2015 with uveitis but unlike that time she really  has no pain with this.  This is been fairly subacute in its onset going back is much is several months.  She does not have the same thing going on in the other eye however.  It is not getting better although it is not getting a lot worse.    She continues to have some foot problems primarily calluses and will be seeing Dr. Gastelum back in August.    She otherwise feels like she is doing pretty well with her arthritis and denies any other new medical problems.    We do not have any labs for her however now for over 6 months.  She has continued on her methotrexate and has not had any symptomatic toxicity and really does not have a history of any laboratory toxicity with that in the past either.    Video physical examination shows her to be in no acute distress.  The joint examination of her hands shows clear subluxation at the level of her wrist and some probable synovitis within her wrist subject of the limitations of the video exam.  She also has fullness in the right second and third MCPs consistent with her symptoms there.    Impression: Per the problem list    Plan:    I did recommend that she go ahead and get her labs.  It is been over 6 months and she is going to need to have them done at some point in the not too distant future.  It is probably is safe in terms of viral exposure right now as it is going to be for the next 6 months is my assessment and we discussed that.    I do think she should see ophthalmology and I made that referral today.  I am unsure what her cloudiness in her eye represents.  It does not really sound like it is likely to be uveitis but given her history and the fact that she does have an inflammatory condition where a variety of forms of inflammatory eye conditions can be seen she certainly should be evaluated.  We did discuss however that Humira would usually be a good treatment for uveitis because she was worried whether it, like Enbrel which she was on previously, was considered to  be not effective or possibly even risky for the development of these conditions and I assured her that in general it was not.    She otherwise seems to be doing well.  I would not, based on her current features suggest a change in therapy.  Even if we are considering that I prefer to see her in person so I could redo a joint examination before making a significant change in therapy.  What might convince me that we would need to do that of course is if she is having inflammatory eye features that have developed despite her being on Humira.  Will need to consider a change if that is the case.    This video visit commenced at 4:38 PM was completed at 5:09 PM.    This visit was 31 mins in duration, the majority spent in counseling.       EARLINE Batista MD, PhD    Rheumatology

## 2020-07-07 NOTE — PROGRESS NOTES
"Patient does have some questions for provider about the Humira and Methotrexate - her last dose for both was 6/15.      Reina Conway is a 65 year old female who is being evaluated via a billable video visit.      The patient has been notified of following:     \"This video visit will be conducted via a call between you and your physician/provider. We have found that certain health care needs can be provided without the need for an in-person physical exam.  This service lets us provide the care you need with a video conversation.  If a prescription is necessary we can send it directly to your pharmacy.  If lab work is needed we can place an order for that and you can then stop by our lab to have the test done at a later time.    Video visits are billed at different rates depending on your insurance coverage.  Please reach out to your insurance provider with any questions.    If during the course of the call the physician/provider feels a video visit is not appropriate, you will not be charged for this service.\"    Patient has given verbal consent for Video visit? Yes  How would you like to obtain your AVS? MyChart    Will anyone else be joining your video visit? No  Video-Visit Details    Type of service:  Video Visit    Originating Location (pt. Location): Home    Distant Location (provider location):   [id number]     Platform used for Video Visit: AmWell     ALLERGIES  Barium sulfate; Fosamax; and No clinical screening - see comments     Additional provider notes:   [facility] - Rheumatology Clinic Visit     Reina Conway  is a 64 year old here for transfer of care for rheumatoid arthritis (RA) deforming.      Review-Seen Dr. Frost until 2010 then swtiched to [facility], then re-established 3/2013 (xrays 2/2013 [place]). Dr. Frost retired 8-2019, co-manage with Dr. 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 Methotrexate helped but did not cause a remission/low disease state. She required Prednisone to control symptoms partially but still had significant ADL issues. We had persistently recommended anti TNF therapy which she resisted. She sought a second opinion at [facility] who recommended the same things, and she continued care there as of August 2010, then switched back to Dr. Frost 3/2013. Begun on Enbrel at [facility]. 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 As no other etiology found it was felt it might be due to Enbrel and she was switched to Humira. She has remained on Methotrexate, primarily 25 mg weekly, decreased 3/2013 (not to go <15mg week due to risk of further deformities or uveitis) . FRAX 32% at [facility]. Prior declined biphosphosphonate, [facility] recommended IV.      Xrays 3/2013 [facility]: See records. Hallux valgus right, hammertoes L>R, hindfoot valgus, pes planus. Dislocated left 2nd MTP, sublux left 3rd MTP, arth 2-3 MTP, rt 1st MTP. Mild DJD hands. Several DIP and 1st CMC. Subluxation left thumb IP and persistant dorsiflexion right wrist. Previous visits discussed stress in her life. Her father in law passed away and there had been a lot of stress and she put her issues on the back burner. She had an episode of chest/epigastric pain and was seen at [facility]. She had apparent negative cardiac evaluation although did not followup with a stress test. She is taking OTC Zantac prn and thinks that helps. Found allergic to Wheat, avoid gluten and sugars. Feels much improved [heartburn, bloating, blood in stools, irregular stools] this really changed her digestive system. She went on a gluten free diet in December, and stopped all RA meds in early Jan 2015. See My Chart message. Restarted Humira early April 2015 every 3 weeks.  "Noticed more migatory joints pains, swelling. Right hand, right knee, right wrist-associated swelling really in right 2nd MCP. 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 Lack of movement and coordinations, using her wrist brace. Uses this at night and daily prn. At last visit she had continued on Humira without side effects and still feels it is helpful. She does have daily discomfort in several areas with either activity or prior damage, especially knees. She continued to have stress in her life but is working through this and \"trying to get back on track\". She stopped drinking any alcohol for the past two months and was congratulated on this. She went to  but does not feel she is alcoholic.  Leonardo going to [facility]. She has been  for a long time. Both lost parents, and grieving. She says she's in less denial. She admits  is helping a lot. She called in November due to increased joint pain and we restarted Methotrexate at 10 mg weekly as previously discussed.  She stopped it two weeks ago.  She noted more joint pain and also some nausea on day of dose.  We discussed this at length and unlikely that MTX contributed to more joint symptoms. At January 2016 visit we readded Methotrexate to Humira in hopes of decreasing disease activity, using low dose due to prior side effect complaints.  She called later that month with flare symptoms requiring Prednisone bridge, and we increased Methotrexate to more standard dose of 15 mg weekly. At her April 2016 visit she had started improving.. She was tolerating Methotrexate this time.  She weaned off Prednisone.     Copy forward initial visit September 5, 2019  Tyson Alicea APRN:   Reports rheumatoid arthritis (RA) is controlled overall and feels her deformities are stable. Some gelling of the knees, contractures and right hand contracture deformities, and rigidity of the neck. This does affect her mobility, fine motor and dexterity, and recently rightTMJ MSK pain felt " "due to the root canal and neck. Gelling of joint sit to stand. At times knees do swelling. No big rheumatoid arthritis (RA) flares and no infections. Denies any fever, chills, SOB, LEE, night sweats, or chest pain, or cough. Reports healthy. No cyclical wearing down. Denies any n/t arms, legs or loss BB or weakness. No redflags. No neck pain.      Continues adalimumab (humira) 40 mg injection every 14 days, methotrexate  15 mg every 7 days MON folic acid  2 mg day tolerating no side-effects. No prednisone or NSAID use.     April 22, 2020  Rheumatoid arthritis (RA) active in right wrist, deformities stable. But hard to get her feet fungus and foot care or to do due to her hand deformities. Does not have PCP. Not had mammogram. Fell Sept dx with right femur fracture at [facility] Dr. Villatoro, did not need surgery but was chair bound for 2 month. Walking is better. Numb in that area is improved. Feels going good and walking better.      Denies any fever, chills, SOB, LEE, night sweats, or chest pain, high fever, cough, travel in last 14 days or exposure to covid-19 (coronavirus). Reports healthy and can speak normally in full sentences.     Continues adalimumab (humira) 40 mg injection every 14 days, methotrexate  15 mg every 7 days MON folic acid  2 mg day tolerating no side-effects. No prednisone or NSAID use.        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 History grave's, menopause, osteoporosis borderline, rheumatoid arthritis (RA), pharyngeal dysphagia, hammertoe       Surgical-fibroid left breast     FH:  No autoimmune disorders, psoriasis, UC, crohn's, SLE, RA, PsA, gout, autoimmune thyroid. No MS, heart disease in family  Mother-breast cancer, parkinson, osteoporosis , low back issues-passed    Father-anxiety, dementia-passed after hip fracture   Siblings-brother \"not live well\"   Children-None   M " aunt breast cancer   Cousin mental illness      SH:  Occasionally moderate Alcohol 1 drink few times a week not M-W . No Smoking. No IVDU. . Retired       [facility] personally reviewed and updated by me.     ROS:  Negative raynaud s phenomena, hairloss, sun sensitivities, keratoconjunctivitis sicca, pleurisy, significant rashes like malar, oral/nasal or ulcerations, inflammatory eye disease, inflammatory bowel disease, dactylitis, tenosynovitis, or enthespathy. Negative blood clots, gout, psoriasis, UC, crohn's. No temporal headache, no jaw claudication, no scalp tenderness, vision changes, carotidynia, cough. No Parotid swelling.     CONSTITUTIONAL: No fevers, night sweats or unintentional weight change. No acute distress, swollen glands  EYES: No vision change, diplopia, pain in eyes or red eyes   EARS, NOSE, MOUTH, THROAT: No tinnitus or hearing change, no epistaxis or nasal discharge, no oral lesions, throat clear. Normal saliva pool.  No drymouth. No thyroid enlargement.   CARDIOVASCULAR: No chest pain, palpitations, or pain with walking, no orthopnea or PND   RESPIRATORY: No dyspnea, cough, shortness of breath or wheezing. No pleurisy.   GI: No nausea, vomiting, diarrhea or constipation, no abdominal pain, or blood in stools.   : No change in urine, no dysuria or hematuria   MUSCKL: see above.   INTEGUMENTARY: No concerning lesions or moles   NEURO: No loss of strength or sensation, no numbness or tingling, no tremor, no dizziness, no headache. No falls   ENDO: No polyuria or polydipsia, no temperature intolerance   HEME/LYMPH:No concerning bumps, bleeding problems, or swollen lymph nodes. No recent infections, hospitalizations or new illnesses.   Otherwise 14 point ROS obtained, reviewed and found negative.     July 7, 2020 interval history:    Patient is currently been off of her Humira for several weeks due to a concern of possible infection because of a sore throat.  She believes from looking at her  records the [place] 15 was her last dose.  She got particular concerned about the coronavirus because she works at home and has really been pretty much in isolation.    Despite being off it for a few weeks she is noticed only some very slight swelling in her right second and third MCPs.  Morning stiffness remains under 10 to 15 minutes.  Her right hand has been the most problematic and she needs to use that the most so that is of concern to her but she is able to do pretty much everything she needs to do.    She is having some cloudiness in her vision of her right eye.  That is a concern to her because she was diagnosed in 2015 with uveitis but unlike that time she really has no pain with this.  This is been fairly subacute in its onset going back is much is several months.  She does not have the same thing going on in the other eye however.  It is not getting better although it is not getting a lot worse.    She continues to have some foot problems primarily calluses and will be seeing Dr. Gastelum back in August.    She otherwise feels like she is doing pretty well with her arthritis and denies any other new medical problems.    We do not have any labs for her however now for over 6 months.  She has continued on her methotrexate and has not had any symptomatic toxicity and really does not have a history of any laboratory toxicity with that in the past either.    Video physical examination shows her to be in no acute distress.  The joint examination of her hands shows clear subluxation at the level of her wrist and some probable synovitis within her wrist subject of the limitations of the video exam.  She also has fullness in the right second and third MCPs consistent with her symptoms there.    Impression: Per the problem list    Plan:    I did recommend that she go ahead and get her labs.  It is been over 6 months and she is going to need to have them done at some point in the not too distant future.  It is probably  is safe in terms of viral exposure right now as it is going to be for the next 6 months is my assessment and we discussed that.    I do think she should see ophthalmology and I made that referral today.  I am unsure what her cloudiness in her eye represents.  It does not really sound like it is likely to be uveitis but given her history and the fact that she does have an inflammatory condition where a variety of forms of inflammatory eye conditions can be seen she certainly should be evaluated.  We did discuss however that Humira would usually be a good treatment for uveitis because she was worried whether it, like Enbrel which she was on previously, was considered to be not effective or possibly even risky for the development of these conditions and I assured her that in general it was not.    She otherwise seems to be doing well.  I would not, based on her current features suggest a change in therapy.  Even if we are considering that I prefer to see her in person so I could redo a joint examination before making a significant change in therapy.  What might convince me that we would need to do that of course is if she is having inflammatory eye features that have developed despite her being on Humira.  Will need to consider a change if that is the case.    This video visit commenced at 4:38 PM was completed at 5:09 PM.    This visit was 31 mins in duration, the majority spent in counseling.       EARLINE Batista MD, PhD    Rheumatology

## 2020-07-22 DIAGNOSIS — M06.00 SERONEGATIVE RHEUMATOID ARTHRITIS (H): ICD-10-CM

## 2020-07-22 DIAGNOSIS — Z79.899 HIGH RISK MEDICATIONS (NOT ANTICOAGULANTS) LONG-TERM USE: ICD-10-CM

## 2020-07-22 DIAGNOSIS — M21.941: ICD-10-CM

## 2020-07-22 LAB
BASOPHILS # BLD AUTO: 0 10E9/L (ref 0–0.2)
BASOPHILS NFR BLD AUTO: 0.5 %
CRP SERPL-MCNC: 8.1 MG/L (ref 0–8)
DIFFERENTIAL METHOD BLD: ABNORMAL
EOSINOPHIL # BLD AUTO: 0 10E9/L (ref 0–0.7)
EOSINOPHIL NFR BLD AUTO: 0.4 %
ERYTHROCYTE [DISTWIDTH] IN BLOOD BY AUTOMATED COUNT: 13.2 % (ref 10–15)
ERYTHROCYTE [SEDIMENTATION RATE] IN BLOOD BY WESTERGREN METHOD: 10 MM/H (ref 0–30)
HCT VFR BLD AUTO: 47.8 % (ref 35–47)
HGB BLD-MCNC: 15.8 G/DL (ref 11.7–15.7)
LYMPHOCYTES # BLD AUTO: 2.6 10E9/L (ref 0.8–5.3)
LYMPHOCYTES NFR BLD AUTO: 35.6 %
MCH RBC QN AUTO: 31.5 PG (ref 26.5–33)
MCHC RBC AUTO-ENTMCNC: 33.1 G/DL (ref 31.5–36.5)
MCV RBC AUTO: 95 FL (ref 78–100)
MONOCYTES # BLD AUTO: 0.4 10E9/L (ref 0–1.3)
MONOCYTES NFR BLD AUTO: 4.7 %
NEUTROPHILS # BLD AUTO: 4.4 10E9/L (ref 1.6–8.3)
NEUTROPHILS NFR BLD AUTO: 58.8 %
PLATELET # BLD AUTO: 253 10E9/L (ref 150–450)
RBC # BLD AUTO: 5.01 10E12/L (ref 3.8–5.2)
WBC # BLD AUTO: 7.4 10E9/L (ref 4–11)

## 2020-07-22 PROCEDURE — 86039 ANTINUCLEAR ANTIBODIES (ANA): CPT | Performed by: NURSE PRACTITIONER

## 2020-07-22 PROCEDURE — 82040 ASSAY OF SERUM ALBUMIN: CPT | Performed by: NURSE PRACTITIONER

## 2020-07-22 PROCEDURE — 86704 HEP B CORE ANTIBODY TOTAL: CPT | Performed by: NURSE PRACTITIONER

## 2020-07-22 PROCEDURE — 86200 CCP ANTIBODY: CPT | Performed by: NURSE PRACTITIONER

## 2020-07-22 PROCEDURE — 86140 C-REACTIVE PROTEIN: CPT | Performed by: NURSE PRACTITIONER

## 2020-07-22 PROCEDURE — 84460 ALANINE AMINO (ALT) (SGPT): CPT | Performed by: NURSE PRACTITIONER

## 2020-07-22 PROCEDURE — 86481 TB AG RESPONSE T-CELL SUSP: CPT | Performed by: NURSE PRACTITIONER

## 2020-07-22 PROCEDURE — 86038 ANTINUCLEAR ANTIBODIES: CPT | Performed by: NURSE PRACTITIONER

## 2020-07-22 PROCEDURE — 86803 HEPATITIS C AB TEST: CPT | Performed by: NURSE PRACTITIONER

## 2020-07-22 PROCEDURE — 85025 COMPLETE CBC W/AUTO DIFF WBC: CPT | Performed by: NURSE PRACTITIONER

## 2020-07-22 PROCEDURE — 85652 RBC SED RATE AUTOMATED: CPT | Performed by: NURSE PRACTITIONER

## 2020-07-22 PROCEDURE — 87340 HEPATITIS B SURFACE AG IA: CPT | Performed by: NURSE PRACTITIONER

## 2020-07-22 PROCEDURE — 84450 TRANSFERASE (AST) (SGOT): CPT | Performed by: NURSE PRACTITIONER

## 2020-07-22 PROCEDURE — 86431 RHEUMATOID FACTOR QUANT: CPT | Performed by: NURSE PRACTITIONER

## 2020-07-22 PROCEDURE — 82565 ASSAY OF CREATININE: CPT | Performed by: NURSE PRACTITIONER

## 2020-07-22 PROCEDURE — 36415 COLL VENOUS BLD VENIPUNCTURE: CPT | Performed by: NURSE PRACTITIONER

## 2020-07-23 LAB
ALBUMIN SERPL-MCNC: 4 G/DL (ref 3.4–5)
ALT SERPL W P-5'-P-CCNC: 26 U/L (ref 0–50)
ANA PAT SER IF-IMP: ABNORMAL
ANA SER QL IF: POSITIVE
ANA TITR SER IF: ABNORMAL {TITER}
AST SERPL W P-5'-P-CCNC: 24 U/L (ref 0–45)
CREAT SERPL-MCNC: 0.66 MG/DL (ref 0.52–1.04)
GFR SERPL CREATININE-BSD FRML MDRD: >90 ML/MIN/{1.73_M2}
HBV CORE AB SERPL QL IA: NONREACTIVE
HBV SURFACE AG SERPL QL IA: NONREACTIVE
HCV AB SERPL QL IA: NONREACTIVE
RHEUMATOID FACT SER NEPH-ACNC: <7 IU/ML (ref 0–20)

## 2020-07-24 LAB
GAMMA INTERFERON BACKGROUND BLD IA-ACNC: 0.03 IU/ML
M TB IFN-G BLD-IMP: NEGATIVE
M TB IFN-G CD4+ BCKGRND COR BLD-ACNC: 8.27 IU/ML
MITOGEN IGNF BCKGRD COR BLD-ACNC: 0 IU/ML
MITOGEN IGNF BCKGRD COR BLD-ACNC: 0 IU/ML

## 2020-07-25 LAB — CCP AB SER IA-ACNC: 3 U/ML

## 2020-07-27 NOTE — TELEPHONE ENCOUNTER
FUTURE VISIT INFORMATION      FUTURE VISIT INFORMATION:    Date: 8/24/20    Time: 8:00am    Location: [facility]  REFERRAL INFORMATION:    Referring provider:  Figueroa Batista MD   Referring providers clinic:  [facility]    Reason for visit/diagnosis  Seronegative rheumatoid arthritis ;Rheumatoid arthritis of multiple sites without rheumatoid factor; Vision disturbance     RECORDS REQUESTED FROM:       Clinic name Comments Records Status Imaging Status   [facility]   [facility]

## 2020-07-30 ENCOUNTER — DOCUMENTATION ONLY (OUTPATIENT)
Dept: CARE COORDINATION | Facility: CLINIC | Age: 65
End: 2020-07-30

## 2020-08-18 ENCOUNTER — OFFICE VISIT (OUTPATIENT)
Dept: OPHTHALMOLOGY | Facility: CLINIC | Age: 65
End: 2020-08-18
Attending: INTERNAL MEDICINE
Payer: COMMERCIAL

## 2020-08-18 DIAGNOSIS — H20.00 ACUTE ANTERIOR UVEITIS OF RIGHT EYE: Primary | ICD-10-CM

## 2020-08-18 DIAGNOSIS — M06.9 RHEUMATOID ARTHRITIS, INVOLVING UNSPECIFIED SITE, UNSPECIFIED RHEUMATOID FACTOR PRESENCE: ICD-10-CM

## 2020-08-18 DIAGNOSIS — H40.001 GLAUCOMA SUSPECT, RIGHT EYE: ICD-10-CM

## 2020-08-18 DIAGNOSIS — H25.13 NUCLEAR SENILE CATARACT OF BOTH EYES: ICD-10-CM

## 2020-08-18 PROCEDURE — G0463 HOSPITAL OUTPT CLINIC VISIT: HCPCS | Mod: ZF

## 2020-08-18 PROCEDURE — 92015 DETERMINE REFRACTIVE STATE: CPT | Mod: ZF

## 2020-08-18 RX ORDER — CYCLOPENTOLATE HYDROCHLORIDE 10 MG/ML
1 SOLUTION/ DROPS OPHTHALMIC 2 TIMES DAILY
Qty: 1 BOTTLE | Refills: 0 | Status: SHIPPED | OUTPATIENT
Start: 2020-08-18 | End: 2020-10-12

## 2020-08-18 RX ORDER — DIFLUPREDNATE OPHTHALMIC 0.5 MG/ML
1 EMULSION OPHTHALMIC 4 TIMES DAILY
Qty: 1 BOTTLE | Refills: 1 | Status: SHIPPED | OUTPATIENT
Start: 2020-08-18 | End: 2020-09-22

## 2020-08-18 RX ORDER — PREDNISOLONE ACETATE 10 MG/ML
1-2 SUSPENSION/ DROPS OPHTHALMIC 4 TIMES DAILY
Qty: 1 BOTTLE | Refills: 1 | Status: CANCELLED | OUTPATIENT
Start: 2020-08-18

## 2020-08-18 ASSESSMENT — VISUAL ACUITY
OS_SC+: -1
CORRECTION_TYPE: GLASSES
OS_SC: 20/30
OD_CC: 20/40
OD_SC+: +2
OS_CC: 20/20
OD_SC: 20/30 SLOW
OS_PH_SC: 20/20 SLOW
METHOD: SNELLEN - LINEAR
OS_PH_SC+: -1
METHOD: SNELLEN - LINEAR
OD_PH_SC: 20/25 SLOW
OD_PH_SC+: -1

## 2020-08-18 ASSESSMENT — REFRACTION_MANIFEST
OD_ADD: +2.75
OS_CYLINDER: +0.50
OD_AXIS: 015
OD_CYLINDER: +0.50
OD_SPHERE: +0.50
OS_ADD: +2.75
OS_AXIS: 060
OS_SPHERE: +0.75

## 2020-08-18 ASSESSMENT — TONOMETRY
OS_IOP_MMHG: 18
IOP_METHOD: TONOPEN
OD_IOP_MMHG: 18

## 2020-08-18 ASSESSMENT — REFRACTION_WEARINGRX
OD_AXIS: 105
SPECS_TYPE: PAL
OD_CYLINDER: +0.75
OD_SPHERE: +1.25
OS_SPHERE: +1.00
OD_ADD: +2.25
OS_AXIS: 085
OS_CYLINDER: +0.25
OS_ADD: +2.25

## 2020-08-18 ASSESSMENT — CUP TO DISC RATIO
OD_RATIO: 0.55
OS_RATIO: 0.3

## 2020-08-18 ASSESSMENT — SLIT LAMP EXAM - LIDS
COMMENTS: NORMAL
COMMENTS: NORMAL

## 2020-08-18 ASSESSMENT — EXTERNAL EXAM - LEFT EYE: OS_EXAM: NORMAL

## 2020-08-18 ASSESSMENT — EXTERNAL EXAM - RIGHT EYE: OD_EXAM: NORMAL

## 2020-08-18 ASSESSMENT — CONF VISUAL FIELD
OD_NORMAL: 1
OS_NORMAL: 1

## 2020-08-18 NOTE — LETTER
8/18/2020       RE: Reina Conway  [address] [place] [address]     Dear Colleague,    Thank you for referring your patient, Reina Conway, to the EYE CLINIC at [facility]. Please see a copy of my visit note below.    Chief Complaint(s) and History of Present Illness(es)     Retinal Evaluation     Laterality: both eyes    Associated symptoms: eye pain (Very intermittent sharp pain in RE. ),   tearing (Very intermittent tearing in RE, usually more when she lays down   x the last few months) and floaters.  Negative for dryness, redness and   flashes    Pain scale: 0/10              Comments     Pt is being referred here by Dr. Figueroa Batista from Rheumatology. Pt has   a H/o panuveitis and CME right eye back in 2011. Pt last saw Dr. Terry 2017.     -Pt c/o a constant cloudiness in the vision of the RE x with in the last   1-2+ months  -Pt notes black dots floating in the central part of pt's vision x the   last 1-2+ months, more noticeable when in bright light.   -Intermittent twitching in the RUL x the last 1-2+ months   -LE vision is perfect no issues, RE vision fluctuates good and bad, RE   more issues and troubles.     Ocular meds: None    KEISHA Bach 9:40 AM August 18, 2020               Review of systems for the eyes was negative other than the pertinent positives/negatives listed in the HPI.      Presents with several months of blurred vision in right eye. She was seen by rheumatology in 7/7/20 and referred for ophthalmology given her history of panuveitis in the right eye in 2011. Associated symptoms include a bit of tearing in right eye, and intermittent sharp pains in or behind the eye. Additionally, she is reporting multiple small floaters int he right eye. A couple of times noticed flash of light when moving the eye to the side. The left eye she reports is doing well. Denies swelling behind the eyes or pain on eye movements. Denies light sensitivity.  Reports night vision is problematic, and avoids driving at night, the headlights have a lot of glare.     Of note, patient went off humira and methotrexate in June 2020 for 6 weeks (3 doses) because she had sore throat. Resumed Mid July 2020. Eye symptoms in right eye started some time after that.     Previous episode of panuveitis in 2011 was treated at that time with a oral steroid burst followed by PST kenalog and drops over the course of 1 month.     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 Acute anterior uveitis of right eye    2. Rheumatoid arthritis, involving unspecified site, unspecified rheumatoid factor presence (H)    3. Nuclear senile cataract of both eyes    4. Glaucoma suspect, right eye       New symptoms of vision change and vague discomfort in the right eye  Recurrent anterior uveitis seen in the right eye on exam today.  Unclear if vitreous cell is old versus new.  Patient left prior to obtaining scleral depressed exam or OCT mac.      Discussed findings with patient and offered PTSK today.  She wanted to have her  come into the clinic to be involved in the decision making, but they could not wait any longer.  Patient left prior to completing exam as above.  Will start topical therapy for now, Return precautions reviewed.     Plan  Cyclogyl BID right eye  Durezol QID right eye- if not covered can consider pred forte Q1hr right eye   Patient would then warrant glaucoma work up with  OCT RNFL, visual field, pachymetry    Patient disposition:   Follow up with Dr. Velazquez 8/24/20, obtain Optos photos, macular OCT prior to appointment      Mamta Addison MD  Ophthalmology, PGY2    Attending Physician Attestation:  Complete documentation of historical and exam elements from today's encounter can be found in the full encounter summary report (not reduplicated in this progress note).  I personally obtained the chief complaint(s) and history of present illness.  I confirmed and edited as necessary the review of systems, past medical/surgical history, family history, social history, and examination findings as documented by others; and I examined the patient myself.  I personally reviewed the relevant tests, images, and reports as documented above.  I formulated and edited as necessary the assessment and plan and discussed the findings and management plan with the patient and family. . - Maximilian Melendez MD       Again, thank you for allowing me to participate in the care of your patient.      Sincerely,    Maximilian Melendez MD

## 2020-08-18 NOTE — PROGRESS NOTES
Chief Complaint(s) and History of Present Illness(es)     Retinal Evaluation     Laterality: both eyes    Associated symptoms: eye pain (Very intermittent sharp pain in RE. ),   tearing (Very intermittent tearing in RE, usually more when she lays down   x the last few months) and floaters.  Negative for dryness, redness and   flashes    Pain scale: 0/10              Comments     Pt is being referred here by Dr. Figueroa Batista from Rheumatology. Pt has   a H/o panuveitis and CME right eye back in 2011. Pt last saw Dr. Terry 2017.     -Pt c/o a constant cloudiness in the vision of the RE x with in the last   1-2+ months  -Pt notes black dots floating in the central part of pt's vision x the   last 1-2+ months, more noticeable when in bright light.   -Intermittent twitching in the RUL x the last 1-2+ months   -LE vision is perfect no issues, RE vision fluctuates good and bad, RE   more issues and troubles.     Ocular meds: None    Allegra Weeks, COMT 9:40 AM August 18, 2020               Review of systems for the eyes was negative other than the pertinent positives/negatives listed in the HPI.      Presents with several months of blurred vision in right eye. She was seen by rheumatology in 7/7/20 and referred for ophthalmology given her history of panuveitis in the right eye in 2011. Associated symptoms include a bit of tearing in right eye, and intermittent sharp pains in or behind the eye. Additionally, she is reporting multiple small floaters int he right eye. A couple of times noticed flash of light when moving the eye to the side. The left eye she reports is doing well. Denies swelling behind the eyes or pain on eye movements. Denies light sensitivity. Reports night vision is problematic, and avoids driving at night, the headlights have a lot of glare.     Of note, patient went off humira and methotrexate in June 2020 for 6 weeks (3 doses) because she had sore throat. Resumed Mid July 2020. Eye symptoms in right eye  started some time after that.     Previous episode of panuveitis in 2011 was treated at that time with a oral steroid burst followed by PST kenalog and drops over the course of 1 month.     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 Acute anterior uveitis of right eye    2. Rheumatoid arthritis, involving unspecified site, unspecified rheumatoid factor presence (H)    3. Nuclear senile cataract of both eyes    4. Glaucoma suspect, right eye       New symptoms of vision change and vague discomfort in the right eye  Recurrent anterior uveitis seen in the right eye on exam today.  Unclear if vitreous cell is old versus new.  Patient left prior to obtaining scleral depressed exam or OCT mac.      Discussed findings with patient and offered PTSK today.  She wanted to have her  come into the clinic to be involved in the decision making, but they could not wait any longer.  Patient left prior to completing exam as above.  Will start topical therapy for now, Return precautions reviewed.     Plan  Cyclogyl BID right eye  Durezol QID right eye- if not covered can consider pred forte Q1hr right eye   Patient would then warrant glaucoma work up with OCT RNFL, visual field, pachymetry    Patient disposition:   Follow up with Dr. Velazquez 8/24/20, obtain Optos photos, macular OCT prior to appointment      Mamta Addison MD  Ophthalmology, PGY2    Attending Physician Attestation:  Complete documentation of  historical and exam elements from today's encounter can be found in the full encounter summary report (not reduplicated in this progress note).  I personally obtained the chief complaint(s) and history of present illness.  I confirmed and edited as necessary the review of systems, past medical/surgical history, family history, social history, and examination findings as documented by others; and I examined the patient myself.  I personally reviewed the relevant tests, images, and reports as documented above.  I formulated and edited as necessary the assessment and plan and discussed the findings and management plan with the patient and family. . - Maximilian Melendez MD

## 2020-08-18 NOTE — NURSING NOTE
Chief Complaint(s) and History of Present Illness(es)     Retinal Evaluation     In both eyes.  Associated symptoms include eye pain (Very intermittent sharp pain in RE. ), tearing (Very intermittent tearing in RE, usually more when she lays down x the last few months) and floaters.  Negative for dryness, redness and flashes.  Pain was noted as 0/10.              Comments     Pt is being referred here by Dr. Figueroa Batista from Rheumatology. Pt has a H/o panuveitis and CME right eye back in 2011. Pt last saw Dr. Terry 2017.   -Pt c/o a constant cloudiness in the vision of the RE x with in the last 1-2+ months  -Pt notes black dots floating in the central part of pt's vision x the last 1-2+ months, more noticeable when in bright light.   -Intermittent twitching in the RUL x the last 1-2+ months   -LE vision is perfect no issues, RE vision fluctuates good and bad, RE more issues and troubles.     Ocular meds: None    KEISHA Bach 9:40 AM August 18, 2020

## 2020-08-19 ENCOUNTER — TELEPHONE (OUTPATIENT)
Dept: OPHTHALMOLOGY | Facility: CLINIC | Age: 65
End: 2020-08-19

## 2020-08-19 DIAGNOSIS — H20.00 ACUTE ANTERIOR UVEITIS OF RIGHT EYE: ICD-10-CM

## 2020-08-19 RX ORDER — PREDNISOLONE ACETATE 10 MG/ML
1 SUSPENSION/ DROPS OPHTHALMIC 4 TIMES DAILY
Qty: 1 BOTTLE | Refills: 3 | Status: SHIPPED | OUTPATIENT
Start: 2020-08-19 | End: 2020-10-12

## 2020-08-19 NOTE — TELEPHONE ENCOUNTER
Durezol PA denied per epic encounter today-- note to Dr. Melendez assist in review of appeal letter applicable    Leonardo Girard RN 4:32 PM 08/19/20          Ok to use prednisolone every hour in right eye.  Pt may change to durezol if prior authorization approved (will send encounter to PA team to initiate)    Pt aware may change to durezol 4/day if PA approved (also commented ok to change on Prednisolone script to to pharmacy    Pt to take until sees Dr. Velazquez on Monday      Reviewed ok for steroid drops with toenail infection     Pt verbally demonstrated understanding    Note to Dr. Melendez for review    Leonardo Girard RN 1:27 PM 08/19/20             Health Call Center    Phone Message    May a detailed message be left on voicemail: yes     Reason for Call: Medication Question or concern regarding medication   Prescription Clarification  Name of Medication: difluprednate (DUREZOL) 0.05 % ophthalmic emulsion   Prescribing Provider: Dr Melendez   Pharmacy: [id number] [address] [place] [address]    What on the order needs clarification? Pt said her Insurance wouldn't cover was $270.00 for 4 days, she is worried as was to start yesterday, Please give Pt a call to discuss  Thank you,          Action Taken: Message routed to:  [facility] ([facility] eye    Travel Screening: Not Applicable

## 2020-08-21 ENCOUNTER — MYC MEDICAL ADVICE (OUTPATIENT)
Dept: OPHTHALMOLOGY | Facility: CLINIC | Age: 65
End: 2020-08-21

## 2020-08-24 ENCOUNTER — PRE VISIT (OUTPATIENT)
Dept: OPHTHALMOLOGY | Facility: CLINIC | Age: 65
End: 2020-08-24

## 2020-09-21 PROBLEM — H44.111 PANUVEITIS OF RIGHT EYE: Status: ACTIVE | Noted: 2020-09-21

## 2020-09-22 ENCOUNTER — OFFICE VISIT (OUTPATIENT)
Dept: OPHTHALMOLOGY | Facility: CLINIC | Age: 65
End: 2020-09-22
Attending: OPHTHALMOLOGY
Payer: COMMERCIAL

## 2020-09-22 DIAGNOSIS — M06.09 RHEUMATOID ARTHRITIS OF MULTIPLE SITES WITHOUT RHEUMATOID FACTOR (H): ICD-10-CM

## 2020-09-22 DIAGNOSIS — H20.00 ACUTE ANTERIOR UVEITIS OF RIGHT EYE: ICD-10-CM

## 2020-09-22 DIAGNOSIS — H35.351 CYSTOID MACULAR EDEMA OF RIGHT EYE: ICD-10-CM

## 2020-09-22 DIAGNOSIS — H40.051 OCULAR HYPERTENSION, RIGHT EYE: ICD-10-CM

## 2020-09-22 DIAGNOSIS — Z79.899 HIGH RISK MEDICATION USE: ICD-10-CM

## 2020-09-22 DIAGNOSIS — H30.21 INTERMEDIATE UVEITIS OF RIGHT EYE: Primary | ICD-10-CM

## 2020-09-22 PROCEDURE — G0463 HOSPITAL OUTPT CLINIC VISIT: HCPCS | Mod: ZF

## 2020-09-22 PROCEDURE — 92250 FUNDUS PHOTOGRAPHY W/I&R: CPT | Mod: ZF | Performed by: OPHTHALMOLOGY

## 2020-09-22 PROCEDURE — 92134 CPTRZ OPH DX IMG PST SGM RTA: CPT | Mod: ZF | Performed by: OPHTHALMOLOGY

## 2020-09-22 PROCEDURE — 92235 FLUORESCEIN ANGRPH MLTIFRAME: CPT | Mod: ZF | Performed by: OPHTHALMOLOGY

## 2020-09-22 RX ORDER — PREDNISONE 10 MG/1
TABLET ORAL
Qty: 30 TABLET | Refills: 0 | Status: SHIPPED | OUTPATIENT
Start: 2020-09-22 | End: 2020-10-12

## 2020-09-22 ASSESSMENT — CUP TO DISC RATIO
OS_RATIO: 0.4
OD_RATIO: 0.5

## 2020-09-22 ASSESSMENT — REFRACTION_WEARINGRX
OS_CYLINDER: +0.25
SPECS_TYPE: PAL
OD_SPHERE: +1.25
OD_AXIS: 105
OS_ADD: +2.25
OS_SPHERE: +1.00
OS_AXIS: 085
OD_ADD: +2.25
OD_CYLINDER: +0.75

## 2020-09-22 ASSESSMENT — VISUAL ACUITY
OS_CC: 20/20
CORRECTION_TYPE: GLASSES
OD_PH_CC: 20/25
OD_PH_CC+: -1
METHOD: SNELLEN - LINEAR
OD_CC: 20/60

## 2020-09-22 ASSESSMENT — CONF VISUAL FIELD
METHOD: COUNTING FINGERS
OS_NORMAL: 1
OD_NORMAL: 1

## 2020-09-22 ASSESSMENT — TONOMETRY
IOP_METHOD: TONOPEN
IOP_METHOD: APPLANATION
OS_IOP_MMHG: 21
OD_IOP_MMHG: 21
OD_IOP_MMHG: 24
OS_IOP_MMHG: 15

## 2020-09-22 ASSESSMENT — SLIT LAMP EXAM - LIDS
COMMENTS: MILD BLEPHARITIS
COMMENTS: MILD BLEPHARITIS

## 2020-09-22 ASSESSMENT — EXTERNAL EXAM - RIGHT EYE: OD_EXAM: NORMAL

## 2020-09-22 ASSESSMENT — EXTERNAL EXAM - LEFT EYE: OS_EXAM: NORMAL

## 2020-09-22 NOTE — PATIENT INSTRUCTIONS
Start the two eye drops: Prednisolone 4x/day in the right eye. Use the Cyclopentolate 2x/day in the right eye. No drops in the left eye       Continue Humira 40 mg every 2 weeks and Methotrexate 15 mg (6 pills) each week. We will ask Dr. Batista about possibly increasing the dose of methotrexate and possibly switching to Otrexup pen injector for Methotrexate      We recommend a dose of oral prednisone for a short period of time. Start at 20 mg daily for one week, then reduce to 10 mg daily until next week. Here is some information about prednisone:    Prednisone is a steroid pill. It can be used for many different conditions and usually for short periods of time (a few weeks to a few months), but some people may take it for years. Sometimes we might get blood tests before starting this medication, but not always.    Prednisone is often prescribed as 10 mg pills. It is usually easiest to take the whole dose (all the pills) with breakfast because it can minimize side effects. A very common side effect is stomach discomfort and some people take antacid medications to help with these symptoms. Other side effects which can occur include raising the blood pressure, blood sugar, and weight. It can also cause irritability and trouble sleeping. For these reasons, we will try to limit the amount of time that prednisone is taken.

## 2020-09-22 NOTE — PROGRESS NOTES
Chief Complaint/Presenting Concern:  Uveitis evaluation    History of Present Ocular Illness:  Reina Conway is a 65 year old patient who returns for evaluation of pan/anterior uveitis of the right eye from Dr. Melendez.    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  There was a suspicion that this may have been related to the use of Enbrel for her arthritis.  Evaluation at that time was negative for ACE, HLA-B27 and she was treated with a steroid injection around the right eye. A follow up showed that things improved and a second steroid injection finally cleared things up along with a switch from Enbrel to Humira, which has been used with Methotrexate since without episodes of uveitis.    Ms. Conway came in 2017 for an evaluation here with Dr. Terry and did not identify any uveitis. Ms. Conway was evaluated by my partner Dr. Melendez in August 2020 as the patient describes several months of blurred vision in the right eye. She was identified as having a recurrence of the anterior uveitis in the right eye.      There is a period during which the patient was off Humira and methotrexate from May 2020 through June 2020 for roughly 6 weeks due to a sore throat, but these were resumed in July 2020 after a virtual visit with Dr. Batista. Ms. Conway reported that the symptoms in the right eye began shortly thereafter. Topical steroid eyedrops were recommended but the patient was unable to use them due to arthritis of her hand.  This evaluation was done recommended. Ms. Conway reports that the floaters are more noticeable without pain.     Additional Ocular History:  1.  Choroidal nevus left eye  2.  Mild cataracts each eye     Relevant Past Medical/Family/Social History:    1.  Rheumatoid arthritis  2.  Graves' disease treated in 2000  3.  Fall in 2019 necessitating ER visit necessitating two months of bed rest but no surgery. There may  have been a period off Humira/Methoterxate.    No family history of eye disease and no prior smoking history    Review of Systems:  Some right sided facial pain with some jaw stiffness. Some trouble swallowing small methotrexate pills     Laboratory Testing September July 2020 (reviewed)  CRP improved to 8.1, CBC within normal limits, QuantiFERON negative, hepatitis B negative, hepatitis C negative     Current eye related medications: Oral Methotrexate 15 mg weekly, Folic Acid daily, Sub-cutaneous Methotrexate 40 mg Q14 days    Retina/Uveitis Imaging:   OCT Spectralis Macula September 22, 2020  right eye: Slightly blunted contour, no fluid. No obvious NFL thickening  left eye: Normal contour, no fluid. No NFL thickening    Optos Fundus Autofluorescence September 22, 2020  right eye: Mottled autofluorescence   left eye: Normal autofluorescence     Optos Fundus Photos OU (both eyes) September 22, 2020  right eye: Hazy media, pigmentary changes  left eye: Temporal nevus    Optos FA September 22, 2020  right eye: Normal transit. Disc, foveal and diffuse small peripheral vascular leakage  left eye: Minimal disc staining, no foveal, minimal peripheral vascular staining.    Assessment:     1. Intermediate uveitis of right eye  Active with vitreous inflammation and angiographic leakage in macula and periphery. There is angiographic cystoid macular edema but none by OCT.    2. Cystoid macular edema of right eye  None by OCT, but present by angiography today.    3. Acute anterior uveitis of right eye  Still active with keratic precipitates, borderline elevated IOP, keratic precipitates    4. Ocular hypertension, right eye  IOP 21 today without significant cupping.    5. Rheumatoid arthritis of multiple sites without rheumatoid factor (H)  Follows with Dr. Batista and CASEY Alicea    6. High risk medication use  Oral Methotrexate 15 mg weekly, Folic Acid daily, Sub-cutaneous Methotrexate 40 mg Q14  days    Plan/Recommendations:      Discussed findings with patient and . There is still active inflammation int the front and back of the right eye. There is no sight threatening cystoid edema by OCT but there is some inflammation of the blood vessels around the center of the retina and in the periphery by angiography. This flare may have occurred due to being off Humira and Methtorexate for several weeks but has not resolved since last month    No additional testing recommended at this time    Recommend starting the two eye drops: Prednisolone 4x/day in the right eye. Use the Cyclopentolate 2x/day in the right eye. No drops in the left eye     Continue Humira 40 mg every 2 weeks and Methotrexate 15 mg (6 pills) each week. We will ask Dr. Batista about possibly increasing the dose of methotrexate to 20 mg weekly and possibly switching to Otrexup pen injector for Methotrexate due to some difficulty with swallowing pills    We recommend a dose of oral prednisone for a short period of time. Start at 20 mg daily for one week, then reduce to 10 mg daily until next visit. This should help improve this flare along with the topical steroid.    RTC 3 weeks IOP, dilate right eye and no testing    Physician Attestation     Attending Physician Attestation:  Complete documentation of historical and exam elements from today's encounter can be found in the full encounter summary report (not reduplicated in this progress note). I personally obtained the chief complaint(s) and history of present illness. I confirmed and edited as necessary the review of systems, past medical/surgical history, family history, social history, and examination findings as documented by others; and I examined the patient myself. I personally reviewed the relevant tests, images, and reports as documented above. I formulated and edited as necessary the assessment and plan and discussed the findings and management plan with the patient and family  members present at the time of this visit.  Edilson Velazquez M.D., Uveitis and Medical Retina, September 22, 2020

## 2020-09-22 NOTE — NURSING NOTE
Chief Complaints and History of Present Illnesses   Patient presents with     Uveitis Follow-Up     Chief Complaint(s) and History of Present Illness(es)     Uveitis Follow-Up     Laterality: right eye    Onset: gradual    Onset: months ago    Quality: Decreased va in the RE, more cloudy    Associated symptoms: floaters, flashes (occ ) and photophobia (increased)    Pain scale: 0/10              Comments     Here for Acute anterior uveitis of right eye   Never used the gtts due to dexterity   Rosa Lopez COT 1:12 PM September 22, 2020

## 2020-09-22 NOTE — LETTER
9/22/2020       RE: Reina Conway  [address] [facility] [address]     Dear Colleague,    Thank you for referring your patient, Reina Conway, to the EYE CLINIC at [facility]. Please see a copy of my visit note below.    Chief Complaint/Presenting Concern:  Uveitis evaluation    History of Present Ocular Illness:  Reina Conway is a 65 year old patient who returns for evaluation of pan/anterior uveitis of the right eye from Dr. Melendez.    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  There was a suspicion that this may have been related to the use of Enbrel for her arthritis.  Evaluation at that time was negative for ACE, HLA-B27 and she was treated with a steroid injection around the right eye. A follow up showed that things improved and a second steroid injection finally cleared things up along with a switch from Enbrel to Humira, which has been used with Methotrexate since without episodes of uveitis.    Ms. Conway came in 2017 for an evaluation here with Dr. Terry and did not identify any uveitis. Ms. Conway was evaluated by my partner Dr. Melendez in August 2020 as the patient describes several months of blurred vision in the right eye. She was identified as having a recurrence of the anterior uveitis in the right eye.      There is a period during which the patient was off Humira and methotrexate from May 2020 through June 2020 for roughly 6 weeks due to a sore throat, but these were resumed in July 2020 after a virtual visit with Dr. Batista. Ms. Conway reported that the symptoms in the right eye began shortly thereafter. Topical steroid eyedrops were recommended but the patient was unable to use them due to arthritis of her hand.  This evaluation was done recommended. Ms. Conway reports that the floaters are more noticeable without pain.     Additional Ocular History:  1.   Choroidal nevus left eye  2.  Mild cataracts each eye     Relevant Past Medical/Family/Social History:    1.  Rheumatoid arthritis  2.  Graves' disease treated in 2000  3.  Fall in 2019 necessitating ER visit necessitating two months of bed rest but no surgery. There may have been a period off Humira/Methoterxate.    No family history of eye disease and no prior smoking history    Review of Systems:  Some right sided facial pain with some jaw stiffness. Some trouble swallowing small methotrexate pills     Laboratory Testing September July 2020 (reviewed)  CRP improved to 8.1, CBC within normal limits, QuantiFERON negative, hepatitis B negative, hepatitis C negative     Current eye related medications: Oral Methotrexate 15 mg weekly, Folic Acid daily, Sub-cutaneous Methotrexate 40 mg Q14 days    Retina/Uveitis Imaging:   OCT Spectralis Macula September 22, 2020  right eye: Slightly blunted contour, no fluid. No obvious NFL thickening  left eye: Normal contour, no fluid. No NFL thickening    Optos Fundus Autofluorescence September 22, 2020  right eye: Mottled autofluorescence   left eye: Normal autofluorescence     Optos Fundus Photos OU (both eyes) September 22, 2020  right eye: Hazy media, pigmentary changes  left eye: Temporal nevus    Optos FA September 22, 2020  right eye: Normal transit. Disc, foveal and diffuse small peripheral vascular leakage  left eye: Minimal disc staining, no foveal, minimal peripheral vascular staining.    Assessment:     1. Intermediate uveitis of right eye  Active with vitreous inflammation and angiographic leakage in macula and periphery. There is angiographic cystoid macular edema but none by OCT.    2. Cystoid macular edema of right eye  None by OCT, but present by angiography today.    3. Acute anterior uveitis of right eye  Still active with keratic precipitates, borderline elevated IOP, keratic precipitates    4. Ocular hypertension, right eye  IOP 21 today without significant  cupping.      5. Rheumatoid arthritis of multiple sites without rheumatoid factor (H)  Follows with Dr. Batista and CASEY Alicea    6. High risk medication use  Oral Methotrexate 15 mg weekly, Folic Acid daily, Sub-cutaneous Methotrexate 40 mg Q14 days    Plan/Recommendations:      Discussed findings with patient and . There is still active inflammation int the front and back of the right eye. There is no sight threatening cystoid edema by OCT but there is some inflammation of the blood vessels around the center of the retina and in the periphery by angiography. This flare may have occurred due to being off Humira and Methtorexate for several weeks but has not resolved since last month    No additional testing recommended at this time    Recommend starting the two eye drops: Prednisolone 4x/day in the right eye. Use the Cyclopentolate 2x/day in the right eye. No drops in the left eye     Continue Humira 40 mg every 2 weeks and Methotrexate 15 mg (6 pills) each week. We will ask Dr. Batista about possibly increasing the dose of methotrexate to 20 mg weekly and possibly switching to Otrexup pen injector for Methotrexate due to some difficulty with swallowing pills    We recommend a dose of oral prednisone for a short period of time. Start at 20 mg daily for one week, then reduce to 10 mg daily until next visit. This should help improve this flare along with the topical steroid.    RTC 3 weeks IOP, dilate right eye and no testing    Physician Attestation     Attending Physician Attestation:  Complete documentation of historical and exam elements from today's encounter can be found in the full encounter summary report (not reduplicated in this progress note). I personally obtained the chief complaint(s) and history of present illness. I confirmed and edited as necessary the review of systems, past medical/surgical history, family history, social history, and examination findings as documented by others; and I  examined the patient myself. I personally reviewed the relevant tests, images, and reports as documented above. I formulated and edited as necessary the assessment and plan and discussed the findings and management plan with the patient and family members present at the time of this visit.  Edilson Velazquez M.D., Uveitis and Medical Retina, September 22, 2020     Sincerely,    Edilson Velazquez MD  [facility] Dept of Ophthalmology  Uveitis and Medical Retina

## 2020-09-28 ENCOUNTER — OFFICE VISIT (OUTPATIENT)
Dept: ORTHOPEDICS | Facility: CLINIC | Age: 65
End: 2020-09-28

## 2020-09-28 DIAGNOSIS — L84 TYLOMA: ICD-10-CM

## 2020-09-28 DIAGNOSIS — B35.1 OM (ONYCHOMYCOSIS): Primary | ICD-10-CM

## 2020-09-28 DIAGNOSIS — M06.09 RHEUMATOID ARTHRITIS OF MULTIPLE SITES WITHOUT RHEUMATOID FACTOR (H): ICD-10-CM

## 2020-09-28 RX ORDER — NYSTATIN 100000 U/G
CREAM TOPICAL 2 TIMES DAILY
Qty: 90 G | Refills: 2 | Status: SHIPPED | OUTPATIENT
Start: 2020-09-28 | End: 2021-08-05

## 2020-09-28 NOTE — LETTER
9/28/2020         RE: Reina Conway  [address] [facility] [address]        Dear Colleague,    Thank you for referring your patient, Reina Conway, to the [facility]. Please see a copy of my visit note below.    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 Frost     Uveitis      Patient Active Problem List   Diagnosis     Hammer toe     Bunion     Osteopenia     Rheumatoid arthritis of multiple sites without rheumatoid factor (H)     Pain in both wrists     Mechanical limb problems     Pharyngeal dysphagia     Muscle tension dysphonia     Myofascial pain     High risk medication use     Intermediate uveitis of right eye     Cystoid macular edema of right eye     Acute anterior uveitis of right eye     Past Surgical History:   Procedure Laterality Date     Fibroidadenoma Left Breast       NO HISTORY OF SURGERY       Social History     Socioeconomic History     Marital status:      Spouse name: Not on file     Number of children: Not on file     Years of education: Not on file     Highest education level: Not on file   Occupational History     Not on file   Social Needs     Financial resource strain: Not on file     Food insecurity     Worry: Not on file     Inability: Not on file     Transportation needs     Medical: Not on file     Non-medical: Not on file   Tobacco Use     Smoking status: Never Smoker     Smokeless tobacco: Never Used   Substance and Sexual Activity     Alcohol use: Yes     Alcohol/week: 5.8 - 11.7 standard drinks     Comment: social use prn     Drug use: No      Sexual activity: Not on file   Lifestyle     Physical activity     Days per week: Not on file     Minutes per session: Not on file     Stress: Not on file   Relationships     Social connections     Talks on phone: Not on file     Gets together: Not on file     Attends [employer] service: Not on file     Active member of club or organization: Not on file     Attends meetings of clubs or organizations: Not on file     Relationship status: Not on file     Intimate partner violence     Fear of current or ex partner: Not on file     Emotionally abused: Not on file     Physically abused: Not on file     Forced sexual activity: Not on file   Other Topics Concern     Parent/sibling w/ CABG, MI or angioplasty before 65F 55M? Not Asked   Social History Narrative    How much exercise per week? Walking, stairs    How much calcium per day? 2-3 servings       How much caffeine per day? 2-3 cups coffee    How much vitamin D per day?      Do you/your family wear seatbelts?  Yes    Do you/your family use safety helmets? No    Do you/your family use sunscreen? No    Do you/your family keep firearms in the home? No    Do you/your family have a smoke detector(s)? Yes        Do you feel safe in your home? Yes    Has anyone ever touched you in an unwanted manner? No     Explain         September 2, 2014 Tamiko Villa LPN             Family History   Problem Relation Age of Onset     Breast Cancer Mother         parkinsons     Osteoporosis Mother      Low Back Problems Mother      Breast Cancer Maternal Aunt      Other - See Comments Other         Inflammation joint in brother, maternal cousin      Diabetes Maternal Grandmother      Anxiety Disorder Father      Skin Cancer Father      Mental Illness Cousin      Alcoholism Paternal Grandfather      Glaucoma No family hx of      Macular Degeneration No family hx of      Retinal detachment No family hx of      Melanoma No family hx of      SUBJECTIVE FINDINGS:  A 65-year-old female returns to  clinic for callus and onychomycosis.  Relates that they dont hurt.  She has not been using any lotion on her feet.      OBJECTIVE FINDINGS:  DP and PT are 2/4 bilaterally.  She has dorsally contracted digits.  She has nucleated hyperkeratotic tissue buildup 2-4 MPJs left foot.  There is no erythema, no drainage, no odor, no calor.  She has dystrophic, thickened, brittle right Hallux nail with subungual debris, dystrophy and discoloration.  She has long incurvated nails 1-5 bilaterally.        ASSESSMENT AND PLAN:  Onychauxis with onychomycosis.  Tyloma left foot.  Diagnosis and treatment options discussed with the patient.  She relates she cannot trim the nails anymore.  All the nails were debrided or reduced bilaterally upon consent.  Tyloma on left mpjs was debrided with a 15 blade upon consent.  The right Hallux nail bled upon debridement, local wound care with Betadine and bandaid done upon consent and use discussed with her.  Prescription for Nystatin cream given and use discussed with her.  She will return to clinic and see me as needed.     Again, thank you for allowing me to participate in the care of your patient.        Sincerely,        Kenny Gao DPM

## 2020-09-28 NOTE — NURSING NOTE
"Reason For Visit:   Chief Complaint   Patient presents with     Follow Up     Toe nail trimming. Foot care. Nail fungus, right great toe.        Pain Assessment  Patient Currently in Pain: Denies        Allergies   Allergen Reactions     Barium Sulfate      Fosamax      Throat discomfort     No Clinical Screening - See Comments Hives     shrimp  Joints flared after getting possible \"white drink\" after CT/MRI in 2007            Frieda Menchaca LPN    "

## 2020-09-28 NOTE — PROGRESS NOTES
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 Frost     Uveitis      Patient Active Problem List   Diagnosis     Hammer toe     Bunion     Osteopenia     Rheumatoid arthritis of multiple sites without rheumatoid factor (H)     Pain in both wrists     Mechanical limb problems     Pharyngeal dysphagia     Muscle tension dysphonia     Myofascial pain     High risk medication use     Intermediate uveitis of right eye     Cystoid macular edema of right eye     Acute anterior uveitis of right eye     Past Surgical History:   Procedure Laterality Date     Fibroidadenoma Left Breast       NO HISTORY OF SURGERY       Social History     Socioeconomic History     Marital status:      Spouse name: Not on file     Number of children: Not on file     Years of education: Not on file     Highest education level: Not on file   Occupational History     Not on file   Social Needs     Financial resource strain: Not on file     Food insecurity     Worry: Not on file     Inability: Not on file     Transportation needs     Medical: Not on file     Non-medical: Not on file   Tobacco Use     Smoking status: Never Smoker     Smokeless tobacco: Never Used   Substance and Sexual Activity     Alcohol use: Yes     Alcohol/week: 5.8 - 11.7 standard drinks     Comment: social use prn     Drug use: No     Sexual activity: Not on file   Lifestyle     Physical activity     Days per week: Not on file     Minutes per session: Not on file     Stress: Not on file   Relationships     Social connections     Talks on phone: Not on file     Gets together: Not on  file     Attends Judaism service: Not on file     Active member of club or organization: Not on file     Attends meetings of clubs or organizations: Not on file     Relationship status: Not on file     Intimate partner violence     Fear of current or ex partner: Not on file     Emotionally abused: Not on file     Physically abused: Not on file     Forced sexual activity: Not on file   Other Topics Concern     Parent/sibling w/ CABG, MI or angioplasty before 65F 55M? Not Asked   Social History Narrative    How much exercise per week? Walking, stairs    How much calcium per day? 2-3 servings       How much caffeine per day? 2-3 cups coffee    How much vitamin D per day?      Do you/your family wear seatbelts?  Yes    Do you/your family use safety helmets? No    Do you/your family use sunscreen? No    Do you/your family keep firearms in the home? No    Do you/your family have a smoke detector(s)? Yes        Do you feel safe in your home? Yes    Has anyone ever touched you in an unwanted manner? No     Explain         September 2, 2014 Tamiko Villa LPN             Family History   Problem Relation Age of Onset     Breast Cancer Mother         parkinsons     Osteoporosis Mother      Low Back Problems Mother      Breast Cancer Maternal Aunt      Other - See Comments Other         Inflammation joint in brother, maternal cousin      Diabetes Maternal Grandmother      Anxiety Disorder Father      Skin Cancer Father      Mental Illness Cousin      Alcoholism Paternal Grandfather      Glaucoma No family hx of      Macular Degeneration No family hx of      Retinal detachment No family hx of      Melanoma No family hx of      SUBJECTIVE FINDINGS:  A 65-year-old female returns to clinic for callus and onychomycosis.  Relates that they dont hurt.  She has not been using any lotion on her feet.      OBJECTIVE FINDINGS:  DP and PT are 2/4 bilaterally.  She has dorsally contracted digits.  She has nucleated hyperkeratotic tissue  buildup 2-4 MPJs left foot.  There is no erythema, no drainage, no odor, no calor.  She has dystrophic, thickened, brittle right Hallux nail with subungual debris, dystrophy and discoloration.  She has long incurvated nails 1-5 bilaterally.        ASSESSMENT AND PLAN:  Onychauxis with onychomycosis.  Tyloma left foot.  Diagnosis and treatment options discussed with the patient.  She relates she cannot trim the nails anymore.  All the nails were debrided or reduced bilaterally upon consent.  Tyloma on left mpjs was debrided with a 15 blade upon consent.  The right Hallux nail bled upon debridement, local wound care with Betadine and bandaid done upon consent and use discussed with her.  Prescription for Nystatin cream given and use discussed with her.  She will return to clinic and see me as needed.

## 2020-10-12 ENCOUNTER — OFFICE VISIT (OUTPATIENT)
Dept: OPHTHALMOLOGY | Facility: CLINIC | Age: 65
End: 2020-10-12
Attending: OPHTHALMOLOGY
Payer: MEDICARE

## 2020-10-12 ENCOUNTER — TELEPHONE (OUTPATIENT)
Dept: RHEUMATOLOGY | Facility: CLINIC | Age: 65
End: 2020-10-12

## 2020-10-12 DIAGNOSIS — H30.21 INTERMEDIATE UVEITIS OF RIGHT EYE: Primary | ICD-10-CM

## 2020-10-12 DIAGNOSIS — Z79.899 HIGH RISK MEDICATION USE: ICD-10-CM

## 2020-10-12 DIAGNOSIS — M06.09 RHEUMATOID ARTHRITIS OF MULTIPLE SITES WITHOUT RHEUMATOID FACTOR (H): ICD-10-CM

## 2020-10-12 DIAGNOSIS — M06.09 RHEUMATOID ARTHRITIS OF MULTIPLE SITES WITHOUT RHEUMATOID FACTOR (H): Primary | ICD-10-CM

## 2020-10-12 DIAGNOSIS — H20.00 ACUTE ANTERIOR UVEITIS OF RIGHT EYE: ICD-10-CM

## 2020-10-12 DIAGNOSIS — Z79.899 HIGH RISK MEDICATIONS (NOT ANTICOAGULANTS) LONG-TERM USE: ICD-10-CM

## 2020-10-12 DIAGNOSIS — H40.051 OCULAR HYPERTENSION, RIGHT EYE: ICD-10-CM

## 2020-10-12 DIAGNOSIS — H35.351 CYSTOID MACULAR EDEMA OF RIGHT EYE: ICD-10-CM

## 2020-10-12 PROCEDURE — 99214 OFFICE O/P EST MOD 30 MIN: CPT | Performed by: OPHTHALMOLOGY

## 2020-10-12 PROCEDURE — G0463 HOSPITAL OUTPT CLINIC VISIT: HCPCS

## 2020-10-12 RX ORDER — CYCLOPENTOLATE HYDROCHLORIDE 10 MG/ML
1 SOLUTION/ DROPS OPHTHALMIC AT BEDTIME
Qty: 1 BOTTLE | Refills: 2 | Status: SHIPPED | OUTPATIENT
Start: 2020-10-12 | End: 2021-01-05

## 2020-10-12 RX ORDER — PREDNISONE 5 MG/1
TABLET ORAL
Qty: 25 TABLET | Refills: 0 | Status: SHIPPED | OUTPATIENT
Start: 2020-10-12 | End: 2021-01-05

## 2020-10-12 RX ORDER — PREDNISOLONE ACETATE 10 MG/ML
SUSPENSION/ DROPS OPHTHALMIC
Qty: 1 BOTTLE | Refills: 3 | Status: SHIPPED | OUTPATIENT
Start: 2020-10-12 | End: 2021-01-05

## 2020-10-12 ASSESSMENT — VISUAL ACUITY
OD_CC: 20/70
CORRECTION_TYPE: GLASSES
METHOD: SNELLEN - LINEAR
OD_PH_CC: 20/30
OS_CC: 20/20
OD_CC+: -2

## 2020-10-12 ASSESSMENT — CONF VISUAL FIELD
OS_NORMAL: 1
METHOD: COUNTING FINGERS
OD_NORMAL: 1

## 2020-10-12 ASSESSMENT — REFRACTION_WEARINGRX
OD_ADD: +2.25
OS_ADD: +2.25
OS_CYLINDER: +0.25
SPECS_TYPE: PAL
OS_SPHERE: +1.00
OS_AXIS: 085
OD_AXIS: 105
OD_CYLINDER: +0.75
OD_SPHERE: +1.25

## 2020-10-12 ASSESSMENT — SLIT LAMP EXAM - LIDS
COMMENTS: MILD BLEPHARITIS
COMMENTS: MILD BLEPHARITIS

## 2020-10-12 ASSESSMENT — TONOMETRY
OD_IOP_MMHG: 22
IOP_METHOD: TONOPEN
OS_IOP_MMHG: 22

## 2020-10-12 ASSESSMENT — EXTERNAL EXAM - RIGHT EYE: OD_EXAM: NORMAL

## 2020-10-12 ASSESSMENT — CUP TO DISC RATIO: OD_RATIO: 0.5

## 2020-10-12 ASSESSMENT — EXTERNAL EXAM - LEFT EYE: OS_EXAM: NORMAL

## 2020-10-12 NOTE — LETTER
10/12/2020       RE: Reina Conway  [address] [facility] [id number]     Dear Colleague,    Thank you for referring your patient, Reina Conway, to the [facility] at [facility]. Please see a copy of my visit note below.    Chief Complaint/Presenting Concern:  Uveitis follow up    Interval History of Present Ocular Illness:  Reina Conway is a 65 year old patient who returns for follow up of the uveitis of the right eye. Since last visit when the uveitis was active in the right eye on Humira and Methotrexate, we elected to start a course of oral prednisone 20 mg daily for one week, then 10 mg daily until this visit. Humira and Methotrexate were continued along with eye drops at the same doses. Overall, Ms. Conway reports things are slightly better in the right eye, left eye still fine.     Interval Updates to Medical/Family/Social History:  Increased ability to move around the house. Has not heard back from Dr. Batista's office about increasing methotrexate.     Relevant Review of Systems Updates:  Mild nausea and increased on predinsone    Laboratory Testing: None since last visit     Current eye related medications: Humira 40 mg Q14 days, Oral Methotrexate 15 mg weekly, Prednisone 10 mg daily, Prednisolone 4x/day right eye, Cyclopentolate 2x/day right eye     Retina/Uveitis Imaging: None today    Assessment:     1. Intermediate uveitis of right eye  Improving vitreous inflammation on prednisone course      2. Cystoid macular edema of right eye  None by OCT last visit, but present angiographically    3. Acute anterior uveitis of right eye  Improving on prednisone    4. Ocular hypertension, right eye  Improved in the right eye, just above normal left eye today.     5. Rheumatoid arthritis of multiple sites without rheumatoid factor (H)  Improved joint symptoms on prednisone    6. High risk medication use   Humira 40 mg Q14 days, Oral Methotrexate  15 mg weekly, Prednisone 10 mg daily    Plan/Recommendations:      Discussed findings with patient and . The inflammation has improved in the front and back of the right eye on prednisone and drops. Would recommend tapering off prednisone to monitor for recurrence. We discussed that this might allow for eye inflammation and joints to re-flare, but that long term prednisone should be avoided if at all possible    We would like to increase the Methotrexate dose from 6 pills (15 mg) to 8 pills (20 mg) each week if okay with Dr. Batista and NP Tyson Alicea.    The eye pressure is improved to just above normal limits in each eye     No additional diagnostic testing recommended     Continue  Humira 40 mg Q14 days, Oral Methotrexate 15 mg (6 pills) weekly until we hear back from Rheumatology    For the prednisone, reduce to 5 mg for the rest of October.     For the prednisolone drops, we will do a slower taper as we are now coming off the prednisone pills: Use in the right eye: 4x/day for 1 week, then 3x/day for two weeks, then 2x/day until next visit    Reduce the Cyclopentolate to just in the evening in the right eye     No new medications are needed at this time, no drops needed in the left eye     RTC 5-6 weeks IOP, dilate right eye with OCT (ordered)    Physician Attestation     Attending Physician Attestation:  Complete documentation of historical and exam elements from today's encounter can be found in the full encounter summary report (not reduplicated in this progress note). I personally obtained the chief complaint(s) and history of present illness. I confirmed and edited as necessary the review of systems, past medical/surgical history, family history, social history, and examination findings as documented by others; and I examined the patient myself. I personally reviewed the relevant tests, images, and reports as documented above. I formulated and edited as necessary the assessment and plan and discussed  the findings and management plan with the patient and family members present at the time of this visit.  Edilson Velazquez M.D., Uveitis and Medical Retina, October 12, 2020     Sincerely,    Edilson Velazquez MD  [facility] Dept of Ophthalmology  Uveitis and Medical Retina

## 2020-10-12 NOTE — PROGRESS NOTES
Chief Complaint/Presenting Concern:  Uveitis follow up    Interval History of Present Ocular Illness:  Reina Conway is a 65 year old patient who returns for follow up of the uveitis of the right eye. Since last visit when the uveitis was active in the right eye on Humira and Methotrexate, we elected to start a course of oral prednisone 20 mg daily for one week, then 10 mg daily until this visit. Humira and Methotrexate were continued along with eye drops at the same doses. Overall, Ms. Conway reports things are slightly better in the right eye, left eye still fine.     Interval Updates to Medical/Family/Social History:  Increased ability to move around the house. Has not heard back from Dr. Batista's office about increasing methotrexate.     Relevant Review of Systems Updates:  Mild nausea and increased on predinsone    Laboratory Testing: None since last visit     Current eye related medications: Humira 40 mg Q14 days, Oral Methotrexate 15 mg weekly, Prednisone 10 mg daily, Prednisolone 4x/day right eye, Cyclopentolate 2x/day right eye     Retina/Uveitis Imaging: None today    Assessment:     1. Intermediate uveitis of right eye  Improving vitreous inflammation on prednisone course    2. Cystoid macular edema of right eye  None by OCT last visit, but present angiographically    3. Acute anterior uveitis of right eye  Improving on prednisone    4. Ocular hypertension, right eye  Improved in the right eye, just above normal left eye today.     5. Rheumatoid arthritis of multiple sites without rheumatoid factor (H)  Improved joint symptoms on prednisone    6. High risk medication use   Humira 40 mg Q14 days, Oral Methotrexate 15 mg weekly, Prednisone 10 mg daily    Plan/Recommendations:      Discussed findings with patient and . The inflammation has improved in the front and back of the right eye on prednisone and drops. Would recommend tapering off prednisone to monitor for recurrence. We discussed that  this might allow for eye inflammation and joints to re-flare, but that long term prednisone should be avoided if at all possible    We would like to increase the Methotrexate dose from 6 pills (15 mg) to 8 pills (20 mg) each week if okay with Dr. Batista and NP Tyson Alicea.    The eye pressure is improved to just above normal limits in each eye     No additional diagnostic testing recommended     Continue  Humira 40 mg Q14 days, Oral Methotrexate 15 mg (6 pills) weekly until we hear back from Rheumatology    For the prednisone, reduce to 5 mg for the rest of October.     For the prednisolone drops, we will do a slower taper as we are now coming off the prednisone pills: Use in the right eye: 4x/day for 1 week, then 3x/day for two weeks, then 2x/day until next visit    Reduce the Cyclopentolate to just in the evening in the right eye     No new medications are needed at this time, no drops needed in the left eye     RTC 5-6 weeks IOP, dilate right eye with OCT (ordered)    Update November 5, 2020: Patient sent chart message about deferring 11/18/20 visit due to concerns about coming to a health care facility and requested a virtual visit. I wrote back and informed her that this would be less than ideal as I cannot do an eye exam or scans. Advised that she think about things, stay safe, and then let us know if/when she feels comfortable with a visit.     Physician Attestation     Attending Physician Attestation:  Complete documentation of historical and exam elements from today's encounter can be found in the full encounter summary report (not reduplicated in this progress note). I personally obtained the chief complaint(s) and history of present illness. I confirmed and edited as necessary the review of systems, past medical/surgical history, family history, social history, and examination findings as documented by others; and I examined the patient myself. I personally reviewed the relevant tests, images, and  reports as documented above. I formulated and edited as necessary the assessment and plan and discussed the findings and management plan with the patient and family members present at the time of this visit.  Edilson Velazquez M.D., Uveitis and Medical Retina, October 12, 2020

## 2020-10-12 NOTE — TELEPHONE ENCOUNTER
"I seen her 4-2020 and Dr. Bowen 7-2020 (on adalimumab (humira) and methotrexate  15 mg every 7 days, folic acid  2 mg day), was off for few weeks due to sore throat. He referred her to opthalmologist due to history uveitis and history of this.     I will copy Dr. Bowen on this note in case he missed Dr. Velazquez notes. Adalimumab (humira) is indicated for uveitis per FDA.     Labs 7-22   -CCP +CATALINO homogenous CRP 8, - hep B/C -quant TB, normal CBC liver and kidney tests.     I read Dr. Velazquez OV recommendations from 10-12. Recent intermediate uveitis right eye improving on prednisone course, acute anterior uveitis right eye improving.       \"We would like to increase the Methotrexate dose from 6 pills (15 mg) to 8 pills (20 mg) each week if okay with Dr. Bowen and NP Tyson Alicea.    The eye pressure is improved to just above normal limits in each eye     No additional diagnostic testing recommended     Continue  Humira 40 mg Q14 days, Oral Methotrexate 15 mg (6 pills) weekly until we hear back from Rheumatology    For the prednisone, reduce to 5 mg for the rest of October.\"    Recommendations:   If DR. BOWEN agrees, we will increase to 20 mg every 7 days  Labs now and then in 4 week after change.   ISRRAEL Bowen-any more labs or orders     RN to call for update:  **How her rheumatoid arthritis (RA) is, if any recent infections, if tolerating methotrexate, if any alcohol intake (educate her), do labs before she does this and make sure to do them 4 week after the change.     Schedule follow-up  Me now, Dr. Bowen in 3 month     **Get a complete physical with PCP-set-this up. Cancer screening, ASCVD, vaccinations, DEXA (history right femur fx)     **We recommend flu vaccine if she can   **We recommend prevnar 13 vaccine if she can and has not had this  She can talk to primary care about this.   "

## 2020-10-12 NOTE — PATIENT INSTRUCTIONS
Continue  Humira 40 mg Q14 days, Oral Methotrexate 15 mg (6 pills) weekly until we hear back from Rheumatology      For the prednisone, reduce to 5 mg until the end of October 2020.       For the prednisolone drops, we will do a slower taper as we are now coming off the prednisone pills: Use in the right eye: 4x/day for 1 week, then 3x/day for two weeks, then 2x/day until next visit      Reduce the Cyclopentolate to just in the evening in the right eye       No new medications are needed at this time, no drops needed in the left eye     We will see you back in 5-6 weeks

## 2020-10-12 NOTE — NURSING NOTE
Chief Complaints and History of Present Illnesses   Patient presents with     Uveitis Follow-Up     Chief Complaint(s) and History of Present Illness(es)     Uveitis Follow-Up     Laterality: right eye    Onset: gradual    Onset: months ago    Quality: States that the va has improved since starting the gtts      Associated symptoms: floaters (in the RE) and photophobia.  Negative for flashes    Pain scale: 0/10              Comments     Here for Intermediate uveitis of right   Prednisolone 4x/day in the right eye  Cyclopentolate 2x/day in the right eye  Rosa Lopez COT 12:41 PM October 12, 2020

## 2020-10-14 NOTE — TELEPHONE ENCOUNTER
Svetlana-    1. please do below.     2. If she agrees to labs now and if ok, we will adjust methotrexate 20 mg --will send in rx if labs ok and she agrees    3. Schedule me and rhina as outline    4. Schedule PCP as below

## 2020-10-14 NOTE — TELEPHONE ENCOUNTER
Left message for Reina to return my call but will also send her mychart message.    Svetlana Montez MSN, RN  Rheumatology RN Care Coordinator   Trish

## 2020-10-17 ENCOUNTER — MYC MEDICAL ADVICE (OUTPATIENT)
Dept: RHEUMATOLOGY | Facility: CLINIC | Age: 65
End: 2020-10-17

## 2020-10-19 DIAGNOSIS — M06.09 RHEUMATOID ARTHRITIS OF MULTIPLE SITES WITHOUT RHEUMATOID FACTOR (H): ICD-10-CM

## 2020-10-19 DIAGNOSIS — H20.00 ACUTE ANTERIOR UVEITIS OF RIGHT EYE: ICD-10-CM

## 2020-10-19 DIAGNOSIS — Z79.899 HIGH RISK MEDICATIONS (NOT ANTICOAGULANTS) LONG-TERM USE: ICD-10-CM

## 2020-10-19 LAB
CRP SERPL-MCNC: 5.8 MG/L (ref 0–8)
ERYTHROCYTE [DISTWIDTH] IN BLOOD BY AUTOMATED COUNT: 15.4 % (ref 10–15)
ERYTHROCYTE [SEDIMENTATION RATE] IN BLOOD BY WESTERGREN METHOD: 9 MM/H (ref 0–30)
HCT VFR BLD AUTO: 46.6 % (ref 35–47)
HGB BLD-MCNC: 15.4 G/DL (ref 11.7–15.7)
MCH RBC QN AUTO: 31.2 PG (ref 26.5–33)
MCHC RBC AUTO-ENTMCNC: 33 G/DL (ref 31.5–36.5)
MCV RBC AUTO: 94 FL (ref 78–100)
PLATELET # BLD AUTO: 274 10E9/L (ref 150–450)
RBC # BLD AUTO: 4.94 10E12/L (ref 3.8–5.2)
WBC # BLD AUTO: 9.6 10E9/L (ref 4–11)

## 2020-10-19 PROCEDURE — 84450 TRANSFERASE (AST) (SGOT): CPT | Performed by: NURSE PRACTITIONER

## 2020-10-19 PROCEDURE — 36415 COLL VENOUS BLD VENIPUNCTURE: CPT | Performed by: NURSE PRACTITIONER

## 2020-10-19 PROCEDURE — 82565 ASSAY OF CREATININE: CPT | Performed by: NURSE PRACTITIONER

## 2020-10-19 PROCEDURE — 86140 C-REACTIVE PROTEIN: CPT | Performed by: NURSE PRACTITIONER

## 2020-10-19 PROCEDURE — 82040 ASSAY OF SERUM ALBUMIN: CPT | Performed by: NURSE PRACTITIONER

## 2020-10-19 PROCEDURE — 85652 RBC SED RATE AUTOMATED: CPT | Performed by: NURSE PRACTITIONER

## 2020-10-19 PROCEDURE — 84460 ALANINE AMINO (ALT) (SGPT): CPT | Performed by: NURSE PRACTITIONER

## 2020-10-19 PROCEDURE — 85027 COMPLETE CBC AUTOMATED: CPT | Performed by: NURSE PRACTITIONER

## 2020-10-20 LAB
ALBUMIN SERPL-MCNC: 3.9 G/DL (ref 3.4–5)
ALT SERPL W P-5'-P-CCNC: 31 U/L (ref 0–50)
AST SERPL W P-5'-P-CCNC: 23 U/L (ref 0–45)
CREAT SERPL-MCNC: 0.67 MG/DL (ref 0.52–1.04)
GFR SERPL CREATININE-BSD FRML MDRD: >90 ML/MIN/{1.73_M2}

## 2020-10-21 ENCOUNTER — VIRTUAL VISIT (OUTPATIENT)
Dept: RHEUMATOLOGY | Facility: CLINIC | Age: 65
End: 2020-10-21
Attending: NURSE PRACTITIONER
Payer: COMMERCIAL

## 2020-10-21 DIAGNOSIS — R76.8 POSITIVE ANA (ANTINUCLEAR ANTIBODY): Primary | ICD-10-CM

## 2020-10-21 DIAGNOSIS — M06.09 RHEUMATOID ARTHRITIS OF MULTIPLE SITES WITHOUT RHEUMATOID FACTOR (H): ICD-10-CM

## 2020-10-21 DIAGNOSIS — Z79.899 HIGH RISK MEDICATIONS (NOT ANTICOAGULANTS) LONG-TERM USE: ICD-10-CM

## 2020-10-21 PROCEDURE — 99214 OFFICE O/P EST MOD 30 MIN: CPT | Mod: 95 | Performed by: NURSE PRACTITIONER

## 2020-10-21 ASSESSMENT — PAIN SCALES - GENERAL: PAINLEVEL: NO PAIN (0)

## 2020-10-21 NOTE — LETTER
"10/21/2020       RE: Reina Conway  [address] [facility] [address]     Dear Colleague,    Thank you for referring your patient, Reina Conway, to the [facility] RHEUMATOLOGY CLINIC [place] at [facility]. Please see a copy of my visit note below.    Reina Conway is a 65 year old female who is being evaluated via a billable video visit.      The patient has been notified of following:     \"This video visit will be conducted via a call between you and your physician/provider. We have found that certain health care needs can be provided without the need for an in-person physical exam.  This service lets us provide the care you need with a video conversation.  If a prescription is necessary we can send it directly to your pharmacy.  If lab work is needed we can place an order for that and you can then stop by our lab to have the test done at a later time.    Video visits are billed at different rates depending on your insurance coverage.  Please reach out to your insurance provider with any questions.    If during the course of the call the physician/provider feels a video visit is not appropriate, you will not be charged for this service.\"    Patient has given verbal consent for Video visit? Yes  How would you like to obtain your AVS? MyChart    Will anyone else be joining your video visit? No        Video-Visit Details    Type of service:  Video Visit    Video Start Time: 11:40 PM  Video End Time: 12:07 PM    Originating Location (pt. Location): Home    Distant Location (provider location):  [facility] RHEUMATOLOGY [facility]     Platform used for Video Visit: CASEY Ceballos CNP          [facility] - Rheumatology Clinic Visit     Reina Conway  is a 64 year old here for rheumatoid arthritis (RA) deforming. -RF -CCP +CATALINO homogenous 1:320 homogenous. CRP 8 - hep B/C -quant TB 7-2020     Review-Seen  " [facility] until 2010 then swtiched to [facility], then re-established 3/2013 (xrays 2/2013 [place]). Dr. Frost retired 8-2019, co-manage with Dr. 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 Methotrexate helped but did not cause a remission/low disease state. She required Prednisone to control symptoms partially but still had significant ADL issues. We had persistently recommended anti TNF therapy which she resisted. She sought a second opinion at [place] who recommended the same things, and she continued care there as of August 2010, then switched back to Dr. Frost 3/2013. Begun on Enbrel at [facility]. 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 As no other etiology found it was felt it might be due to Enbrel and she was switched to Humira. She has remained on Methotrexate, primarily 25 mg weekly, decreased 3/2013 (not to go <15mg week due to risk of further deformities or uveitis) . FRAX 32% at [place]. Prior declined biphosphosphonate, [place] recommended IV.     Xrays 3/2013 [place]: See records. Hallux valgus right, hammertoes L>R, hindfoot valgus, pes planus. Dislocated left 2nd MTP, sublux left 3rd MTP, arth 2-3 MTP, rt 1st MTP. Mild DJD hands. Several DIP and 1st CMC. Subluxation left thumb IP and persistant dorsiflexion right wrist. Previous visits discussed stress in her life. Her father in law passed away and there had been a lot of stress and she put her issues on the back burner. She had an episode of chest/epigastric pain and was seen at [facility]. She had apparent negative cardiac evaluation although did not followup with a stress test. She is taking OTC Zantac prn and thinks that helps. Found allergic to Wheat, avoid gluten and sugars. Feels much improved  "[heartburn, bloating, blood in stools, irregular stools] this really changed her digestive system. She went on a gluten free diet in December, and stopped all RA meds in early Jan 2015. See My Chart message. Restarted Humira early April 2015 every 3 weeks. Noticed more migatory joints pains, swelling. Right hand, right knee, right wrist-associated swelling really in right 2nd MCP. 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 Lack of movement and coordinations, using her wrist brace. Uses this at night and daily prn. At last visit she had continued on Humira without side effects and still feels it is helpful. She does have daily discomfort in several areas with either activity or prior damage, especially knees. She continued to have stress in her life but is working through this and \"trying to get back on track\". She stopped drinking any alcohol for the past two months and was congratulated on this. She went to  but does not feel she is alcoholic.  Leonardo going to [facility]. She has been  for a long time. Both lost parents, and grieving. She says she's in less denial. She admits  is helping a lot. She called in November due to increased joint pain and we restarted Methotrexate at 10 mg weekly as previously discussed.  She stopped it two weeks ago.  She noted more joint pain and also some nausea on day of dose.  We discussed this at length and unlikely that MTX contributed to more joint symptoms. At January 2016 visit we readded Methotrexate to Humira in hopes of decreasing disease activity, using low dose due to prior side effect complaints.  She called later that month with flare symptoms requiring Prednisone bridge, and we increased Methotrexate to more standard dose of 15 mg weekly. At her April 2016 visit she had started improving.. She was tolerating Methotrexate this time.  She weaned off Prednisone.     Copy forward initial visit September 5, 2019  Tyson Alicea APRN:   Reports rheumatoid arthritis (RA) " "is controlled overall and feels her deformities are stable. Some gelling of the knees, contractures and right hand contracture deformities, and rigidity of the neck. This does affect her mobility, fine motor and dexterity, and recently rightTMJ MSK pain felt due to the root canal and neck. Gelling of joint sit to stand. At times knees do swelling. No big rheumatoid arthritis (RA) flares and no infections. Denies any fever, chills, SOB, LEE, night sweats, or chest pain, or cough. Reports healthy. No cyclical wearing down. Denies any n/t arms, legs or loss BB or weakness. No redflags. No neck pain.      Continues adalimumab (humira) 40 mg injection every 14 days, methotrexate  15 mg every 7 days MON folic acid  2 mg day tolerating no side-effects. No prednisone or NSAID use.      October 21, 2020  Fell Sept 2019 dx with right femur fracture at [facility] Dr. Villatoro, did not need surgery but was chair bound for 2 month. Leonardo is with her now.     Denies any fever, chills, SOB, LEE, night sweats, or chest pain, high fever, cough, travel in last 14 days or exposure to covid-19 (coronavirus). Reports healthy. Follows CDC guidelines.     Looking for new PCP. Wishes to wait until after pandemic as she is afraid to leave her house.     I seen her 4-2020 and Dr. Batista 7-2020 (on adalimumab (humira) and methotrexate  15 mg every 7 days, folic acid  2 mg day), was off for few weeks due to sore throat. He referred her to opthalmologist due to history uveitis and history of this. Adalimumab (humira) is indicated for uveitis per FDA. Dx Right eye uveitis in Aug, put on prednisone 20 mg x 1 week then 10 mg x 1 week then now at 5 mg day and continues the steroid gtts. Plan was to increase methotrexate to 20 mg, labs in 1 month, on prednisone and gtts per ophthalmologist. Right eye is better, vision is near normal when closes her eye, no redness or pain. \"mild fuzz\"\"no black spots\". No pain or pressure at any time even when flaring. No " "infections prior to this. Left eye is good. States will need new glasses after this is done.       Rheumatoid arthritis (RA) is good, right hand is better. Stable. Able to go down stairs. Right hand deformed. Right knee and hips and other joints are good.      Labs 7-22   -CCP +CATALINO homogenous CRP 8, - hep B/C -quant TB, normal CBC liver and kidney tests.      I read Dr. Velazquez OV recommendations from 10-12. Recent intermediate uveitis right eye improving on prednisone course, acute anterior uveitis right eye improving.        \"We would like to increase the Methotrexate dose from 6 pills (15 mg) to 8 pills (20 mg) each week if okay with Dr. Batista and NP Tyson Alicea.    The eye pressure is improved to just above normal limits in each eye     No additional diagnostic testing recommended     Continue  Humira 40 mg Q14 days, Oral Methotrexate 15 mg (6 pills) weekly until we hear back from Rheumatology    For the prednisone, reduce to 5 mg for the rest of October.\"    Continues adalimumab (humira) 40 mg injection every 14 days, methotrexate  15 mg every 7 days MON folic acid  2 mg day tolerating no side-effects maybe slight less appetite day taking. No NSAID use. Prednisone 5 mg day. No side-effects. Reports many years ago at [place] she was taking higher methotrexate but did not tolerate. This was over at least 8 yrs ago. She was on lower folic acid as well.     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 History grave's, menopause, osteoporosis borderline, rheumatoid arthritis (RA), pharyngeal dysphagia, hammertoe      Surgical-fibroid left breast    FH:  No autoimmune disorders, psoriasis, UC, crohn's, SLE, RA, PsA, gout, autoimmune thyroid. No MS, heart disease in family  Mother-breast cancer, parkinson, osteoporosis , low back issues-passed    Father-anxiety, dementia-passed after hip fracture " "  Siblings-brother \"not live well\"   Children-None   M aunt breast cancer   Cousin mental illness      SH:  1 glass Wine few times a week. not M-W . No Smoking. No IVDU.  Leonardo. Retired       [facility] personally reviewed and updated by me.    ROS:  Negative raynaud s phenomena, hairloss, sun sensitivities, keratoconjunctivitis sicca, pleurisy, significant rashes like malar, oral/nasal or ulcerations, inflammatory bowel disease, dactylitis, tenosynovitis, or enthespathy. Negative blood clots, gout, psoriasis, UC, crohn's. No temporal headache, no jaw claudication, no scalp tenderness, vision changes, carotidynia, cough. No Parotid swelling.     CONSTITUTIONAL: No fevers, night sweats or unintentional weight change. No acute distress, swollen glands  EYES: see above.  EARS, NOSE, MOUTH, THROAT: No tinnitus or hearing change, no epistaxis or nasal discharge, no oral lesions, throat clear. Normal saliva pool.  No drymouth. No thyroid enlargement.   CARDIOVASCULAR: No chest pain, palpitations, or pain with walking, no orthopnea or PND   RESPIRATORY: No dyspnea, cough, shortness of breath or wheezing. No pleurisy.   GI: No nausea, vomiting, diarrhea or constipation, no abdominal pain, or blood in stools.   : No change in urine, no dysuria or hematuria   MUSCKL: see above.   INTEGUMENTARY: No concerning lesions or moles   NEURO: No loss of strength or sensation, no numbness or tingling, no tremor, no dizziness, no headache. No falls   ENDO: No polyuria or polydipsia, no temperature intolerance   HEME/LYMPH:No concerning bumps, bleeding problems, or swollen lymph nodes. No recent infections, hospitalizations or new illnesses.   Otherwise 14 point ROS obtained, reviewed and found negative.     Assessment via video:    Constitutional: WD-[id number] cooperative  Eyes: nl EOM, PERRLA, vision, conjunctiva, sclera  ENT: nl external ears, nose, hearing, lips, teeth, gums, throat  Neck: no mass or thyroid enlargement  RESP: No " cough, no audible wheezing, able to talk in full sentences  Skin: no nail pitting, alopecia, rash  Neuro: Cranial nerves grossly intact.  Mentation and speech appropriate for age.  PSYCH: Mentation appears normal, affect normal/bright, judgement and insight intact, normal speech and appearance well-groomed, memory, affect.  Alert and oriented times 3; coherent speech, normal rate and volume, able to articulate logical thoughts, able  to abstract reason, no tangential thoughts, no hallucinations or delusions  His affect is normal and pleasant  MSK: right hip reduced IR/ER, right knee halgus valgus, right 1-3 MCP swelling and styloids swelling, swan deformities right hand with severe deformities, z-thumbs Bilateral subluxation wrists with decreased ROM pannus. Right hand ulnar deviation, right thumb drop , right 5th DIP subluxation, right wrist subluxation, hammertoes. Bunions bilaterally. Deformities of MTPs with palpable MTP heads. Pes planus. contracture of knees with lack of full extension. Nil ROM neck or extension neck. Very restricted in neck ROM. All other TMJ, neck, shoulder, elbow, wrist, hand, knee, ankle, and foot joints found normal. Negative Lhermitte's sign. No periuncle erythema  Remainder of exam unable to be completed due to video visits    Due to efforts to reduce the spread of COVID-19 in the clinic, state, nation, telephone visits are encouraged currently. Patient understands that diagnose and advice is limited by the inability to exam him/her/them face-to-face. Discussed corovid-19 prevention, CDC guidelines/resources and to follow CDC/MDH guidelines. If any signs or symptoms of infection to stop immunosuppression and seek immediate medical attn. That prednisone can increase change of serious infections so should be avoided. Discussed the importance of good hand washing techniques with soap and water for at least 20 seconds, avoid touching eyes, nose, mouth, avoiding crowds, social distancing. We  also agree that the benefits of continued immunosuppression outweigh the risks of COVID-19 infection.       Labs/Imaging:  I have reviewed EHR  Component      Latest Ref Rng & Units 7/22/2020 10/19/2020   WBC      4.0 - 11.0 10e9/L 7.4 9.6   RBC Count      3.8 - 5.2 10e12/L 5.01 4.94   Hemoglobin      11.7 - 15.7 g/dL 15.8 (H) 15.4   Hematocrit      35.0 - 47.0 % 47.8 (H) 46.6   MCV      78 - 100 fl 95 94   MCH      26.5 - 33.0 pg 31.5 31.2   MCHC      31.5 - 36.5 g/dL 33.1 33.0   RDW      10.0 - 15.0 % 13.2 15.4 (H)   Platelet Count      150 - 450 10e9/L 253 274   % Neutrophils      % 58.8    % Lymphocytes      % 35.6    % Monocytes      % 4.7    % Eosinophils      % 0.4    % Basophils      % 0.5    Absolute Neutrophil      1.6 - 8.3 10e9/L 4.4    Absolute Lymphocytes      0.8 - 5.3 10e9/L 2.6    Absolute Monocytes      0.0 - 1.3 10e9/L 0.4    Absolute Eosinophils      0.0 - 0.7 10e9/L 0.0    Absolute Basophils      0.0 - 0.2 10e9/L 0.0    Diff Method       Automated Method    Quantiferon-TB Gold Plus Result      NEG:Negative Negative    TB1 Ag minus Nil Value      IU/mL 0.00    TB2 Ag minus Nil Value      IU/mL 0.00    Mitogen minus Nil Result      IU/mL 8.27    Nil Result      IU/mL 0.03    Creatinine      0.52 - 1.04 mg/dL 0.66 0.67   GFR Estimate      >60 mL/min/1.73:m2 >90 >90   GFR Estimate If Black      >60 mL/min/1.73:m2 >90 >90   CATALINO interpretation      NEG:Negative Positive (A)    CATALINO pattern 1       HOMOGENEOUS    CATALINO titer 1       1:320    Rheumatoid Factor      <12 IU/mL <7    Hepatitis B Core Desiree      NR:Nonreactive Nonreactive    Hep B Surface Agn      NR:Nonreactive Nonreactive    Hepatitis C Antibody      NR:Nonreactive Nonreactive    AST      0 - 45 U/L 24 23   ALT      0 - 50 U/L 26 31   Albumin      3.4 - 5.0 g/dL 4.0 3.9   CRP Inflammation      0.0 - 8.0 mg/L 8.1 (H) 5.8   Sed Rate      0 - 30 mm/h 10 9   Cyclic Citrullinated Peptide Antibody, IgG      <7 U/mL 3       Assessment/Plan:  Impression/Plan:  1. Seronegative destructive deforming contractures RA (-RF/CCP) .   Episode of panuveitis that was attributed to Enbrel 2011. Interval right uveitis, and I deem the rheumatoid arthritis (RA) is not controlled, is improved on low dose prednisone. Plan to try higher methotrexate to try to control rheumatoid arthritis (RA) & inflammatory eye process. Given pat intolerance, she will try up by 1 tab or 2.5 mg x 1-2 week then update me to try to go to 20 mg.This will take about 6 week to start working. Adalimumab (humira) is approved in uveitis. We can add in sulfasalazine or hydroxychloroquine (plaquenil) if this does not work. Had not tried IL-6 or MAP kinase inhibition. Given pandemic and not able to do virtual or face to face, we agreed to continue this adalimumab (humira) and methotrexate dose adjustment.     2. High risk medication monitoring, labs every 12 weeks. Future x-rays     3. Uveitis, right eye, per ophthalmologist. New positive CATALINO, I will add LASHAE, TSH and HIV to next labs. I will ask Dr. Batista if need other labs for this new eye inflammation work-up,. TB and hep B/C neg in July.     4. Alcohol use. Reports abstinent from Alcohol. I discussed the risks with her today on the liver and MTX. She understands. counseled her that she should be careful with alcohol intake while on methotrexate, and REC no more than 1 drink/d, and not on day of or after Methotrexate    5. Severe right hand deformities, would not be able to do vial of methotrexate    ** Due for complete physical with new PCP. Given femur fracture in Sept and not up-to-date with cancer screenings bone health or vaccinations, we discussed her getting complete physical. I am concerned she has osteoporosis. Recommend pneumonia vaccinations, annual flu vaccine and shingrix to prevent infections she can get this at local pharmacy.        PLAN: Discussed in detail with the patient.   1. Continue adalimumab  (humira) 40 mg SQ Q 2 weeks and methotrexate at 17.5 mg every 7 days weekly with 2 mg day Folic acid--for 2 week then notify me if ok, then will adjsut to 20 mg or add in sulfasalazine or hydroxychloroquine (plaquenil)   --Labs in 4 week then when stable every 12 weeks--discussed importance of labs every 12 weeks and hold MTX for any infections.    2 F/U Ophthalmology as ordered. History of and recent anterior uveitis.   3. RTC 3 mths, sooner prn Dr. Batista, me in 6 month    4. Get a complete physical with PCP-set-this up. Cancer screening, ASCVD, vaccinations, DEXA (history right femur fx). We recommend flu vaccine if she can .We recommend prevnar 13 vaccine if she can and has not had this then pneumonia 23 after 8 weeks or more to reduce chance of infections.     The patient understood the rationale for the diagnosis and treatment plan. Patient shared in the decision making. All questions were answered to best of my ability and the patient's satisfaction and agrees with the plan.      Tyson PEÑA CNP, MSN  [facility]  Department of Rheumatology & Autoimmune Disorders      I have reviewed the note as documented above.  This accurately captures the substance of my conversation with the patient. Instructions given to the patient including prescriptions, follow-up appointments and plan, follow-up labs and orders.        Thank-you for allowing me to participate in your care.     Tyson PEÑA CNP, MSN  [facility]  Department of Rheumatology & Autoimmune Disorders  [facility] Rheumatology: [phone] Opt 2, then Opt 1 Scheduling     Education:   1. Immunization: Reviewed CDC guidelines with patient updated, information provided to patient based on CDC guidelines for vaccination recommendations, patient will discuss with primary provider  2. Bone Health:Educated on adequate calcium and vitamin D intake, Educated on exercise, Glucocorticoid education  discussed risks, benefits & potential long term side effects from use per primary provider  3. Discussed routine annual physicals, cancer screening, cardiac risk monitoring, bone health and primary care with primary care provider. Also, educated glucocorticoid increase change of infections including shingles.   4. Educated on diagnosis, prognosis, labs, imaging, treatment options (including risks, benefits, risk of no treatment), medications (use, dose, side-effects, risks of medications including infection/cancer/bone marrow suppression, lab monitoring), infections what to do, plan of cares, goals of treatment. Clinic information given.    5. Educated in Rheumatology we do not prescribe narcotics or manage chronic pain, the patient will need to discuss with primary care or go to a pain clinic for management.   6. Discussed corovid-19 prevention, CDC guidelines/resources and what to do for exposure signs or symptoms and where to go, to follow CDC/MDH guidelines.     Again, thank you for allowing me to participate in the care of your patient.      Sincerely,    CASEY Mac CNP

## 2020-10-21 NOTE — RESULT ENCOUNTER NOTE
The blood counts, liver, kidney and CRP inflammation labs are normal.     Tyson PEÑA, CNP, MSN  10/21/2020  7:29 AM

## 2020-10-21 NOTE — PROGRESS NOTES
[facility] - Rheumatology Clinic Visit     Reina Conway  is a 64 year old here for rheumatoid arthritis (RA) deforming. -RF -CCP +CATALINO homogenous 1:320 homogenous. CRP 8 - hep B/C -quant TB 7-2020     Review-Seen Dr. Frost until 2010 then swtiched to [facility], then re-established 3/2013 (xrays 2/2013 [facility]). Dr. Frost retired 8-2019, co-manage with Dr. 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 Methotrexate helped but did not cause a remission/low disease state. She required Prednisone to control symptoms partially but still had significant ADL issues. We had persistently recommended anti TNF therapy which she resisted. She sought a second opinion at [facility] who recommended the same things, and she continued care there as of August 2010, then switched back to Dr. Frost 3/2013. Begun on Enbrel at [facility]. 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 As no other etiology found it was felt it might be due to Enbrel and she was switched to Humira. She has remained on Methotrexate, primarily 25 mg weekly, decreased 3/2013 (not to go <15mg week due to risk of further deformities or uveitis) . FRAX 32% at [facility]. Prior declined biphosphosphonate, [facility] recommended IV.     Xrays 3/2013 [facility]: See records. Hallux valgus right, hammertoes L>R, hindfoot valgus, pes planus. Dislocated left 2nd MTP, sublux left 3rd MTP, arth 2-3 MTP, rt 1st MTP. Mild DJD hands. Several DIP and 1st CMC. Subluxation left thumb IP and persistant dorsiflexion right wrist. Previous visits discussed stress in her life. Her father in law passed away and there had been a lot of stress and she put her issues on the back burner. She had an episode of chest/epigastric pain and was seen at  "[facility]. She had apparent negative cardiac evaluation although did not followup with a stress test. She is taking OTC Zantac prn and thinks that helps. Found allergic to Wheat, avoid gluten and sugars. Feels much improved [heartburn, bloating, blood in stools, irregular stools] this really changed her digestive system. She went on a gluten free diet in December, and stopped all RA meds in early Jan 2015. See My Chart message. Restarted Humira early April 2015 every 3 weeks. Noticed more migatory joints pains, swelling. Right hand, right knee, right wrist-associated swelling really in right 2nd MCP. 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 Lack of movement and coordinations, using her wrist brace. Uses this at night and daily prn. At last visit she had continued on Humira without side effects and still feels it is helpful. She does have daily discomfort in several areas with either activity or prior damage, especially knees. She continued to have stress in her life but is working through this and \"trying to get back on track\". She stopped drinking any alcohol for the past two months and was congratulated on this. She went to  but does not feel she is alcoholic.  Leonardo going to [facility] She has been  for a long time. Both lost parents, and grieving. She says she's in less denial. She admits  is helping a lot. She called in November due to increased joint pain and we restarted Methotrexate at 10 mg weekly as previously discussed.  She stopped it two weeks ago.  She noted more joint pain and also some nausea on day of dose.  We discussed this at length and unlikely that MTX contributed to more joint symptoms. At January 2016 visit we readded Methotrexate to Humira in hopes of decreasing disease activity, using low dose due to prior side effect complaints.  She called later that month with flare symptoms requiring Prednisone bridge, and we increased Methotrexate to more standard dose of 15 mg " weekly. At her April 2016 visit she had started improving.. She was tolerating Methotrexate this time.  She weaned off Prednisone.     Copy forward initial visit September 5, 2019  Tyson Alicea APRN:   Reports rheumatoid arthritis (RA) is controlled overall and feels her deformities are stable. Some gelling of the knees, contractures and right hand contracture deformities, and rigidity of the neck. This does affect her mobility, fine motor and dexterity, and recently rightTMJ MSK pain felt due to the root canal and neck. Gelling of joint sit to stand. At times knees do swelling. No big rheumatoid arthritis (RA) flares and no infections. Denies any fever, chills, SOB, LEE, night sweats, or chest pain, or cough. Reports healthy. No cyclical wearing down. Denies any n/t arms, legs or loss BB or weakness. No redflags. No neck pain.      Continues adalimumab (humira) 40 mg injection every 14 days, methotrexate  15 mg every 7 days MON folic acid  2 mg day tolerating no side-effects. No prednisone or NSAID use.      October 21, 2020  Fell Sept 2019 dx with right femur fracture at [facility] Dr. Villatoro, did not need surgery but was chair bound for 2 month. Leonardo is with her now.     Denies any fever, chills, SOB, LEE, night sweats, or chest pain, high fever, cough, travel in last 14 days or exposure to covid-19 (coronavirus). Reports healthy. Follows CDC guidelines.     Looking for new PCP. Wishes to wait until after pandemic as she is afraid to leave her house.     I seen her 4-2020 and Dr. Batista 7-2020 (on adalimumab (humira) and methotrexate  15 mg every 7 days, folic acid  2 mg day), was off for few weeks due to sore throat. He referred her to opthalmologist due to history uveitis and history of this. Adalimumab (humira) is indicated for uveitis per FDA. Dx Right eye uveitis in Aug, put on prednisone 20 mg x 1 week then 10 mg x 1 week then now at 5 mg day and continues the steroid gtts. Plan was to increase methotrexate to 20  "mg, labs in 1 month, on prednisone and gtts per ophthalmologist. Right eye is better, vision is near normal when closes her eye, no redness or pain. \"mild fuzz\"\"no black spots\". No pain or pressure at any time even when flaring. No infections prior to this. Left eye is good. States will need new glasses after this is done.       Rheumatoid arthritis (RA) is good, right hand is better. Stable. Able to go down stairs. Right hand deformed. Right knee and hips and other joints are good.      Labs 7-22   -CCP +CATALINO homogenous CRP 8, - hep B/C -quant TB, normal CBC liver and kidney tests.      I read Dr. Velazquez OV recommendations from 10-12. Recent intermediate uveitis right eye improving on prednisone course, acute anterior uveitis right eye improving.        \"We would like to increase the Methotrexate dose from 6 pills (15 mg) to 8 pills (20 mg) each week if okay with Dr. Batista and NP Tyson Alicea.    The eye pressure is improved to just above normal limits in each eye     No additional diagnostic testing recommended     Continue  Humira 40 mg Q14 days, Oral Methotrexate 15 mg (6 pills) weekly until we hear back from Rheumatology    For the prednisone, reduce to 5 mg for the rest of October.\"    Continues adalimumab (humira) 40 mg injection every 14 days, methotrexate  15 mg every 7 days MON folic acid  2 mg day tolerating no side-effects maybe slight less appetite day taking. No NSAID use. Prednisone 5 mg day. No side-effects. Reports many years ago at [place] she was taking higher methotrexate but did not tolerate. This was over at least 8 yrs ago. She was on lower folic acid as well.     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 History grave's, menopause, osteoporosis borderline, rheumatoid arthritis (RA), pharyngeal dysphagia, hammertoe      Surgical-fibroid left breast    FH:  No autoimmune " "disorders, psoriasis, UC, crohn's, SLE, RA, PsA, gout, autoimmune thyroid. No MS, heart disease in family  Mother-breast cancer, parkinson, osteoporosis , low back issues-passed    Father-anxiety, dementia-passed after hip fracture   Siblings-brother \"not live well\"   Children-None   M aunt breast cancer   Cousin mental illness      SH:  1 glass Wine few times a week. not M-W . No Smoking. No IVDU.  Leonardo. Retired       [facility] personally reviewed and updated by me.    ROS:  Negative raynaud s phenomena, hairloss, sun sensitivities, keratoconjunctivitis sicca, pleurisy, significant rashes like malar, oral/nasal or ulcerations, inflammatory bowel disease, dactylitis, tenosynovitis, or enthespathy. Negative blood clots, gout, psoriasis, UC, crohn's. No temporal headache, no jaw claudication, no scalp tenderness, vision changes, carotidynia, cough. No Parotid swelling.     CONSTITUTIONAL: No fevers, night sweats or unintentional weight change. No acute distress, swollen glands  EYES: see above.  EARS, NOSE, MOUTH, THROAT: No tinnitus or hearing change, no epistaxis or nasal discharge, no oral lesions, throat clear. Normal saliva pool.  No drymouth. No thyroid enlargement.   CARDIOVASCULAR: No chest pain, palpitations, or pain with walking, no orthopnea or PND   RESPIRATORY: No dyspnea, cough, shortness of breath or wheezing. No pleurisy.   GI: No nausea, vomiting, diarrhea or constipation, no abdominal pain, or blood in stools.   : No change in urine, no dysuria or hematuria   MUSCKL: see above.   INTEGUMENTARY: No concerning lesions or moles   NEURO: No loss of strength or sensation, no numbness or tingling, no tremor, no dizziness, no headache. No falls   ENDO: No polyuria or polydipsia, no temperature intolerance   HEME/LYMPH:No concerning bumps, bleeding problems, or swollen lymph nodes. No recent infections, hospitalizations or new illnesses.   Otherwise 14 point ROS obtained, reviewed and found negative. "     Assessment via video:    Constitutional: WD-[id number] cooperative  Eyes: nl EOM, PERRLA, vision, conjunctiva, sclera  ENT: nl external ears, nose, hearing, lips, teeth, gums, throat  Neck: no mass or thyroid enlargement  RESP: No cough, no audible wheezing, able to talk in full sentences  Skin: no nail pitting, alopecia, rash  Neuro: Cranial nerves grossly intact.  Mentation and speech appropriate for age.  PSYCH: Mentation appears normal, affect normal/bright, judgement and insight intact, normal speech and appearance well-groomed, memory, affect.  Alert and oriented times 3; coherent speech, normal rate and volume, able to articulate logical thoughts, able  to abstract reason, no tangential thoughts, no hallucinations or delusions  His affect is normal and pleasant  MSK: right hip reduced IR/ER, right knee halgus valgus, right 1-3 MCP swelling and styloids swelling, swan deformities right hand with severe deformities, z-thumbs Bilateral subluxation wrists with decreased ROM pannus. Right hand ulnar deviation, right thumb drop , right 5th DIP subluxation, right wrist subluxation, hammertoes. Bunions bilaterally. Deformities of MTPs with palpable MTP heads. Pes planus. contracture of knees with lack of full extension. Nil ROM neck or extension neck. Very restricted in neck ROM. All other TMJ, neck, shoulder, elbow, wrist, hand, knee, ankle, and foot joints found normal. Negative Lhermitte's sign. No periuncle erythema  Remainder of exam unable to be completed due to video visits    Due to efforts to reduce the spread of COVID-19 in the clinic, state, nation, telephone visits are encouraged currently. Patient understands that diagnose and advice is limited by the inability to exam him/her/them face-to-face. Discussed corovid-19 prevention, CDC guidelines/resources and to follow CDC/MDH guidelines. If any signs or symptoms of infection to stop immunosuppression and seek immediate medical attn. That prednisone can  increase change of serious infections so should be avoided. Discussed the importance of good hand washing techniques with soap and water for at least 20 seconds, avoid touching eyes, nose, mouth, avoiding crowds, social distancing. We also agree that the benefits of continued immunosuppression outweigh the risks of COVID-19 infection.       Labs/Imaging:  I have reviewed EHR  Component      Latest Ref Rng & Units 7/22/2020 10/19/2020   WBC      4.0 - 11.0 10e9/L 7.4 9.6   RBC Count      3.8 - 5.2 10e12/L 5.01 4.94   Hemoglobin      11.7 - 15.7 g/dL 15.8 (H) 15.4   Hematocrit      35.0 - 47.0 % 47.8 (H) 46.6   MCV      78 - 100 fl 95 94   MCH      26.5 - 33.0 pg 31.5 31.2   MCHC      31.5 - 36.5 g/dL 33.1 33.0   RDW      10.0 - 15.0 % 13.2 15.4 (H)   Platelet Count      150 - 450 10e9/L 253 274   % Neutrophils      % 58.8    % Lymphocytes      % 35.6    % Monocytes      % 4.7    % Eosinophils      % 0.4    % Basophils      % 0.5    Absolute Neutrophil      1.6 - 8.3 10e9/L 4.4    Absolute Lymphocytes      0.8 - 5.3 10e9/L 2.6    Absolute Monocytes      0.0 - 1.3 10e9/L 0.4    Absolute Eosinophils      0.0 - 0.7 10e9/L 0.0    Absolute Basophils      0.0 - 0.2 10e9/L 0.0    Diff Method       Automated Method    Quantiferon-TB Gold Plus Result      NEG:Negative Negative    TB1 Ag minus Nil Value      IU/mL 0.00    TB2 Ag minus Nil Value      IU/mL 0.00    Mitogen minus Nil Result      IU/mL 8.27    Nil Result      IU/mL 0.03    Creatinine      0.52 - 1.04 mg/dL 0.66 0.67   GFR Estimate      >60 mL/min/1.73:m2 >90 >90   GFR Estimate If Black      >60 mL/min/1.73:m2 >90 >90   CATALINO interpretation      NEG:Negative Positive (A)    CATALINO pattern 1       HOMOGENEOUS    CATALINO titer 1       1:320    Rheumatoid Factor      <12 IU/mL <7    Hepatitis B Core Desiree      NR:Nonreactive Nonreactive    Hep B Surface Agn      NR:Nonreactive Nonreactive    Hepatitis C Antibody      NR:Nonreactive Nonreactive    AST      0 - 45 U/L 24 23   ALT       0 - 50 U/L 26 31   Albumin      3.4 - 5.0 g/dL 4.0 3.9   CRP Inflammation      0.0 - 8.0 mg/L 8.1 (H) 5.8   Sed Rate      0 - 30 mm/h 10 9   Cyclic Citrullinated Peptide Antibody, IgG      <7 U/mL 3      Assessment/Plan:  Impression/Plan:  1. Seronegative destructive deforming contractures RA (-RF/CCP) .   Episode of panuveitis that was attributed to Enbrel 2011. Interval right uveitis, and I deem the rheumatoid arthritis (RA) is not controlled, is improved on low dose prednisone. Plan to try higher methotrexate to try to control rheumatoid arthritis (RA) & inflammatory eye process. Given pat intolerance, she will try up by 1 tab or 2.5 mg x 1-2 week then update me to try to go to 20 mg.This will take about 6 week to start working. Adalimumab (humira) is approved in uveitis. We can add in sulfasalazine or hydroxychloroquine (plaquenil) if this does not work. Had not tried IL-6 or MAP kinase inhibition. Given pandemic and not able to do virtual or face to face, we agreed to continue this adalimumab (humira) and methotrexate dose adjustment.     2. High risk medication monitoring, labs every 12 weeks. Future x-rays     3. Uveitis, right eye, per ophthalmologist. New positive CATALINO, I will add LASHAE, TSH and HIV to next labs. I will ask Dr. Batista if need other labs for this new eye inflammation work-up,. TB and hep B/C neg in July.     4. Alcohol use. Reports abstinent from Alcohol. I discussed the risks with her today on the liver and MTX. She understands. counseled her that she should be careful with alcohol intake while on methotrexate, and REC no more than 1 drink/d, and not on day of or after Methotrexate    5. Severe right hand deformities, would not be able to do vial of methotrexate    ** Due for complete physical with new PCP. Given femur fracture in Sept and not up-to-date with cancer screenings bone health or vaccinations, we discussed her getting complete physical. I am concerned she has osteoporosis.  Recommend pneumonia vaccinations, annual flu vaccine and shingrix to prevent infections she can get this at local pharmacy.        PLAN: Discussed in detail with the patient.   1. Continue adalimumab (humira) 40 mg SQ Q 2 weeks and methotrexate at 17.5 mg every 7 days weekly with 2 mg day Folic acid--for 2 week then notify me if ok, then will adjsut to 20 mg or add in sulfasalazine or hydroxychloroquine (plaquenil)   --Labs in 4 week then when stable every 12 weeks--discussed importance of labs every 12 weeks and hold MTX for any infections.    2 F/U Ophthalmology as ordered. History of and recent anterior uveitis.   3. RTC 3 mths, sooner prn Dr. Batista, me in 6 month    4. Get a complete physical with PCP-set-this up. Cancer screening, ASCVD, vaccinations, DEXA (history right femur fx). We recommend flu vaccine if she can .We recommend prevnar 13 vaccine if she can and has not had this then pneumonia 23 after 8 weeks or more to reduce chance of infections.     The patient understood the rationale for the diagnosis and treatment plan. Patient shared in the decision making. All questions were answered to best of my ability and the patient's satisfaction and agrees with the plan.      Tyson PEÑA CNP, MSN  [facility]  Department of Rheumatology & Autoimmune Disorders      I have reviewed the note as documented above.  This accurately captures the substance of my conversation with the patient. Instructions given to the patient including prescriptions, follow-up appointments and plan, follow-up labs and orders.        Thank-you for allowing me to participate in your care.     Tyson PEÑA CNP, MSN  [facility]  Department of Rheumatology & Autoimmune Disorders  [facility]: [phone] Opt 2, then Opt 1 Scheduling     Education:   1. Immunization: Reviewed CDC guidelines with patient updated, information provided to patient based on CDC  guidelines for vaccination recommendations, patient will discuss with primary provider  2. Bone Health:Educated on adequate calcium and vitamin D intake, Educated on exercise, Glucocorticoid education discussed risks, benefits & potential long term side effects from use per primary provider  3. Discussed routine annual physicals, cancer screening, cardiac risk monitoring, bone health and primary care with primary care provider. Also, educated glucocorticoid increase change of infections including shingles.   4. Educated on diagnosis, prognosis, labs, imaging, treatment options (including risks, benefits, risk of no treatment), medications (use, dose, side-effects, risks of medications including infection/cancer/bone marrow suppression, lab monitoring), infections what to do, plan of cares, goals of treatment. Clinic information given.    5. Educated in Rheumatology we do not prescribe narcotics or manage chronic pain, the patient will need to discuss with primary care or go to a pain clinic for management.   6. Discussed corovid-19 prevention, CDC guidelines/resources and what to do for exposure signs or symptoms and where to go, to follow CDC/MD guidelines.

## 2020-10-21 NOTE — TELEPHONE ENCOUNTER
I seen her today. Rheumatoid arthritis (RA) is active in right hand 1-3 MCP and styloids (this is deformed hand). Right eye uveitis is improved on steroid gtts and prednisone 5 mg. She agreed to try going up by 2.5 for 2 week to see if she can tolerate this dose then will contact me to try to 20 mg.    If this does not work, we can add on hydroxychloroquine (plaquenil) if the eye provider is ok with this or sulfasalazine for more quicker improvement. Injections wont work with her deformities.  And on medicare now I think    Dr. Batista and Dr. Velazquez --she is now +CATALINO homogenous 1:320. -RF -CCP. No clear SLE. Hard to see she thought shemay have been a red on chest. She is afraid to come in to any visits except eye. She is overdue for complete physical and no longer has PCP--again afraid to get this done with pandemic. Is there any work-up other labs or imaging that needs to be done  with this new +CATALINO in setting of uveitis (she had panuveitis when on many etanercept (enbrel) years ago).

## 2020-10-21 NOTE — PROGRESS NOTES
"Reina Conway is a 65 year old female who is being evaluated via a billable video visit.      The patient has been notified of following:     \"This video visit will be conducted via a call between you and your physician/provider. We have found that certain health care needs can be provided without the need for an in-person physical exam.  This service lets us provide the care you need with a video conversation.  If a prescription is necessary we can send it directly to your pharmacy.  If lab work is needed we can place an order for that and you can then stop by our lab to have the test done at a later time.    Video visits are billed at different rates depending on your insurance coverage.  Please reach out to your insurance provider with any questions.    If during the course of the call the physician/provider feels a video visit is not appropriate, you will not be charged for this service.\"    Patient has given verbal consent for Video visit? Yes  How would you like to obtain your AVS? MyChart    Will anyone else be joining your video visit? No        Video-Visit Details    Type of service:  Video Visit    Video Start Time: 11:40 PM  Video End Time: 12:07 PM    Originating Location (pt. Location): Home    Distant Location (provider location):  [facility] [place]     Platform used for Video Visit: CASEY Ceballos CNP        "

## 2020-10-21 NOTE — PATIENT INSTRUCTIONS
Advise flu and pneumonia vaccines at local pharmacy  Advise a complete physical now with an internal medicine provider   Update me in 2 week if you are tolerating this. Labs in 4-5 week.   I will update your opthalmologist  And dr. Batista. I added more labs     Education:   1. Immunizations: Please review your vaccination history with your primary provider to get up-to-date per the CDC guidelines to reduce infection risks.  2. Bone Health: Please take adequate calcium and vitamin D and exercise.. No smoking, Glucocorticoids (like prednisone or medrol) can increase your chance of infections including shingles, complications from Covid-19 (coronavirus), diabetes, osteoporosis or osteopenia, avascular necrosis, thin skin, cataracts, adrenal insufficiency, heart disease among others. Long-term use you can't stop this abruptly.   3. Important to have a primary care provider. Get yearly physicals, cancer screening including skin and oral, cardiac risk monitoring, bone health and primary care. Seek immediate medical attention for any signs or symptoms of infections. Hold your immunosuppression medications you have an infection and especially if taking antibiotics. No live vaccinations if you are on biologics. If you are not sure, talk to your MD. Limit your alcohol to 0-2 week while on methotrexate or leflunomide (arava)--not the day of or day after taking it.   4. Rheumatology we do not prescribe narcotics or manage chronic pain. You should discuss with primary care or go to a pain clinic for management.   5. Covid-19 (coronavirus) prevention in high risk patients follow CDC guidelines/resources and follow their guidelines what to do for exposure signs or symptoms. Goal to minimize prednisone exposure to reduce infection risks. Good hand washing techniques with soap and water for at least 20 seconds, avoid touching eyes, nose, mouth, avoiding crowds, social distancing. If any signs or symptoms of infection, call 911 go  to ER.   6. For more information on disease or medication education, go to the American College of Rheumatology website. Www.rheumatology.org >go under patient section  7. Always read your medication phamplet for your medications education when you get them from pharmacy   8. It is advised if you are smoking, to work with your primary care provider about a quitting program. We do have one here. Smoking can increase your chance of infections, cancer, heart disease including blood clots or strokes or hypertension and lung disease like COPD.      Thank-you for allowing me to participate in your care.     Tyson PEÑA, CNP, MSN  [facility]  Department of Rheumatology & Autoimmune Disorders  [facility] Rheumatology: [phone] Opt 2, then Opt 1 Scheduling   [facility]: [phone] Option 7 scheduling

## 2020-11-02 ENCOUNTER — MYC MEDICAL ADVICE (OUTPATIENT)
Dept: RHEUMATOLOGY | Facility: CLINIC | Age: 65
End: 2020-11-02

## 2020-11-02 DIAGNOSIS — M06.00 SERONEGATIVE RHEUMATOID ARTHRITIS (H): ICD-10-CM

## 2020-11-04 NOTE — TELEPHONE ENCOUNTER
Patient would like to continue pool and PT at [facility] as Dr. Frost had ordered. Please help me to order this and continue. Agree with this.    Sent message to Pamella to tell her that this patient would be running late.

## 2021-01-05 ENCOUNTER — OFFICE VISIT (OUTPATIENT)
Dept: OPHTHALMOLOGY | Facility: CLINIC | Age: 66
End: 2021-01-05
Attending: OPHTHALMOLOGY
Payer: MEDICARE

## 2021-01-05 DIAGNOSIS — H20.00 ACUTE ANTERIOR UVEITIS OF RIGHT EYE: ICD-10-CM

## 2021-01-05 DIAGNOSIS — H35.351 CYSTOID MACULAR EDEMA OF RIGHT EYE: ICD-10-CM

## 2021-01-05 DIAGNOSIS — H40.051 OCULAR HYPERTENSION, RIGHT EYE: ICD-10-CM

## 2021-01-05 DIAGNOSIS — Z79.899 HIGH RISK MEDICATION USE: ICD-10-CM

## 2021-01-05 DIAGNOSIS — H30.21 INTERMEDIATE UVEITIS OF RIGHT EYE: Primary | ICD-10-CM

## 2021-01-05 DIAGNOSIS — M06.09 RHEUMATOID ARTHRITIS OF MULTIPLE SITES WITHOUT RHEUMATOID FACTOR (H): ICD-10-CM

## 2021-01-05 PROCEDURE — 99214 OFFICE O/P EST MOD 30 MIN: CPT | Performed by: OPHTHALMOLOGY

## 2021-01-05 PROCEDURE — G0463 HOSPITAL OUTPT CLINIC VISIT: HCPCS

## 2021-01-05 PROCEDURE — 92134 CPTRZ OPH DX IMG PST SGM RTA: CPT | Performed by: OPHTHALMOLOGY

## 2021-01-05 RX ORDER — DIFLUPREDNATE OPHTHALMIC 0.5 MG/ML
1 EMULSION OPHTHALMIC 2 TIMES DAILY
Qty: 5 ML | Refills: 4 | Status: SHIPPED | OUTPATIENT
Start: 2021-01-05 | End: 2021-01-05

## 2021-01-05 RX ORDER — ACETAMINOPHEN 325 MG/1
325-650 TABLET ORAL EVERY 6 HOURS PRN
COMMUNITY
End: 2022-09-07

## 2021-01-05 RX ORDER — DORZOLAMIDE HYDROCHLORIDE AND TIMOLOL MALEATE 20; 5 MG/ML; MG/ML
1 SOLUTION/ DROPS OPHTHALMIC 2 TIMES DAILY
Qty: 10 ML | Refills: 5 | Status: SHIPPED | OUTPATIENT
Start: 2021-01-05 | End: 2021-04-21

## 2021-01-05 RX ORDER — DIFLUPREDNATE OPHTHALMIC 0.5 MG/ML
1 EMULSION OPHTHALMIC 2 TIMES DAILY
Qty: 5 ML | Refills: 4 | Status: SHIPPED | OUTPATIENT
Start: 2021-01-05 | End: 2021-04-21

## 2021-01-05 RX ORDER — CYCLOPENTOLATE HYDROCHLORIDE 10 MG/ML
1 SOLUTION/ DROPS OPHTHALMIC 2 TIMES DAILY
Qty: 5 ML | Refills: 3 | Status: SHIPPED | OUTPATIENT
Start: 2021-01-05 | End: 2021-01-05

## 2021-01-05 RX ORDER — CYCLOPENTOLATE HYDROCHLORIDE 10 MG/ML
1 SOLUTION/ DROPS OPHTHALMIC 2 TIMES DAILY
Qty: 5 ML | Refills: 3 | Status: SHIPPED | OUTPATIENT
Start: 2021-01-05 | End: 2021-02-05

## 2021-01-05 RX ORDER — DORZOLAMIDE HYDROCHLORIDE AND TIMOLOL MALEATE 20; 5 MG/ML; MG/ML
1 SOLUTION/ DROPS OPHTHALMIC 2 TIMES DAILY
Qty: 10 ML | Refills: 5 | Status: SHIPPED | OUTPATIENT
Start: 2021-01-05 | End: 2021-01-05

## 2021-01-05 ASSESSMENT — CONF VISUAL FIELD
OD_NORMAL: 1
METHOD: COUNTING FINGERS
OS_NORMAL: 1

## 2021-01-05 ASSESSMENT — REFRACTION_WEARINGRX
OD_ADD: +2.25
OS_ADD: +2.25
OS_CYLINDER: +0.25
OD_AXIS: 105
OD_CYLINDER: +0.75
OD_SPHERE: +1.25
OS_AXIS: 085
OS_SPHERE: +1.00
SPECS_TYPE: PAL

## 2021-01-05 ASSESSMENT — CUP TO DISC RATIO
OD_RATIO: 0.5
OS_RATIO: 0.45

## 2021-01-05 ASSESSMENT — SLIT LAMP EXAM - LIDS
COMMENTS: MILD BLEPHARITIS
COMMENTS: MILD BLEPHARITIS

## 2021-01-05 ASSESSMENT — TONOMETRY
OD_IOP_MMHG: 19
OS_IOP_MMHG: 19
IOP_METHOD: TONOPEN
OD_IOP_MMHG: 29
IOP_METHOD: TONOPEN
OS_IOP_MMHG: 30

## 2021-01-05 ASSESSMENT — EXTERNAL EXAM - LEFT EYE: OS_EXAM: NORMAL

## 2021-01-05 ASSESSMENT — VISUAL ACUITY
OD_PH_SC: 20/30
OD_SC: 20/40
METHOD: SNELLEN - LINEAR
OS_CC+: -2
OS_CC: 20/20

## 2021-01-05 ASSESSMENT — EXTERNAL EXAM - RIGHT EYE: OD_EXAM: NORMAL

## 2021-01-05 NOTE — NURSING NOTE
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

## 2021-01-05 NOTE — PATIENT INSTRUCTIONS
For eye drops, use all three of these drops 2x/day in the right eye: Durezol, Dorzolamide-Timolol, Cyclopentolate. Let me know if these are too expensive    We will hold off on Humira and Methotrexate until you hear from Dr. Batista or Tyson. From my standpoint, reasonable to restart if you are up for it. One other option they might consider is Remicade infusions, but these are also expensive.

## 2021-01-05 NOTE — PROGRESS NOTES
Chief Complaint/Presenting Concern:  Uveitis follow up    Interval History of Present Ocular Illness:  Reina Conway is a 65 year old patient who returns for follow up of her intermediate uveitis of the right eye. At last visit in October 2020, we elected to continue Humira, Methtorexate and prednisone with drops. Prednisone was used until the end of October. Humira 40 mg Q14 days and Methotrexate 17.5 mg (7 pills) weekly were used until around one month ago when Ms. Conway stopped Humira, Methotrexate and Folic Acid due to cost with new Medicare plans. She did not have intolerance or side effects, but the cost of the insurance Ms. Conway has since noticed more floaters, mild discomfort occasionally but more light sensitivity to light. Left eye doing fine.     Interval Updates to Medical/Family/Social History:  Staying safe, working from home. No recent illnesses    Relevant Review of Systems Updates:  Slight increase in joint pain initially off the medication, but feeling better recently. No coughs, colds, no headaches.     Laboratory Testing: None recently     Current eye related medications: No drops and no pills for several weeks    Retina/Uveitis Imaging:   OCT Spectralis Macula January 5, 2021  right eye: Slightly hazier view, early subfoveal IS/OS elevation, although no maricruz fluid.   left eye: Normal contour, no fluid. Stable     Assessment:     1. Intermediate uveitis of right eye  Early return of floaters, no maricruz cystoid macular edema    2. Cystoid macular edema of right eye  No maricruz recurrence but likely early return    3. Acute anterior uveitis of right eye  Most active with large and small keratic precipitates.     4. Ocular hypertension, right eye  Stable off drops    5. Rheumatoid arthritis of multiple sites without rheumatoid factor (H)  Currently joint pain at 4-5 on scale of 1-10 with ten being unbearable    6. High risk medication use  Now off all systemic medications for one month.      Plan/Recommendations:      Discussed findings with patient and her . The inflammation has recurred in the right eye, now one month after stopping Humira and Methotrexate. This is a somewhat expected time frame as these medicines typically take this long to kick in and wear off. As above, there has been some variation in joint pains off these medications as well.    We discussed options including local eye therapy with drops or injections (which have risk of elevating eye pressure and hastening cataract development), or restarting systemic therapy. Ms. Conway and her  would like to discuss with Dr. Batista and DARIANA Alicea regarding their thoughts on these medications or other options given the expense.     We also discussed a course of prednisone, but elected to hold off and start drops only in the right eye. This will help to determine the level of baseline inflammation of joints which might be addressed by systemic treatment.     Do not recommend additional diagnostic testing at this time    The eye pressure is normal but we will start a stronger steroid eye drop to help the anterior and intermediate uveitis and prevent retinal edema, so we will add an eye pressure lowering drop as a precaution    For eye drops, use all three of these drops 2x/day in the right eye: Durezol, Dorzolamide-Timolol, Cyclopentolate. Let me know if these are too expensive    We will hold off on Humira and Methotrexate until you hear from Dr. Batista. One other option they might consider is Remicade infusions, but these are also expensive.    No prednisone or steroid injections at this time. If ultimately we elect to focus on local therapy without Humira or Methotrexate, steroid eye injections might be reasonable if the eye pressure does not elevate with this newly prescribed     RTC  1. Will have virtual visit with Dr. Batista on February 2    2. [place] IOP, dilate right eye, OCT (ordered). Can see sooner PRN patient or  Dr. Batista/NP Deanne    Update February 5, 2021. Patient encouraged to take her Methotrexate by Rheumatology. Cyclogyl not covered by insurance, sent Rx for Atropine once weekly right eye.      Physician Attestation     Attending Physician Attestation:  Complete documentation of historical and exam elements from today's encounter can be found in the full encounter summary report (not reduplicated in this progress note). I personally obtained the chief complaint(s) and history of present illness. I confirmed and edited as necessary the review of systems, past medical/surgical history, family history, social history, and examination findings as documented by others; and I examined the patient myself. I personally reviewed the relevant tests, images, and reports as documented above. I formulated and edited as necessary the assessment and plan and discussed the findings and management plan with the patient and family members present at the time of this visit.  Edilson Velazquez M.D., Uveitis and Medical Retina, January 5, 2021

## 2021-01-05 NOTE — LETTER
1/5/2021       RE: Reina Conway  [address] [facility] [address]     Dear Colleague,    Thank you for referring your patient, Reina Conway, to the [facility] at [facility]. Please see a copy of my visit note below.    Chief Complaint/Presenting Concern:  Uveitis follow up    Interval History of Present Ocular Illness:  Reina Conway is a 65 year old patient who returns for follow up of her intermediate uveitis of the right eye. At last visit in October 2020, we elected to continue Humira, Methtorexate and prednisone with drops. Prednisone was used until the end of October. Humira 40 mg Q14 days and Methotrexate 17.5 mg (7 pills) weekly were used until around one month ago when Ms. Conway stopped Humira, Methotrexate and Folic Acid due to cost with new Medicare plans. She did not have intolerance or side effects, but the cost of the insurance Ms. Conway has since noticed more floaters, mild discomfort occasionally but more light sensitivity to light. Left eye doing fine.     Interval Updates to Medical/Family/Social History:  Staying safe, working from home. No recent illnesses    Relevant Review of Systems Updates:  Slight increase in joint pain initially off the medication, but feeling better recently. No coughs, colds, no headaches.     Laboratory Testing: None recently     Current eye related medications: No drops and no pills for several weeks    Retina/Uveitis Imaging:   OCT Spectralis Macula January 5, 2021  right eye: Slightly hazier view, early subfoveal IS/OS elevation, although no maricruz fluid.   left eye: Normal contour, no fluid. Stable       Assessment:     1. Intermediate uveitis of right eye  Early return of floaters, no maricruz cystoid macular edema    2. Cystoid macular edema of right eye  No maricruz recurrence but likely early return    3. Acute anterior uveitis of right eye  Most active with large and small keratic  precipitates.     4. Ocular hypertension, right eye  Stable off drops    5. Rheumatoid arthritis of multiple sites without rheumatoid factor (H)  Currently joint pain at 4-5 on scale of 1-10 with ten being unbearable    6. High risk medication use  Now off all systemic medications for one month.     Plan/Recommendations:      Discussed findings with patient and her . The inflammation has recurred in the right eye, now one month after stopping Humira and Methotrexate. This is a somewhat expected time frame as these medicines typically take this long to kick in and wear off. As above, there has been some variation in joint pains off these medications as well.    We discussed options including local eye therapy with drops or injections (which have risk of elevating eye pressure and hastening cataract development), or restarting systemic therapy. Ms. Conway and her  would like to discuss with Dr. Batista and DARIANA Alicea regarding their thoughts on these medications or other options given the expense.     We also discussed a course of prednisone, but elected to hold off and start drops only in the right eye. This will help to determine the level of baseline inflammation of joints which might be addressed by systemic treatment.     Do not recommend additional diagnostic testing at this time    The eye pressure is normal but we will start a stronger steroid eye drop to help the anterior and intermediate uveitis and prevent retinal edema, so we will add an eye pressure lowering drop as a precaution    For eye drops, use all three of these drops 2x/day in the right eye: Durezol, Dorzolamide-Timolol, Cyclopentolate. Let me know if these are too expensive    We will hold off on Humira and Methotrexate until you hear from Dr. Batista. One other option they might consider is Remicade infusions, but these are also expensive.    No prednisone or steroid injections at this time. If ultimately we elect to focus on local  therapy without Humira or Methotrexate, steroid eye injections might be reasonable if the eye pressure does not elevate with this newly prescribed     RTC  1. Will have virtual visit with Dr. Batista on February 2    2. [place] IOP, dilate right eye, OCT (ordered). Can see sooner PRN patient or Dr. Batista/DARIANA Alicea      Physician Attestation    Attending Physician Attestation:  Complete documentation of historical and exam elements from today's encounter can be found in the full encounter summary report (not reduplicated in this progress note). I personally obtained the chief complaint(s) and history of present illness. I confirmed and edited as necessary the review of systems, past medical/surgical history, family history, social history, and examination findings as documented by others; and I examined the patient myself. I personally reviewed the relevant tests, images, and reports as documented above. I formulated and edited as necessary the assessment and plan and discussed the findings and management plan with the patient and family.  Edilson Velazquez MD.   Edilson Velazquez M.D., Uveitis and Medical Retina, January 5, 2021     Sincerely,    Edilson Velazquez MD  [facility] Dept of Ophthalmology  Uveitis and Medical Retina

## 2021-01-15 ENCOUNTER — HEALTH MAINTENANCE LETTER (OUTPATIENT)
Age: 66
End: 2021-01-15

## 2021-01-25 DIAGNOSIS — Z79.899 HIGH RISK MEDICATIONS (NOT ANTICOAGULANTS) LONG-TERM USE: ICD-10-CM

## 2021-01-25 DIAGNOSIS — M06.09 RHEUMATOID ARTHRITIS OF MULTIPLE SITES WITHOUT RHEUMATOID FACTOR (H): ICD-10-CM

## 2021-01-28 NOTE — TELEPHONE ENCOUNTER
methotrexate 2.5 MG tablet      Last Written Prescription Date:  10-  Last Fill Quantity: 28,   # refills: 1  Last Office Visit: 10-  Future Office visit:  2-2-2021    CBC RESULTS:   Recent Labs   Lab Test 10/19/20  1306   WBC 9.6   RBC 4.94   HGB 15.4   HCT 46.6   MCV 94   MCH 31.2   MCHC 33.0   RDW 15.4*          Creatinine   Date Value Ref Range Status   10/19/2020 0.67 0.52 - 1.04 mg/dL Final   ]    Liver Function Studies -   Recent Labs   Lab Test 10/19/20  1306 09/25/14  1334 09/25/14  1334   ALBUMIN 3.9   < > 4.1   ALKPHOS  --   --  82   AST 23   < > 19   ALT 31   < > 28    < > = values in this interval not displayed.       Routing refill request to provider for review/approval because:  Not on protocol.      Kathleen M Doege RN

## 2021-01-29 RX ORDER — METHOTREXATE 2.5 MG/1
TABLET ORAL
Qty: 28 TABLET | Refills: 0 | Status: SHIPPED | OUTPATIENT
Start: 2021-01-29 | End: 2021-03-03

## 2021-01-29 NOTE — TELEPHONE ENCOUNTER
You are seeing her Tues. She had inflammatory eye and we had adjusted this. Please refill on what dose you feel she needs and can tolerate

## 2021-02-02 ENCOUNTER — VIRTUAL VISIT (OUTPATIENT)
Dept: RHEUMATOLOGY | Facility: CLINIC | Age: 66
End: 2021-02-02
Attending: INTERNAL MEDICINE
Payer: MEDICARE

## 2021-02-02 DIAGNOSIS — M06.09 RHEUMATOID ARTHRITIS OF MULTIPLE SITES WITHOUT RHEUMATOID FACTOR (H): Primary | ICD-10-CM

## 2021-02-02 PROCEDURE — 99215 OFFICE O/P EST HI 40 MIN: CPT | Mod: 95 | Performed by: INTERNAL MEDICINE

## 2021-02-02 ASSESSMENT — PAIN SCALES - GENERAL: PAINLEVEL: NO PAIN (0)

## 2021-02-02 NOTE — LETTER
2021       RE: Reina Conway  [address] [facility] [address]     Dear Colleague,    Thank you for referring your patient, Reina Conway, to the [facility] RHEUMATOLOGY CLINIC [place] at [facility]. Please see a copy of my visit note below.    Reina Conway is a 65 year old who is being evaluated via a billable video visit.      How would you like to obtain your AVS? MyChart  If the video visit is dropped, the invitation should be resent by: Send to e-mail at: [email or web address]  Will anyone else be joining your video visit? No        Video-Visit Details    Type of service:  Video Visit    Originating Location (pt. Location): Home    Distant Location (provider location):  [facility] RHEUMATOLOGY CLINIC [place]     Platform used for Video Visit:   [facility]  Rheumatology Clinic  Figueroa Batista MD  2021     Name: Reina Conway  MRN: [id number]  Age: 64 year old  : 1955  Referring provider: Diana Orantes     Problem List:  1. Seronegative destructive deforming contractures RA (-RF/CCP/LASHAE panel) .   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 Rheumatoid arthritis (RA) activity=low with stable deformities.   High risk medication monitoring, labs every 12 weeks.   2. Neck rigidity no radiculpathy. Declined cervical xrays. Educated her on the s/sx redflags.   3. Alcohol use. Reports abstinent from Alcohol. I discussed the risks with her today on the liver and MTX. She understands.   4. Severe right hand deformities, would not be able to do vial of methotrexate       Assessment and Plan:     Per the problem list.  Plan/recommendations:    I have reached out to her ophthalmologist about her stopping the eyedrops and urged her to resume them until she speaks with his office.    I also reinforced that in the future, she does not need to stop her methotrexate even if  she does need to stop Humira because of cost issues.    On the other hand I did also emphasized that she if she is stopping 1 medicine because of a infection and reducing the infection risk she should stop both.    In that regard we discussed the coronavirus vaccination and the unpredictability of when it will be available.  Given that, and the difficulty of trying to time stopping either methotrexate or Humira that she should simply stay on them and when the vaccine becomes available get it.    I am uncertain exactly what is going on in her hip and knees.  She is keen to avoid being out and about more than she has to right now with the pandemic still ongoing, but I would like to see her back in 6 months with plain films of knees and hips at that point to do a good examination and try to understand why she is having so much difficulty descending stairs.    She will see Tyson in the meantime in 3 months for another video visit check and make sure she is doing okay.    This video visit commenced at 6:04 PM and the face-to-face component was completed at 6:39 PM, 35 minutes in duration.  An  additional 15 minutes were spent on chart review,  and messaging, and documentation, for total of 50 minutes on this encounter, all completed on the date of service.     EARLINE Batista MD, PhD    Rheumatology      2/25/2020:   Reina Conway is a 64 year old here for transfer of care for rheumatoid arthritis (RA) deforming.     Today, she has had a slight sore throat the past few weeks. She has stopped Humira and methotrexate as a result. She has morning stiffness for 10 to 15 minutes. She occasionally takes Acetaminophen, usually at the end of her Humira cycle, and finds it helpful. She had a recent accident in December 2019 from sitting on a broken chair and subsequently for falling on her hip. She had bed rest for two months. She was using a walker around her house for two months. She no longer uses  the cane in her house. She wants to start physical therapy to help her mobility.     She denies Raynaud's color changes. Her hands are worse in the cold due to rheumatoid arthritis She denies difficulty with breathing. She francy sicca symptoms. She has had anterior uveitis years ago. She has been off of methotrexate for three weeks, and is has been irregularly taking Humira recently.     July 7, 2020 interval history:    Patient is currently been off of her Humira for several weeks due to a concern of possible infection because of a sore throat.  She believes from looking at her records the Solange 15 was her last dose.  She got particular concerned about the coronavirus because she works at home and has really been pretty much in isolation.    Despite being off it for a few weeks she is noticed only some very slight swelling in her right second and third MCPs.  Morning stiffness remains under 10 to 15 minutes.  Her right hand has been the most problematic and she needs to use that the most so that is of concern to her but she is able to do pretty much everything she needs to do.    She is having some cloudiness in her vision of her right eye.  That is a concern to her because she was diagnosed in 2015 with uveitis but unlike that time she really has no pain with this.  This is been fairly subacute in its onset going back is much is several months.  She does not have the same thing going on in the other eye however.  It is not getting better although it is not getting a lot worse.    She continues to have some foot problems primarily calluses and will be seeing Dr. Gastelum back in August.    She otherwise feels like she is doing pretty well with her arthritis and denies any other new medical problems.    We do not have any labs for her however now for over 6 months.  She has continued on her methotrexate and has not had any symptomatic toxicity and really does not have a history of any laboratory toxicity with that in  the past either.    Video physical examination shows her to be in no acute distress.  The joint examination of her hands shows clear subluxation at the level of her wrist and some probable synovitis within her wrist subject of the limitations of the video exam.  She also has fullness in the right second and third MCPs consistent with her symptoms there.    Impression: Per the problem list    Plan:    I did recommend that she go ahead and get her labs.  It is been over 6 months and she is going to need to have them done at some point in the not too distant future.  It is probably is safe in terms of viral exposure right now as it is going to be for the next 6 months is my assessment and we discussed that.    I do think she should see ophthalmology and I made that referral today.  I am unsure what her cloudiness in her eye represents.  It does not really sound like it is likely to be uveitis but given her history and the fact that she does have an inflammatory condition where a variety of forms of inflammatory eye conditions can be seen she certainly should be evaluated.  We did discuss however that Humira would usually be a good treatment for uveitis because she was worried whether it, like Enbrel which she was on previously, was considered to be not effective or possibly even risky for the development of these conditions and I assured her that in general it was not.    She otherwise seems to be doing well.  I would not, based on her current features suggest a change in therapy.  Even if we are considering that I prefer to see her in person so I could redo a joint examination before making a significant change in therapy.  What might convince me that we would need to do that of course is if she is having inflammatory eye features that have developed despite her being on Humira.  Will need to consider a change if that is the case.    February 2, 2021 interval history:    Since she has last seen me, she again touch  bases with Tyson arianna in October.  She was on both Humira and methotrexate at the time, but in December, a change in insurance made her Humira very expensive and so she stopped it.  She ultimately restarted it on 10 January, but in the meantime developed an attack of uveitis in the right eye.  She had also stopped her methotrexate because she is thought that the 2 needed to be taken together.    Her joints to really did not get a lot worse during that time.  She does acknowledge perhaps a little bit of worsening of morning stiffness during that time but feels that that is back to her baseline now at about 15 to 30 minutes in her hands and her feet.  She believes her eye is better as well and about 4 days ago actually stopped her eyedrops that she was given with her ophthalmology visit on January 8.  She has not to touch [facility] with the ophthalmologist about that however.    She otherwise feels pretty good.  The other significant problems she has however is that she has some hip and knee pain.  This is bad enough that she feels unstable when walking down the stairs facing forward so she goes down the stairs backwards holding onto the railing.  This causes her less pain and she feels less unstable in doing so.  She had a fall in 2019 and had a hip film at that time that showed a stress fracture apparently.  This was done at [facility].  Per the read, the hip joints themselves look okay on that but she has had pain on that side ever since.  She has some pain in both of the knees.    She has not done any laboratory monitoring since October.  That looked fine however.  She continues on the methotrexate and feels a little poorly the day after taking it but overall tolerates it pretty well.    She denies any other new medical problems.  She does have a number of questions about the coronavirus vaccinations.             Review-Seen Dr. Frost until 2010 then swtiched to [facility], then re-established 3/2013 (xrays  2/2013 [place]). Dr. Frost retired 8-2019   Past-pan uveitis in 2011 etanercept (enbrel) uveitis, adalimumab (humira) and methotrexate       HISTORY CARRIED FORWARD FROM Tyson Alicea.   Prior: Synovitis and joint deformities in 2008 was persistently seronegative but had active synovitis. Methotrexate helped but did not cause a remission/low disease state. She required Prednisone to control symptoms partially but still had significant ADL issues. We had persistently recommended anti TNF therapy which she resisted. She sought a second opinion at [place] who recommended the same things, and she continued care there as of August 2010, then switched back to Dr. Frost 3/2013. Begun on Enbrel at [facility]. 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 As no other etiology found it was felt it might be due to Enbrel and she was switched to Humira. She has remained on Methotrexate, primarily 25 mg weekly, decreased 3/2013 (not to go <15mg week due to risk of further deformities or uveitis) . FRAX 32% at [facility]. Prior declined biphosphosphonate, [facility] recommended IV.   Xrays 3/2013 [place]: See records. Hallux valgus right, hammertoes L>R, hindfoot valgus, pes planus. Dislocated left 2nd MTP, sublux left 3rd MTP, arth 2-3 MTP, rt 1st MTP. Mild DJD hands. Several DIP and 1st CMC. Subluxation left thumb IP and persistant dorsiflexion right wrist. Previous visits discussed stress in her life. Her father in law passed away and there had been a lot of stress and she put her issues on the back burner. She had an episode of chest/epigastric pain and was seen at [facility]. She had apparent negative cardiac evaluation although did not followup with a stress test. She is taking OTC Zantac prn and thinks that helps. Found allergic to Wheat, avoid gluten and sugars. Feels much improved [heartburn, bloating, blood in stools, irregular stools] this really changed her digestive system. She  "went on a gluten free diet in December, and stopped all RA meds in early Jan 2015. See My Chart message. Restarted Humira early April 2015 every 3 weeks. Noticed more migatory joints pains, swelling. Right hand, right knee, right wrist-associated swelling really in right 2nd MCP. 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 Lack of movement and coordinations, using her wrist brace. Uses this at night and daily prn. At last visit she had continued on Humira without side effects and still feels it is helpful. She does have daily discomfort in several areas with either activity or prior damage, especially knees. She continued to have stress in her life but is working through this and \"trying to get back on track\". She stopped drinking any alcohol for the past two months and was congratulated on this. She went to  but does not feel she is alcoholic.  Leonardo going to [facility]. She has been  for a long time. Both lost parents, and grieving. She says she's in less denial. She admits  is helping a lot. She called in November due to increased joint pain and we restarted Methotrexate at 10 mg weekly as previously discussed.  She stopped it two weeks ago.  She noted more joint pain and also some nausea on day of dose.  We discussed this at length and unlikely that MTX contributed to more joint symptoms. At January 2016 visit we readded Methotrexate to Humira in hopes of decreasing disease activity, using low dose due to prior side effect complaints.  She called later that month with flare symptoms requiring Prednisone bridge, and we increased Methotrexate to more standard dose of 15 mg weekly. At her April 2016 visit she had started improving.. She was tolerating Methotrexate this time.  She weaned off Prednisone.    Review of Systems:   Pertinent items are noted in HPI or as below, remainder of complete ROS is negative.      No recent problems with hearing or vision. No swallowing problems.   No breathing difficulty, " shortness of breath, coughing, or wheezing  No chest pain or palpitations  No heart burn, indigestion, abdominal pain, nausea, vomiting, diarrhea  No urination problems, no bloody, cloudy urine, no dysuria  No numbing, tingling, weakness  No headaches or confusion  No rashes. No easy bleeding or bruising.     Active Medications:     Current Outpatient Medications:      acetaminophen (TYLENOL) 325 MG tablet, Take 325-650 mg by mouth every 6 hours as needed, Disp: , Rfl:      adalimumab (HUMIRA PEN) 40 MG/0.8ML pen kit, Inject 0.8 mLs (40 mg) Subcutaneous every 14 days Hold for signs of infection, then seek medical attention., Disp: 1 kit, Rfl: 2     cyclopentolate (CYCLOGYL) 1 % ophthalmic solution, Place 1 drop into the right eye 2 times daily, Disp: 5 mL, Rfl: 3     difluprednate (DUREZOL) 0.05 % ophthalmic emulsion, Place 1 drop into the right eye 2 times daily, Disp: 5 mL, Rfl: 4     dorzolamide-timolol (COSOPT) 2-0.5 % ophthalmic solution, Place 1 drop into both eyes 2 times daily, Disp: 10 mL, Rfl: 5     folic acid (FOLVITE) 1 MG tablet, Take 2 tablets (2 mg) by mouth daily, Disp: 180 tablet, Rfl: 2     methotrexate sodium 2.5 MG TABS, TAKE 7 TABLETS BY MOUTH EVERY 7 DAYS. , Disp: 28 tablet, Rfl: 0     nystatin (MYCOSTATIN) 286554 UNIT/GM external cream, Apply topically 2 times daily To right big toenail. 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 History grave's, menopause, osteoporosis borderline, rheumatoid arthritis (RA), pharyngeal dysphagia, hammertoe    Surgical-  Fibroid adenoma Left Breast     FH:  No autoimmune disorders, psoriasis, UC, crohn's, SLE, RA, PsA, gout, autoimmune thyroid.  No MS, heart disease in family  Mother-breast cancer, parkinson,  "osteoporosis , low back issues-passed    Father-anxiety, dementia-passed after hip fracture   Siblings-brother \"not live well\"   Children-None   M aunt breast cancer   Cousin mental illness      SH:  Occasionally moderate Alcohol 1 drink few times a week not M-W . No Smoking. No IVDU. . Retired      Physical Exam:   There were no vitals taken for this visit.   Wt Readings from Last 4 Encounters:   02/25/20 75.1 kg (165 lb 9.6 oz)   09/05/19 74.9 kg (165 lb 3.2 oz)   08/15/19 74.4 kg (164 lb)   03/26/19 74.6 kg (164 lb 8 oz)     Constitutional: Well-developed, appearing stated age; cooperative  Eyes: Normal EOM, PERRLA, vision, conjunctiva, sclera  Physical examination by video shows her to be in no acute distress HEENT examination is normal.  She is speaking in full sentences with no evidence of shortness of breath.  Examination of her hands shows her chronic deformities, but she does seem able to make a fist and there is good definition between her MCPs suggesting minimal inflammation there.  Impression:    Psych: Normal judgement, orientation, memory, affect.     Laboratory:   RHEUM RESULTS Latest Ref Rng & Units 12/19/2019 7/22/2020 10/19/2020   SED RATE 0 - 30 mm/h - 10 9   CRP, INFLAMMATION 0.0 - 8.0 mg/L - 8.1(H) 5.8   CYCLIC CIT PEPT IGG <5 U/mL - - -   RHEUMATOID FACTOR <12 IU/mL - <7 -   CATALINO SCREEN BY EIA 0 - 1.0 - - -   AST 0 - 45 U/L 29 24 23   ALT 0 - 50 U/L 48 26 31   ALBUMIN 3.4 - 5.0 g/dL 3.8 4.0 3.9   WBC 4.0 - 11.0 10e9/L 7.8 7.4 9.6   RBC 3.8 - 5.2 10e12/L 4.60 5.01 4.94   HGB 11.7 - 15.7 g/dL 14.5 15.8(H) 15.4   HCT 35.0 - 47.0 % 44.1 47.8(H) 46.6   MCV 78 - 100 fl 96 95 94   MCHC 31.5 - 36.5 g/dL 32.9 33.1 33.0   RDW 10.0 - 15.0 % 13.9 13.2 15.4(H)    - 450 10e9/L 292 253 274   CREATININE 0.52 - 1.04 mg/dL 0.71 0.66 0.67   GFR ESTIMATE, IF BLACK >60 mL/min/[1.73:m2] >90 >90 >90   GFR ESTIMATE >60 mL/min/[1.73:m2] 89 >90 >90   HEPATITIS C ANTIBODY NR:Nonreactive - Nonreactive - "       Rheumatoid Factor   Date Value Ref Range Status   12/17/2013 <20 <20 IU/mL Final   ,  ,   Cyclic Cit Pept IgG/IgA   Date Value Ref Range Status   12/17/2013 <20  Interpretation:  Negative <20 UNITS Final   ,   Cyclic Citrullinated Peptide IgG   Date Value Ref Range Status   11/18/2009 <2 <5 U/mL Final     Comment:     Interpretation:  Negative   ,  ,   SSA (RO) Antibody IgG   Date Value Ref Range Status   11/18/2009 5  Final     Comment:     Reference range: 0 to 40  Unit: AU/mL  (Note)  REFERENCE INTERVALS: SSA (Ro) (LASHAE) Ab, IgG   29 AU/mL or Less ............. Negative   30 - 40 AU/mL ................ Equivocal   41 AU/mL or Greater .......... Positive    SSA (Ro) antibody is seen in 70-75% of Sjogren syndrome  cases, 30-40% of systemic lupus erythematosus (SLE) and  5-10% of progressive systemic sclerosis (PSS).  Performed by [facility],  [address] [facility] [place] [address] [phone]  www.[employer] [facility], Anne Arellano MD, Lab. Director     SSB (LA) Antibody IgG   Date Value Ref Range Status   11/18/2009 0  Final     Comment:     Reference range: 0 to 40  Unit: AU/mL  (Note)  REFERENCE INTERVALS: SSB (La) (LASHAE) Ab, IgG   29 AU/mL or Less ............. Negative   30 - 40 AU/mL ................ Equivocal   41 AU/mL or Greater .......... Positive    SSB (La) antibody is seen in 50-60% of Sjogren syndrome  cases and is specific if it is the only LASHAE antibody  present. 15-25% of patients with systemic lupus  erythematosus (SLE) and 5-10% of patients with progressive  systemic sclerosis (PSS) also have this antibody.  Performed by [facility],  [address], [facility] [place] [id number] [phone]  www.[employer] [facility], Anne Arellano MD, Lab. Director   ,  ,   CATALINO Screen by EIA   Date Value Ref Range Status   11/18/2009 <1.0 0 - 1.0 Final     Comment:     Interpretation:  Negative   ,  ,  ,  ,  ,  ,  ,   Hepatitis B Core Desiree   Date Value Ref Range Status   12/17/2013 Negative NEG Final   ,   Hep B Surface Agn    Date Value Ref Range Status   12/17/2013 Negative NEG Final           Again, thank you for allowing me to participate in the care of your patient.      Sincerely,    Figueroa Batista MD

## 2021-02-02 NOTE — PROGRESS NOTES
Reina Conway is a 65 year old who is being evaluated via a billable video visit.      How would you like to obtain your AVS? MyChart  If the video visit is dropped, the invitation should be resent by: Send to e-mail at: [email or web address]  Will anyone else be joining your video visit? No        Video-Visit Details    Type of service:  Video Visit    Originating Location (pt. Location): Home    Distant Location (provider location):  [facility] RHEUMATOLOGY [facility]INIC [place]     Platform used for Video Visit:   [facility]  Rheumatology Clinic  Figueroa Batista MD  2021     Name: Reina Conway  MRN: [id number]  Age: 64 year old  : 1955  Referring provider: Diana Orantes     Problem List:  1. Seronegative destructive deforming contractures RA (-RF/CCP/LASHAE panel) .   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 Rheumatoid arthritis (RA) activity=low with stable deformities.   High risk medication monitoring, labs every 12 weeks.   2. Neck rigidity no radiculpathy. Declined cervical xrays. Educated her on the s/sx redflags.   3. Alcohol use. Reports abstinent from Alcohol. I discussed the risks with her today on the liver and MTX. She understands.   4. Severe right hand deformities, would not be able to do vial of methotrexate       Assessment and Plan:     Per the problem list.  Plan/recommendations:    I have reached out to her ophthalmologist about her stopping the eyedrops and urged her to resume them until she speaks with his office.    I also reinforced that in the future, she does not need to stop her methotrexate even if she does need to stop Humira because of cost issues.    On the other hand I did also emphasized that she if she is stopping 1 medicine because of a infection and reducing the infection risk she should stop both.    In that regard we discussed the coronavirus vaccination and the unpredictability of when it will  be available.  Given that, and the difficulty of trying to time stopping either methotrexate or Humira that she should simply stay on them and when the vaccine becomes available get it.    I am uncertain exactly what is going on in her hip and knees.  She is keen to avoid being out and about more than she has to right now with the pandemic still ongoing, but I would like to see her back in 6 months with plain films of knees and hips at that point to do a good examination and try to understand why she is having so much difficulty descending stairs.    She will see Tyson in the meantime in 3 months for another video visit check and make sure she is doing okay.    This video visit commenced at 6:04 PM and the face-to-face component was completed at 6:39 PM, 35 minutes in duration.  An  additional 15 minutes were spent on chart review,  and messaging, and documentation, for total of 50 minutes on this encounter, all completed on the date of service.     EARLINE Batista MD, PhD    Rheumatology      2/25/2020:   Reina Conway is a 64 year old here for transfer of care for rheumatoid arthritis (RA) deforming.     Today, she has had a slight sore throat the past few weeks. She has stopped Humira and methotrexate as a result. She has morning stiffness for 10 to 15 minutes. She occasionally takes Acetaminophen, usually at the end of her Humira cycle, and finds it helpful. She had a recent accident in December 2019 from sitting on a broken chair and subsequently for falling on her hip. She had bed rest for two months. She was using a walker around her house for two months. She no longer uses the cane in her house. She wants to start physical therapy to help her mobility.     She denies Raynaud's color changes. Her hands are worse in the cold due to rheumatoid arthritis She denies difficulty with breathing. She francy sicca symptoms. She has had anterior uveitis years ago. She has been off of  methotrexate for three weeks, and is has been irregularly taking Humira recently.     July 7, 2020 interval history:    Patient is currently been off of her Humira for several weeks due to a concern of possible infection because of a sore throat.  She believes from looking at her records the Solange 15 was her last dose.  She got particular concerned about the coronavirus because she works at home and has really been pretty much in isolation.    Despite being off it for a few weeks she is noticed only some very slight swelling in her right second and third MCPs.  Morning stiffness remains under 10 to 15 minutes.  Her right hand has been the most problematic and she needs to use that the most so that is of concern to her but she is able to do pretty much everything she needs to do.    She is having some cloudiness in her vision of her right eye.  That is a concern to her because she was diagnosed in 2015 with uveitis but unlike that time she really has no pain with this.  This is been fairly subacute in its onset going back is much is several months.  She does not have the same thing going on in the other eye however.  It is not getting better although it is not getting a lot worse.    She continues to have some foot problems primarily calluses and will be seeing Dr. Gastelum back in August.    She otherwise feels like she is doing pretty well with her arthritis and denies any other new medical problems.    We do not have any labs for her however now for over 6 months.  She has continued on her methotrexate and has not had any symptomatic toxicity and really does not have a history of any laboratory toxicity with that in the past either.    Video physical examination shows her to be in no acute distress.  The joint examination of her hands shows clear subluxation at the level of her wrist and some probable synovitis within her wrist subject of the limitations of the video exam.  She also has fullness in the right second  and third MCPs consistent with her symptoms there.    Impression: Per the problem list    Plan:    I did recommend that she go ahead and get her labs.  It is been over 6 months and she is going to need to have them done at some point in the not too distant future.  It is probably is safe in terms of viral exposure right now as it is going to be for the next 6 months is my assessment and we discussed that.    I do think she should see ophthalmology and I made that referral today.  I am unsure what her cloudiness in her eye represents.  It does not really sound like it is likely to be uveitis but given her history and the fact that she does have an inflammatory condition where a variety of forms of inflammatory eye conditions can be seen she certainly should be evaluated.  We did discuss however that Humira would usually be a good treatment for uveitis because she was worried whether it, like Enbrel which she was on previously, was considered to be not effective or possibly even risky for the development of these conditions and I assured her that in general it was not.    She otherwise seems to be doing well.  I would not, based on her current features suggest a change in therapy.  Even if we are considering that I prefer to see her in person so I could redo a joint examination before making a significant change in therapy.  What might convince me that we would need to do that of course is if she is having inflammatory eye features that have developed despite her being on Humira.  Will need to consider a change if that is the case.    February 2, 2021 interval history:    Since she has last seen me, she again touch bases with Tyson back in October.  She was on both Humira and methotrexate at the time, but in December, a change in insurance made her Humira very expensive and so she stopped it.  She ultimately restarted it on 10 January, but in the meantime developed an attack of uveitis in the right eye.  She had also  stopped her methotrexate because she is thought that the 2 needed to be taken together.    Her joints to really did not get a lot worse during that time.  She does acknowledge perhaps a little bit of worsening of morning stiffness during that time but feels that that is back to her baseline now at about 15 to 30 minutes in her hands and her feet.  She believes her eye is better as well and about 4 days ago actually stopped her eyedrops that she was given with her ophthalmology visit on January 8.  She has not to touch bases with the ophthalmologist about that however.    She otherwise feels pretty good.  The other significant problems she has however is that she has some hip and knee pain.  This is bad enough that she feels unstable when walking down the stairs facing forward so she goes down the stairs backwards holding onto the railing.  This causes her less pain and she feels less unstable in doing so.  She had a fall in 2019 and had a hip film at that time that showed a stress fracture apparently.  This was done at [facility].  Per the read, the hip joints themselves look okay on that but she has had pain on that side ever since.  She has some pain in both of the knees.    She has not done any laboratory monitoring since October.  That looked fine however.  She continues on the methotrexate and feels a little poorly the day after taking it but overall tolerates it pretty well.    She denies any other new medical problems.  She does have a number of questions about the coronavirus vaccinations.             Review-Seen Dr. Frost until 2010 then swtiched to [facility], then re-established 3/2013 (xrays 2/2013 [place]). Dr. Frost retired 8-2019   Past-pan uveitis in 2011 etanercept (enbrel) uveitis, adalimumab (humira) and methotrexate       HISTORY CARRIED FORWARD FROM [facility].   Prior: Synovitis and joint deformities in 2008 was persistently seronegative but had active synovitis. Methotrexate helped but  did not cause a remission/low disease state. She required Prednisone to control symptoms partially but still had significant ADL issues. We had persistently recommended anti TNF therapy which she resisted. She sought a second opinion at [facility] who recommended the same things, and she continued care there as of August 2010, then switched back to Dr. Frost 3/2013. Begun on Enbrel at [facility]. 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 As no other etiology found it was felt it might be due to Enbrel and she was switched to Humira. She has remained on Methotrexate, primarily 25 mg weekly, decreased 3/2013 (not to go <15mg week due to risk of further deformities or uveitis) . FRAX 32% at [facility]. Prior declined biphosphosphonate, [facility] recommended IV.   Xrays 3/2013 [facility]: See records. Hallux valgus right, hammertoes L>R, hindfoot valgus, pes planus. Dislocated left 2nd MTP, sublux left 3rd MTP, arth 2-3 MTP, rt 1st MTP. Mild DJD hands. Several DIP and 1st CMC. Subluxation left thumb IP and persistant dorsiflexion right wrist. Previous visits discussed stress in her life. Her father in law passed away and there had been a lot of stress and she put her issues on the back burner. She had an episode of chest/epigastric pain and was seen at [facility]. She had apparent negative cardiac evaluation although did not followup with a stress test. She is taking OTC Zantac prn and thinks that helps. Found allergic to Wheat, avoid gluten and sugars. Feels much improved [heartburn, bloating, blood in stools, irregular stools] this really changed her digestive system. She went on a gluten free diet in December, and stopped all RA meds in early Jan 2015. See My Chart message. Restarted Humira early April 2015 every 3 weeks. Noticed more migatory joints pains, swelling. Right hand, right knee, right wrist-associated swelling really in right 2nd MCP. Notice as she's a visual  "artists. Lack of movement and coordinations, using her wrist brace. Uses this at night and daily prn. At last visit she had continued on Humira without side effects and still feels it is helpful. She does have daily discomfort in several areas with either activity or prior damage, especially knees. She continued to have stress in her life but is working through this and \"trying to get back on track\". She stopped drinking any alcohol for the past two months and was congratulated on this. She went to  but does not feel she is alcoholic.  Leonardo going to [facility]. She has been  for a long time. Both lost parents, and grieving. She says she's in less denial. She admits  is helping a lot. She called in November due to increased joint pain and we restarted Methotrexate at 10 mg weekly as previously discussed.  She stopped it two weeks ago.  She noted more joint pain and also some nausea on day of dose.  We discussed this at length and unlikely that MTX contributed to more joint symptoms. At January 2016 visit we readded Methotrexate to Humira in hopes of decreasing disease activity, using low dose due to prior side effect complaints.  She called later that month with flare symptoms requiring Prednisone bridge, and we increased Methotrexate to more standard dose of 15 mg weekly. At her April 2016 visit she had started improving.. She was tolerating Methotrexate this time.  She weaned off Prednisone.    Review of Systems:   Pertinent items are noted in HPI or as below, remainder of complete ROS is negative.      No recent problems with hearing or vision. No swallowing problems.   No breathing difficulty, shortness of breath, coughing, or wheezing  No chest pain or palpitations  No heart burn, indigestion, abdominal pain, nausea, vomiting, diarrhea  No urination problems, no bloody, cloudy urine, no dysuria  No numbing, tingling, weakness  No headaches or confusion  No rashes. No easy bleeding or bruising. " "    Active Medications:     Current Outpatient Medications:      acetaminophen (TYLENOL) 325 MG tablet, Take 325-650 mg by mouth every 6 hours as needed, Disp: , Rfl:      adalimumab (HUMIRA PEN) 40 MG/0.8ML pen kit, Inject 0.8 mLs (40 mg) Subcutaneous every 14 days Hold for signs of infection, then seek medical attention., Disp: 1 kit, Rfl: 2     cyclopentolate (CYCLOGYL) 1 % ophthalmic solution, Place 1 drop into the right eye 2 times daily, Disp: 5 mL, Rfl: 3     difluprednate (DUREZOL) 0.05 % ophthalmic emulsion, Place 1 drop into the right eye 2 times daily, Disp: 5 mL, Rfl: 4     dorzolamide-timolol (COSOPT) 2-0.5 % ophthalmic solution, Place 1 drop into both eyes 2 times daily, Disp: 10 mL, Rfl: 5     folic acid (FOLVITE) 1 MG tablet, Take 2 tablets (2 mg) by mouth daily, Disp: 180 tablet, Rfl: 2     methotrexate sodium 2.5 MG TABS, TAKE 7 TABLETS BY MOUTH EVERY 7 DAYS. , Disp: 28 tablet, Rfl: 0     nystatin (MYCOSTATIN) [id number] UNIT/GM external cream, Apply topically 2 times daily To right big toenail. 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 History grave's, menopause, osteoporosis borderline, rheumatoid arthritis (RA), pharyngeal dysphagia, hammertoe    Surgical-  Fibroid adenoma Left Breast     FH:  No autoimmune disorders, psoriasis, UC, crohn's, SLE, RA, PsA, gout, autoimmune thyroid.  No MS, heart disease in family  Mother-breast cancer, parkinson, osteoporosis , low back issues-passed    Father-anxiety, dementia-passed after hip fracture   Siblings-brother \"not live well\"   Children-None   M aunt breast cancer   Cousin mental illness      SH:  Occasionally moderate Alcohol 1 drink few times a week not M-W . No Smoking. No IVDU. . Retired  "     Physical Exam:   There were no vitals taken for this visit.   Wt Readings from Last 4 Encounters:   02/25/20 75.1 kg (165 lb 9.6 oz)   09/05/19 74.9 kg (165 lb 3.2 oz)   08/15/19 74.4 kg (164 lb)   03/26/19 74.6 kg (164 lb 8 oz)     Constitutional: Well-developed, appearing stated age; cooperative  Eyes: Normal EOM, PERRLA, vision, conjunctiva, sclera  Physical examination by video shows her to be in no acute distress HEENT examination is normal.  She is speaking in full sentences with no evidence of shortness of breath.  Examination of her hands shows her chronic deformities, but she does seem able to make a fist and there is good definition between her MCPs suggesting minimal inflammation there.  Impression:    Psych: Normal judgement, orientation, memory, affect.     Laboratory:   RHEUM RESULTS Latest Ref Rng & Units 12/19/2019 7/22/2020 10/19/2020   SED RATE 0 - 30 mm/h - 10 9   CRP, INFLAMMATION 0.0 - 8.0 mg/L - 8.1(H) 5.8   CYCLIC CIT PEPT IGG <5 U/mL - - -   RHEUMATOID FACTOR <12 IU/mL - <7 -   CATALINO SCREEN BY EIA 0 - 1.0 - - -   AST 0 - 45 U/L 29 24 23   ALT 0 - 50 U/L 48 26 31   ALBUMIN 3.4 - 5.0 g/dL 3.8 4.0 3.9   WBC 4.0 - 11.0 10e9/L 7.8 7.4 9.6   RBC 3.8 - 5.2 10e12/L 4.60 5.01 4.94   HGB 11.7 - 15.7 g/dL 14.5 15.8(H) 15.4   HCT 35.0 - 47.0 % 44.1 47.8(H) 46.6   MCV 78 - 100 fl 96 95 94   MCHC 31.5 - 36.5 g/dL 32.9 33.1 33.0   RDW 10.0 - 15.0 % 13.9 13.2 15.4(H)    - 450 10e9/L 292 253 274   CREATININE 0.52 - 1.04 mg/dL 0.71 0.66 0.67   GFR ESTIMATE, IF BLACK >60 mL/min/[1.73:m2] >90 >90 >90   GFR ESTIMATE >60 mL/min/[1.73:m2] 89 >90 >90   HEPATITIS C ANTIBODY NR:Nonreactive - Nonreactive -       Rheumatoid Factor   Date Value Ref Range Status   12/17/2013 <20 <20 IU/mL Final   ,  ,   Cyclic Cit Pept IgG/IgA   Date Value Ref Range Status   12/17/2013 <20  Interpretation:  Negative <20 UNITS Final   ,   Cyclic Citrullinated Peptide IgG   Date Value Ref Range Status   11/18/2009 <2 <5 U/mL Final      Comment:     Interpretation:  Negative   ,  ,   SSA (RO) Antibody IgG   Date Value Ref Range Status   11/18/2009 5  Final     Comment:     Reference range: 0 to 40  Unit: AU/mL  (Note)  REFERENCE INTERVALS: SSA (Ro) (LASHAE) Ab, IgG   29 AU/mL or Less ............. Negative   30 - 40 AU/mL ................ Equivocal   41 AU/mL or Greater .......... Positive    SSA (Ro) antibody is seen in 70-75% of Sjogren syndrome  cases, 30-40% of systemic lupus erythematosus (SLE) and  5-10% of progressive systemic sclerosis (PSS).  Performed by [facility],  [address],[place] [id number] [phone]  [email or web address], Anne Arellano MD, Lab. Director     SSB (LA) Antibody IgG   Date Value Ref Range Status   11/18/2009 0  Final     Comment:     Reference range: 0 to 40  Unit: AU/mL  (Note)  REFERENCE INTERVALS: SSB (La) (LASHAE) Ab, IgG   29 AU/mL or Less ............. Negative   30 - 40 AU/mL ................ Equivocal   41 AU/mL or Greater .......... Positive    SSB (La) antibody is seen in 50-60% of Sjogren syndrome  cases and is specific if it is the only LASHAE antibody  present. 15-25% of patients with systemic lupus  erythematosus (SLE) and 5-10% of patients with progressive  systemic sclerosis (PSS) also have this antibody.  Performed by [facility],  [address],[place] [id number] [phone]  [email or web address], Anne Arellano MD, Lab. Director   ,  ,   CATALINO Screen by EIA   Date Value Ref Range Status   11/18/2009 <1.0 0 - 1.0 Final     Comment:     Interpretation:  Negative   ,  ,  ,  ,  ,  ,  ,   Hepatitis B Core Desiree   Date Value Ref Range Status   12/17/2013 Negative NEG Final   ,   Hep B Surface Agn   Date Value Ref Range Status   12/17/2013 Negative NEG Final

## 2021-02-03 ENCOUNTER — TELEPHONE (OUTPATIENT)
Dept: OPHTHALMOLOGY | Facility: CLINIC | Age: 66
End: 2021-02-03

## 2021-02-03 NOTE — TELEPHONE ENCOUNTER
Called and relayed the message about her drops and her to resume her eyedrops particularly as she has been hesitant about her systemic medications.

## 2021-02-05 RX ORDER — ATROPINE SULFATE 10 MG/ML
1 SOLUTION/ DROPS OPHTHALMIC
Qty: 5 ML | Refills: 1 | Status: SHIPPED | OUTPATIENT
Start: 2021-02-05 | End: 2021-04-21

## 2021-02-10 ENCOUNTER — TELEPHONE (OUTPATIENT)
Dept: OPHTHALMOLOGY | Facility: CLINIC | Age: 66
End: 2021-02-10

## 2021-02-10 NOTE — TELEPHONE ENCOUNTER
Thank you. We have been receiving several denial notices for Cyclopentolate, but plan for Atropine once weekly right eye is great. Thank you.

## 2021-02-10 NOTE — TELEPHONE ENCOUNTER
Reviewed with pharmacy at 0830    Ok to dispense Atropine as written:  One drop in Right eye Every Seven days    Pharmacy verbally demonstrated understanding    Note to Dr. Caroline Girard, RN 8:31 AM 02/10/21

## 2021-03-02 ENCOUNTER — MYC MEDICAL ADVICE (OUTPATIENT)
Dept: RHEUMATOLOGY | Facility: CLINIC | Age: 66
End: 2021-03-02

## 2021-03-02 DIAGNOSIS — Z79.899 HIGH RISK MEDICATIONS (NOT ANTICOAGULANTS) LONG-TERM USE: ICD-10-CM

## 2021-03-02 DIAGNOSIS — M06.09 RHEUMATOID ARTHRITIS OF MULTIPLE SITES WITHOUT RHEUMATOID FACTOR (H): ICD-10-CM

## 2021-03-02 NOTE — CONFIDENTIAL NOTE
Reviewed and responded that pt should hold her methotrexate for 1 week after each covid vaccination.    Rolando Pennington, BSN, RN  Rheumatology Care Coordinator  [facility]

## 2021-03-03 NOTE — TELEPHONE ENCOUNTER
Methotrexate Sodium Oral Tablet 2.5 MG  Last Written Prescription Date:  1/29/2021  Last Fill Quantity: 28,   # refills: 0  Last Office Visit: 2/2/2021  Future Office visit:  5/5/2021    CBC RESULTS:   Recent Labs   Lab Test 10/19/20  1306   WBC 9.6   RBC 4.94   HGB 15.4   HCT 46.6   MCV 94   MCH 31.2   MCHC 33.0   RDW 15.4*          Creatinine   Date Value Ref Range Status   10/19/2020 0.67 0.52 - 1.04 mg/dL Final   ]    Liver Function Studies -   Recent Labs   Lab Test 10/19/20  1306 09/25/14  1334 09/25/14  1334   ALBUMIN 3.9   < > 4.1   ALKPHOS  --   --  82   AST 23   < > 19   ALT 31   < > 28    < > = values in this interval not displayed.       Routing refill request to provider for review/approval because:  Drug not on the G, P or [facility] refill protocol or controlled substance      Ghazal Gee RN  Central Triage Red Flags/Med Refills

## 2021-03-07 RX ORDER — METHOTREXATE 2.5 MG/1
7 TABLET ORAL
Qty: 28 TABLET | Refills: 3 | Status: SHIPPED | OUTPATIENT
Start: 2021-03-07 | End: 2021-09-29

## 2021-04-13 ENCOUNTER — DOCUMENTATION ONLY (OUTPATIENT)
Dept: CARE COORDINATION | Facility: CLINIC | Age: 66
End: 2021-04-13

## 2021-04-14 DIAGNOSIS — M06.00 SERONEGATIVE RHEUMATOID ARTHRITIS (H): ICD-10-CM

## 2021-04-16 NOTE — TELEPHONE ENCOUNTER
adalimumab (HUMIRA PEN) 40 MG/0.8ML pen kit  Last Written Prescription Date: 11/2/20  Last Fill Quantity: 1 kit,   # refills: 2  Last Office Visit : 2/2/21  Future Office visit: 5/5/21

## 2021-04-21 ENCOUNTER — OFFICE VISIT (OUTPATIENT)
Dept: OPHTHALMOLOGY | Facility: CLINIC | Age: 66
End: 2021-04-21
Attending: OPHTHALMOLOGY
Payer: MEDICARE

## 2021-04-21 DIAGNOSIS — M21.941: ICD-10-CM

## 2021-04-21 DIAGNOSIS — M06.09 RHEUMATOID ARTHRITIS OF MULTIPLE SITES WITHOUT RHEUMATOID FACTOR (H): ICD-10-CM

## 2021-04-21 DIAGNOSIS — H20.00 ACUTE ANTERIOR UVEITIS OF RIGHT EYE: Primary | ICD-10-CM

## 2021-04-21 DIAGNOSIS — H35.351 CYSTOID MACULAR EDEMA OF RIGHT EYE: ICD-10-CM

## 2021-04-21 DIAGNOSIS — H30.21 INTERMEDIATE UVEITIS OF RIGHT EYE: ICD-10-CM

## 2021-04-21 DIAGNOSIS — Z79.899 HIGH RISK MEDICATION USE: ICD-10-CM

## 2021-04-21 DIAGNOSIS — Z79.899 HIGH RISK MEDICATIONS (NOT ANTICOAGULANTS) LONG-TERM USE: ICD-10-CM

## 2021-04-21 DIAGNOSIS — H40.051 OCULAR HYPERTENSION, RIGHT EYE: ICD-10-CM

## 2021-04-21 DIAGNOSIS — M06.00 SERONEGATIVE RHEUMATOID ARTHRITIS (H): ICD-10-CM

## 2021-04-21 DIAGNOSIS — R76.8 POSITIVE ANA (ANTINUCLEAR ANTIBODY): ICD-10-CM

## 2021-04-21 LAB
ALBUMIN SERPL-MCNC: 3.9 G/DL (ref 3.4–5)
ALT SERPL W P-5'-P-CCNC: 35 U/L (ref 0–50)
AST SERPL W P-5'-P-CCNC: 28 U/L (ref 0–45)
BASOPHILS # BLD AUTO: 0.1 10E9/L (ref 0–0.2)
BASOPHILS NFR BLD AUTO: 0.7 %
CREAT SERPL-MCNC: 0.76 MG/DL (ref 0.52–1.04)
CRP SERPL-MCNC: 8.8 MG/L (ref 0–8)
DIFFERENTIAL METHOD BLD: ABNORMAL
EOSINOPHIL # BLD AUTO: 0 10E9/L (ref 0–0.7)
EOSINOPHIL NFR BLD AUTO: 0.4 %
ERYTHROCYTE [DISTWIDTH] IN BLOOD BY AUTOMATED COUNT: 13.8 % (ref 10–15)
ERYTHROCYTE [SEDIMENTATION RATE] IN BLOOD BY WESTERGREN METHOD: 12 MM/H (ref 0–30)
GFR SERPL CREATININE-BSD FRML MDRD: 82 ML/MIN/{1.73_M2}
HCT VFR BLD AUTO: 47.3 % (ref 35–47)
HGB BLD-MCNC: 15.7 G/DL (ref 11.7–15.7)
IMM GRANULOCYTES # BLD: 0 10E9/L (ref 0–0.4)
IMM GRANULOCYTES NFR BLD: 0.2 %
LYMPHOCYTES # BLD AUTO: 3.3 10E9/L (ref 0.8–5.3)
LYMPHOCYTES NFR BLD AUTO: 38.7 %
MCH RBC QN AUTO: 31.3 PG (ref 26.5–33)
MCHC RBC AUTO-ENTMCNC: 33.2 G/DL (ref 31.5–36.5)
MCV RBC AUTO: 94 FL (ref 78–100)
MONOCYTES # BLD AUTO: 0.4 10E9/L (ref 0–1.3)
MONOCYTES NFR BLD AUTO: 4.7 %
NEUTROPHILS # BLD AUTO: 4.7 10E9/L (ref 1.6–8.3)
NEUTROPHILS NFR BLD AUTO: 55.3 %
NRBC # BLD AUTO: 0 10*3/UL
NRBC BLD AUTO-RTO: 0 /100
PLATELET # BLD AUTO: 288 10E9/L (ref 150–450)
RBC # BLD AUTO: 5.02 10E12/L (ref 3.8–5.2)
TSH SERPL DL<=0.005 MIU/L-ACNC: 0.72 MU/L (ref 0.4–4)
WBC # BLD AUTO: 8.4 10E9/L (ref 4–11)

## 2021-04-21 PROCEDURE — 86235 NUCLEAR ANTIGEN ANTIBODY: CPT | Performed by: NURSE PRACTITIONER

## 2021-04-21 PROCEDURE — G0463 HOSPITAL OUTPT CLINIC VISIT: HCPCS

## 2021-04-21 PROCEDURE — 85025 COMPLETE CBC W/AUTO DIFF WBC: CPT | Performed by: PATHOLOGY

## 2021-04-21 PROCEDURE — 82040 ASSAY OF SERUM ALBUMIN: CPT | Performed by: PATHOLOGY

## 2021-04-21 PROCEDURE — 99214 OFFICE O/P EST MOD 30 MIN: CPT | Performed by: OPHTHALMOLOGY

## 2021-04-21 PROCEDURE — 92134 CPTRZ OPH DX IMG PST SGM RTA: CPT | Performed by: OPHTHALMOLOGY

## 2021-04-21 PROCEDURE — 86225 DNA ANTIBODY NATIVE: CPT | Performed by: NURSE PRACTITIONER

## 2021-04-21 PROCEDURE — 82565 ASSAY OF CREATININE: CPT | Performed by: PATHOLOGY

## 2021-04-21 PROCEDURE — 36415 COLL VENOUS BLD VENIPUNCTURE: CPT | Performed by: PATHOLOGY

## 2021-04-21 PROCEDURE — 84450 TRANSFERASE (AST) (SGOT): CPT | Performed by: PATHOLOGY

## 2021-04-21 PROCEDURE — 86160 COMPLEMENT ANTIGEN: CPT | Performed by: NURSE PRACTITIONER

## 2021-04-21 PROCEDURE — 85652 RBC SED RATE AUTOMATED: CPT | Performed by: PATHOLOGY

## 2021-04-21 PROCEDURE — 86140 C-REACTIVE PROTEIN: CPT | Performed by: PATHOLOGY

## 2021-04-21 PROCEDURE — 87389 HIV-1 AG W/HIV-1&-2 AB AG IA: CPT | Performed by: NURSE PRACTITIONER

## 2021-04-21 PROCEDURE — 84443 ASSAY THYROID STIM HORMONE: CPT | Performed by: PATHOLOGY

## 2021-04-21 PROCEDURE — 84460 ALANINE AMINO (ALT) (SGPT): CPT | Performed by: PATHOLOGY

## 2021-04-21 RX ORDER — DIFLUPREDNATE OPHTHALMIC 0.5 MG/ML
1 EMULSION OPHTHALMIC 2 TIMES DAILY
Qty: 5 ML | Refills: 4 | Status: SHIPPED | OUTPATIENT
Start: 2021-04-21 | End: 2021-08-30

## 2021-04-21 RX ORDER — ATROPINE SULFATE 10 MG/ML
1 SOLUTION/ DROPS OPHTHALMIC
Qty: 5 ML | Refills: 3 | Status: SHIPPED | OUTPATIENT
Start: 2021-04-21 | End: 2021-08-30

## 2021-04-21 RX ORDER — DORZOLAMIDE HYDROCHLORIDE AND TIMOLOL MALEATE 20; 5 MG/ML; MG/ML
1 SOLUTION/ DROPS OPHTHALMIC 2 TIMES DAILY
Qty: 10 ML | Refills: 5 | Status: SHIPPED | OUTPATIENT
Start: 2021-04-21 | End: 2021-08-30

## 2021-04-21 ASSESSMENT — VISUAL ACUITY
METHOD: SNELLEN - LINEAR
OD_SC+: +2
OS_SC: 20/30
OD_SC: 20/50
OS_SC+: +1

## 2021-04-21 ASSESSMENT — CUP TO DISC RATIO
OD_RATIO: 0.5
OS_RATIO: 0.45

## 2021-04-21 ASSESSMENT — EXTERNAL EXAM - RIGHT EYE: OD_EXAM: NORMAL

## 2021-04-21 ASSESSMENT — EXTERNAL EXAM - LEFT EYE: OS_EXAM: NORMAL

## 2021-04-21 ASSESSMENT — TONOMETRY
OS_IOP_MMHG: 21
OD_IOP_MMHG: 23
IOP_METHOD: TONOPEN

## 2021-04-21 ASSESSMENT — SLIT LAMP EXAM - LIDS
COMMENTS: MILD BLEPHARITIS
COMMENTS: MILD BLEPHARITIS

## 2021-04-21 ASSESSMENT — CONF VISUAL FIELD
OD_NORMAL: 1
METHOD: COUNTING FINGERS
OS_NORMAL: 1

## 2021-04-21 NOTE — LETTER
"4/21/2021       RE: Reina Conway  [address] [place] [id number]     Dear Colleague,    Thank you for referring your patient, Reina Conway, to the [facility] at [facility]. Please see a copy of my visit note below.    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 She was last seen in January 2021 after being off Humira and Methotrexate for one month. We recommended restarted systemic therapy (both Humira and methotrexate were resumed in February 2021), and things were doing well.  Both systemic medications and drops were stopped in March 2021 (when Covid vaccination doses were given).  Methotrexate and Humira were resumed in the month of April but drops have not been restarted. Today, Ms. Conway reports the right eye seems to have more spots and the cloud has returned. Left eye fine    Interval Updates to Medical/Family/Social History:    Did take one month off (March 2021) Methotrexate to get both doses of Pfizer COVID vaccine, now restarted both Humira and Methotrexate. Had some brain \"fog\" and fatigue but these gradually improved     Relevant Review of Systems Updates:  Joint pains improving back on Methotrexate and Humira.  No coughs or colds    Laboratory Testing April 21, 2021: CBC WNL, AST, ALT, Albumin, Creatinine     Current eye related medications: Oral Methotrexate 17.5 mg (7 pills) each week, Humira 40 mg Q14 days (off all drops in the right eye for one month)    Retina/Uveitis Imaging:   OCT Spectralis Macula April 21, 2021  right eye: Slight increased media haze, few vitreous opacities, no fluid.  left eye: Normal contour, no fluid. Stable  Assessment:   1. Acute anterior uveitis of right eye  Still active in the right eye, IOP lower but still slightly elevated    2. " Intermediate uveitis of right eye  Still active vitreous inflammation but no CME    3. Cystoid macular edema of right eye  No recurrence by OCT    4. Ocular hypertension, right eye  Improved, but still elevated    5. Rheumatoid arthritis of multiple sites without rheumatoid factor (H)  More joint pain recently    6. High risk medication use  Back on Humira 40 mg every 14 days and oral methotrexate 17.5 mg every week    Plan/Recommendations:      Discussed findings with patient and her . The inflammation today resembles the appearance in the right eye from last visit in January 2021 (when off Humira and Methotrexate for one month).  Although we did not have a visit since, I anticipate things were better in February 2021 (on medications and drops) and started flaring off mediations in March 2021. Things have yet to improve completely as off drops one month and back on systemic medications a few weeks     Fortunately, eye pressure is slightly elevated but improved and OCT without macular edema    Do not recommend additional diagnostic testing given normal routine lab monitoring testing by rheumatology completed today    No drops needed in the left eye, which remains free of inflammation    Continue current systemic medications: Oral Methotrexate 17.5 mg (7 pills) each week, Humira 40 mg Q14 days    Restart eye drops in the right eye: Durezol 2x/day, Cosopt (blue top) 2x/day, and Atropine (dilating drop) once a week (on the day of Methotrexate use)-cyclopentolate was previously used daily but this is no longer covered by insurance    We anticipate that with regular use of the systemic medications and eyedrops that the inflammation in the eye should improve.  We further anticipate that the joint pains should improve after a few more doses of Humira and methotrexate    RTC 2 months Tonopen, Dilate right eye only, OCT and RNFL (ordered)    Physician Attestation   Attending Physician Attestation:  Complete  documentation of historical and exam elements from today's encounter can be found in the full encounter summary report (not reduplicated in this progress note). I personally obtained the chief complaint(s) and history of present illness. I confirmed and edited as necessary the review of systems, past medical/surgical history, family history, social history, and examination findings as documented by others; and I examined the patient myself. I personally reviewed the relevant tests, images, and reports as documented above. I formulated and edited as necessary the assessment and plan and discussed the findings and management plan with the patient and family members present at the time of this visit.  Edilson Velazquez M.D., Uveitis and Medical Retina, April 21, 2021            Sincerely,    Edilson Velazquez MD  [facility] Dept of Ophthalmology  Uveitis and Medical Retina

## 2021-04-21 NOTE — PATIENT INSTRUCTIONS
Continue current systemic medications: Oral Methotrexate 17.5 mg (7 pills) each week, Humira 40 mg Q14 days      Restart eye drops in the right eye: Durezol 2x/day, Cosopt (blue top) 2x/day, and Atropine (dilating drop) once a week (on the day of Methotrexate use)      Okay to get glasses anytime. When you get them, best to hold off on Atropine that week.

## 2021-04-21 NOTE — PROGRESS NOTES
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 She was last seen in January 2021 after being off Humira and Methotrexate for one month. We recommended restarted systemic therapy (both Humira and methotrexate were resumed in February 2021), and things were doing well.  Both systemic medications and drops were stopped in March 2021 (when Covid vaccination doses were given).  Methotrexate and Humira were resumed in the month of April but drops have not been restarted. Today, Ms. Conway reports the right eye seems to have more spots and the cloud has returned. Left eye fine    Interval Updates to Medical/Family/Social History:    Did take one month off (March 2021) Methotrexate to get both doses of Pfizer COVID vaccine, now restarted both Humira and Methotrexate. Had some brain \"fog\" and fatigue but these gradually imrpoved     Relevant Review of Systems Updates:  Joint pains improving back on Methotrexate and Humira.  No coughs or colds    Laboratory Testing April 21, 2021: CBC WNL, AST, ALT, Albumin, Creatinine     Current eye related medications: Oral Methotrexate 17.5 mg (7 pills) each week, Humira 40 mg Q14 days (off all drops in the right eye for one month)    Retina/Uveitis Imaging:   OCT Spectralis Macula April 21, 2021  right eye: Slight increased media haze, few vitreous opacities, no fluid.  left eye: Normal contour, no fluid. Stable    Assessment:     1. Acute anterior uveitis of right eye  Still active in the right eye, IOP lower but still slightly elevated    2. Intermediate uveitis of right eye  Still active vitreous inflammation but no CME    3. Cystoid macular edema of right eye  No recurrence by OCT    4. Ocular hypertension, right eye  Improved, but still elevated    5. Rheumatoid arthritis of multiple sites without rheumatoid factor (H)  More joint " pain recently    6. High risk medication use  Back on Humira 40 mg every 14 days and oral methotrexate 17.5 mg every week    Plan/Recommendations:      Discussed findings with patient and her . The inflammation today resembles the appearance in the right eye from last visit in January 2021 (when off Humira and Methotrexate for one month).  Although we did not have a visit since, I anticipate things were better in February 2021 (on medications and drops) and started flaring off mediations in March 2021. Things have yet to improve completely as off drops one month and back on systemic medications a few weeks     Fortunately, eye pressure is slightly elevated but improved and OCT without macular edema    Do not recommend additional diagnostic testing given normal routine lab monitoring testing by rheumatology completed today    No drops needed in the left eye, which remains free of inflammation    Continue current systemic medications: Oral Methotrexate 17.5 mg (7 pills) each week, Humira 40 mg Q14 days    Restart eye drops in the right eye: Durezol 2x/day, Cosopt (blue top) 2x/day, and Atropine (dilating drop) once a week (on the day of Methotrexate use)-cyclopentolate was previously used daily but this is no longer covered by insurance    We anticipate that with regular use of the systemic medications and eyedrops that the inflammation in the eye should improve.  We further anticipate that the joint pains should improve after a few more doses of Humira and methotrexate    RTC 2 months Tonopen, Dilate right eye only, OCT and RNFL (ordered)    Physician Attestation     Attending Physician Attestation:  Complete documentation of historical and exam elements from today's encounter can be found in the full encounter summary report (not reduplicated in this progress note). I personally obtained the chief complaint(s) and history of present illness. I confirmed and edited as necessary the review of systems, past  medical/surgical history, family history, social history, and examination findings as documented by others; and I examined the patient myself. I personally reviewed the relevant tests, images, and reports as documented above. I formulated and edited as necessary the assessment and plan and discussed the findings and management plan with the patient and family members present at the time of this visit.  Edilson Velazquez M.D., Uveitis and Medical Retina, April 21, 2021

## 2021-04-21 NOTE — NURSING NOTE
Chief Complaints and History of Present Illnesses   Patient presents with     Uveitis Follow-Up     Chief Complaint(s) and History of Present Illness(es)     Uveitis Follow-Up     Laterality: right eye    Onset: months ago    Quality: States va is the same since last visit      Associated symptoms: floaters (in the RE) and photophobia.  Negative for flashes    Pain scale: 0/10              Comments     Here for Intermediate uveitis of right eye  Discontinue all gtts for the past month or so.  Rosa Lopez COT 2:41 PM April 21, 2021

## 2021-04-22 DIAGNOSIS — R76.8 POSITIVE ANA (ANTINUCLEAR ANTIBODY): ICD-10-CM

## 2021-04-22 DIAGNOSIS — H20.00 ACUTE ANTERIOR UVEITIS OF RIGHT EYE: ICD-10-CM

## 2021-04-22 DIAGNOSIS — M06.09 RHEUMATOID ARTHRITIS OF MULTIPLE SITES WITHOUT RHEUMATOID FACTOR (H): Primary | ICD-10-CM

## 2021-04-22 DIAGNOSIS — Z79.899 HIGH RISK MEDICATIONS (NOT ANTICOAGULANTS) LONG-TERM USE: ICD-10-CM

## 2021-04-22 LAB
C3 SERPL-MCNC: 117 MG/DL (ref 81–157)
C4 SERPL-MCNC: 22 MG/DL (ref 13–39)
DSDNA AB SER-ACNC: 2 IU/ML
ENA RNP IGG SER IA-ACNC: <0.2 AI (ref 0–0.9)
ENA SM IGG SER-ACNC: <0.2 AI (ref 0–0.9)
ENA SS-A IGG SER IA-ACNC: <0.2 AI (ref 0–0.9)
ENA SS-B IGG SER IA-ACNC: <0.2 AI (ref 0–0.9)
HIV 1+2 AB+HIV1 P24 AG SERPL QL IA: NONREACTIVE

## 2021-05-05 ENCOUNTER — VIRTUAL VISIT (OUTPATIENT)
Dept: RHEUMATOLOGY | Facility: CLINIC | Age: 66
End: 2021-05-05
Attending: NURSE PRACTITIONER
Payer: MEDICARE

## 2021-05-05 DIAGNOSIS — M06.00 SERONEGATIVE RHEUMATOID ARTHRITIS (H): Primary | ICD-10-CM

## 2021-05-05 DIAGNOSIS — H20.00 ACUTE ANTERIOR UVEITIS OF RIGHT EYE: ICD-10-CM

## 2021-05-05 DIAGNOSIS — M21.941: ICD-10-CM

## 2021-05-05 DIAGNOSIS — Z79.899 HIGH RISK MEDICATIONS (NOT ANTICOAGULANTS) LONG-TERM USE: ICD-10-CM

## 2021-05-05 PROCEDURE — 99214 OFFICE O/P EST MOD 30 MIN: CPT | Mod: 95 | Performed by: NURSE PRACTITIONER

## 2021-05-05 ASSESSMENT — PAIN SCALES - GENERAL: PAINLEVEL: NO PAIN (0)

## 2021-05-05 NOTE — PATIENT INSTRUCTIONS
1. Advise a prevnar 13 vaccine followed 8 week later by pneumonia 23  2. Advise shingrix vaccines. Update Dr. Figueroa Batista prior to any vaccines if you have questions how to hold. This vaccine can cause side-effects up to a week. So plan ahead. Check with your insurance and pharmacy on cost and coverage  3. Establish with primary care for a complete physical including mammogram, colonoscopy (est with blood in stool), skin check for cancer and overall health  4. Advise daily walk and exercise.   5. ER stat if sudden vision loss, chest pain or signs or symptoms heart attack or stroke.     Dr. Figueroa Batista is not primary care provider so it is important to establish with one.     Education:   1. Immunizations: Please review your vaccination history with your primary provider to get up-to-date per the CDC guidelines to reduce infection risks.  2. Bone Health: Please take adequate calcium and vitamin D and exercise.. No smoking, Glucocorticoids (like prednisone or medrol) can increase your chance of infections including shingles, complications from Covid-19 (coronavirus), diabetes, osteoporosis or osteopenia, avascular necrosis, thin skin, cataracts, adrenal insufficiency, heart disease among others. Long-term use you can't stop this abruptly. Talk to your primary care for a DEXA scan  3. Important to have a primary care provider. Get yearly physicals, cancer screening including skin and oral, cardiac risk monitoring, bone health and primary care. Seek immediate medical attention for any signs or symptoms of infections. Hold your immunosuppression medications you have an infection and especially if taking antibiotics. No live vaccinations if you are on biologics. If you are not sure, talk to your MD. Limit your alcohol to 0-2 week while on methotrexate or leflunomide (arava)--not the day of or day after taking it.   4. Rheumatology we do not prescribe narcotics or manage chronic pain. You should discuss with primary  care or go to a pain clinic for management.   5. Covid-19 (coronavirus) prevention in high risk patients follow CDC guidelines/resources and follow their guidelines what to do for exposure signs or symptoms. Goal to minimize prednisone exposure to reduce infection risks. Good hand washing techniques with soap and water for at least 20 seconds, avoid touching eyes, nose, mouth, avoiding crowds, social distancing. If any signs or symptoms of infection, call 911 go to ER.   6. For more information on disease or medication education, go to the American College of Rheumat[employer] website. Www.rheumatology.org >go under patient section  7. Always read your medication phamplet for your medications education when you get them from pharmacy   8. It is advised if you are smoking, to work with your primary care provider about a quitting program. We do have one here. Smoking can increase your chance of infections, cancer, heart disease including blood clots or strokes or hypertension and lung disease like COPD.      Thank-you for allowing me to participate in your care.     Tyson Alicea APRN, CNP, MSN  [facility]  Department of Rheumatology & Autoimmune Disorders  [facility]: [phone] Opt 2, then Opt 1 Scheduling   [facility]: [phone] for any issues; main number [phone]

## 2021-05-05 NOTE — PROGRESS NOTES
[facility] - Rheumatology Clinic Visit     Reina Conway  is a 65 year old here for rheumatoid arthritis (RA) deforming. -RF -CCP -LASHAE - C3 -C4 -dsDNA +CATALINO homogenous 1:320 homogenous. CRP 8 - hep B/C -quant TB 7-2020. +uveitis  when off adalimumab (humira) and methotrexate (cost) back on 1-     Review-Seen Dr. Frost until 2010 then swtiched to [facility], then re-established 3/2013 (xrays 2/2013 [facility]). Dr. Frost retired 8-2019, co-manage with Dr. 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 Methotrexate helped but did not cause a remission/low disease state. She required Prednisone to control symptoms partially but still had significant ADL issues. We had persistently recommended anti TNF therapy which she resisted. She sought a second opinion at [facility] who recommended the same things, and she continued care there as of August 2010, then switched back to Dr. Frost 3/2013. Begun on Enbrel at [facility]. 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 As no other etiology found it was felt it might be due to Enbrel and she was switched to Humira. She has remained on Methotrexate, primarily 25 mg weekly, decreased 3/2013 (not to go <15mg week due to risk of further deformities or uveitis) . FRAX 32% at [facility]. Prior declined biphosphosphonate, [facility] recommended IV.     Xrays 3/2013 [facility]: See records. Hallux valgus right, hammertoes L>R, hindfoot valgus, pes planus. Dislocated left 2nd MTP, sublux left 3rd MTP, arth 2-3 MTP, rt 1st MTP. Mild DJD hands. Several DIP and 1st CMC. Subluxation left thumb IP and persistant dorsiflexion right wrist. Previous visits discussed stress in her life. Her father in law passed away and there had been a lot of  "stress and she put her issues on the back burner. She had an episode of chest/epigastric pain and was seen at [facility]. She had apparent negative cardiac evaluation although did not followup with a stress test. She is taking OTC Zantac prn and thinks that helps. Found allergic to Wheat, avoid gluten and sugars. Feels much improved [heartburn, bloating, blood in stools, irregular stools] this really changed her digestive system. She went on a gluten free diet in December, and stopped all RA meds in early Jan 2015. See My Chart message. Restarted Humira early April 2015 every 3 weeks. Noticed more migatory joints pains, swelling. Right hand, right knee, right wrist-associated swelling really in right 2nd MCP. 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 Lack of movement and coordinations, using her wrist brace. Uses this at night and daily prn. At last visit she had continued on Humira without side effects and still feels it is helpful. She does have daily discomfort in several areas with either activity or prior damage, especially knees. She continued to have stress in her life but is working through this and \"trying to get back on track\". She stopped drinking any alcohol for the past two months and was congratulated on this. She went to  but does not feel she is alcoholic.  Leonardo going to [facility]. She has been  for a long time. Both lost parents, and grieving. She says she's in less denial. She admits  is helping a lot. She called in November due to increased joint pain and we restarted Methotrexate at 10 mg weekly as previously discussed.  She stopped it two weeks ago.  She noted more joint pain and also some nausea on day of dose.  We discussed this at length and unlikely that MTX contributed to more joint symptoms. At January 2016 visit we readded Methotrexate to Humira in hopes of decreasing disease activity, using low dose due to prior side effect complaints.  She called later that month with " flare symptoms requiring Prednisone bridge, and we increased Methotrexate to more standard dose of 15 mg weekly. At her April 2016 visit she had started improving.. She was tolerating Methotrexate this time.  She weaned off Prednisone.     Copy forward initial visit September 5, 2019  Tyson Alicea CASEY:   Reports rheumatoid arthritis (RA) is controlled overall and feels her deformities are stable. Some gelling of the knees, contractures and right hand contracture deformities, and rigidity of the neck. This does affect her mobility, fine motor and dexterity, and recently rightTMJ MSK pain felt due to the root canal and neck. Gelling of joint sit to stand. At times knees do swelling. No big rheumatoid arthritis (RA) flares and no infections. Denies any fever, chills, SOB, LEE, night sweats, or chest pain, or cough. Reports healthy. No cyclical wearing down. Denies any n/t arms, legs or loss BB or weakness. No redflags. No neck pain.      Continues adalimumab (humira) 40 mg injection every 14 days, methotrexate  15 mg every 7 days MON folic acid  2 mg day tolerating no side-effects. No prednisone or NSAID use.    Copy forward  October 21, 2020  Fell Sept 2019 dx with right femur fracture at [facility] Dr. Villatoro, did not need surgery but was chair bound for 2 month. Leonardo is with her now.     Denies any fever, chills, SOB, LEE, night sweats, or chest pain, high fever, cough, travel in last 14 days or exposure to covid-19 (coronavirus). Reports healthy. Follows CDC guidelines.     Looking for new PCP. Wishes to wait until after pandemic as she is afraid to leave her house.     I seen her 4-2020 and Dr. Batista 7-2020 (on adalimumab (humira) and methotrexate  15 mg every 7 days, folic acid  2 mg day), was off for few weeks due to sore throat. He referred her to opthalmologist due to history uveitis and history of this. Adalimumab (humira) is indicated for uveitis per FDA. Dx Right eye uveitis in Aug, put on prednisone 20 mg x 1  "week then 10 mg x 1 week then now at 5 mg day and continues the steroid gtts. Plan was to increase methotrexate to 20 mg, labs in 1 month, on prednisone and gtts per ophthalmologist. Right eye is better, vision is near normal when closes her eye, no redness or pain. \"mild fuzz\"\"no black spots\". No pain or pressure at any time even when flaring. No infections prior to this. Left eye is good. States will need new glasses after this is done.       Rheumatoid arthritis (RA) is good, right hand is better. Stable. Able to go down stairs. Right hand deformed. Right knee and hips and other joints are good.      Labs 7-22   -CCP +CATALINO homogenous CRP 8, - hep B/C -quant TB, normal CBC liver and kidney tests.      I read Dr. Velazquez OV recommendations from 10-12. Recent intermediate uveitis right eye improving on prednisone course, acute anterior uveitis right eye improving.        \"We would like to increase the Methotrexate dose from 6 pills (15 mg) to 8 pills (20 mg) each week if okay with Dr. Batista and NP Tyson Alicea.    The eye pressure is improved to just above normal limits in each eye     No additional diagnostic testing recommended     Continue  Humira 40 mg Q14 days, Oral Methotrexate 15 mg (6 pills) weekly until we hear back from Rheumatology    For the prednisone, reduce to 5 mg for the rest of October.\"    Continues adalimumab (humira) 40 mg injection every 14 days, methotrexate  15 mg every 7 days MON folic acid  2 mg day tolerating no side-effects maybe slight less appetite day taking. No NSAID use. Prednisone 5 mg day. No side-effects. Reports many years ago at [place] she was taking higher methotrexate but did not tolerate. This was over at least 8 yrs ago. She was on lower folic acid as well.     May 5, 2021  Denies any fever, chills, SOB, LEE, night sweats, or chest pain, high fever, cough, travel in last 14 days or exposure to covid-19 (coronavirus). Reports healthy. Follows CDC guidelines. Got the Covid-19 " "(coronavirus) vaccines.     Seen opthalmologist 4-21--still active no CME, improved ocular hypertension. Restarted her gtts. Stopped adalimumab (humira) and methotrexate when covid vaccines were given. Follow-up 2 months. Right eye is improving. Slight cloudy no pain. Other eye is good. Rheumatoid arthritis (RA) is controlled now. Right thumb 90 degree. Did flare off adalimumab (humira) and methotrexate  --off Dec to Ko 10 (cost) and held for Covid-19 (coronavirus) vaccines. Right hip still bothers when walks, past MRI/x-rays no fx after other fall. Not seen PCP in long time since Dr. Orantes left. Due for mammogram, colonoscopy and overall check. No lung sx. Some allergies. Does not feel any ILD or toxicity    Adalimumab (humira) 40 mg injection every 14 days due Mon 5-10, methotrexate 17.5 mg every 7 days MON, folic acid 2 mg day, no side-effects.No prednisone or NSAIDs.     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 History grave's, menopause, osteoporosis borderline, rheumatoid arthritis (RA), pharyngeal dysphagia, hammertoe      Surgical-fibroid left breast    FH:  No autoimmune disorders, psoriasis, UC, crohn's, SLE, RA, PsA, gout, autoimmune thyroid. No MS, heart disease in family  Mother-breast cancer, parkinson, osteoporosis , low back issues-passed    Father-anxiety, dementia-passed after hip fracture   Siblings-brother \"not live well\"   Children-None   M aunt breast cancer   Cousin mental illness      SH:  1 glass Wine few times a week. not M-W . No Smoking. No IVDU.  Leonardo. Retired       PMSH personally reviewed and updated by me.    ROS:  Negative raynaud s phenomena, hairloss, sun sensitivities, keratoconjunctivitis sicca, pleurisy, significant rashes like malar, oral/nasal or ulcerations, inflammatory bowel disease, dactylitis, tenosynovitis, or enthespathy. Negative blood " clots, gout, psoriasis, UC, crohn's. No temporal headache, no jaw claudication, no scalp tenderness, vision changes, carotidynia, cough. No Parotid swelling.     CONSTITUTIONAL: No fevers, night sweats or unintentional weight change. No acute distress, swollen glands  EYES: see above.  EARS, NOSE, MOUTH, THROAT: No tinnitus or hearing change, no epistaxis or nasal discharge, no oral lesions, throat clear. Normal saliva pool.  No drymouth. No thyroid enlargement.   CARDIOVASCULAR: No chest pain, palpitations, or pain with walking, no orthopnea or PND   RESPIRATORY: No dyspnea, cough, shortness of breath or wheezing. No pleurisy.   GI: No nausea, vomiting, diarrhea or constipation, no abdominal pain, or blood in stools.   : No change in urine, no dysuria or hematuria   MUSCKL: see above.   INTEGUMENTARY: No concerning lesions or moles   NEURO: No loss of strength or sensation, no numbness or tingling, no tremor, no dizziness, no headache. No falls   ENDO: No polyuria or polydipsia, no temperature intolerance   HEME/LYMPH:No concerning bumps, bleeding problems, or swollen lymph nodes. No recent infections, hospitalizations or new illnesses.   Otherwise 14 point ROS obtained, reviewed and found negative.     Assessment via video:    Constitutional: [id number] cooperative  Eyes: nl EOM, PERRLA, vision, conjunctiva, sclera  ENT: nl external ears, nose, hearing, lips, teeth, gums, throat  Neck: no mass or thyroid enlargement  RESP: No cough, no audible wheezing, able to talk in full sentences  Skin: no nail pitting, alopecia, rash  Neuro: Cranial nerves grossly intact.  Mentation and speech appropriate for age.  PSYCH: Mentation appears normal, affect normal/bright, judgement and insight intact, normal speech and appearance well-groomed, memory, affect.  Alert and oriented times 3; coherent speech, normal rate and volume, able to articulate logical thoughts, able  to abstract reason, no tangential thoughts, no  hallucinations or delusions  His affect is normal and pleasant  MSK: swan deformities right hand with severe deformities, z-thumbs right 90 degree Bilateral subluxation wrists with decreased ROM pannus. Right hand ulnar deviation, right thumb drop , right 5th DIP subluxation, right wrist subluxation, hammertoes. Bunions bilaterally. Deformities of MTPs with palpable MTP heads. Pes planus. contracture of knees with lack of full extension. Nil ROM neck or extension neck. Very restricted in neck ROM. All other TMJ, neck, shoulder, elbow, wrist, hand, knee, ankle, and foot joints found normal. Negative Lhermitte's sign. No periuncle erythema  Remainder of exam unable to be completed due to video visits    Due to efforts to reduce the spread of COVID-19 in the clinic, state, nation, telephone visits are encouraged currently. Patient understands that diagnose and advice is limited by the inability to exam him/her/them face-to-face. Discussed corovid-19 prevention, CDC guidelines/resources and to follow CDC/MDH guidelines. If any signs or symptoms of infection to stop immunosuppression and seek immediate medical attn. That prednisone can increase change of serious infections so should be avoided. Discussed the importance of good hand washing techniques with soap and water for at least 20 seconds, avoid touching eyes, nose, mouth, avoiding crowds, social distancing. We also agree that the benefits of continued immunosuppression outweigh the risks of COVID-19 infection.       Labs/Imaging:  I have reviewed EHR  Component      Latest Ref Rng & Units 4/21/2021   WBC      4.0 - 11.0 10e9/L 8.4   RBC Count      3.8 - 5.2 10e12/L 5.02   Hemoglobin      11.7 - 15.7 g/dL 15.7   Hematocrit      35.0 - 47.0 % 47.3 (H)   MCV      78 - 100 fl 94   MCH      26.5 - 33.0 pg 31.3   MCHC      31.5 - 36.5 g/dL 33.2   RDW      10.0 - 15.0 % 13.8   Platelet Count      150 - 450 10e9/L 288   Diff Method       Automated Method   % Neutrophils       % 55.3   % Lymphocytes      % 38.7   % Monocytes      % 4.7   % Eosinophils      % 0.4   % Basophils      % 0.7   % Immature Granulocytes      % 0.2   Nucleated RBCs      0 /100 0   Absolute Neutrophil      1.6 - 8.3 10e9/L 4.7   Absolute Lymphocytes      0.8 - 5.3 10e9/L 3.3   Absolute Monocytes      0.0 - 1.3 10e9/L 0.4   Absolute Eosinophils      0.0 - 0.7 10e9/L 0.0   Absolute Basophils      0.0 - 0.2 10e9/L 0.1   Abs Immature Granulocytes      0 - 0.4 10e9/L 0.0   Absolute Nucleated RBC       0.0   RNP Antibody IgG      0.0 - 0.9 AI <0.2   Payton LASHAE Antibody IgG      0.0 - 0.9 AI <0.2   SSA (Ro) (LASHAE) Antibody, IgG      0.0 - 0.9 AI <0.2   SSB (La) (LASHAE) Antibody, IgG      0.0 - 0.9 AI <0.2   Creatinine      0.52 - 1.04 mg/dL 0.76   GFR Estimate      >60 mL/min/1.73:m2 82   GFR Estimate If Black      >60 mL/min/1.73:m2 >90   HIV Antigen Antibody Combo      NR:Nonreactive     Nonreactive   TSH      0.40 - 4.00 mU/L 0.72   Complement C3      81 - 157 mg/dL 117   Complement C4      13 - 39 mg/dL 22   Sed Rate      0 - 30 mm/h 12   CRP Inflammation      0.0 - 8.0 mg/L 8.8 (H)   DNA-ds      <10 IU/mL 2   Albumin      3.4 - 5.0 g/dL 3.9   ALT      0 - 50 U/L 35   AST      0 - 45 U/L 28     Assessment/Plan:  Impression/Plan:  1. Seronegative destructive deforming contractures RA (-RF/CCP) stable chronic .   Episode of panuveitis that was attributed to Enbrel 2011. Interval right uveitis due to off meds for cost then again flare recently due to off due to Covid-19 (coronavirus) vaccines. Improved. Rheumatoid arthritis (RA) is controlled based on her report and exam (limited). Risk of ILD and methotrexate toxicity we discussed, she will be doing in person visit next time but will call if any cardiopulmonary symptoms.     Adalimumab (humira) is approved in uveitis. We agreed to continue this adalimumab (humira) and methotrexate dose.      2. High risk medication monitoring, immunosuppression.  Labs every 8weeks normal  cbc, liver, kidney tests 4-2021.     3. Uveitis, right eye, per ophthalmologist.     4. Severe right hand deformities, would not be able to do vial of methotrexate.     ** Due for complete physical with new PCP. Given femur fracture hx and not up-to-date with cancer screenings bone health or vaccinations, we discussed her getting complete physical. I am concerned she has osteoporosis. Recommend pneumonia vaccinations, annual flu vaccine and shingrix to prevent infections she can get this at local pharmacy.        PLAN: Discussed in detail with the patient.   1. Continue adalimumab (humira) 40 mg SQ Q 2 weeks and methotrexate at 17.5 mg every 7 days weekly with 2 mg day Folic acid  --hold MTX for any infections.    2  F/U Ophthalmology as ordered.   3. RTC 3 mths, sooner prn Dr. Batista   4. Get a complete physical with PCP-set-this up. Cancer screening, ASCVD, vaccinations, DEXA (history right femur fx). We recommend flu vaccine if she can .We recommend prevnar 13 vaccine if she can and has not had this then pneumonia 23 after 8 weeks or more to reduce chance of infections.     The patient understood the rationale for the diagnosis and treatment plan. Patient shared in the decision making. All questions were answered to best of my ability and the patient's satisfaction and agrees with the plan.      Tyson PEÑA, JOSHUA, MSN  [facility]  Department of Rheumatology & Autoimmune Disorders      I have reviewed the note as documented above.  This accurately captures the substance of my conversation with the patient. Instructions given to the patient including prescriptions, follow-up appointments and plan, follow-up labs and orders.        Thank-you for allowing me to participate in your care.     Tyson PEÑA CNP, MSN  [facility]  Department of Rheumatology & Autoimmune Disorders  [facility]: [phone] Opt 2, then Opt 1 Scheduling      Education:   1. Immunization: Reviewed CDC guidelines with patient updated, information provided to patient based on CDC guidelines for vaccination recommendations, patient will discuss with primary provider  2. Bone Health:Educated on adequate calcium and vitamin D intake, Educated on exercise, Glucocorticoid education discussed risks, benefits & potential long term side effects from use per primary provider  3. Discussed routine annual physicals, cancer screening, cardiac risk monitoring, bone health and primary care with primary care provider. Also, educated glucocorticoid increase change of infections including shingles.   4. Educated on diagnosis, prognosis, labs, imaging, treatment options (including risks, benefits, risk of no treatment), medications (use, dose, side-effects, risks of medications including infection/cancer/bone marrow suppression, lab monitoring), infections what to do, plan of cares, goals of treatment. Clinic information given.    5. Educated in Rheumatology we do not prescribe narcotics or manage chronic pain, the patient will need to discuss with primary care or go to a pain clinic for management.   6. Discussed corovid-19 prevention, CDC guidelines/resources and what to do for exposure signs or symptoms and where to go, to follow CDC/MD guidelines.     TT 39 min, video 25 min charting and orders, review chart 14 min

## 2021-05-05 NOTE — PROGRESS NOTES
Reina is a 65 year old who is being evaluated via a billable video visit.      How would you like to obtain your AVS? MyChart  If the video visit is dropped, the invitation should be resent by: Send to e-mail at: [email or web address]  Will anyone else be joining your video visit? No      Video Start Time: 252 PM  Video-Visit Details    Type of service:  Video Visit    Video End Time:325 PM    Originating Location (pt. Location): Home    Distant Location (provider location):  [facility] [place]     Platform used for Video Visit: [employer]

## 2021-05-05 NOTE — LETTER
5/5/2021       RE: Reina Conway  [address] [facility] [address]     Dear Colleague,    Thank you for referring your patient, Reina Conway, to the [facility] RHEUMATOLOGY CLINIC [place] at [facility]. Please see a copy of my visit note below.    Reina is a 65 year old who is being evaluated via a billable video visit.      How would you like to obtain your AVS? MyChart  If the video visit is dropped, the invitation should be resent by: Send to e-mail at: [email or web address]  Will anyone else be joining your video visit? No      Video Start Time: 252 PM  Video-Visit Details    Type of service:  Video Visit    Video End Time:325 PM    Originating Location (pt. Location): Home    Distant Location (provider location):  [facility] RHEUMATOLOGY [facility]     Platform used for Video Visit: Sobrr    [facility] - Rheumatology Clinic Visit     Reina Conway  is a 65 year old here for rheumatoid arthritis (RA) deforming. -RF -CCP -LASHAE - C3 -C4 -dsDNA +CATALINO homogenous 1:320 homogenous. CRP 8 - hep B/C -quant TB 7-2020. +uveitis  when off adalimumab (humira) and methotrexate (cost) back on 1-     Review-Seen Dr. Frost until 2010 then swtiched to [facility], then re-established 3/2013 (xrays 2/2013 [place]). Dr. Frost retired 8-2019, co-manage with Dr. 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 Methotrexate helped but did not cause a remission/low disease state. She required Prednisone to control symptoms partially but still had significant ADL issues. We had persistently recommended anti TNF therapy which she resisted. She sought a second opinion at [place] who recommended the same things, and she continued care  there as of August 2010, then switched back to Dr. Frost 3/2013. Begun on Enbrel at [facility]. 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 As no other etiology found it was felt it might be due to Enbrel and she was switched to Humira. She has remained on Methotrexate, primarily 25 mg weekly, decreased 3/2013 (not to go <15mg week due to risk of further deformities or uveitis) . FRAX 32% at [place]. Prior declined biphosphosphonate, [place] recommended IV.     Xrays 3/2013 [place]: See records. Hallux valgus right, hammertoes L>R, hindfoot valgus, pes planus. Dislocated left 2nd MTP, sublux left 3rd MTP, arth 2-3 MTP, rt 1st MTP. Mild DJD hands. Several DIP and 1st CMC. Subluxation left thumb IP and persistant dorsiflexion right wrist. Previous visits discussed stress in her life. Her father in law passed away and there had been a lot of stress and she put her issues on the back burner. She had an episode of chest/epigastric pain and was seen at [facility]. She had apparent negative cardiac evaluation although did not followup with a stress test. She is taking OTC Zantac prn and thinks that helps. Found allergic to Wheat, avoid gluten and sugars. Feels much improved [heartburn, bloating, blood in stools, irregular stools] this really changed her digestive system. She went on a gluten free diet in December, and stopped all RA meds in early Jan 2015. See My Chart message. Restarted Humira early April 2015 every 3 weeks. Noticed more migatory joints pains, swelling. Right hand, right knee, right wrist-associated swelling really in right 2nd MCP. 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 Lack of movement and coordinations, using her wrist brace. Uses this at night and daily prn. At last visit she had continued on Humira without side effects and still feels it is helpful. She does have daily discomfort in several areas with either activity or prior damage, especially knees.  "She continued to have stress in her life but is working through this and \"trying to get back on track\". She stopped drinking any alcohol for the past two months and was congratulated on this. She went to  but does not feel she is alcoholic.  Leonardo going to [place]. She has been  for a long time. Both lost parents, and grieving. She says she's in less denial. She admits  is helping a lot. She called in November due to increased joint pain and we restarted Methotrexate at 10 mg weekly as previously discussed.  She stopped it two weeks ago.  She noted more joint pain and also some nausea on day of dose.  We discussed this at length and unlikely that MTX contributed to more joint symptoms. At January 2016 visit we readded Methotrexate to Humira in hopes of decreasing disease activity, using low dose due to prior side effect complaints.  She called later that month with flare symptoms requiring Prednisone bridge, and we increased Methotrexate to more standard dose of 15 mg weekly. At her April 2016 visit she had started improving.. She was tolerating Methotrexate this time.  She weaned off Prednisone.     Copy forward initial visit September 5, 2019  Tyson Alicea APRN:   Reports rheumatoid arthritis (RA) is controlled overall and feels her deformities are stable. Some gelling of the knees, contractures and right hand contracture deformities, and rigidity of the neck. This does affect her mobility, fine motor and dexterity, and recently rightTMJ MSK pain felt due to the root canal and neck. Gelling of joint sit to stand. At times knees do swelling. No big rheumatoid arthritis (RA) flares and no infections. Denies any fever, chills, SOB, LEE, night sweats, or chest pain, or cough. Reports healthy. No cyclical wearing down. Denies any n/t arms, legs or loss BB or weakness. No redflags. No neck pain.      Continues adalimumab (humira) 40 mg injection every 14 days, methotrexate  15 mg every 7 days MON " "folic acid  2 mg day tolerating no side-effects. No prednisone or NSAID use.    Copy forward  October 21, 2020  Fell Sept 2019 dx with right femur fracture at [facility] Dr. Villatoro, did not need surgery but was chair bound for 2 month. Leonardo is with her now.     Denies any fever, chills, SOB, LEE, night sweats, or chest pain, high fever, cough, travel in last 14 days or exposure to covid-19 (coronavirus). Reports healthy. Follows CDC guidelines.     Looking for new PCP. Wishes to wait until after pandemic as she is afraid to leave her house.     I seen her 4-2020 and Dr. Batista 7-2020 (on adalimumab (humira) and methotrexate  15 mg every 7 days, folic acid  2 mg day), was off for few weeks due to sore throat. He referred her to opthalmologist due to history uveitis and history of this. Adalimumab (humira) is indicated for uveitis per FDA. Dx Right eye uveitis in Aug, put on prednisone 20 mg x 1 week then 10 mg x 1 week then now at 5 mg day and continues the steroid gtts. Plan was to increase methotrexate to 20 mg, labs in 1 month, on prednisone and gtts per ophthalmologist. Right eye is better, vision is near normal when closes her eye, no redness or pain. \"mild fuzz\"\"no black spots\". No pain or pressure at any time even when flaring. No infections prior to this. Left eye is good. States will need new glasses after this is done.       Rheumatoid arthritis (RA) is good, right hand is better. Stable. Able to go down stairs. Right hand deformed. Right knee and hips and other joints are good.      Labs 7-22   -CCP +CATALINO homogenous CRP 8, - hep B/C -quant TB, normal CBC liver and kidney tests.      I read Dr. Velazquez OV recommendations from 10-12. Recent intermediate uveitis right eye improving on prednisone course, acute anterior uveitis right eye improving.        \"We would like to increase the Methotrexate dose from 6 pills (15 mg) to 8 pills (20 mg) each week if okay with Dr. Batista and NP Tyson Alicea.    The eye pressure " "is improved to just above normal limits in each eye     No additional diagnostic testing recommended     Continue  Humira 40 mg Q14 days, Oral Methotrexate 15 mg (6 pills) weekly until we hear back from Rheumatology    For the prednisone, reduce to 5 mg for the rest of October.\"    Continues adalimumab (humira) 40 mg injection every 14 days, methotrexate  15 mg every 7 days MON folic acid  2 mg day tolerating no side-effects maybe slight less appetite day taking. No NSAID use. Prednisone 5 mg day. No side-effects. Reports many years ago at [place] she was taking higher methotrexate but did not tolerate. This was over at least 8 yrs ago. She was on lower folic acid as well.     May 5, 2021  Denies any fever, chills, SOB, LEE, night sweats, or chest pain, high fever, cough, travel in last 14 days or exposure to covid-19 (coronavirus). Reports healthy. Follows CDC guidelines. Got the Covid-19 (coronavirus) vaccines.     Seen opthalmologist 4-21--still active no CME, improved ocular hypertension. Restarted her gtts. Stopped adalimumab (humira) and methotrexate when covid vaccines were given. Follow-up 2 months. Right eye is improving. Slight cloudy no pain. Other eye is good. Rheumatoid arthritis (RA) is controlled now. Right thumb 90 degree. Did flare off adalimumab (humira) and methotrexate  --off Dec to Ko 10 (cost) and held for Covid-19 (coronavirus) vaccines. Right hip still bothers when walks, past MRI/x-rays no fx after other fall. Not seen PCP in long time since Dr. Orantes left. Due for mammogram, colonoscopy and overall check. No lung sx. Some allergies. Does not feel any ILD or toxicity    Adalimumab (humira) 40 mg injection every 14 days due Mon 5-10, methotrexate 17.5 mg every 7 days MON, folic acid 2 mg day, no side-effects.No prednisone or NSAIDs.     PMH:  Injury-right femur fracture   Medical-right anterior uveitis (felt secondary to etanercept (enbrel), fibroid  high in 2007 then normal, " "hyperlipidemia. History grave's, menopause, osteoporosis borderline, rheumatoid arthritis (RA), pharyngeal dysphagia, hammertoe      Surgical-fibroid left breast    FH:  No autoimmune disorders, psoriasis, UC, crohn's, SLE, RA, PsA, gout, autoimmune thyroid. No MS, heart disease in family  Mother-breast cancer, parkinson, osteoporosis , low back issues-passed    Father-anxiety, dementia-passed after hip fracture   Siblings-brother \"not live well\"   Children-None   M aunt breast cancer   Cousin mental illness      SH:  1 glass Wine few times a week. not M-W . No Smoking. No IVDU.  Leonardo. Retired       [place] personally reviewed and updated by me.    ROS:  Negative raynaud s phenomena, hairloss, sun sensitivities, keratoconjunctivitis sicca, pleurisy, significant rashes like malar, oral/nasal or ulcerations, inflammatory bowel disease, dactylitis, tenosynovitis, or enthespathy. Negative blood clots, gout, psoriasis, UC, crohn's. No temporal headache, no jaw claudication, no scalp tenderness, vision changes, carotidynia, cough. No Parotid swelling.     CONSTITUTIONAL: No fevers, night sweats or unintentional weight change. No acute distress, swollen glands  EYES: see above.  EARS, NOSE, MOUTH, THROAT: No tinnitus or hearing change, no epistaxis or nasal discharge, no oral lesions, throat clear. Normal saliva pool.  No drymouth. No thyroid enlargement.   CARDIOVASCULAR: No chest pain, palpitations, or pain with walking, no orthopnea or PND   RESPIRATORY: No dyspnea, cough, shortness of breath or wheezing. No pleurisy.   GI: No nausea, vomiting, diarrhea or constipation, no abdominal pain, or blood in stools.   : No change in urine, no dysuria or hematuria   MUSCKL: see above.   INTEGUMENTARY: No concerning lesions or moles   NEURO: No loss of strength or sensation, no numbness or tingling, no tremor, no dizziness, no headache. No falls   ENDO: No polyuria or polydipsia, no temperature intolerance   HEME/LYMPH:No " concerning bumps, bleeding problems, or swollen lymph nodes. No recent infections, hospitalizations or new illnesses.   Otherwise 14 point ROS obtained, reviewed and found negative.     Assessment via video:    Constitutional: WD-WN-[id number] cooperative  Eyes: nl EOM, PERRLA, vision, conjunctiva, sclera  ENT: nl external ears, nose, hearing, lips, teeth, gums, throat  Neck: no mass or thyroid enlargement  RESP: No cough, no audible wheezing, able to talk in full sentences  Skin: no nail pitting, alopecia, rash  Neuro: Cranial nerves grossly intact.  Mentation and speech appropriate for age.  PSYCH: Mentation appears normal, affect normal/bright, judgement and insight intact, normal speech and appearance well-groomed, memory, affect.  Alert and oriented times 3; coherent speech, normal rate and volume, able to articulate logical thoughts, able  to abstract reason, no tangential thoughts, no hallucinations or delusions  His affect is normal and pleasant  MSK: swan deformities right hand with severe deformities, z-thumbs right 90 degree Bilateral subluxation wrists with decreased ROM pannus. Right hand ulnar deviation, right thumb drop , right 5th DIP subluxation, right wrist subluxation, hammertoes. Bunions bilaterally. Deformities of MTPs with palpable MTP heads. Pes planus. contracture of knees with lack of full extension. Nil ROM neck or extension neck. Very restricted in neck ROM. All other TMJ, neck, shoulder, elbow, wrist, hand, knee, ankle, and foot joints found normal. Negative Lhermitte's sign. No periuncle erythema  Remainder of exam unable to be completed due to video visits    Due to efforts to reduce the spread of COVID-19 in the clinic, state, nation, telephone visits are encouraged currently. Patient understands that diagnose and advice is limited by the inability to exam him/her/them face-to-face. Discussed corovid-19 prevention, CDC guidelines/resources and to follow CDC/MDH guidelines. If any signs or  symptoms of infection to stop immunosuppression and seek immediate medical attn. That prednisone can increase change of serious infections so should be avoided. Discussed the importance of good hand washing techniques with soap and water for at least 20 seconds, avoid touching eyes, nose, mouth, avoiding crowds, social distancing. We also agree that the benefits of continued immunosuppression outweigh the risks of COVID-19 infection.       Labs/Imaging:  I have reviewed EHR  Component      Latest Ref Rng & Units 4/21/2021   WBC      4.0 - 11.0 10e9/L 8.4   RBC Count      3.8 - 5.2 10e12/L 5.02   Hemoglobin      11.7 - 15.7 g/dL 15.7   Hematocrit      35.0 - 47.0 % 47.3 (H)   MCV      78 - 100 fl 94   MCH      26.5 - 33.0 pg 31.3   MCHC      31.5 - 36.5 g/dL 33.2   RDW      10.0 - 15.0 % 13.8   Platelet Count      150 - 450 10e9/L 288   Diff Method       Automated Method   % Neutrophils      % 55.3   % Lymphocytes      % 38.7   % Monocytes      % 4.7   % Eosinophils      % 0.4   % Basophils      % 0.7   % Immature Granulocytes      % 0.2   Nucleated RBCs      0 /100 0   Absolute Neutrophil      1.6 - 8.3 10e9/L 4.7   Absolute Lymphocytes      0.8 - 5.3 10e9/L 3.3   Absolute Monocytes      0.0 - 1.3 10e9/L 0.4   Absolute Eosinophils      0.0 - 0.7 10e9/L 0.0   Absolute Basophils      0.0 - 0.2 10e9/L 0.1   Abs Immature Granulocytes      0 - 0.4 10e9/L 0.0   Absolute Nucleated RBC       0.0   RNP Antibody IgG      0.0 - 0.9 AI <0.2   Payton LASHAE Antibody IgG      0.0 - 0.9 AI <0.2   SSA (Ro) (LASHAE) Antibody, IgG      0.0 - 0.9 AI <0.2   SSB (La) (LASHAE) Antibody, IgG      0.0 - 0.9 AI <0.2   Creatinine      0.52 - 1.04 mg/dL 0.76   GFR Estimate      >60 mL/min/1.73:m2 82   GFR Estimate If Black      >60 mL/min/1.73:m2 >90   HIV Antigen Antibody Combo      NR:Nonreactive     Nonreactive   TSH      0.40 - 4.00 mU/L 0.72   Complement C3      81 - 157 mg/dL 117   Complement C4      13 - 39 mg/dL 22   Sed Rate      0 - 30 mm/h  12   CRP Inflammation      0.0 - 8.0 mg/L 8.8 (H)   DNA-ds      <10 IU/mL 2   Albumin      3.4 - 5.0 g/dL 3.9   ALT      0 - 50 U/L 35   AST      0 - 45 U/L 28     Assessment/Plan:  Impression/Plan:  1. Seronegative destructive deforming contractures RA (-RF/CCP) stable chronic .   Episode of panuveitis that was attributed to Enbrel 2011. Interval right uveitis due to off meds for cost then again flare recently due to off due to Covid-19 (coronavirus) vaccines. Improved. Rheumatoid arthritis (RA) is controlled based on her report and exam (limited). Risk of ILD and methotrexate toxicity we discussed, she will be doing in person visit next time but will call if any cardiopulmonary symptoms.     Adalimumab (humira) is approved in uveitis. We agreed to continue this adalimumab (humira) and methotrexate dose.      2. High risk medication monitoring, immunosuppression.  Labs every 8weeks normal cbc, liver, kidney tests 4-2021.     3. Uveitis, right eye, per ophthalmologist.     4. Severe right hand deformities, would not be able to do vial of methotrexate.     ** Due for complete physical with new PCP. Given femur fracture hx and not up-to-date with cancer screenings bone health or vaccinations, we discussed her getting complete physical. I am concerned she has osteoporosis. Recommend pneumonia vaccinations, annual flu vaccine and shingrix to prevent infections she can get this at local pharmacy.        PLAN: Discussed in detail with the patient.   1. Continue adalimumab (humira) 40 mg SQ Q 2 weeks and methotrexate at 17.5 mg every 7 days weekly with 2 mg day Folic acid  --hold MTX for any infections.    2  F/U Ophthalmology as ordered.   3. RTC 3 mths, sooner prn Dr. Batista   4. Get a complete physical with PCP-set-this up. Cancer screening, ASCVD, vaccinations, DEXA (history right femur fx). We recommend flu vaccine if she can .We recommend prevnar 13 vaccine if she can and has not had this then pneumonia 23 after 8  weeks or more to reduce chance of infections.     The patient understood the rationale for the diagnosis and treatment plan. Patient shared in the decision making. All questions were answered to best of my ability and the patient's satisfaction and agrees with the plan.      Tyson PEÑA CNP, MSN  [facility]  Department of Rheumatology & Autoimmune Disorders      I have reviewed the note as documented above.  This accurately captures the substance of my conversation with the patient. Instructions given to the patient including prescriptions, follow-up appointments and plan, follow-up labs and orders.        Thank-you for allowing me to participate in your care.     Tyson PEÑA CNP, MSN  [facility]  Department of Rheumatology & Autoimmune Disorders  [facility] Rheumatology: [phone] Opt 2, then Opt 1 Scheduling     Education:   1. Immunization: Reviewed CDC guidelines with patient updated, information provided to patient based on CDC guidelines for vaccination recommendations, patient will discuss with primary provider  2. Bone Health:Educated on adequate calcium and vitamin D intake, Educated on exercise, Glucocorticoid education discussed risks, benefits & potential long term side effects from use per primary provider  3. Discussed routine annual physicals, cancer screening, cardiac risk monitoring, bone health and primary care with primary care provider. Also, educated glucocorticoid increase change of infections including shingles.   4. Educated on diagnosis, prognosis, labs, imaging, treatment options (including risks, benefits, risk of no treatment), medications (use, dose, side-effects, risks of medications including infection/cancer/bone marrow suppression, lab monitoring), infections what to do, plan of cares, goals of treatment. Clinic information given.    5. Educated in Rheumatology we do not prescribe narcotics or manage chronic  pain, the patient will need to discuss with primary care or go to a pain clinic for management.   6. Discussed corovid-19 prevention, CDC guidelines/resources and what to do for exposure signs or symptoms and where to go, to follow CDC/MDH guidelines.     TT 39 min, video 25 min charting and orders, review chart 14 min      Sincerely,    CASEY Mac CNP

## 2021-06-14 ENCOUNTER — OFFICE VISIT (OUTPATIENT)
Dept: ORTHOPEDICS | Facility: CLINIC | Age: 66
End: 2021-06-14
Payer: MEDICARE

## 2021-06-14 ENCOUNTER — OFFICE VISIT (OUTPATIENT)
Dept: OPHTHALMOLOGY | Facility: CLINIC | Age: 66
End: 2021-06-14
Attending: OPHTHALMOLOGY
Payer: MEDICARE

## 2021-06-14 DIAGNOSIS — H40.051 OCULAR HYPERTENSION, RIGHT EYE: ICD-10-CM

## 2021-06-14 DIAGNOSIS — B35.1 OM (ONYCHOMYCOSIS): Primary | ICD-10-CM

## 2021-06-14 DIAGNOSIS — M06.09 RHEUMATOID ARTHRITIS OF MULTIPLE SITES WITHOUT RHEUMATOID FACTOR (H): ICD-10-CM

## 2021-06-14 DIAGNOSIS — H35.351 CYSTOID MACULAR EDEMA OF RIGHT EYE: ICD-10-CM

## 2021-06-14 DIAGNOSIS — H20.00 ACUTE ANTERIOR UVEITIS OF RIGHT EYE: ICD-10-CM

## 2021-06-14 DIAGNOSIS — M79.672 LEFT FOOT PAIN: ICD-10-CM

## 2021-06-14 DIAGNOSIS — H26.221 CATARACT IN INFLAMMATORY DISORDER, RIGHT: ICD-10-CM

## 2021-06-14 DIAGNOSIS — H30.21 INTERMEDIATE UVEITIS OF RIGHT EYE: Primary | ICD-10-CM

## 2021-06-14 DIAGNOSIS — Z79.899 HIGH RISK MEDICATION USE: ICD-10-CM

## 2021-06-14 DIAGNOSIS — L84 TYLOMA: ICD-10-CM

## 2021-06-14 PROCEDURE — G0463 HOSPITAL OUTPT CLINIC VISIT: HCPCS

## 2021-06-14 PROCEDURE — 92133 CPTRZD OPH DX IMG PST SGM ON: CPT | Performed by: OPHTHALMOLOGY

## 2021-06-14 PROCEDURE — 99214 OFFICE O/P EST MOD 30 MIN: CPT | Performed by: OPHTHALMOLOGY

## 2021-06-14 PROCEDURE — G0463 HOSPITAL OUTPT CLINIC VISIT: HCPCS | Mod: 25

## 2021-06-14 PROCEDURE — 99213 OFFICE O/P EST LOW 20 MIN: CPT | Performed by: PODIATRIST

## 2021-06-14 PROCEDURE — 92134 CPTRZ OPH DX IMG PST SGM RTA: CPT | Performed by: OPHTHALMOLOGY

## 2021-06-14 PROCEDURE — 99207 OCT OPTIC NERVE RNFL SPECTRALIS OU (BOTH EYES): CPT | Mod: 26 | Performed by: OPHTHALMOLOGY

## 2021-06-14 RX ORDER — TRIAMCINOLONE ACETONIDE 40 MG/ML
40 INJECTION, SUSPENSION INTRA-ARTICULAR; INTRAMUSCULAR
Status: ACTIVE | OUTPATIENT
Start: 2021-06-15 | End: 2022-07-11

## 2021-06-14 ASSESSMENT — CUP TO DISC RATIO
OS_RATIO: 0.45
OD_RATIO: 0.5

## 2021-06-14 ASSESSMENT — VISUAL ACUITY
OD_SC: 20/60
OS_SC: 20/25
METHOD: SNELLEN - LINEAR
OS_SC+: -2
OD_PH_SC: 20/40

## 2021-06-14 ASSESSMENT — TONOMETRY
OD_IOP_MMHG: 18
OS_IOP_MMHG: 18
IOP_METHOD: TONOPEN

## 2021-06-14 ASSESSMENT — CONF VISUAL FIELD
OS_NORMAL: 1
METHOD: COUNTING FINGERS
OD_NORMAL: 1

## 2021-06-14 ASSESSMENT — SLIT LAMP EXAM - LIDS
COMMENTS: MILD BLEPHARITIS
COMMENTS: MILD BLEPHARITIS

## 2021-06-14 ASSESSMENT — EXTERNAL EXAM - LEFT EYE: OS_EXAM: NORMAL

## 2021-06-14 ASSESSMENT — EXTERNAL EXAM - RIGHT EYE: OD_EXAM: NORMAL

## 2021-06-14 NOTE — NURSING NOTE
Chief Complaints and History of Present Illnesses   Patient presents with     Uveitis Follow-Up     Chief Complaint(s) and History of Present Illness(es)     Uveitis Follow-Up     Laterality: right eye    Onset: gradual    Onset: months ago    Quality: States that the va is good    Severity: moderate    Frequency: intermittently    Associated symptoms: floaters (occ) and photophobia.  Negative for flashes    Treatments tried: eye drops and no treatments    Pain scale: 0/10              Comments     Here for Acute anterior uveitis of right eye   Discontinue all gtts 5/24  Methotrexate   Alicia Lopez COT 1:51 PM June 14, 2021

## 2021-06-14 NOTE — PATIENT INSTRUCTIONS
Continue current medications: Humira 40 mg every 14 days, Oral Methotrexate 17.5 mg weekly without specific increase in therapy at this time    No new medications recommended nor oral steroid at this time    We reviewed the drop instructions for the right eye : Atropine once a week, Durezol 2x/day in the right eye, Dorzolamide-Timolol 2x/day

## 2021-06-14 NOTE — PROGRESS NOTES
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 Frost     Uveitis      Patient Active Problem List   Diagnosis     Hammer toe     Bunion     Osteopenia     Rheumatoid arthritis of multiple sites without rheumatoid factor (H)     Pain in both wrists     Mechanical limb problems     Pharyngeal dysphagia     Muscle tension dysphonia     Myofascial pain     High risk medication use     Intermediate uveitis of right eye     Cystoid macular edema of right eye     Acute anterior uveitis of right eye     Past Surgical History:   Procedure Laterality Date     Fibroidadenoma Left Breast       NO HISTORY OF SURGERY       Social History     Socioeconomic History     Marital status:      Spouse name: Not on file     Number of children: Not on file     Years of education: Not on file     Highest education level: Not on file   Occupational History     Not on file   Social Needs     Financial resource strain: Not on file     Food insecurity     Worry: Not on file     Inability: Not on file     Transportation needs     Medical: Not on file     Non-medical: Not on file   Tobacco Use     Smoking status: Never Smoker     Smokeless tobacco: Never Used   Substance and Sexual Activity     Alcohol use: Yes     Alcohol/week: 5.8 - 11.7 standard drinks     Comment: social use prn     Drug use: No     Sexual activity: Not on file   Lifestyle     Physical activity     Days per week: Not on file     Minutes per session: Not on file     Stress: Not on file   Relationships     Social connections     Talks on phone: Not on file     Gets together: Not on  file     Attends Faith service: Not on file     Active member of club or organization: Not on file     Attends meetings of clubs or organizations: Not on file     Relationship status: Not on file     Intimate partner violence     Fear of current or ex partner: Not on file     Emotionally abused: Not on file     Physically abused: Not on file     Forced sexual activity: Not on file   Other Topics Concern     Parent/sibling w/ CABG, MI or angioplasty before 65F 55M? Not Asked   Social History Narrative    How much exercise per week? Walking, stairs    How much calcium per day? 2-3 servings       How much caffeine per day? 2-3 cups coffee    How much vitamin D per day?      Do you/your family wear seatbelts?  Yes    Do you/your family use safety helmets? No    Do you/your family use sunscreen? No    Do you/your family keep firearms in the home? No    Do you/your family have a smoke detector(s)? Yes        Do you feel safe in your home? Yes    Has anyone ever touched you in an unwanted manner? No     Explain         September 2, 2014 Tamiko Villa LPN             Family History   Problem Relation Age of Onset     Breast Cancer Mother         parkinsons     Osteoporosis Mother      Low Back Problems Mother      Breast Cancer Maternal Aunt      Other - See Comments Other         Inflammation joint in brother, maternal cousin      Diabetes Maternal Grandmother      Anxiety Disorder Father      Skin Cancer Father      Mental Illness Cousin      Alcoholism Paternal Grandfather      Glaucoma No family hx of      Macular Degeneration No family hx of      Retinal detachment No family hx of      Melanoma No family hx of      SUBJECTIVE FINDINGS:  A 66-year-old female returns to clinic for painful callus of left foot, onychomycosis and onychauxis.  She relates she used nystatin cream for a little bit but she did not use it very long and she has not been using it.  She is not sure if it was helping or not.    OBJECTIVE  FINDINGS:  DP and PT are 2/4 bilaterally.  She has dystrophic, thickened, brittle right hallux nail with hyperkeratosis and subungual debris and dystrophy and discoloration and to a lesser degree, other nails bilaterally.  She has dorsally contracted digits bilaterally.  She has 2-4 plantar nucleated hyperkeratotic tissue buildup on the left foot.  There is no erythema, no drainage, no odor, no calor.  Minimal peripheral edema bilaterally.    ASSESSMENT AND PLAN:  Onychomycosis, right hallux; onychauxis bilaterally; tyloma of left foot causing pain.  She does have rheumatoid arthritis, she relates.  Diagnosis and treatment discussed with her.  All the nails were debrided or reduced bilaterally upon consent.  The right hallux had some pinpoint bleeding upon debridement.  Local wound care with Neosporin and Band-Aid done and use discussed with her upon consent.  Tyloma of the left foot was sharp debrided with a 10 blade.  She will return to clinic and see me in about 2-3 months.  I advised her on nystatin cream use.  She relates she still has this.  She will start using it again.

## 2021-06-14 NOTE — LETTER
6/14/2021         RE: Reina Conway  [address] [facility] [address]        Dear Colleague,    Thank you for referring your patient, Reina Conway, to the [facility] [place]. Please see a copy of my visit note below.    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 Frost     Uveitis      Patient Active Problem List   Diagnosis     Hammer toe     Bunion     Osteopenia     Rheumatoid arthritis of multiple sites without rheumatoid factor (H)     Pain in both wrists     Mechanical limb problems     Pharyngeal dysphagia     Muscle tension dysphonia     Myofascial pain     High risk medication use     Intermediate uveitis of right eye     Cystoid macular edema of right eye     Acute anterior uveitis of right eye     Past Surgical History:   Procedure Laterality Date     Fibroidadenoma Left Breast       NO HISTORY OF SURGERY       Social History     Socioeconomic History     Marital status:      Spouse name: Not on file     Number of children: Not on file     Years of education: Not on file     Highest education level: Not on file   Occupational History     Not on file   Social Needs     Financial resource strain: Not on file     Food insecurity     Worry: Not on file     Inability: Not on file     Transportation needs     Medical: Not on file     Non-medical: Not on file   Tobacco Use     Smoking status: Never Smoker     Smokeless tobacco: Never Used   Substance and Sexual Activity     Alcohol use: Yes     Alcohol/week: 5.8 - 11.7 standard drinks     Comment: social use prn      Drug use: No     Sexual activity: Not on file   Lifestyle     Physical activity     Days per week: Not on file     Minutes per session: Not on file     Stress: Not on file   Relationships     Social connections     Talks on phone: Not on file     Gets together: Not on file     Attends Jain service: Not on file     Active member of club or organization: Not on file     Attends meetings of clubs or organizations: Not on file     Relationship status: Not on file     Intimate partner violence     Fear of current or ex partner: Not on file     Emotionally abused: Not on file     Physically abused: Not on file     Forced sexual activity: Not on file   Other Topics Concern     Parent/sibling w/ CABG, MI or angioplasty before 65F 55M? Not Asked   Social History Narrative    How much exercise per week? Walking, stairs    How much calcium per day? 2-3 servings       How much caffeine per day? 2-3 cups coffee    How much vitamin D per day?      Do you/your family wear seatbelts?  Yes    Do you/your family use safety helmets? No    Do you/your family use sunscreen? No    Do you/your family keep firearms in the home? No    Do you/your family have a smoke detector(s)? Yes        Do you feel safe in your home? Yes    Has anyone ever touched you in an unwanted manner? No     Explain         September 2, 2014 Tamiko Villa LPN             Family History   Problem Relation Age of Onset     Breast Cancer Mother         parkinsons     Osteoporosis Mother      Low Back Problems Mother      Breast Cancer Maternal Aunt      Other - See Comments Other         Inflammation joint in brother, maternal cousin      Diabetes Maternal Grandmother      Anxiety Disorder Father      Skin Cancer Father      Mental Illness Cousin      Alcoholism Paternal Grandfather      Glaucoma No family hx of      Macular Degeneration No family hx of      Retinal detachment No family hx of      Melanoma No family hx of      SUBJECTIVE FINDINGS:  A 66-year-old  female returns to clinic for painful callus of left foot, onychomycosis and onychauxis.  She relates she used nystatin cream for a little bit but she did not use it very long and she has not been using it.  She is not sure if it was helping or not.    OBJECTIVE FINDINGS:  DP and PT are 2/4 bilaterally.  She has dystrophic, thickened, brittle right hallux nail with hyperkeratosis and subungual debris and dystrophy and discoloration and to a lesser degree, other nails bilaterally.  She has dorsally contracted digits bilaterally.  She has 2-4 plantar nucleated hyperkeratotic tissue buildup on the left foot.  There is no erythema, no drainage, no odor, no calor.  Minimal peripheral edema bilaterally.    ASSESSMENT AND PLAN:  Onychomycosis, right hallux; onychauxis bilaterally; tyloma of left foot causing pain.  She does have rheumatoid arthritis, she relates.  Diagnosis and treatment discussed with her.  All the nails were debrided or reduced bilaterally upon consent.  The right hallux had some pinpoint bleeding upon debridement.  Local wound care with Neosporin and Band-Aid done and use discussed with her upon consent.  Tyloma of the left foot was sharp debrided with a 10 blade.  She will return to clinic and see me in about 2-3 months.  I advised her on nystatin cream use.  She relates she still has this.  She will start using it again.      Again, thank you for allowing me to participate in the care of your patient.        Sincerely,        Kenny Gao DPM

## 2021-06-14 NOTE — LETTER
6/14/2021       RE: Reina Conway  [address] [facility] [address]     Dear Colleague,    Thank you for referring your patient, Reina Conway, to the [facility] at [facility]. Please see a copy of my visit note below.    Chief Complaint/Presenting Concern:  Uveitis follow up    Interval History of Present Ocular Illness:  Reina Conway is a 66 year old patient who returns for follow up of her anterior and intermediate uveitis of the right eye. At last visit, the inflammation recurred in the right eye. We recommended continued use of Humira and Methotrexate and also recommended a course of eye drops. Ms. Conway used drops until 5/24 and stopped as she was getting tired of using them. Since then, she feels the eyes are stable.     Interval Updates to Medical/Family/Social History:    Ms. Conway saw Tyson Alicea in Rheumatology and recommended continued use of Humira and Methotrexate    Relevant Review of Systems Updates:  Doing well without coughs/colds, no worsening joint pains.     Laboratory Testing April 21, 2021  Abnormal: CRP slightly elevated  Normal: CBC, AST, ALT, Albumin, Creatinine, ESR, C3, C4     Current eye related medications: Off all drops since 5/24, Humira 40 mg every 14 days, Oral Methotrexate 17.5 mg weekly    Retina/Uveitis Imaging:   OCT Spectralis Macula June 14, 2021  right eye: Improved media, one possible inner retinal cyst  left eye: Normal contour, no fluid.     OCT Optic Nerve RNFL Spectralis June 14, 2021  right eye: Avg thickness 111 microns, no thinning.   left eye: Avg thickness 89 microns, no thinning    Assessment:     1. Intermediate uveitis of right eye  Slightly improved    2. Acute anterior uveitis of right eye  Still active with keratic precipitates and anterior chamber cell    3. Cystoid macular edema of right eye  One small cyst in the right eye     4. Ocular hypertension, right eye  Normalized  in the right eye today     5. Cataract in inflammatory disorder, right  Becoming visually significant     6. High risk medication use  Humira 40 mg every 14 days, Oral Methotrexate 17.5 mg weekly    Plan/Recommendations:      Discussed findings with patient and her . The intermediate uveitis has improved and there is no maricruz cystoid macular edema although one small cyst by OCT    The anterior uveitis is still active which may be related to being off drops at this time.    We discussed options including restarting drops, increased dose of methotrexate and possibly steroid eye injection. We elected to start drops as below and hold off on increasing other things for now.     We also discussed that the cataract is progressing in the right eye is progressing. Holding off on new glasses is best for now, but surgery might be in our future.     Eye pressure is improved and eye pressure scan does not show any signs of glaucoma    Do not recommend additional diagnostic testing    Continue current medications: Humira 40 mg every 14 days, Oral Methotrexate 17.5 mg weekly without specific increase in therapy at this time    No new medications recommended nor oral steroid at this time    We reviewed the drop instructions for the right eye : Atropine once a week, Durezol 2x/day in the right eye, Dorzolamide-Timolol 2x/day     No drops needed in the left eye     RTC 6 weeks Tonopen, dilate right eye, OCT (ordered), possible kenalog right eye     Physician Attestation     Attending Physician Attestation:  Complete documentation of historical and exam elements from today's encounter can be found in the full encounter summary report (not reduplicated in this progress note). I personally obtained the chief complaint(s) and history of present illness. I confirmed and edited as necessary the review of systems, past medical/surgical history, family history, social history, and examination findings as documented by others; and I  examined the patient myself. I personally reviewed the relevant tests, images, and reports as documented above. I formulated and edited as necessary the assessment and plan and discussed the findings and management plan with the patient and family.  Edilson Velazquez MD.          Sincerely,    Edilson Velazquez MD  [facility] Dept of Ophthalmology  Uveitis and Medical Retina

## 2021-06-14 NOTE — PROGRESS NOTES
Chief Complaint/Presenting Concern:  Uveitis follow up    Interval History of Present Ocular Illness:  Reina Conway is a 66 year old patient who returns for follow up of her anterior and intermediate uveitis of the right eye. At last visit, the inflammation recurred in the right eye. We recommended continued use of Humira and Methotrexate and also recommended a course of eye drops. Ms. Conway used drops until 5/24 and stopped as she was getting tired of using them. Since then, she feels the eyes are stable.     Interval Updates to Medical/Family/Social History:    Ms. Conway saw Tyson Alicea in Rheumatology and recommended continued use of Humira and Methotrexate    Relevant Review of Systems Updates:  Doing well without coughs/colds, no worsening joint pains.     Laboratory Testing April 21, 2021  Abnormal: CRP slightly elevated  Normal: CBC, AST, ALT, Albumin, Creatinine, ESR, C3, C4     Current eye related medications: Off all drops since 5/24, Humira 40 mg every 14 days, Oral Methotrexate 17.5 mg weekly    Retina/Uveitis Imaging:   OCT Spectralis Macula June 14, 2021  right eye: Improved media, one possible inner retinal cyst  left eye: Normal contour, no fluid.     OCT Optic Nerve RNFL Spectralis June 14, 2021  right eye: Avg thickness 111 microns, no thinning.   left eye: Avg thickness 89 microns, no thinning    Assessment:     1. Intermediate uveitis of right eye  Slightly improved    2. Acute anterior uveitis of right eye  Still active with keratic precipitates and anterior chamber cell    3. Cystoid macular edema of right eye  One small cyst in the right eye     4. Ocular hypertension, right eye  Normalized in the right eye today     5. Cataract in inflammatory disorder, right  Becoming visually significant     6. High risk medication use  Humira 40 mg every 14 days, Oral Methotrexate 17.5 mg weekly    Plan/Recommendations:      Discussed findings with patient and her . The intermediate uveitis  has improved and there is no maricruz cystoid macular edema although one small cyst by OCT    The anterior uveitis is still active which may be related to being off drops at this time.    We discussed options including restarting drops, increased dose of methotrexate and possibly steroid eye injection. We elected to start drops as below and hold off on increasing other things for now.     We also discussed that the cataract is progressing in the right eye is progressing. Holding off on new glasses is best for now, but surgery might be in our future.     Eye pressure is improved and eye pressure scan does not show any signs of glaucoma    Do not recommend additional diagnostic testing    Continue current medications: Humira 40 mg every 14 days, Oral Methotrexate 17.5 mg weekly without specific increase in therapy at this time    No new medications recommended nor oral steroid at this time    We reviewed the drop instructions for the right eye : Atropine once a week, Durezol 2x/day in the right eye, Dorzolamide-Timolol 2x/day     No drops needed in the left eye     RTC 6 weeks Tonopen, dilate right eye, OCT (ordered), possible kenalog right eye     Physician Attestation     Attending Physician Attestation:  Complete documentation of historical and exam elements from today's encounter can be found in the full encounter summary report (not reduplicated in this progress note). I personally obtained the chief complaint(s) and history of present illness. I confirmed and edited as necessary the review of systems, past medical/surgical history, family history, social history, and examination findings as documented by others; and I examined the patient myself. I personally reviewed the relevant tests, images, and reports as documented above. I formulated and edited as necessary the assessment and plan and discussed the findings and management plan with the patient and family members present at the time of this visit.  Edilson  SOFI Velazquez., Uveitis and Medical Retina, June 14, 2021

## 2021-06-14 NOTE — NURSING NOTE
"Reason For Visit:   Chief Complaint   Patient presents with     Follow Up     Toe nail trimming. Foot care. Nail fungus, right great toe. Callus, left foot.        Pain Assessment  Patient Currently in Pain: Denies        Allergies   Allergen Reactions     Barium Sulfate      Fosamax      Throat discomfort     No Clinical Screening - See Comments Hives     shrimp  Joints flared after getting possible \"white drink\" after CT/MRI in 2007            Frieda Menchaca LPN    "

## 2021-07-27 DIAGNOSIS — M06.00 SERONEGATIVE RHEUMATOID ARTHRITIS (H): ICD-10-CM

## 2021-08-05 ENCOUNTER — LAB (OUTPATIENT)
Dept: LAB | Facility: CLINIC | Age: 66
End: 2021-08-05
Payer: MEDICARE

## 2021-08-05 ENCOUNTER — OFFICE VISIT (OUTPATIENT)
Dept: PULMONOLOGY | Facility: CLINIC | Age: 66
End: 2021-08-05
Attending: INTERNAL MEDICINE
Payer: MEDICARE

## 2021-08-05 VITALS
HEART RATE: 82 BPM | WEIGHT: 165 LBS | SYSTOLIC BLOOD PRESSURE: 126 MMHG | BODY MASS INDEX: 25.01 KG/M2 | HEIGHT: 68 IN | OXYGEN SATURATION: 96 % | RESPIRATION RATE: 17 BRPM | DIASTOLIC BLOOD PRESSURE: 91 MMHG

## 2021-08-05 DIAGNOSIS — M06.00 SERONEGATIVE RHEUMATOID ARTHRITIS (H): ICD-10-CM

## 2021-08-05 DIAGNOSIS — M06.09 RHEUMATOID ARTHRITIS OF MULTIPLE SITES WITHOUT RHEUMATOID FACTOR (H): ICD-10-CM

## 2021-08-05 DIAGNOSIS — R76.8 POSITIVE ANA (ANTINUCLEAR ANTIBODY): ICD-10-CM

## 2021-08-05 DIAGNOSIS — M20.41 HAMMER TOES OF BOTH FEET: ICD-10-CM

## 2021-08-05 DIAGNOSIS — M20.42 HAMMER TOES OF BOTH FEET: ICD-10-CM

## 2021-08-05 DIAGNOSIS — H30.21 INTERMEDIATE UVEITIS OF RIGHT EYE: ICD-10-CM

## 2021-08-05 DIAGNOSIS — Z79.899 HIGH RISK MEDICATION USE: Primary | ICD-10-CM

## 2021-08-05 DIAGNOSIS — Z79.899 HIGH RISK MEDICATIONS (NOT ANTICOAGULANTS) LONG-TERM USE: ICD-10-CM

## 2021-08-05 DIAGNOSIS — H20.00 ACUTE ANTERIOR UVEITIS OF RIGHT EYE: ICD-10-CM

## 2021-08-05 LAB
ALBUMIN SERPL-MCNC: 4 G/DL (ref 3.4–5)
ALT SERPL W P-5'-P-CCNC: 22 U/L (ref 0–50)
AST SERPL W P-5'-P-CCNC: 17 U/L (ref 0–45)
BASOPHILS # BLD AUTO: 0.1 10E3/UL (ref 0–0.2)
BASOPHILS NFR BLD AUTO: 1 %
CREAT SERPL-MCNC: 0.73 MG/DL (ref 0.52–1.04)
CRP SERPL-MCNC: 11.9 MG/L (ref 0–8)
EOSINOPHIL # BLD AUTO: 0 10E3/UL (ref 0–0.7)
EOSINOPHIL NFR BLD AUTO: 1 %
ERYTHROCYTE [DISTWIDTH] IN BLOOD BY AUTOMATED COUNT: 13.2 % (ref 10–15)
ERYTHROCYTE [SEDIMENTATION RATE] IN BLOOD BY WESTERGREN METHOD: 9 MM/HR (ref 0–30)
GFR SERPL CREATININE-BSD FRML MDRD: 86 ML/MIN/1.73M2
HCT VFR BLD AUTO: 51.3 % (ref 35–47)
HGB BLD-MCNC: 17.6 G/DL (ref 11.7–15.7)
IMM GRANULOCYTES # BLD: 0 10E3/UL
IMM GRANULOCYTES NFR BLD: 0 %
LYMPHOCYTES # BLD AUTO: 2.5 10E3/UL (ref 0.8–5.3)
LYMPHOCYTES NFR BLD AUTO: 32 %
MCH RBC QN AUTO: 32.5 PG (ref 26.5–33)
MCHC RBC AUTO-ENTMCNC: 34.3 G/DL (ref 31.5–36.5)
MCV RBC AUTO: 95 FL (ref 78–100)
MONOCYTES # BLD AUTO: 0.5 10E3/UL (ref 0–1.3)
MONOCYTES NFR BLD AUTO: 7 %
NEUTROPHILS # BLD AUTO: 4.7 10E3/UL (ref 1.6–8.3)
NEUTROPHILS NFR BLD AUTO: 59 %
NRBC # BLD AUTO: 0 10E3/UL
NRBC BLD AUTO-RTO: 0 /100
PLATELET # BLD AUTO: 264 10E3/UL (ref 150–450)
RBC # BLD AUTO: 5.42 10E6/UL (ref 3.8–5.2)
WBC # BLD AUTO: 7.9 10E3/UL (ref 4–11)

## 2021-08-05 PROCEDURE — 36415 COLL VENOUS BLD VENIPUNCTURE: CPT | Performed by: PATHOLOGY

## 2021-08-05 PROCEDURE — 85652 RBC SED RATE AUTOMATED: CPT | Performed by: PATHOLOGY

## 2021-08-05 PROCEDURE — 86140 C-REACTIVE PROTEIN: CPT | Performed by: PATHOLOGY

## 2021-08-05 PROCEDURE — 99215 OFFICE O/P EST HI 40 MIN: CPT | Performed by: INTERNAL MEDICINE

## 2021-08-05 PROCEDURE — 82565 ASSAY OF CREATININE: CPT | Performed by: PATHOLOGY

## 2021-08-05 PROCEDURE — 85025 COMPLETE CBC W/AUTO DIFF WBC: CPT | Performed by: PATHOLOGY

## 2021-08-05 PROCEDURE — 84460 ALANINE AMINO (ALT) (SGPT): CPT | Performed by: PATHOLOGY

## 2021-08-05 PROCEDURE — 82040 ASSAY OF SERUM ALBUMIN: CPT | Performed by: PATHOLOGY

## 2021-08-05 PROCEDURE — 84450 TRANSFERASE (AST) (SGOT): CPT | Performed by: PATHOLOGY

## 2021-08-05 ASSESSMENT — PAIN SCALES - GENERAL: PAINLEVEL: SEVERE PAIN (7)

## 2021-08-05 ASSESSMENT — MIFFLIN-ST. JEOR: SCORE: 1336.94

## 2021-08-05 NOTE — PROGRESS NOTES
[facility]  Rheumatology Clinic  Figueroa Batista MD  2021     Name: Reina Conway  MRN: [id number]  Age: 64 year old  : 1955  Referring provider: Diana Orantes     Problem List:  1. Seronegative destructive deforming contractures RA (-RF/CCP/LASHAE panel) .   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 Rheumatoid arthritis (RA) activity=low with stable deformities.   High risk medication monitoring, labs every 12 weeks.   2. Neck rigidity no radiculpathy. Declined cervical xrays. Educated her on the s/sx redflags.   3. Alcohol use. Reports abstinent from Alcohol. I discussed the risks with her today on the liver and MTX. She understands.   4. Severe right hand deformities, would not be able to do vial of methotrexate       Assessment and Plan:     Reina Conway is a 64 year old here for transfer of care for rheumatoid arthritis (RA) deforming, with associated Uveitis.       Plan/recommendation:    It sounds as though she is having enough side effects from methotrexate that it time she is reluctant to take it.  I think we should try going back down to 6 tablets daily but have asked her to make sure she is taking it each and every week and that she is also on her Humira at all times unless she has a severe infection.    I have asked her to come back in 4 months.  At that time, if she is continuing like she is now I think we should be adding hydroxychloroquine but also considering switching her entirely off of the Humira and onto IV Remicade to see if we can get better control of her overall disease process.    She thinks she is never really gotten better since surgery on the right hip several years ago but she is not doing any active exercising for rehabbing it.  I strongly urged her to be doing that.  She needs to strengthen that leg, and we probably need to consider an injection of the right knee if she continues to have swelling there but I would do  x-rays first to determine whether she has such severe DJD that she needs to be considering knee replacement.    This visit was 32 minutes in face-to-face time, with an additional 10 minutes in documentation, , and chart review completed on the date of service.    EARLINE Batista MD, PhD    Rheumatology      HPI:   Reina Conway is a 64 year old here for transfer of care for rheumatoid arthritis (RA) deforming.     Today, she has had a slight sore throat the past few weeks. She has stopped Humira and methotrexate as a result. She has morning stiffness for 10 to 15 minutes. She occasionally takes Acetaminophen, usually at the end of her Humira cycle, and finds it helpful. She had a recent accident in December 2019 from sitting on a broken chair and subsequently for falling on her hip. She had bed rest for two months. She was using a walker around her house for two months. She no longer uses the cane in her house. She wants to start physical therapy to help her mobility.     She denies Raynaud's color changes. Her hands are worse in the cold due to rheumatoid arthritis She denies difficulty with breathing. She francy sicca symptoms. She has had anterior uveitis years ago. She has been off of methotrexate for three weeks, and is has been irregularly taking Humira recently.      Review-Seen Dr. Frost until 2010 then swtiched to [facility], then re-established 3/2013 (xrays 2/2013 [facility]). Dr. Frost retired 8-2019   Past-pan uveitis in 2011 etanercept (enbrel) uveitis, adalimumab (humira) and methotrexate       HISTORY CARRIED FORWARD FROM [facility].   Prior: Synovitis and joint deformities in 2008 was persistently seronegative but had active synovitis. Methotrexate helped but did not cause a remission/low disease state. She required Prednisone to control symptoms partially but still had significant ADL issues. We had persistently recommended anti TNF therapy which she resisted. She sought  a second opinion at [facility] who recommended the same things, and she continued care there as of August 2010, then switched back to Dr. Frost 3/2013. Begun on Enbrel at [facility]. 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 As no other etiology found it was felt it might be due to Enbrel and she was switched to Humira. She has remained on Methotrexate, primarily 25 mg weekly, decreased 3/2013 (not to go <15mg week due to risk of further deformities or uveitis) . FRAX 32% at [facility]. Prior declined biphosphosphonate, [facility] recommended IV.   Xrays 3/2013 [facility]: See records. Hallux valgus right, hammertoes L>R, hindfoot valgus, pes planus. Dislocated left 2nd MTP, sublux left 3rd MTP, arth 2-3 MTP, rt 1st MTP. Mild DJD hands. Several DIP and 1st CMC. Subluxation left thumb IP and persistant dorsiflexion right wrist. Previous visits discussed stress in her life. Her father in law passed away and there had been a lot of stress and she put her issues on the back burner. She had an episode of chest/epigastric pain and was seen at [facility]. She had apparent negative cardiac evaluation although did not followup with a stress test. She is taking OTC Zantac prn and thinks that helps. Found allergic to Wheat, avoid gluten and sugars. Feels much improved [heartburn, bloating, blood in stools, irregular stools] this really changed her digestive system. She went on a gluten free diet in December, and stopped all RA meds in early Jan 2015. See My Chart message. Restarted Humira early April 2015 every 3 weeks. Noticed more migatory joints pains, swelling. Right hand, right knee, right wrist-associated swelling really in right 2nd MCP. 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 Lack of movement and coordinations, using her wrist brace. Uses this at night and daily prn. At last visit she had continued on Humira without side effects and still feels it is helpful. She does have daily  "discomfort in several areas with either activity or prior damage, especially knees. She continued to have stress in her life but is working through this and \"trying to get back on track\". She stopped drinking any alcohol for the past two months and was congratulated on this. She went to  but does not feel she is alcoholic.  Leonardo going to [facility]. She has been  for a long time. Both lost parents, and grieving. She says she's in less denial. She admits  is helping a lot. She called in November due to increased joint pain and we restarted Methotrexate at 10 mg weekly as previously discussed.  She stopped it two weeks ago.  She noted more joint pain and also some nausea on day of dose.  We discussed this at length and unlikely that MTX contributed to more joint symptoms. At January 2016 visit we readded Methotrexate to Humira in hopes of decreasing disease activity, using low dose due to prior side effect complaints.  She called later that month with flare symptoms requiring Prednisone bridge, and we increased Methotrexate to more standard dose of 15 mg weekly. At her April 2016 visit she had started improving.. She was tolerating Methotrexate this time.  She weaned off Prednisone.      July 7, 2020 interval history:    Patient is currently been off of her Humira for several weeks due to a concern of possible infection because of a sore throat.  She believes from looking at her records the Solange 15 was her last dose.  She got particular concerned about the coronavirus because she works at home and has really been pretty much in isolation.    Despite being off it for a few weeks she is noticed only some very slight swelling in her right second and third MCPs.  Morning stiffness remains under 10 to 15 minutes.  Her right hand has been the most problematic and she needs to use that the most so that is of concern to her but she is able to do pretty much everything she needs to do.    She is having some " cloudiness in her vision of her right eye.  That is a concern to her because she was diagnosed in 2015 with uveitis but unlike that time she really has no pain with this.  This is been fairly subacute in its onset going back is much is several months.  She does not have the same thing going on in the other eye however.  It is not getting better although it is not getting a lot worse.    She continues to have some foot problems primarily calluses and will be seeing Dr. Gastelum back in August.    She otherwise feels like she is doing pretty well with her arthritis and denies any other new medical problems.    We do not have any labs for her however now for over 6 months.  She has continued on her methotrexate and has not had any symptomatic toxicity and really does not have a history of any laboratory toxicity with that in the past either.      August 5, 2021 interval history:    Since have last seen the patient she has again been off of both methotrexate and Humira at times.  She was feeling poorly so skipped this on her doses in early July and on July 26 although she took it on July 19.  She is not entirely clear whether she stop both drugs on these occasions are only one of them.    She does think maybe her symptoms of morning stiffness are a little worse off of the drug but otherwise feels like it had not made a big difference.  She says in some respects she felt better.    Going into that further, she does acknowledge that she feels somewhat poorly on the day after the methotrexate.  We increased her to 7 tabs daily last time.  She is not really entirely sure whether the day after symptoms worsened after that but they certainly have not gotten better over time.        Review of Systems:   Pertinent items are noted in HPI or as below, remainder of complete ROS is negative.      No recent problems with hearing or vision. No swallowing problems.   No breathing difficulty, shortness of breath, coughing, or  wheezing  No chest pain or palpitations  No heart burn, indigestion, abdominal pain, nausea, vomiting, diarrhea  No urination problems, no bloody, cloudy urine, no dysuria  No numbing, tingling, weakness  No headaches or confusion  No rashes. No easy bleeding or bruising.     Active Medications:     Current Outpatient Medications:      acetaminophen (TYLENOL) 325 MG tablet, Take 325-650 mg by mouth every 6 hours as needed, Disp: , Rfl:      adalimumab (HUMIRA PEN) 40 MG/0.8ML pen kit, INJECT THE CONTENTS OF 1 PEN SUBCUTANEOUSLY EVERY OTHER WEEK.  HOLD FOR SIGNS OF INFECTION, THEN SEEK MEDICAL ATTENTION., Disp: 2 each, Rfl: 2     folic acid (FOLVITE) 1 MG tablet, Take 2 tablets (2 mg) by mouth daily, Disp: 180 tablet, Rfl: 2     methotrexate sodium 2.5 MG TABS, Take 7 tablets (17.5 mg) by mouth every 7 days, Disp: 28 tablet, Rfl: 3     atropine 1 % ophthalmic solution, Place 1 drop into the right eye every 7 days (on day of Methotrexate use) (Patient not taking: Reported on 8/5/2021), Disp: 5 mL, Rfl: 3     difluprednate (DUREZOL) 0.05 % ophthalmic emulsion, Place 1 drop into the right eye 2 times daily (Patient not taking: Reported on 8/5/2021), Disp: 5 mL, Rfl: 4     dorzolamide-timolol (COSOPT) 2-0.5 % ophthalmic solution, Place 1 drop into the right eye 2 times daily (Patient not taking: Reported on 8/5/2021), Disp: 10 mL, Rfl: 5    Current Facility-Administered Medications:      triamcinolone (KENALOG-40) injection 40 mg, 40 mg, Subtenon injection, Q2 Months, Edilson Velazquez MD      Allergies:   Barium sulfate, Fosamax, and No clinical screening - see comments      PMH:  Injury-  Medical-right anterior uveitis (felt secondary to etanercept (enbrel), fibroid  high in 2007 then normal, hyperlipidemia. History grave's, menopause, osteoporosis borderline, rheumatoid arthritis (RA), pharyngeal dysphagia, hammertoe    Surgical-  Fibroid adenoma Left Breast     FH:  No autoimmune disorders, psoriasis, UC,  "crohn's, SLE, RA, PsA, gout, autoimmune thyroid.  No MS, heart disease in family  Mother-breast cancer, parkinson, osteoporosis , low back issues-passed    Father-anxiety, dementia-passed after hip fracture   Siblings-brother \"not live well\"   Children-None   M aunt breast cancer   Cousin mental illness      SH:  Occasionally moderate Alcohol 1 drink few times a week not M-W . No Smoking. No IVDU. . Retired      Physical Exam:   BP (!) 126/91   Pulse 82   Resp 17   Ht 1.727 m (5' 8\")   Wt 74.8 kg (165 lb)   SpO2 96%   BMI 25.09 kg/m     Wt Readings from Last 4 Encounters:   08/05/21 74.8 kg (165 lb)   02/25/20 75.1 kg (165 lb 9.6 oz)   09/05/19 74.9 kg (165 lb 3.2 oz)   08/15/19 74.4 kg (164 lb)     Constitutional: Well-developed, appearing stated age; cooperative  Eyes: Normal EOM, PERRLA, vision, conjunctiva, sclera  ENT: Normal external ears, nose, hearing, lips, teeth, gums, throat. No mucous membrane lesions, normal saliva pool  Neck: No mass or thyroid enlargement  Resp: Lungs clear to auscultation, nl to palpation  CV: RRR, no murmurs, rubs or gallops, no edema  GI: No ABD mass or tenderness, no HSM  : Not tested  Lymph: No cervical, supraclavicular, inguinal or epitrochlear nodes  MS: The TMJ, neck, shoulder, elbow, wrist, MCP/PIP/DIP, spine, hip, knee, ankle, and foot MTP/IP joints were examined and found normal. No active synovitis or altered joint anatomy. Full joint ROM. Normal  strength. No dactylitis,  tenosynovitis, enthesopathy. On exam today she has swelling of bilateral wrists that is 1-2+ in severity right first through third MCPs that is 2+ in severity the right knee with a approximately 15 degree contracture.  She also has at least low-grade synovitis in bilateral elbows worse on the left with contracture there and a decreased range of motion in the right hip with pretty significant pain there.     5 degree contractures in the elbows.   10-15 degree contractures in right knee " with moderate size effusions.   1-3rd MCPS with singificant synovitis, at least 1+ maybe 2+ in both right and left hands.   Wrists have 20-30 degrees of extension, and 10-15 degrees of flexion  Some subluxation in both wrists. 2+ synovitis in both wrists  Trace swelling in elbows.   Right sided MCPs with very little extension.   Fibular deviation  2nd to 4th Hammer toes bilateral  Severe onychomycosis of the right great toe  Ulnar deviation  Swelling in the right knee.   Skin: No nail pitting, alopecia, rash, nodules or lesions  Neuro: Normal cranial nerves, strength, sensation, DTRs.   Psych: Normal judgement, orientation, memory, affect.     Laboratory:   RHEUM RESULTS Latest Ref Rng & Units 4/3/2007 8/7/2007 9/11/2007   ALBUMIN 3.4 - 5.0 g/dL - - -   ALT 0 - 50 U/L - - -   AST 0 - 45 U/L - - -   CATALINO INTERPRETATION NEG:Negative - - -   ANAP1 - - - -   ANAT1 - - - -   COMPLEMENT C3 81 - 157 mg/dL - - -   COMPLEMENT C4 13 - 39 mg/dL - - -   CREATININE 0.52 - 1.04 mg/dL 0.94 - -   CRP 0.0 - 8.0 mg/L - - 63.9(H)   DNA <10 IU/mL - - -   FELICE 0 - 1.0 - 1.7 -   GFR ESTIMATE, IF BLACK >60 mL/min/[1.73:m2] 81 - -   GFR ESTIMATE >60 mL/min/1.73m2 67 - -   HEMATOCRIT 35.0 - 47.0 % 46.4 - 42.6   HEMOGLOBIN 11.7 - 15.7 g/dL 15.7 - 14.2   HEPBANG NR:Nonreactive - - -   HCVAB NR:Nonreactive - - -   WBC 4.0 - 11.0 10e3/uL 6.7 - 7.5   RBC 3.80 - 5.20 10e6/uL 5.29(H) - 5.04   RDW 10.0 - 15.0 % 13.2 - 13.2   MCHC 31.5 - 36.5 g/dL 33.8 - 33.3   MCV 78 - 100 fL 88 - 85   PLATELET COUNT 150 - 450 10e3/uL 298 - 368   RHEUMATOID FACTOR <12 IU/mL - <20 -   ESR 0 - 30 mm/hr - 48(H) 65(H)   QUANTIFERON-TB GOLD PLUS RESULT NEG:Negative - - -       Rheumatoid Factor   Date Value Ref Range Status   12/17/2013 <20 <20 IU/mL Final   ,  ,   Cyclic Cit Pept IgG/IgA   Date Value Ref Range Status   12/17/2013 <20  Interpretation:  Negative <20 UNITS Final   ,   Cyclic Citrullinated Peptide IgG   Date Value Ref Range Status   11/18/2009 <2 <5 U/mL  Final     Comment:     Interpretation:  Negative   ,  ,   SSA (RO) Antibody IgG   Date Value Ref Range Status   11/18/2009 5  Final     Comment:     Reference range: 0 to 40  Unit: AU/mL  (Note)  REFERENCE INTERVALS: SSA (Ro) (ALSHAE) Ab, IgG   29 AU/mL or Less ............. Negative   30 - 40 AU/mL ................ Equivocal   41 AU/mL or Greater .......... Positive    SSA (Ro) antibody is seen in 70-75% of Sjogren syndrome  cases, 30-40% of systemic lupus erythematosus (SLE) and  5-10% of progressive systemic sclerosis (PSS).  Performed by [employer] [facility],  [address] [facility] [address] [facility],[place] [id number] [phone]  [email or web address], Anne Arellano MD, Lab. Director     SSB (LA) Antibody IgG   Date Value Ref Range Status   11/18/2009 0  Final     Comment:     Reference range: 0 to 40  Unit: AU/mL  (Note)  REFERENCE INTERVALS: SSB (La) (LASHAE) Ab, IgG   29 AU/mL or Less ............. Negative   30 - 40 AU/mL ................ Equivocal   41 AU/mL or Greater .......... Positive    SSB (La) antibody is seen in 50-60% of Sjogren syndrome  cases and is specific if it is the only LASHAE antibody  present. 15-25% of patients with systemic lupus  erythematosus (SLE) and 5-10% of patients with progressive  systemic sclerosis (PSS) also have this antibody.  Performed by [facility],  [address] [facility],[place] [id number] [phone]  [email or web address], Anne Arellano MD, Lab. Director   ,  ,   CATALINO Screen by EIA   Date Value Ref Range Status   11/18/2009 <1.0 0 - 1.0 Final     Comment:     Interpretation:  Negative   ,  ,  ,  ,  ,  ,  ,   Hepatitis B Core Desiree   Date Value Ref Range Status   12/17/2013 Negative NEG Final   ,   Hep B Surface Agn   Date Value Ref Range Status   12/17/2013 Negative NEG Final

## 2021-08-05 NOTE — LETTER
2021         RE: Reina Conway  [address] [facility] [address]        Dear Colleague,    Thank you for referring your patient, Reina Conway, to the [facility] [place]. Please see a copy of my visit note below.    [facility]  Rheumatology Clinic  Figueroa Batista MD  2021     Name: Reina Conway  MRN: [id number]  Age: 64 year old  : 1955  Referring provider: Diana Orantes     Problem List:  1. Seronegative destructive deforming contractures RA (-RF/CCP/LASHAE panel) .   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 Rheumatoid arthritis (RA) activity=low with stable deformities.   High risk medication monitoring, labs every 12 weeks.   2. Neck rigidity no radiculpathy. Declined cervical xrays. Educated her on the s/sx redflags.   3. Alcohol use. Reports abstinent from Alcohol. I discussed the risks with her today on the liver and MTX. She understands.   4. Severe right hand deformities, would not be able to do vial of methotrexate       Assessment and Plan:     Reina Conway is a 64 year old here for transfer of care for rheumatoid arthritis (RA) deforming, with associated Uveitis.       Plan/recommendation:    It sounds as though she is having enough side effects from methotrexate that it time she is reluctant to take it.  I think we should try going back down to 6 tablets daily but have asked her to make sure she is taking it each and every week and that she is also on her Humira at all times unless she has a severe infection.    I have asked her to come back in 4 months.  At that time, if she is continuing like she is now I think we should be adding hydroxychloroquine but also considering switching her entirely off of the Humira and onto IV Remicade to see if we can get better control of her overall disease process.    She thinks she is never really gotten better since surgery  on the right hip several years ago but she is not doing any active exercising for rehabbing it.  I strongly urged her to be doing that.  She needs to strengthen that leg, and we probably need to consider an injection of the right knee if she continues to have swelling there but I would do x-rays first to determine whether she has such severe DJD that she needs to be considering knee replacement.    This visit was 32 minutes in face-to-face time, with an additional 10 minutes in documentation, , and chart review completed on the date of service.    EARLINE Batista MD, PhD    Rheumatology      HPI:   Reina Conway is a 64 year old here for transfer of care for rheumatoid arthritis (RA) deforming.     Today, she has had a slight sore throat the past few weeks. She has stopped Humira and methotrexate as a result. She has morning stiffness for 10 to 15 minutes. She occasionally takes Acetaminophen, usually at the end of her Humira cycle, and finds it helpful. She had a recent accident in December 2019 from sitting on a broken chair and subsequently for falling on her hip. She had bed rest for two months. She was using a walker around her house for two months. She no longer uses the cane in her house. She wants to start physical therapy to help her mobility.     She denies Raynaud's color changes. Her hands are worse in the cold due to rheumatoid arthritis She denies difficulty with breathing. She francy sicca symptoms. She has had anterior uveitis years ago. She has been off of methotrexate for three weeks, and is has been irregularly taking Humira recently.      Review-Seen Dr. Frost until 2010 then swtiched to [facility], then re-established 3/2013 (xrays 2/2013 [place]). Dr. Frost retired 8-2019   Past-pan uveitis in 2011 etanercept (enbrel) uveitis, adalimumab (humira) and methotrexate       HISTORY CARRIED FORWARD FROM [facility].   Prior: Synovitis and joint deformities in 2008 was  persistently seronegative but had active synovitis. Methotrexate helped but did not cause a remission/low disease state. She required Prednisone to control symptoms partially but still had significant ADL issues. We had persistently recommended anti TNF therapy which she resisted. She sought a second opinion at [facility] who recommended the same things, and she continued care there as of August 2010, then switched back to Dr. Frost 3/2013. Begun on Enbrel at [facility]. 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 As no other etiology found it was felt it might be due to Enbrel and she was switched to Humira. She has remained on Methotrexate, primarily 25 mg weekly, decreased 3/2013 (not to go <15mg week due to risk of further deformities or uveitis) . FRAX 32% at [facility]. Prior declined biphosphosphonate, [facility] recommended IV.   Xrays 3/2013 [facility]: See records. Hallux valgus right, hammertoes L>R, hindfoot valgus, pes planus. Dislocated left 2nd MTP, sublux left 3rd MTP, arth 2-3 MTP, rt 1st MTP. Mild DJD hands. Several DIP and 1st CMC. Subluxation left thumb IP and persistant dorsiflexion right wrist. Previous visits discussed stress in her life. Her father in law passed away and there had been a lot of stress and she put her issues on the back burner. She had an episode of chest/epigastric pain and was seen at [facility]. She had apparent negative cardiac evaluation although did not followup with a stress test. She is taking OTC Zantac prn and thinks that helps. Found allergic to Wheat, avoid gluten and sugars. Feels much improved [heartburn, bloating, blood in stools, irregular stools] this really changed her digestive system. She went on a gluten free diet in December, and stopped all RA meds in early Jan 2015. See My Chart message. Restarted Humira early April 2015 every 3 weeks. Noticed more migatory joints pains, swelling. Right hand, right knee, right  "wrist-associated swelling really in right 2nd MCP. 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 Lack of movement and coordinations, using her wrist brace. Uses this at night and daily prn. At last visit she had continued on Humira without side effects and still feels it is helpful. She does have daily discomfort in several areas with either activity or prior damage, especially knees. She continued to have stress in her life but is working through this and \"trying to get back on track\". She stopped drinking any alcohol for the past two months and was congratulated on this. She went to  but does not feel she is alcoholic.  [facility] She has been  for a long time. Both lost parents, and grieving. She says she's in less denial. She admits  is helping a lot. She called in November due to increased joint pain and we restarted Methotrexate at 10 mg weekly as previously discussed.  She stopped it two weeks ago.  She noted more joint pain and also some nausea on day of dose.  We discussed this at length and unlikely that MTX contributed to more joint symptoms. At January 2016 visit we readded Methotrexate to Humira in hopes of decreasing disease activity, using low dose due to prior side effect complaints.  She called later that month with flare symptoms requiring Prednisone bridge, and we increased Methotrexate to more standard dose of 15 mg weekly. At her April 2016 visit she had started improving.. She was tolerating Methotrexate this time.  She weaned off Prednisone.      July 7, 2020 interval history:    Patient is currently been off of her Humira for several weeks due to a concern of possible infection because of a sore throat.  She believes from looking at her records the Solange 15 was her last dose.  She got particular concerned about the coronavirus because she works at home and has really been pretty much in isolation.    Despite being off it for a few weeks she is noticed only some very " slight swelling in her right second and third MCPs.  Morning stiffness remains under 10 to 15 minutes.  Her right hand has been the most problematic and she needs to use that the most so that is of concern to her but she is able to do pretty much everything she needs to do.    She is having some cloudiness in her vision of her right eye.  That is a concern to her because she was diagnosed in 2015 with uveitis but unlike that time she really has no pain with this.  This is been fairly subacute in its onset going back is much is several months.  She does not have the same thing going on in the other eye however.  It is not getting better although it is not getting a lot worse.    She continues to have some foot problems primarily calluses and will be seeing Dr. Gastelum back in August.    She otherwise feels like she is doing pretty well with her arthritis and denies any other new medical problems.    We do not have any labs for her however now for over 6 months.  She has continued on her methotrexate and has not had any symptomatic toxicity and really does not have a history of any laboratory toxicity with that in the past either.      August 5, 2021 interval history:    Since have last seen the patient she has again been off of both methotrexate and Humira at times.  She was feeling poorly so skipped this on her doses in early July and on July 26 although she took it on July 19.  She is not entirely clear whether she stop both drugs on these occasions are only one of them.    She does think maybe her symptoms of morning stiffness are a little worse off of the drug but otherwise feels like it had not made a big difference.  She says in some respects she felt better.    Going into that further, she does acknowledge that she feels somewhat poorly on the day after the methotrexate.  We increased her to 7 tabs daily last time.  She is not really entirely sure whether the day after symptoms worsened after that but they  certainly have not gotten better over time.        Review of Systems:   Pertinent items are noted in HPI or as below, remainder of complete ROS is negative.      No recent problems with hearing or vision. No swallowing problems.   No breathing difficulty, shortness of breath, coughing, or wheezing  No chest pain or palpitations  No heart burn, indigestion, abdominal pain, nausea, vomiting, diarrhea  No urination problems, no bloody, cloudy urine, no dysuria  No numbing, tingling, weakness  No headaches or confusion  No rashes. No easy bleeding or bruising.     Active Medications:     Current Outpatient Medications:      acetaminophen (TYLENOL) 325 MG tablet, Take 325-650 mg by mouth every 6 hours as needed, Disp: , Rfl:      adalimumab (HUMIRA PEN) 40 MG/0.8ML pen kit, INJECT THE CONTENTS OF 1 PEN SUBCUTANEOUSLY EVERY OTHER WEEK.  HOLD FOR SIGNS OF INFECTION, THEN SEEK MEDICAL ATTENTION., Disp: 2 each, Rfl: 2     folic acid (FOLVITE) 1 MG tablet, Take 2 tablets (2 mg) by mouth daily, Disp: 180 tablet, Rfl: 2     methotrexate sodium 2.5 MG TABS, Take 7 tablets (17.5 mg) by mouth every 7 days, Disp: 28 tablet, Rfl: 3     atropine 1 % ophthalmic solution, Place 1 drop into the right eye every 7 days (on day of Methotrexate use) (Patient not taking: Reported on 8/5/2021), Disp: 5 mL, Rfl: 3     difluprednate (DUREZOL) 0.05 % ophthalmic emulsion, Place 1 drop into the right eye 2 times daily (Patient not taking: Reported on 8/5/2021), Disp: 5 mL, Rfl: 4     dorzolamide-timolol (COSOPT) 2-0.5 % ophthalmic solution, Place 1 drop into the right eye 2 times daily (Patient not taking: Reported on 8/5/2021), Disp: 10 mL, Rfl: 5    Current Facility-Administered Medications:      triamcinolone (KENALOG-40) injection 40 mg, 40 mg, Subtenon injection, Q2 Months, Edilson Velazquez MD      Allergies:   Barium sulfate, Fosamax, and No clinical screening - see comments      PMH:  Injury-  Medical-right anterior uveitis (felt  "secondary to etanercept (enbrel), fibroid  high in 2007 then normal, hyperlipidemia. History grave's, menopause, osteoporosis borderline, rheumatoid arthritis (RA), pharyngeal dysphagia, hammertoe    Surgical-  Fibroid adenoma Left Breast     FH:  No autoimmune disorders, psoriasis, UC, crohn's, SLE, RA, PsA, gout, autoimmune thyroid.  No MS, heart disease in family  Mother-breast cancer, parkinson, osteoporosis , low back issues-passed    Father-anxiety, dementia-passed after hip fracture   Siblings-brother \"not live well\"   Children-None   M aunt breast cancer   Cousin mental illness      SH:  Occasionally moderate Alcohol 1 drink few times a week not M-W . No Smoking. No IVDU. . Retired      Physical Exam:   BP (!) 126/91   Pulse 82   Resp 17   Ht 1.727 m (5' 8\")   Wt 74.8 kg (165 lb)   SpO2 96%   BMI 25.09 kg/m     Wt Readings from Last 4 Encounters:   08/05/21 74.8 kg (165 lb)   02/25/20 75.1 kg (165 lb 9.6 oz)   09/05/19 74.9 kg (165 lb 3.2 oz)   08/15/19 74.4 kg (164 lb)     Constitutional: Well-developed, appearing stated age; cooperative  Eyes: Normal EOM, PERRLA, vision, conjunctiva, sclera  ENT: Normal external ears, nose, hearing, lips, teeth, gums, throat. No mucous membrane lesions, normal saliva pool  Neck: No mass or thyroid enlargement  Resp: Lungs clear to auscultation, nl to palpation  CV: RRR, no murmurs, rubs or gallops, no edema  GI: No ABD mass or tenderness, no HSM  : Not tested  Lymph: No cervical, supraclavicular, inguinal or epitrochlear nodes  MS: The TMJ, neck, shoulder, elbow, wrist, MCP/PIP/DIP, spine, hip, knee, ankle, and foot MTP/IP joints were examined and found normal. No active synovitis or altered joint anatomy. Full joint ROM. Normal  strength. No dactylitis,  tenosynovitis, enthesopathy. On exam today she has swelling of bilateral wrists that is 1-2+ in severity right first through third MCPs that is 2+ in severity the right knee with a approximately " 15 degree contracture.  She also has at least low-grade synovitis in bilateral elbows worse on the left with contracture there and a decreased range of motion in the right hip with pretty significant pain there.     5 degree contractures in the elbows.   10-15 degree contractures in right knee with moderate size effusions.   1-3rd MCPS with singificant synovitis, at least 1+ maybe 2+ in both right and left hands.   Wrists have 20-30 degrees of extension, and 10-15 degrees of flexion  Some subluxation in both wrists. 2+ synovitis in both wrists  Trace swelling in elbows.   Right sided MCPs with very little extension.   Fibular deviation  2nd to 4th Hammer toes bilateral  Severe onychomycosis of the right great toe  Ulnar deviation  Swelling in the right knee.   Skin: No nail pitting, alopecia, rash, nodules or lesions  Neuro: Normal cranial nerves, strength, sensation, DTRs.   Psych: Normal judgement, orientation, memory, affect.     Laboratory:   RHEUM RESULTS Latest Ref Rng & Units 4/3/2007 8/7/2007 9/11/2007   ALBUMIN 3.4 - 5.0 g/dL - - -   ALT 0 - 50 U/L - - -   AST 0 - 45 U/L - - -   CATALINO INTERPRETATION NEG:Negative - - -   ANAP1 - - - -   ANAT1 - - - -   COMPLEMENT C3 81 - 157 mg/dL - - -   COMPLEMENT C4 13 - 39 mg/dL - - -   CREATININE 0.52 - 1.04 mg/dL 0.94 - -   CRP 0.0 - 8.0 mg/L - - 63.9(H)   DNA <10 IU/mL - - -   FELICE 0 - 1.0 - 1.7 -   GFR ESTIMATE, IF BLACK >60 mL/min/[1.73:m2] 81 - -   GFR ESTIMATE >60 mL/min/1.73m2 67 - -   HEMATOCRIT 35.0 - 47.0 % 46.4 - 42.6   HEMOGLOBIN 11.7 - 15.7 g/dL 15.7 - 14.2   HEPBANG NR:Nonreactive - - -   HCVAB NR:Nonreactive - - -   WBC 4.0 - 11.0 10e3/uL 6.7 - 7.5   RBC 3.80 - 5.20 10e6/uL 5.29(H) - 5.04   RDW 10.0 - 15.0 % 13.2 - 13.2   MCHC 31.5 - 36.5 g/dL 33.8 - 33.3   MCV 78 - 100 fL 88 - 85   PLATELET COUNT 150 - 450 10e3/uL 298 - 368   RHEUMATOID FACTOR <12 IU/mL - <20 -   ESR 0 - 30 mm/hr - 48(H) 65(H)   QUANTIFERON-TB GOLD PLUS RESULT NEG:Negative - - -        Rheumatoid Factor   Date Value Ref Range Status   12/17/2013 <20 <20 IU/mL Final   ,  ,   Cyclic Cit Pept IgG/IgA   Date Value Ref Range Status   12/17/2013 <20  Interpretation:  Negative <20 UNITS Final   ,   Cyclic Citrullinated Peptide IgG   Date Value Ref Range Status   11/18/2009 <2 <5 U/mL Final     Comment:     Interpretation:  Negative   ,  ,   SSA (RO) Antibody IgG   Date Value Ref Range Status   11/18/2009 5  Final     Comment:     Reference range: 0 to 40  Unit: AU/mL  (Note)  REFERENCE INTERVALS: SSA (Ro) (LASHAE) Ab, IgG   29 AU/mL or Less ............. Negative   30 - 40 AU/mL ................ Equivocal   41 AU/mL or Greater .......... Positive    SSA (Ro) antibody is seen in 70-75% of Sjogren syndrome  cases, 30-40% of systemic lupus erythematosus (SLE) and  5-10% of progressive systemic sclerosis (PSS).  Performed by [facility],  [address] [place] [address] [phone]  [email or web address], Anne Arellano MD, Lab. Director     SSB (LA) Antibody IgG   Date Value Ref Range Status   11/18/2009 0  Final     Comment:     Reference range: 0 to 40  Unit: AU/mL  (Note)  REFERENCE INTERVALS: SSB (La) (LASHAE) Ab, IgG   29 AU/mL or Less ............. Negative   30 - 40 AU/mL ................ Equivocal   41 AU/mL or Greater .......... Positive    SSB (La) antibody is seen in 50-60% of Sjogren syndrome  cases and is specific if it is the only LASHAE antibody  present. 15-25% of patients with systemic lupus  erythematosus (SLE) and 5-10% of patients with progressive  systemic sclerosis (PSS) also have this antibody.  Performed by [facility],  [address], [facility],[place] [id number] [phone]  [email or web address], Anne Arellano MD, Lab. Director   ,  ,   CATALINO Screen by [facility]   Date Value Ref Range Status   11/18/2009 <1.0 0 - 1.0 Final     Comment:     Interpretation:  Negative   ,  ,  ,  ,  ,  ,  ,   Hepatitis B Core Desiree   Date Value Ref Range Status   12/17/2013 Negative NEG Final   ,   Hep B Surface Agn    Date Value Ref Range Status   12/17/2013 Negative NEG Final         Again, thank you for allowing me to participate in the care of your patient.        Sincerely,        Figueroa Batista MD

## 2021-08-30 ENCOUNTER — OFFICE VISIT (OUTPATIENT)
Dept: OPHTHALMOLOGY | Facility: CLINIC | Age: 66
End: 2021-08-30
Attending: OPHTHALMOLOGY
Payer: MEDICARE

## 2021-08-30 DIAGNOSIS — Z79.899 HIGH RISK MEDICATION USE: ICD-10-CM

## 2021-08-30 DIAGNOSIS — H30.21 INTERMEDIATE UVEITIS OF RIGHT EYE: Primary | ICD-10-CM

## 2021-08-30 DIAGNOSIS — M06.09 RHEUMATOID ARTHRITIS OF MULTIPLE SITES WITHOUT RHEUMATOID FACTOR (H): ICD-10-CM

## 2021-08-30 DIAGNOSIS — H40.051 OCULAR HYPERTENSION, RIGHT EYE: ICD-10-CM

## 2021-08-30 DIAGNOSIS — H20.00 ACUTE ANTERIOR UVEITIS OF RIGHT EYE: ICD-10-CM

## 2021-08-30 DIAGNOSIS — H35.351 CYSTOID MACULAR EDEMA OF RIGHT EYE: ICD-10-CM

## 2021-08-30 DIAGNOSIS — H26.221 CATARACT IN INFLAMMATORY DISORDER, RIGHT: ICD-10-CM

## 2021-08-30 PROCEDURE — 92134 CPTRZ OPH DX IMG PST SGM RTA: CPT | Performed by: OPHTHALMOLOGY

## 2021-08-30 PROCEDURE — 99214 OFFICE O/P EST MOD 30 MIN: CPT | Performed by: OPHTHALMOLOGY

## 2021-08-30 PROCEDURE — G0463 HOSPITAL OUTPT CLINIC VISIT: HCPCS

## 2021-08-30 RX ORDER — LIDOCAINE HYDROCHLORIDE 20 MG/ML
5 INJECTION, SOLUTION EPIDURAL; INFILTRATION; INTRACAUDAL; PERINEURAL
Status: DISCONTINUED | OUTPATIENT
Start: 2021-08-30 | End: 2021-08-30 | Stop reason: CLARIF

## 2021-08-30 RX ORDER — DIFLUPREDNATE OPHTHALMIC 0.5 MG/ML
1 EMULSION OPHTHALMIC 2 TIMES DAILY
Qty: 5 ML | Refills: 4 | Status: SHIPPED | OUTPATIENT
Start: 2021-08-30 | End: 2021-08-31

## 2021-08-30 RX ORDER — ATROPINE SULFATE 10 MG/ML
1 SOLUTION/ DROPS OPHTHALMIC
Qty: 5 ML | Refills: 3 | Status: SHIPPED | OUTPATIENT
Start: 2021-08-30 | End: 2021-08-31

## 2021-08-30 RX ORDER — DORZOLAMIDE HYDROCHLORIDE AND TIMOLOL MALEATE 20; 5 MG/ML; MG/ML
1 SOLUTION/ DROPS OPHTHALMIC 2 TIMES DAILY
Qty: 10 ML | Refills: 5 | Status: SHIPPED | OUTPATIENT
Start: 2021-08-30 | End: 2021-08-31

## 2021-08-30 RX ORDER — PREDNISONE 10 MG/1
TABLET ORAL
Qty: 45 TABLET | Refills: 0 | Status: SHIPPED | OUTPATIENT
Start: 2021-08-30 | End: 2021-08-31

## 2021-08-30 ASSESSMENT — VISUAL ACUITY
OD_SC: 20/70
METHOD: SNELLEN - LINEAR
OD_PH_SC: 20/40
OS_SC: 20/20
OS_SC+: -1

## 2021-08-30 ASSESSMENT — CUP TO DISC RATIO
OD_RATIO: 0.5
OS_RATIO: 0.45

## 2021-08-30 ASSESSMENT — EXTERNAL EXAM - LEFT EYE: OS_EXAM: NORMAL

## 2021-08-30 ASSESSMENT — CONF VISUAL FIELD
OD_NORMAL: 1
OS_NORMAL: 1
METHOD: COUNTING FINGERS

## 2021-08-30 ASSESSMENT — EXTERNAL EXAM - RIGHT EYE: OD_EXAM: NORMAL

## 2021-08-30 ASSESSMENT — TONOMETRY
OD_IOP_MMHG: 19
OS_IOP_MMHG: 21
IOP_METHOD: TONOPEN

## 2021-08-30 ASSESSMENT — SLIT LAMP EXAM - LIDS
COMMENTS: MILD BLEPHARITIS
COMMENTS: MILD BLEPHARITIS

## 2021-08-30 NOTE — PROGRESS NOTES
Chief Complaint/Presenting Concern:  Uveitis follow up    Interval History of Present Ocular Illness:  Reina Conway is a 66 year old patient who returns for follow up of her intermediate uveitis. She was last seen a few months ago in June 2021. We talked about continued use of Humira and Methotrexate and also continued use of Durezol and Cosopt.    Since then, Ms. Conway did well for one month but reports she has not used them as frequently in the last few weeks).     Interval Updates to Medical/Family/Social History:    Arthritis generally doing well    Relevant Review of Systems Updates:  No coughs/colds recently, no joint pain.     Laboratory Testing August 5, 2021  Normal ESR, Creatinine, AST, ALT. Slightly elevated ESR     Current eye related medications: Humira 40 mg every 2 weeks (may have one dose delayed by one week, Methotrexate 17.5 mg (7 tabs) weekly, Folic Acid daily, Atropine weekly right eye, Durezol 2x/day right eye and Cosopt 2x/day (not as regularly in the last few weeks).    Retina/Uveitis Imaging:   OCT Spectralis Macula August 30, 2021  right eye: Recurrent cystoid changes and subretinal fluid.   left eye: Normal contour, no fluid    Assessment:     1. Intermediate uveitis of right eye  Mild vitreous inflammation     2. Acute anterior uveitis of right eye  Improved today    3. Cystoid macular edema of right eye  Worse today    4. Ocular hypertension, right eye  Controlled even with less frequent use of Cosopt.     5. 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 Rheumatoid arthritis of multiple sites without rheumatoid factor (H)  No joint pains.     7. High risk medication use  Humira 40 mg every 2 weeks,  Methotrexate 17.5 mg (7 tabs) weekly.     Plan/Recommendations:      Discussed findings with patient and her . The anterior uveitis is better but there is worsened cystoid macular edema. We discussed options including steroid injection and a course of oral  prednisone. WE elected on a course of prednisone.     Eye pressure is controlled in each eye     Additional lab testing: None at this time.     Continue current medications: Humira 40 mg every 2 weeks (may have one dose delayed by one week, Methotrexate 17.5 mg (7 tabs) weekly, Folic Acid daily, Atropine weekly right eye, Durezol 2x/day right eye and Cosopt 2x/day (not as regularly in the last few weeks).    We discussed a higher dose of Methotrexate. Given risks of infection, Ms. Hair would like to continue this current dose.     Start a course of oral prednisone to see if this can help resolve the inflammation: Start 30 mg daily for one week, 20 mg daily for one week, then 10 mg daily for one week, then stop.     RTC 6-8 weeks tonopen, dilate right eye with OCT (ordered) ? Kenalog right eye    Physician Attestation     Attending Physician Attestation:  Complete documentation of historical and exam elements from today's encounter can be found in the full encounter summary report (not reduplicated in this progress note). I personally obtained the chief complaint(s) and history of present illness. I confirmed and edited as necessary the review of systems, past medical/surgical history, family history, social history, and examination findings as documented by others; and I examined the patient myself. I personally reviewed the relevant tests, images, and reports as documented above. I formulated and edited as necessary the assessment and plan and discussed the findings and management plan with the patient and family members present at the time of this visit.  Edilson Velazquez M.D., Uveitis and Medical Retina, August 30, 2021

## 2021-08-30 NOTE — LETTER
8/30/2021       RE: Reina Conway  [address] [facility] [address]     Dear Colleague,    Thank you for referring your patient, Reina Conway, to the [facility] E[facility] at [facility]. Please see a copy of my visit note below.    Chief Complaint/Presenting Concern:  Uveitis follow up    Interval History of Present Ocular Illness:  Reina Conway is a 66 year old patient who returns for follow up of her intermediate uveitis. She was last seen a few months ago in June 2021. We talked about continued use of Humira and Methotrexate and also continued use of Durezol and Cosopt.    Since then, Ms. Conway did well for one month but reports she has not used them as frequently in the last few weeks).     Interval Updates to Medical/Family/Social History:    Arthritis generally doing well    Relevant Review of Systems Updates:  No coughs/colds recently, no joint pain.     Laboratory Testing August 5, 2021  Normal ESR, Creatinine, AST, ALT. Slightly elevated ESR     Current eye related medications: Humira 40 mg every 2 weeks (may have one dose delayed by one week, Methotrexate 17.5 mg (7 tabs) weekly, Folic Acid daily, Atropine weekly right eye, Durezol 2x/day right eye and Cosopt 2x/day (not as regularly in the last few weeks).    Retina/Uveitis Imaging:   OCT Spectralis Macula August 30, 2021  right eye: Recurrent cystoid changes and subretinal fluid.   left eye: Normal contour, no fluid    Assessment:     1. Intermediate uveitis of right eye  Mild vitreous inflammation     2. Acute anterior uveitis of right eye  Improved today    3. Cystoid macular edema of right eye  Worse today    4. Ocular hypertension, right eye  Controlled even with less frequent use of Cosopt.     5. 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 Rheumatoid arthritis of multiple sites without rheumatoid factor (H)  No joint pains.     7. High risk medication  use  Humira 40 mg every 2 weeks,  Methotrexate 17.5 mg (7 tabs) weekly.     Plan/Recommendations:      Discussed findings with patient and her . The anterior uveitis is better but there is worsened cystoid macular edema. We discussed options including steroid injection and a course of oral prednisone. We elected on a course of prednisone.     Eye pressure is controlled in each eye     Additional lab testing: None at this time.     Continue current medications: Humira 40 mg every 2 weeks (may have one dose delayed by one week, Methotrexate 17.5 mg (7 tabs) weekly, Folic Acid daily, Atropine weekly right eye, Durezol 2x/day right eye and Cosopt 2x/day (not as regularly in the last few weeks).    We discussed a higher dose of Methotrexate. Given risks of infection, Ms. Hair would like to continue this current dose.     Start a course of oral prednisone to see if this can help resolve the inflammation: Start 30 mg daily for one week, 20 mg daily for one week, then 10 mg daily for one week, then stop.     RTC 6-8 weeks tonopen, dilate right eye with OCT (ordered) ? Kenalog right eye    Physician Attestation     Attending Physician Attestation:  Complete documentation of historical and exam elements from today's encounter can be found in the full encounter summary report (not reduplicated in this progress note). I personally obtained the chief complaint(s) and history of present illness. I confirmed and edited as necessary the review of systems, past medical/surgical history, family history, social history, and examination findings as documented by others; and I examined the patient myself. I personally reviewed the relevant tests, images, and reports as documented above. I formulated and edited as necessary the assessment and plan and discussed the findings and management plan with the patient and family members present at the time of this visit.  Edilson Velazquez M.D., Uveitis and Medical Retina, August 30, 2021              Sincerely,    Edilson Velazquez MD  [facility] Dept of Ophthalmology  Uveitis and Medical Retina

## 2021-08-30 NOTE — NURSING NOTE
Chief Complaints and History of Present Illnesses   Patient presents with     Uveitis Follow-Up     Chief Complaint(s) and History of Present Illness(es)     Uveitis Follow-Up     Laterality: right eye    Onset: gradual    Onset: months ago    Quality: States the va in the right eye is cloudy and blurry    Severity: moderate    Frequency: intermittently    Associated symptoms: floaters and photophobia.  Negative for flashes    Treatments tried: eye drops    Pain scale: 0/10              Comments     Here for Intermediate uveitis of right eye  Dorz/angelito BID right eye   Atropine once a week right eye   Durezol BID right eye   Will occ a day or 2   Rosa Lopez COT 1:39 PM August 30, 2021

## 2021-08-30 NOTE — PATIENT INSTRUCTIONS
Continue current medications: Humira 40 mg every 2 weeks (may have one dose delayed by one week, Methotrexate 17.5 mg (7 tabs) weekly, Folic Acid daily, Atropine weekly right eye, Durezol 2x/day right eye and Cosopt 2x/day (not as regularly in the last few weeks).    We discussed a higher dose of Methotrexate. Given risks of infection, Ms. Hair would like to continue this current dose.     Start a course of oral prednisone to see if this can help resolve the inflammation: Start 30 mg daily for one week, 20 mg daily for one week, then 10 mg daily for one week, then stop. Here's some information about prednisone and reasons to let me know if there are issues:    Prednisone is a steroid pill. It can be used for many different conditions and usually for short periods of time (a few weeks to a few months), but some people may take it for years. Sometimes we might get blood tests before starting this medication, but not always.    Prednisone is often prescribed as 10 mg pills. It is usually easiest to take the whole dose (all the pills) with breakfast because it can minimize side effects. A very common side effect is stomach discomfort and some people take antacid medications to help with these symptoms. Other side effects which can occur include raising the blood pressure, blood sugar, and weight. It can also cause irritability and trouble sleeping. For these reasons, we will try to limit the amount of time that prednisone is taken.

## 2021-08-31 ENCOUNTER — TELEPHONE (OUTPATIENT)
Dept: OPHTHALMOLOGY | Facility: CLINIC | Age: 66
End: 2021-08-31

## 2021-08-31 DIAGNOSIS — H35.351 CYSTOID MACULAR EDEMA OF RIGHT EYE: ICD-10-CM

## 2021-08-31 DIAGNOSIS — H40.051 OCULAR HYPERTENSION, RIGHT EYE: ICD-10-CM

## 2021-08-31 DIAGNOSIS — H30.21 INTERMEDIATE UVEITIS OF RIGHT EYE: ICD-10-CM

## 2021-08-31 DIAGNOSIS — H20.00 ACUTE ANTERIOR UVEITIS OF RIGHT EYE: ICD-10-CM

## 2021-08-31 RX ORDER — PREDNISONE 10 MG/1
TABLET ORAL
Qty: 45 TABLET | Refills: 0 | Status: SHIPPED | OUTPATIENT
Start: 2021-08-31 | End: 2021-10-18

## 2021-08-31 RX ORDER — DIFLUPREDNATE OPHTHALMIC 0.5 MG/ML
1 EMULSION OPHTHALMIC 2 TIMES DAILY
Qty: 5 ML | Refills: 4 | Status: SHIPPED | OUTPATIENT
Start: 2021-08-31 | End: 2021-10-18

## 2021-08-31 RX ORDER — DORZOLAMIDE HYDROCHLORIDE AND TIMOLOL MALEATE 20; 5 MG/ML; MG/ML
1 SOLUTION/ DROPS OPHTHALMIC 2 TIMES DAILY
Qty: 10 ML | Refills: 5 | Status: SHIPPED | OUTPATIENT
Start: 2021-08-31 | End: 2021-10-18

## 2021-08-31 RX ORDER — ATROPINE SULFATE 10 MG/ML
1 SOLUTION/ DROPS OPHTHALMIC
Qty: 5 ML | Refills: 3 | Status: SHIPPED | OUTPATIENT
Start: 2021-08-31 | End: 2021-10-18

## 2021-08-31 NOTE — TELEPHONE ENCOUNTER
[facility] states did not receive 4 scripts sent yesterday    Confirm pharmacy location in [facility]    Per my review Rx's sent and confirmed by e-prescribing status.    Pharmacy states not able to locate and requesting Rx's to be resent    Rx's resent    Note to Dr. Caroline Girard, RN 11:58 AM 08/31/21

## 2021-09-04 ENCOUNTER — HEALTH MAINTENANCE LETTER (OUTPATIENT)
Age: 66
End: 2021-09-04

## 2021-09-27 ENCOUNTER — OFFICE VISIT (OUTPATIENT)
Dept: ORTHOPEDICS | Facility: CLINIC | Age: 66
End: 2021-09-27
Payer: MEDICARE

## 2021-09-27 DIAGNOSIS — B35.1 OM (ONYCHOMYCOSIS): Primary | ICD-10-CM

## 2021-09-27 DIAGNOSIS — M06.09 RHEUMATOID ARTHRITIS OF MULTIPLE SITES WITHOUT RHEUMATOID FACTOR (H): ICD-10-CM

## 2021-09-27 DIAGNOSIS — Z79.899 HIGH RISK MEDICATIONS (NOT ANTICOAGULANTS) LONG-TERM USE: ICD-10-CM

## 2021-09-27 DIAGNOSIS — L84 TYLOMA: ICD-10-CM

## 2021-09-27 DIAGNOSIS — M79.672 LEFT FOOT PAIN: ICD-10-CM

## 2021-09-27 PROCEDURE — 11720 DEBRIDE NAIL 1-5: CPT | Mod: Q8 | Performed by: PODIATRIST

## 2021-09-27 PROCEDURE — 99213 OFFICE O/P EST LOW 20 MIN: CPT | Mod: 25 | Performed by: PODIATRIST

## 2021-09-27 NOTE — PROGRESS NOTES
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 Frost     Uveitis      Patient Active Problem List   Diagnosis     Hammer toe     Bunion     Osteopenia     Rheumatoid arthritis of multiple sites without rheumatoid factor (H)     Pain in both wrists     Mechanical limb problems     Pharyngeal dysphagia     Muscle tension dysphonia     Myofascial pain     High risk medication use     Intermediate uveitis of right eye     Cystoid macular edema of right eye     Acute anterior uveitis of right eye     Ocular hypertension, right eye     Past Surgical History:   Procedure Laterality Date     Fibroidadenoma Left Breast       NO HISTORY OF SURGERY       Social History     Socioeconomic History     Marital status:      Spouse name: Not on file     Number of children: Not on file     Years of education: Not on file     Highest education level: Not on file   Occupational History     Not on file   Tobacco Use     Smoking status: Never Smoker     Smokeless tobacco: Never Used   Substance and Sexual Activity     Alcohol use: Yes     Alcohol/week: 5.8 - 11.7 standard drinks     Comment: social use prn     Drug use: No     Sexual activity: Not on file   Other Topics Concern     Parent/sibling w/ CABG, MI or angioplasty before 65F 55M? Not Asked   Social History Narrative    How much exercise per week? Walking, stairs    How much calcium per day? 2-3 servings       How much caffeine per day? 2-3 cups coffee    How much vitamin D per day?      Do you/your family wear seatbelts?  Yes    Do you/your family use safety helmets? No    Do you/your  family use sunscreen? No    Do you/your family keep firearms in the home? No    Do you/your family have a smoke detector(s)? Yes        Do you feel safe in your home? Yes    Has anyone ever touched you in an unwanted manner? No     Explain         September 2, 2014 Tamiko Villa LPN             Social Determinants of Health     Financial Resource Strain:      Difficulty of Paying Living Expenses:    Food Insecurity:      Worried About Running Out of Food in the Last Year:      Ran Out of Food in the Last Year:    Transportation Needs:      Lack of Transportation (Medical):      Lack of Transportation (Non-Medical):    Physical Activity:      Days of Exercise per Week:      Minutes of Exercise per Session:    Stress:      Feeling of Stress :    Social Connections:      Frequency of Communication with Friends and Family:      Frequency of Social Gatherings with Friends and Family:      Attends Jew Services:      Active Member of Clubs or Organizations:      Attends Club or Organization Meetings:      Marital Status:    Intimate Partner Violence:      Fear of Current or Ex-Partner:      Emotionally Abused:      Physically Abused:      Sexually Abused:      Family History   Problem Relation Age of Onset     Breast Cancer Mother         parkinsons     Osteoporosis Mother      Low Back Problems Mother      Breast Cancer Maternal Aunt      Other - See Comments Other         Inflammation joint in brother, maternal cousin      Diabetes Maternal Grandmother      Anxiety Disorder Father      Skin Cancer Father      Mental Illness Cousin      Alcoholism Paternal Grandfather      Glaucoma No family hx of      Macular Degeneration No family hx of      Retinal detachment No family hx of      Melanoma No family hx of      SUBJECTIVE FINDINGS:  A 66-year-old female returns to clinic for painful callus of left foot, onychomycosis and onychauxis.  She relates she was using the Nystatin cream and then the tea tree oil but has not  used it recently.  Relates the left foot callus hurts when she walks on it.     OBJECTIVE FINDINGS:  DP and PT are 2/4 bilaterally.  She has dystrophic, thickened, brittle right hallux nail with hyperkeratosis and subungual debris and dystrophy and discoloration and to a lesser degree, other nails bilaterally.  She has dorsally contracted digits bilaterally.  She has 2-4 plantar nucleated hyperkeratotic tissue buildup on the left foot.  There is no erythema, no drainage, no odor, no calor.  Minimal peripheral edema bilaterally.     ASSESSMENT AND PLAN:  Onychomycosis, right hallux; onychauxis bilaterally; tyloma of left foot causing pain.  She does have Rheumatoid arthritis.  Diagnosis and treatment discussed with her.  All the nails were debrided or reduced bilaterally upon consent.  The right hallux had some pinpoint bleeding upon debridement.  Local wound care with Betadine and Band-Aid done upon consent and use discussed her.  Tyloma of the left foot was sharp debrided with a 10 blade upon consent.  She will return to clinic and see me in about 2-3 months.  I advised her on nystatin cream or tea tree oil use.  She relates she still has this.  She will start using it again.

## 2021-09-27 NOTE — NURSING NOTE
Reason For Visit:   Chief Complaint   Patient presents with     RECHECK     3 month follow up.        There were no vitals taken for this visit.    Pain Assessment  Patient Currently in Pain: Charlee Escamilla LPN

## 2021-09-27 NOTE — LETTER
9/27/2021         RE: Reina Conway  [address] [facility] [address]        Dear Colleague,    Thank you for referring your patient, Reina Conway, to the [facility] [place]. Please see a copy of my visit note below.    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 Frost     Uveitis      Patient Active Problem List   Diagnosis     Hammer toe     Bunion     Osteopenia     Rheumatoid arthritis of multiple sites without rheumatoid factor (H)     Pain in both wrists     Mechanical limb problems     Pharyngeal dysphagia     Muscle tension dysphonia     Myofascial pain     High risk medication use     Intermediate uveitis of right eye     Cystoid macular edema of right eye     Acute anterior uveitis of right eye     Ocular hypertension, right eye     Past Surgical History:   Procedure Laterality Date     Fibroidadenoma Left Breast       NO HISTORY OF SURGERY       Social History     Socioeconomic History     Marital status:      Spouse name: Not on file     Number of children: Not on file     Years of education: Not on file     Highest education level: Not on file   Occupational History     Not on file   Tobacco Use     Smoking status: Never Smoker     Smokeless tobacco: Never Used   Substance and Sexual Activity     Alcohol use: Yes     Alcohol/week: 5.8 - 11.7 standard drinks     Comment: social use prn     Drug use: No     Sexual activity: Not on file   Other Topics Concern     Parent/sibling w/ CABG, MI or angioplasty before 65F 55M? Not Asked   Social History Narrative     How much exercise per week? Walking, stairs    How much calcium per day? 2-3 servings       How much caffeine per day? 2-3 cups coffee    How much vitamin D per day?      Do you/your family wear seatbelts?  Yes    Do you/your family use safety helmets? No    Do you/your family use sunscreen? No    Do you/your family keep firearms in the home? No    Do you/your family have a smoke detector(s)? Yes        Do you feel safe in your home? Yes    Has anyone ever touched you in an unwanted manner? No     Explain         September 2, 2014 Tamiko Villa LPN             Social Determinants of Health     Financial Resource Strain:      Difficulty of Paying Living Expenses:    Food Insecurity:      Worried About Running Out of Food in the Last Year:      Ran Out of Food in the Last Year:    Transportation Needs:      Lack of Transportation (Medical):      Lack of Transportation (Non-Medical):    Physical Activity:      Days of Exercise per Week:      Minutes of Exercise per Session:    Stress:      Feeling of Stress :    Social Connections:      Frequency of Communication with Friends and Family:      Frequency of Social Gatherings with Friends and Family:      Attends Druze Services:      Active Member of Clubs or Organizations:      Attends Club or Organization Meetings:      Marital Status:    Intimate Partner Violence:      Fear of Current or Ex-Partner:      Emotionally Abused:      Physically Abused:      Sexually Abused:      Family History   Problem Relation Age of Onset     Breast Cancer Mother         parkinsons     Osteoporosis Mother      Low Back Problems Mother      Breast Cancer Maternal Aunt      Other - See Comments Other         Inflammation joint in brother, maternal cousin      Diabetes Maternal Grandmother      Anxiety Disorder Father      Skin Cancer Father      Mental Illness Cousin      Alcoholism Paternal Grandfather      Glaucoma No family hx of      Macular Degeneration No family hx of      Retinal  detachment No family hx of      Melanoma No family hx of      SUBJECTIVE FINDINGS:  A 66-year-old female returns to clinic for painful callus of left foot, onychomycosis and onychauxis.  She relates she was using the Nystatin cream and then the tea tree oil but has not used it recently.  Relates the left foot callus hurts when she walks on it.     OBJECTIVE FINDINGS:  DP and PT are 2/4 bilaterally.  She has dystrophic, thickened, brittle right hallux nail with hyperkeratosis and subungual debris and dystrophy and discoloration and to a lesser degree, other nails bilaterally.  She has dorsally contracted digits bilaterally.  She has 2-4 plantar nucleated hyperkeratotic tissue buildup on the left foot.  There is no erythema, no drainage, no odor, no calor.  Minimal peripheral edema bilaterally.     ASSESSMENT AND PLAN:  Onychomycosis, right hallux; onychauxis bilaterally; tyloma of left foot causing pain.  She does have Rheumatoid arthritis.  Diagnosis and treatment discussed with her.  All the nails were debrided or reduced bilaterally upon consent.  The right hallux had some pinpoint bleeding upon debridement.  Local wound care with Betadine and Band-Aid done upon consent and use discussed her.  Tyloma of the left foot was sharp debrided with a 10 blade upon consent.  She will return to clinic and see me in about 2-3 months.  I advised her on nystatin cream or tea tree oil use.  She relates she still has this.  She will start using it again.       Again, thank you for allowing me to participate in the care of your patient.        Sincerely,        Kenny Gao DPM

## 2021-09-29 RX ORDER — METHOTREXATE 2.5 MG/1
6 TABLET ORAL
Qty: 24 TABLET | Refills: 5 | Status: SHIPPED | OUTPATIENT
Start: 2021-09-29 | End: 2021-12-16

## 2021-09-29 NOTE — TELEPHONE ENCOUNTER
Methotrexate Sodium Oral Tablet 2.5 MG   Last Written Prescription Date:  3/7/2021  Last Fill Quantity: 28,   # refills: 3  Last Office Visit: 5/5/2021  Future Office visit:  None    CBC RESULTS: Recent Labs   Lab Test 08/05/21  1202   WBC 7.9   RBC 5.42*   HGB 17.6*   HCT 51.3*   MCV 95   MCH 32.5   MCHC 34.3   RDW 13.2          Creatinine   Date Value Ref Range Status   08/05/2021 0.73 0.52 - 1.04 mg/dL Final   04/21/2021 0.76 0.52 - 1.04 mg/dL Final   ]    Liver Function Studies -   Recent Labs   Lab Test 08/05/21  1202 02/26/15  1547 09/25/14  1334   ALBUMIN 4.0   < > 4.1   ALKPHOS  --   --  82   AST 17   < > 19   ALT 22   < > 28    < > = values in this interval not displayed.       Routing refill request to provider for review/approval because:  Drug not on the [facility], [facility] or [facility] refill protocol or controlled substance      Ghazal Gee RN  Central Triage Red Flags/Med Refills

## 2021-10-01 NOTE — TELEPHONE ENCOUNTER
[facility] Call Center    Phone Message    May a detailed message be left on voicemail: no     Reason for Call: Other: Pt, Reina calling about: methotrexate sodium 2.5 MG TABS/  Needs it to be 7 tablets per week and 28 , not 24.  [id number] - [place], [place] - [address]      Action Taken: Message routed to:  [facility] ([facility] Pulmonology    Travel Screening: Not Applicable

## 2021-10-05 RX ORDER — METHOTREXATE 2.5 MG/1
7 TABLET ORAL
Qty: 28 TABLET | Refills: 3 | Status: SHIPPED | OUTPATIENT
Start: 2021-10-05 | End: 2021-10-18

## 2021-10-05 NOTE — TELEPHONE ENCOUNTER
Pt is requesting that her refill be changed to 7 tabs of methotrexate per week. That is what she was one and not the 6 tabs that was on her refill.Old prescription set up and will be routed to Dr Batista to complete.    Last Office Visit:  8/5/21  Next Enc: 12/16/21  Medication: methotrexate    Last given: 9/27/21  Qty: 24  Lab:    WBC   Date Value Ref Range Status   04/21/2021 8.4 4.0 - 11.0 10e9/L Final     WBC Count   Date Value Ref Range Status   08/05/2021 7.9 4.0 - 11.0 10e3/uL Final     RBC Count   Date Value Ref Range Status   08/05/2021 5.42 (H) 3.80 - 5.20 10e6/uL Final   04/21/2021 5.02 3.8 - 5.2 10e12/L Final     Hemoglobin   Date Value Ref Range Status   08/05/2021 17.6 (H) 11.7 - 15.7 g/dL Final   04/21/2021 15.7 11.7 - 15.7 g/dL Final     Hematocrit   Date Value Ref Range Status   08/05/2021 51.3 (H) 35.0 - 47.0 % Final   04/21/2021 47.3 (H) 35.0 - 47.0 % Final     MCV   Date Value Ref Range Status   08/05/2021 95 78 - 100 fL Final   04/21/2021 94 78 - 100 fl Final     MCH   Date Value Ref Range Status   08/05/2021 32.5 26.5 - 33.0 pg Final   04/21/2021 31.3 26.5 - 33.0 pg Final     Platelet Count   Date Value Ref Range Status   08/05/2021 264 150 - 450 10e3/uL Final   04/21/2021 288 150 - 450 10e9/L Final     % Lymphocytes   Date Value Ref Range Status   08/05/2021 32 % Final   04/21/2021 38.7 % Final     AST   Date Value Ref Range Status   08/05/2021 17 0 - 45 U/L Final   04/21/2021 28 0 - 45 U/L Final     ALT   Date Value Ref Range Status   08/05/2021 22 0 - 50 U/L Final   04/21/2021 35 0 - 50 U/L Final     Albumin   Date Value Ref Range Status   08/05/2021 4.0 3.4 - 5.0 g/dL Final   04/21/2021 3.9 3.4 - 5.0 g/dL Final     Alkaline Phosphatase   Date Value Ref Range Status   09/25/2014 82 40 - 150 U/L Final     Creatinine   Date Value Ref Range Status   08/05/2021 0.73 0.52 - 1.04 mg/dL Final   04/21/2021 0.76 0.52 - 1.04 mg/dL Final     GFR Estimate   Date Value Ref Range Status   08/05/2021 86  >60 mL/min/1.73m2 Final     Comment:     As of July 11, 2021, eGFR is calculated by the CKD-EPI creatinine equation, without race adjustment. eGFR can be influenced by muscle mass, exercise, and diet. The reported eGFR is an estimation only and is only applicable if the renal function is stable.   04/21/2021 82 >60 mL/min/[1.73_m2] Final     Comment:     Non  GFR Calc  Starting 12/18/2018, serum creatinine based estimated GFR (eGFR) will be   calculated using the Chronic Kidney Disease Epidemiology Collaboration   (CKD-EPI) equation.       GFR Estimate If Black   Date Value Ref Range Status   04/21/2021 >90 >60 mL/min/[1.73_m2] Final     Comment:      GFR Calc  Starting 12/18/2018, serum creatinine based estimated GFR (eGFR) will be   calculated using the Chronic Kidney Disease Epidemiology Collaboration   (CKD-EPI) equation.       Sed Rate   Date Value Ref Range Status   04/21/2021 12 0 - 30 mm/h Final     Erythrocyte Sedimentation Rate   Date Value Ref Range Status   08/05/2021 9 0 - 30 mm/hr Final     CRP Inflammation   Date Value Ref Range Status   08/05/2021 11.9 (H) 0.0 - 8.0 mg/L Final   04/21/2021 8.8 (H) 0.0 - 8.0 mg/L Final     JAYA Newman, RN  Rheumatology Care Coordinator  [facility]

## 2021-10-18 ENCOUNTER — OFFICE VISIT (OUTPATIENT)
Dept: OPHTHALMOLOGY | Facility: CLINIC | Age: 66
End: 2021-10-18
Attending: OPHTHALMOLOGY
Payer: MEDICARE

## 2021-10-18 DIAGNOSIS — H35.351 CYSTOID MACULAR EDEMA OF RIGHT EYE: ICD-10-CM

## 2021-10-18 DIAGNOSIS — Z79.899 HIGH RISK MEDICATION USE: ICD-10-CM

## 2021-10-18 DIAGNOSIS — H40.051 OCULAR HYPERTENSION, RIGHT EYE: ICD-10-CM

## 2021-10-18 DIAGNOSIS — M06.09 RHEUMATOID ARTHRITIS OF MULTIPLE SITES WITHOUT RHEUMATOID FACTOR (H): ICD-10-CM

## 2021-10-18 DIAGNOSIS — H30.21 INTERMEDIATE UVEITIS OF RIGHT EYE: Primary | ICD-10-CM

## 2021-10-18 DIAGNOSIS — H26.221 CATARACT IN INFLAMMATORY DISORDER, RIGHT: ICD-10-CM

## 2021-10-18 DIAGNOSIS — H20.00 ACUTE ANTERIOR UVEITIS OF RIGHT EYE: ICD-10-CM

## 2021-10-18 PROCEDURE — 99214 OFFICE O/P EST MOD 30 MIN: CPT | Mod: GC | Performed by: OPHTHALMOLOGY

## 2021-10-18 PROCEDURE — G0463 HOSPITAL OUTPT CLINIC VISIT: HCPCS

## 2021-10-18 PROCEDURE — 92134 CPTRZ OPH DX IMG PST SGM RTA: CPT | Performed by: OPHTHALMOLOGY

## 2021-10-18 RX ORDER — DIFLUPREDNATE OPHTHALMIC 0.5 MG/ML
1 EMULSION OPHTHALMIC 2 TIMES DAILY
Qty: 5 ML | Refills: 4 | Status: SHIPPED | OUTPATIENT
Start: 2021-10-18 | End: 2022-02-23

## 2021-10-18 RX ORDER — LIDOCAINE HYDROCHLORIDE 20 MG/ML
5 INJECTION, SOLUTION EPIDURAL; INFILTRATION; INTRACAUDAL; PERINEURAL
Status: DISCONTINUED | OUTPATIENT
Start: 2021-10-18 | End: 2021-10-18 | Stop reason: CLARIF

## 2021-10-18 RX ORDER — DORZOLAMIDE HYDROCHLORIDE AND TIMOLOL MALEATE 20; 5 MG/ML; MG/ML
1 SOLUTION/ DROPS OPHTHALMIC 2 TIMES DAILY
Qty: 10 ML | Refills: 5 | Status: SHIPPED | OUTPATIENT
Start: 2021-10-18 | End: 2022-02-23

## 2021-10-18 RX ORDER — ATROPINE SULFATE 10 MG/ML
1 SOLUTION/ DROPS OPHTHALMIC
Qty: 5 ML | Refills: 3 | Status: SHIPPED | OUTPATIENT
Start: 2021-10-18 | End: 2022-04-11

## 2021-10-18 ASSESSMENT — EXTERNAL EXAM - LEFT EYE: OS_EXAM: NORMAL

## 2021-10-18 ASSESSMENT — CUP TO DISC RATIO
OS_RATIO: 0.45
OD_RATIO: 0.5

## 2021-10-18 ASSESSMENT — CONF VISUAL FIELD
METHOD: COUNTING FINGERS
OD_NORMAL: 1
OS_NORMAL: 1

## 2021-10-18 ASSESSMENT — VISUAL ACUITY
OS_SC+: -1
OD_PH_SC: 20/50
OD_SC: 20/100
OD_PH_SC+: -2
OS_SC: 20/20
METHOD: SNELLEN - LINEAR

## 2021-10-18 ASSESSMENT — EXTERNAL EXAM - RIGHT EYE: OD_EXAM: NORMAL

## 2021-10-18 ASSESSMENT — TONOMETRY
OS_IOP_MMHG: 21
OD_IOP_MMHG: 21
IOP_METHOD: TONOPEN

## 2021-10-18 ASSESSMENT — SLIT LAMP EXAM - LIDS
COMMENTS: MILD BLEPHARITIS
COMMENTS: MILD BLEPHARITIS

## 2021-10-18 NOTE — PATIENT INSTRUCTIONS
Continue current medications: Humira 40 mg every 2 weeks ( delayed last dose by one week), Methotrexate 15 mg (6 tabs) weekly, Folic Acid daily    Continue the Atropine weekly right eye, Durezol 2x/day right eye, Cosopt 2x/day right eye

## 2021-10-18 NOTE — LETTER
10/18/2021       RE: Reina Conway  [address] [facility] [address]     Dear Colleague,    Thank you for referring your patient, Reina Conway, to the [facility] at [facility]. Please see a copy of my visit note below.    Chief Complaint/Presenting Concern:  Uveitis follow up    Interval History of Present Ocular Illness:  Reina Conway is a 66 year old patient who returns for follow up of her intermediate uveitis. She was last seen 7 weeks ago on 8/30/2021. At that time, she was noted to have increased cystoid macular edema of the right eye. We elected to treat this with a course of oral prednisone starting with 30 mg daily. Ms. Conway remembers that she once took oral prednisone a long time ago and felt great while on the drug but had a hard time stopping it, ultimately feeling worse afterward than before she began. However after her visit with us, she developed some concerns about possible side effects of prednisone and decided not take it.     She delayed her dose of Humira last week for unclear reasons but took a dose last week and feels better on it.     She has decreased her methotrexate dose to 15mg (6 pills) weekly a few weeks ago because she discussed with Dr. Batista about some nasuea she was having with it.  She feels like her eyes are about the same today. She is having an easier time walking than she used to have.     Interval Updates to Medical/Family/Social History: As above and below    Relevant Review of Systems Updates: Improved joint pain, no cough, no cold symptoms.     Laboratory Testing: None since last visit     Current eye related medications: Humira 40 mg every 2 weeks ( delayed last dose by one week), Methotrexate 15 mg (6 tabs) weekly, Folic Acid daily, Atropine weekly right eye, Durezol 2x/day right eye, Cosopt 2x/day right eye (did not take oral prednisone)    Retina/Uveitis Imaging:   OCT Spectralis  Macula October 18, 2021  right eye: Interval resolution of intraretinal and subretinal fluid, normal foveal contour, no peripapillary membrane, mild ERM - improved from previous  left eye: normal foveal depression, full clarity of retinal layers, vitreomacular adhesion present at fovea without distortion, infrared shows stable area of whitening superotemporally outside the raster slices, no peripapillary membrane - stable    Assessment:   1. Intermediate uveitis of right eye  No haze and improved CME.     2. Acute anterior uveitis of right eye  Few cells remaining. There are a few cells in the anterior chamber of the left eye without symptoms.    3. Cystoid macular edema of right eye  Improved on drops    4. Ocular hypertension, right eye  Controlled on Cosopt    5. 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 Rheumatoid arthritis of multiple sites without rheumatoid factor (H)  No joint pains    7. High risk medication use  Humira 40 mg every 2 weeks ( delayed last dose by one week), Methotrexate 15 mg (6 tabs) weekly--dose reduced few weeks ago,Folic Acid daily    Plan/Recommendations:      Discussed findings with patient and her . The inflammation and retinal edema are improving in the right eye. While we could taper drops, we will continue to ensure no flare as methotrexate was recently reduced. No steroid injection needed in the right eye today.     There are a few cells in the left eye, but no symptoms. So this can be observed    Eye pressure is controlled on Cosopt    Additional lab testing: None needed at this time    Continue current medications: Humira 40 mg every 2 weeks ( delayed last dose by one week), Methotrexate 15 mg (6 tabs) weekly, Folic Acid daily    Continue eye drops right eye: Atropine weekly right eye, Durezol 2x/day right eye, Cosopt 2x/day right eye (did not take oral prednisone)    No oral prednisone or other treatment needed at this time    RTC Early  December dilate right eye with OCT (ordered) ? Kenalog right eye if CME worse    Willie Palmer MD  Retina Fellow    Attending Physician Attestation:  Complete documentation of historical and exam elements from today's encounter can be found in the full encounter summary report (not reduplicated in this progress note). I reviewed the chief complaint(s) and history of present illness, and  confirmed and edited as necessary the review of systems, past medical/surgical history, family history, social history, and examination findings as documented by others and the treating Resident or Fellow Physician. I examined the patient myself, discussed the findings, reviewed all ancillary testing data and modified these results and reports along with the assessment and plan with the Treating Resident or Fellow Physician. I agree with the note as detailed above. Edilson Velazquez M.D.      Again, thank you for allowing me to participate in the care of your patient.      Sincerely,      Edilson Velazquez MD  [facility] Dept of Ophthalmology  Uveitis and Medical Retina

## 2021-10-18 NOTE — PROGRESS NOTES
Chief Complaint/Presenting Concern:  Uveitis follow up    Interval History of Present Ocular Illness:  Reina Conway is a 66 year old patient who returns for follow up of her intermediate uveitis. She was last seen 7 weeks ago on 8/30/2021. At that time, she was noted to have increased cystoid macular edema of the right eye. We elected to treat this with a course of oral prednisone starting with 30 mg daily. Ms. Conway remembers that she once took oral prednisone a long time ago and felt great while on the drug but had a hard time stopping it, ultimately feeling worse afterward than before she began. However after her visit with us, she developed some concerns about possible side effects of prednisone and decided not take it.     She delayed her dose of Humira last week for unclear reasons but took a dose last week and feels better on it.     She has decreased her methotrexate dose to 15mg (6 pills) weekly a few weeks ago because she discussed with Dr. Batista about some nasuea she was having with it.  She feels like her eyes are about the same today. She is having an easier time walking than she used to have.     Interval Updates to Medical/Family/Social History: As above and below    Relevant Review of Systems Updates: Improved joint pain, no cough, no cold symptoms.     Laboratory Testing: None since last visit     Current eye related medications: Humira 40 mg every 2 weeks ( delayed last dose by one week), Methotrexate 15 mg (6 tabs) weekly, Folic Acid daily, Atropine weekly right eye, Durezol 2x/day right eye, Cosopt 2x/day right eye (did not take oral prednisone)    Retina/Uveitis Imaging:   OCT Spectralis Macula October 18, 2021  right eye: Interval resolution of intraretinal and subretinal fluid, normal foveal contour, no peripapillary membrane, mild ERM - improved from previous  left eye: normal foveal depression, full clarity of retinal layers, vitreomacular adhesion present at fovea without distortion,  infrared shows stable area of whitening superotemporally outside the raster slices, no peripapillary membrane - stable    Assessment:     1. Intermediate uveitis of right eye  No haze and improved CME.     2. Acute anterior uveitis of right eye  Few cells remaining. There are a few cells in the anterior chamber of the left eye without symptoms.    3. Cystoid macular edema of right eye  Improved on drops    4. Ocular hypertension, right eye  Controlled on Cosopt    5. 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 Rheumatoid arthritis of multiple sites without rheumatoid factor (H)  No joint pains    7. High risk medication use  Humira 40 mg every 2 weeks ( delayed last dose by one week), Methotrexate 15 mg (6 tabs) weekly--dose reduced few weeks ago,Folic Acid daily    Plan/Recommendations:      Discussed findings with patient and her . The inflammation and retinal edema are improving in the right eye. While we could taper drops, we will continue to ensure no flare as methotrexate was recently reduced. No steroid injection needed in the right eye today.     There are a few cells in the left eye, but no symptoms. So this can be observed    Eye pressure is controlled on Cosopt    Additional lab testing: None needed at this time    Continue current medications: Humira 40 mg every 2 weeks ( delayed last dose by one week), Methotrexate 15 mg (6 tabs) weekly, Folic Acid daily    Continue eye drops right eye: Atropine weekly right eye, Durezol 2x/day right eye, Cosopt 2x/day right eye (did not take oral prednisone)    No oral prednisone or other treatment needed at this time    RTC Early December dilate right eye with OCT (ordered) ? Kenalog right eye if CME recurs    Willie Palmer MD  Retina Fellow    Attending Physician Attestation:  Complete documentation of historical and exam elements from today's encounter can be found in the full encounter summary report (not reduplicated in this  progress note). I reviewed the chief complaint(s) and history of present illness, and  confirmed and edited as necessary the review of systems, past medical/surgical history, family history, social history, and examination findings as documented by others and the treating Resident or Fellow Physician.    I examined the patient myself, discussed the findings, reviewed all ancillary testing data and modified these results and reports along with the assessment and plan with the Treating Resident or Fellow Physician. I agree with the note as detailed above.   Edilson Velazquez M.D.  Uveitis and Medical Retina  October 18, 2021

## 2021-10-18 NOTE — NURSING NOTE
Chief Complaints and History of Present Illnesses   Patient presents with     Uveitis Follow-Up     Chief Complaint(s) and History of Present Illness(es)     Uveitis Follow-Up     Laterality: right eye    Onset: gradual    Onset: months ago    Quality: States the va is doing better      Severity: moderate    Frequency: intermittently    Associated symptoms: photophobia (almost normal).  Negative for floaters and flashes    Treatments tried: eye drops    Pain scale: 0/10              Comments     Here for Intermediate uveitis of right eye   Atropine weekly right eye  Durezol 2x/day right eye   Cosopt 2x/day each eye   Has not taken the Durezol and Cosopt last night or today  Methotrexate   Rosa Lopez COT 10:41 AM October 18, 2021

## 2021-10-30 ENCOUNTER — HEALTH MAINTENANCE LETTER (OUTPATIENT)
Age: 66
End: 2021-10-30

## 2021-12-07 ENCOUNTER — IMMUNIZATION (OUTPATIENT)
Dept: NURSING | Facility: CLINIC | Age: 66
End: 2021-12-07
Payer: MEDICARE

## 2021-12-07 PROCEDURE — 91300 PR COVID VAC PFIZER DIL RECON 30 MCG/0.3 ML IM: CPT

## 2021-12-07 PROCEDURE — 0004A PR COVID VAC PFIZER DIL RECON 30 MCG/0.3 ML IM: CPT

## 2021-12-13 ENCOUNTER — OFFICE VISIT (OUTPATIENT)
Dept: ORTHOPEDICS | Facility: CLINIC | Age: 66
End: 2021-12-13
Payer: MEDICARE

## 2021-12-13 ENCOUNTER — OFFICE VISIT (OUTPATIENT)
Dept: OPHTHALMOLOGY | Facility: CLINIC | Age: 66
End: 2021-12-13
Attending: OPHTHALMOLOGY
Payer: MEDICARE

## 2021-12-13 DIAGNOSIS — B35.1 OM (ONYCHOMYCOSIS): Primary | ICD-10-CM

## 2021-12-13 DIAGNOSIS — H26.221 CATARACT IN INFLAMMATORY DISORDER, RIGHT: ICD-10-CM

## 2021-12-13 DIAGNOSIS — L84 TYLOMA: ICD-10-CM

## 2021-12-13 DIAGNOSIS — H40.051 OCULAR HYPERTENSION, RIGHT EYE: ICD-10-CM

## 2021-12-13 DIAGNOSIS — H20.00 ACUTE ANTERIOR UVEITIS OF RIGHT EYE: ICD-10-CM

## 2021-12-13 DIAGNOSIS — M06.09 RHEUMATOID ARTHRITIS OF MULTIPLE SITES WITHOUT RHEUMATOID FACTOR (H): ICD-10-CM

## 2021-12-13 DIAGNOSIS — H35.351 CYSTOID MACULAR EDEMA OF RIGHT EYE: ICD-10-CM

## 2021-12-13 DIAGNOSIS — H30.21 INTERMEDIATE UVEITIS OF RIGHT EYE: Primary | ICD-10-CM

## 2021-12-13 DIAGNOSIS — M79.672 LEFT FOOT PAIN: ICD-10-CM

## 2021-12-13 DIAGNOSIS — Z79.899 HIGH RISK MEDICATION USE: ICD-10-CM

## 2021-12-13 PROCEDURE — 99214 OFFICE O/P EST MOD 30 MIN: CPT | Performed by: OPHTHALMOLOGY

## 2021-12-13 PROCEDURE — G0463 HOSPITAL OUTPT CLINIC VISIT: HCPCS | Mod: 25

## 2021-12-13 PROCEDURE — 99213 OFFICE O/P EST LOW 20 MIN: CPT | Performed by: PODIATRIST

## 2021-12-13 PROCEDURE — 92134 CPTRZ OPH DX IMG PST SGM RTA: CPT | Performed by: OPHTHALMOLOGY

## 2021-12-13 RX ORDER — SILVER SULFADIAZINE 10 MG/G
CREAM TOPICAL 2 TIMES DAILY
Qty: 85 G | Refills: 3 | Status: SHIPPED | OUTPATIENT
Start: 2021-12-13 | End: 2022-01-10

## 2021-12-13 RX ORDER — LIDOCAINE HYDROCHLORIDE 20 MG/ML
5 INJECTION, SOLUTION EPIDURAL; INFILTRATION; INTRACAUDAL; PERINEURAL
Status: DISCONTINUED | OUTPATIENT
Start: 2021-12-13 | End: 2021-12-14 | Stop reason: CLARIF

## 2021-12-13 ASSESSMENT — CONF VISUAL FIELD
OD_NORMAL: 1
OS_NORMAL: 1
METHOD: COUNTING FINGERS

## 2021-12-13 ASSESSMENT — CUP TO DISC RATIO
OS_RATIO: 0.45
OD_RATIO: 0.55

## 2021-12-13 ASSESSMENT — VISUAL ACUITY
OD_SC: 20/150
OD_PH_SC: 20/60
OS_SC: 20/30
METHOD: SNELLEN - LINEAR
OS_SC+: +3
OS_PH_SC: 20/20

## 2021-12-13 ASSESSMENT — TONOMETRY
OS_IOP_MMHG: 16
IOP_METHOD: APPLANATION
OD_IOP_MMHG: 16

## 2021-12-13 ASSESSMENT — SLIT LAMP EXAM - LIDS
COMMENTS: MILD BLEPHARITIS
COMMENTS: MILD BLEPHARITIS

## 2021-12-13 ASSESSMENT — EXTERNAL EXAM - RIGHT EYE: OD_EXAM: NORMAL

## 2021-12-13 ASSESSMENT — EXTERNAL EXAM - LEFT EYE: OS_EXAM: NORMAL

## 2021-12-13 NOTE — LETTER
12/13/2021       RE: Reina Conway  [address] [facility] [address]     Dear Colleague,    Thank you for referring your patient, Reina Conway, to the [facility] at [facility]. Please see a copy of my visit note below.    Chief Complaint/Presenting Concern:  Uveitis follow up    Interval History of Present Ocular Illness:  Reina Conway is a 66 year old patient who returns for follow up of her uveitis and CME of the right eye. At last visit, things were stable and no macular edema was present, so we elected to continue drops and meds as below. Overall, doing pretty well with some floaters.     Interval Updates to Medical/Family/Social History:    Got COVID Booster and stopped all drops, Humira, methotrexate one week ago, has not yet restarted.     Relevant Review of Systems Updates:  Still recovering from booster with some fatigue which is persistent    Laboratory Testing: None recently     Current eye related medications: No drops in a week, No Humira or Methotrexate for the last two weeks.     Retina/Uveitis Imaging:   OCT Spectralis Macula December 13, 2021  right eye: No recurrent edema  left eye: Vitreo macular adhesion, no fluid. Stable     Assessment:     1. Intermediate uveitis of right eye  Mild haze, no CME    2. Acute anterior uveitis of right eye  Nearly resolved    3. Cystoid macular edema of right eye  No recurrence    4. Ocular hypertension, right eye  Controlled     5. Cataract in inflammatory disorder, right  Some visual significance     6. High risk medication use  On Humira and Methotrexate, but off last week for booster.    Plan/Recommendations:      Discussed findings with patient and her . Although no drops, Humira, methotrexate have been used for over 1 week, there is no recurrence of the uveitis and no recurrent macular edema. This suggests that the combination of systemic therapy and eyedrops are  controlling the inflammation and we should resume them promptly without the need for steroid eye injection today    Eye pressure is controlled in both eyes    Recommend routine lab testing as followed by Dr. Batista    Restart these medications next week: Humira 40 mg every 2 weeks, Methotrexate 15 mg (6 tabs) weekly, Folic Acid daily    Restart  eye drops right eye next week: Atropine weekly right eye, Cosopt 2x/day right eye    Restart Durezol 2x/day in the right eye. At the end of January, reduce the Durezol to once a day.     No prednisone pills or other treatment needed    RTC   1. Dr Batista 12/16/21    2. Caroline Mid-Late February Applanate, dilate right eye with OCT, RNFL, ?Kenalog    Physician Attestation     Attending Physician Attestation:  Complete documentation of historical and exam elements from today's encounter can be found in the full encounter summary report (not reduplicated in this progress note). I personally obtained the chief complaint(s) and history of present illness. I confirmed and edited as necessary the review of systems, past medical/surgical history, family history, social history, and examination findings as documented by others; and I examined the patient myself. I personally reviewed the relevant tests, images, and reports as documented above. I formulated and edited as necessary the assessment and plan and discussed the findings and management plan with the patient and family members present at the time of this visit.  Edilson Velazquez M.D., Uveitis and Medical Retina, December 13, 2021     Sincerely,    Edilson Velazquez MD  [facility] Dept of Ophthalmology  Uveitis and Medical Retina

## 2021-12-13 NOTE — LETTER
12/13/2021         RE: Reina Conway  [address] [facility] [address]        Dear Colleague,    Thank you for referring your patient, Reina Conway, to the [facility] [place]. Please see a copy of my visit note below.    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 Frost     Uveitis      Patient Active Problem List   Diagnosis     Hammer toe     Bunion     Osteopenia     Rheumatoid arthritis of multiple sites without rheumatoid factor (H)     Pain in both wrists     Mechanical limb problems     Pharyngeal dysphagia     Muscle tension dysphonia     Myofascial pain     High risk medication use     Intermediate uveitis of right eye     Cystoid macular edema of right eye     Acute anterior uveitis of right eye     Ocular hypertension, right eye     Past Surgical History:   Procedure Laterality Date     Fibroidadenoma Left Breast       NO HISTORY OF SURGERY       Social History     Socioeconomic History     Marital status:      Spouse name: Not on file     Number of children: Not on file     Years of education: Not on file     Highest education level: Not on file   Occupational History     Not on file   Tobacco Use     Smoking status: Never Smoker     Smokeless tobacco: Never Used   Substance and Sexual Activity     Alcohol use: Yes     Alcohol/week: 5.8 - 11.7 standard drinks     Comment: social use prn     Drug use: No     Sexual activity: Not on file   Other Topics Concern     Parent/sibling w/ CABG, MI or angioplasty before 65F 55M? Not Asked   Social History Narrative     How much exercise per week? Walking, stairs    How much calcium per day? 2-3 servings       How much caffeine per day? 2-3 cups coffee    How much vitamin D per day?      Do you/your family wear seatbelts?  Yes    Do you/your family use safety helmets? No    Do you/your family use sunscreen? No    Do you/your family keep firearms in the home? No    Do you/your family have a smoke detector(s)? Yes        Do you feel safe in your home? Yes    Has anyone ever touched you in an unwanted manner? No     Explain         September 2, 2014 Tamiko Villa LPN             Social Determinants of Health     Financial Resource Strain: Not on file   Food Insecurity: Not on file   Transportation Needs: Not on file   Physical Activity: Not on file   Stress: Not on file   Social Connections: Not on file   Intimate Partner Violence: Not on file   Housing Stability: Not on file     Family History   Problem Relation Age of Onset     Breast Cancer Mother         parkinsons     Osteoporosis Mother      Low Back Problems Mother      Breast Cancer Maternal Aunt      Other - See Comments Other         Inflammation joint in brother, maternal cousin      Diabetes Maternal Grandmother      Anxiety Disorder Father      Skin Cancer Father      Mental Illness Cousin      Alcoholism Paternal Grandfather      Glaucoma No family hx of      Macular Degeneration No family hx of      Retinal detachment No family hx of      Melanoma No family hx of    SUBJECTIVE FINDINGS:  A 66-year-old female returns to clinic for painful callus of left foot, onychomycosis and onychauxis.  She relates she has not been using the Nystatin cream and then the tea tree oil because it is hard to put on.  Relates the left foot callus hurts when she walks on it and she has been using a non medicated donut pad.     OBJECTIVE FINDINGS:  DP and PT are 2/4 bilaterally.  She has dystrophic, thickened, brittle right hallux nail with hyperkeratosis and subungual debris and dystrophy and  discoloration and to a lesser degree, other nails bilaterally.  She has dorsally contracted digits bilaterally.  She has 2-4 plantar nucleated hyperkeratotic tissue buildup on the left foot.  There is no erythema, no drainage, no odor, no calor.  Minimal peripheral edema bilaterally.     ASSESSMENT AND PLAN:  Onychomycosis, right hallux; onychauxis bilaterally; tyloma of left foot causing pain.  She does have Rheumatoid arthritis.  Diagnosis and treatment discussed with her.  All the nails were debrided or reduced bilaterally upon consent.  The right hallux had some pinpoint bleeding upon debridement.  Local wound care with Betadine and Band-Aid done upon consent and use discussed her.  Tyloma of the left foot was sharp debrided with a 15 blade upon consent.  She will return to clinic and see me in about 2 months.  Prescription for Silvadene cream given and use discussed with her.  She relates she would like to start applying somenthing on the toes again.  Previous notes reviewed.       Again, thank you for allowing me to participate in the care of your patient.        Sincerely,        Kenny Gao DPM

## 2021-12-13 NOTE — NURSING NOTE
Chief Complaints and History of Present Illnesses   Patient presents with     Uveitis Follow-Up     Chief Complaint(s) and History of Present Illness(es)     Uveitis Follow-Up     Laterality: right eye    Onset: gradual    Onset: years ago    Course: gradually improving    Associated symptoms: eye pain, floaters and photophobia.  Negative for dryness, tearing and flashes    Treatments tried: eye drops    Pain scale: 0/10              Comments     Pt here for Intermediate uveitis of right eye.  Pt states vision has gotten better since last visit.  Slight shooting pain intermittently RE temporally for about a week.  Less floaters.    Humira 40 mg every 2 weeks (last dose 3 weeks ago)  Methotrexate 15 mg (6 tabs) weekly (last dose 1-2 weeks ago)  Folic Acid daily  Atropine weekly right eye  Durezol 2x/day right eye (ran out a week ago)  Cosopt 2x/day right eye (ran out a week ago)    BROOK Sotelo December 13, 2021 2:50 PM                    
I have personally seen and examined this patient.  I have fully participated in the care of this patient. I have reviewed all pertinent clinical information, including history, physical exam, plan and the Resident’s note and agree except as noted.

## 2021-12-13 NOTE — PROGRESS NOTES
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 Frost     Uveitis      Patient Active Problem List   Diagnosis     Hammer toe     Bunion     Osteopenia     Rheumatoid arthritis of multiple sites without rheumatoid factor (H)     Pain in both wrists     Mechanical limb problems     Pharyngeal dysphagia     Muscle tension dysphonia     Myofascial pain     High risk medication use     Intermediate uveitis of right eye     Cystoid macular edema of right eye     Acute anterior uveitis of right eye     Ocular hypertension, right eye     Past Surgical History:   Procedure Laterality Date     Fibroidadenoma Left Breast       NO HISTORY OF SURGERY       Social History     Socioeconomic History     Marital status:      Spouse name: Not on file     Number of children: Not on file     Years of education: Not on file     Highest education level: Not on file   Occupational History     Not on file   Tobacco Use     Smoking status: Never Smoker     Smokeless tobacco: Never Used   Substance and Sexual Activity     Alcohol use: Yes     Alcohol/week: 5.8 - 11.7 standard drinks     Comment: social use prn     Drug use: No     Sexual activity: Not on file   Other Topics Concern     Parent/sibling w/ CABG, MI or angioplasty before 65F 55M? Not Asked   Social History Narrative    How much exercise per week? Walking, stairs    How much calcium per day? 2-3 servings       How much caffeine per day? 2-3 cups coffee    How much vitamin D per day?      Do you/your family wear seatbelts?  Yes    Do you/your family use safety helmets? No    Do you/your  family use sunscreen? No    Do you/your family keep firearms in the home? No    Do you/your family have a smoke detector(s)? Yes        Do you feel safe in your home? Yes    Has anyone ever touched you in an unwanted manner? No     Explain         September 2, 2014 Tamiko Villa LPN             Social Determinants of Health     Financial Resource Strain: Not on file   Food Insecurity: Not on file   Transportation Needs: Not on file   Physical Activity: Not on file   Stress: Not on file   Social Connections: Not on file   Intimate Partner Violence: Not on file   Housing Stability: Not on file     Family History   Problem Relation Age of Onset     Breast Cancer Mother         parkinsons     Osteoporosis Mother      Low Back Problems Mother      Breast Cancer Maternal Aunt      Other - See Comments Other         Inflammation joint in brother, maternal cousin      Diabetes Maternal Grandmother      Anxiety Disorder Father      Skin Cancer Father      Mental Illness Cousin      Alcoholism Paternal Grandfather      Glaucoma No family hx of      Macular Degeneration No family hx of      Retinal detachment No family hx of      Melanoma No family hx of    SUBJECTIVE FINDINGS:  A 66-year-old female returns to clinic for painful callus of left foot, onychomycosis and onychauxis.  She relates she has not been using the Nystatin cream and then the tea tree oil because it is hard to put on.  Relates the left foot callus hurts when she walks on it and she has been using a non medicated donut pad.     OBJECTIVE FINDINGS:  DP and PT are 2/4 bilaterally.  She has dystrophic, thickened, brittle right hallux nail with hyperkeratosis and subungual debris and dystrophy and discoloration and to a lesser degree, other nails bilaterally.  She has dorsally contracted digits bilaterally.  She has 2-4 plantar nucleated hyperkeratotic tissue buildup on the left foot.  There is no erythema, no drainage, no odor, no calor.  Minimal peripheral  edema bilaterally.     ASSESSMENT AND PLAN:  Onychomycosis, right hallux; onychauxis bilaterally; tyloma of left foot causing pain.  She does have Rheumatoid arthritis.  Diagnosis and treatment discussed with her.  All the nails were debrided or reduced bilaterally upon consent.  The right hallux had some pinpoint bleeding upon debridement.  Local wound care with Betadine and Band-Aid done upon consent and use discussed her.  Tyloma of the left foot was sharp debrided with a 15 blade upon consent.  She will return to clinic and see me in about 2 months.  Prescription for Silvadene cream given and use discussed with her.  She relates she would like to start applying somenthing on the toes again.  Previous notes reviewed.

## 2021-12-13 NOTE — PROGRESS NOTES
Chief Complaint/Presenting Concern:  Uveitis follow up    Interval History of Present Ocular Illness:  Reina Conway is a 66 year old patient who returns for follow up of her uveitis and CME of the right eye. At last visit, things were stable and no macular edema was present, so we elected to continue drops and meds as below. Overall, doing pretty well with some floaters.     Interval Updates to Medical/Family/Social History:    Got COVID Booster and stopped all drops, Humira, methotrexate one week ago, has not yet restarted.     Relevant Review of Systems Updates:  Still recovering from Booster with some fatigue which is persistent    Laboratory Testing: None recently     Current eye related medications: No drops in a week, No Humira or Methotrexate for the last two weeks.     Retina/Uveitis Imaging:   OCT Spectralis Macula December 13, 2021  right eye: No recurrent edema  left eye: Vitreo macular adhesion, no fluid. Stable     Assessment:     1. Intermediate uveitis of right eye  Mild haze, no CME    2. Acute anterior uveitis of right eye  Nearly resolved    3. Cystoid macular edema of right eye  No recurrence    4. Ocular hypertension, right eye  Controlled     5. Cataract in inflammatory disorder, right  Some visual significance     6. High risk medication use  On Humira and Methotrexate, but off last week for Booster.    Plan/Recommendations:      Discussed findings with patient and her . Although no drops, Humira, methotrexate have been used for over 1 week, there is no recurrence of the uveitis and no recurrent macular edema. This suggests that the combination of systemic therapy and eyedrops are controlling the inflammation and we should resume them promptly without the need for steroid eye injection today    Eye pressure is controlled in both eyes    Recommend routine lab testing as followed by Dr. Batista    Restart these medications next week: Humira 40 mg every 2 weeks, Methotrexate 15 mg (6  tabs) weekly, Folic Acid daily    Restart  eye drops right eye next week: Atropine weekly right eye, Cosopt 2x/day right eye    Restart Durezol 2x/day in the right eye. At the end of January, reduce the Durezol to once a day.     No prednisone pills or other treatment needed    RTC   1. Dr Batista 12/16/21    2. Caroline Mid-Late February Applanate, dilate right eye with OCT, RNFL, ?Kenalog    Physician Attestation     Attending Physician Attestation:  Complete documentation of historical and exam elements from today's encounter can be found in the full encounter summary report (not reduplicated in this progress note). I personally obtained the chief complaint(s) and history of present illness. I confirmed and edited as necessary the review of systems, past medical/surgical history, family history, social history, and examination findings as documented by others; and I examined the patient myself. I personally reviewed the relevant tests, images, and reports as documented above. I formulated and edited as necessary the assessment and plan and discussed the findings and management plan with the patient and family members present at the time of this visit.  Edilson Velazquez M.D., Uveitis and Medical Retina, December 13, 2021

## 2021-12-13 NOTE — PATIENT INSTRUCTIONS
Restart these medications next week: Humira 40 mg every 2 weeks, Methotrexate 15 mg (6 tabs) weekly, Folic Acid daily    Restart  eye drops right eye next week: Atropine weekly right eye, Cosopt 2x/day right eye    Restart Durezol 2x/day in the right eye. At the end of January, reduce the Durezol to once a day.     No prednisone pills or steroid eye injections today

## 2021-12-16 ENCOUNTER — ANCILLARY PROCEDURE (OUTPATIENT)
Dept: GENERAL RADIOLOGY | Facility: CLINIC | Age: 66
End: 2021-12-16
Attending: INTERNAL MEDICINE
Payer: MEDICARE

## 2021-12-16 ENCOUNTER — OFFICE VISIT (OUTPATIENT)
Dept: PULMONOLOGY | Facility: CLINIC | Age: 66
End: 2021-12-16
Attending: INTERNAL MEDICINE
Payer: MEDICARE

## 2021-12-16 ENCOUNTER — LAB (OUTPATIENT)
Dept: LAB | Facility: CLINIC | Age: 66
End: 2021-12-16
Payer: MEDICARE

## 2021-12-16 VITALS — HEART RATE: 80 BPM | SYSTOLIC BLOOD PRESSURE: 157 MMHG | OXYGEN SATURATION: 96 % | DIASTOLIC BLOOD PRESSURE: 74 MMHG

## 2021-12-16 DIAGNOSIS — Z79.620 ADALIMUMAB (HUMIRA) LONG-TERM USE: ICD-10-CM

## 2021-12-16 DIAGNOSIS — M06.09 RHEUMATOID ARTHRITIS OF MULTIPLE SITES WITHOUT RHEUMATOID FACTOR (H): ICD-10-CM

## 2021-12-16 DIAGNOSIS — R76.8 POSITIVE ANA (ANTINUCLEAR ANTIBODY): ICD-10-CM

## 2021-12-16 DIAGNOSIS — Z79.899 HIGH RISK MEDICATIONS (NOT ANTICOAGULANTS) LONG-TERM USE: ICD-10-CM

## 2021-12-16 DIAGNOSIS — M06.09 RHEUMATOID ARTHRITIS OF MULTIPLE SITES WITHOUT RHEUMATOID FACTOR (H): Primary | ICD-10-CM

## 2021-12-16 DIAGNOSIS — Z79.899 HIGH RISK MEDICATION USE: ICD-10-CM

## 2021-12-16 DIAGNOSIS — Z79.631 METHOTREXATE, LONG TERM, CURRENT USE: ICD-10-CM

## 2021-12-16 DIAGNOSIS — H20.00 ACUTE ANTERIOR UVEITIS OF RIGHT EYE: ICD-10-CM

## 2021-12-16 LAB
ALBUMIN SERPL-MCNC: 3.9 G/DL (ref 3.4–5)
ALT SERPL W P-5'-P-CCNC: 28 U/L (ref 0–50)
AST SERPL W P-5'-P-CCNC: 22 U/L (ref 0–45)
BASOPHILS # BLD AUTO: 0.1 10E3/UL (ref 0–0.2)
BASOPHILS NFR BLD AUTO: 2 %
CREAT SERPL-MCNC: 0.72 MG/DL (ref 0.52–1.04)
CRP SERPL-MCNC: 8.3 MG/L (ref 0–8)
EOSINOPHIL # BLD AUTO: 0 10E3/UL (ref 0–0.7)
EOSINOPHIL NFR BLD AUTO: 1 %
ERYTHROCYTE [DISTWIDTH] IN BLOOD BY AUTOMATED COUNT: 13.4 % (ref 10–15)
ERYTHROCYTE [SEDIMENTATION RATE] IN BLOOD BY WESTERGREN METHOD: 10 MM/HR (ref 0–30)
GFR SERPL CREATININE-BSD FRML MDRD: 88 ML/MIN/1.73M2
HCT VFR BLD AUTO: 49.5 % (ref 35–47)
HGB BLD-MCNC: 16.6 G/DL (ref 11.7–15.7)
IMM GRANULOCYTES # BLD: 0 10E3/UL
IMM GRANULOCYTES NFR BLD: 0 %
LYMPHOCYTES # BLD AUTO: 2.9 10E3/UL (ref 0.8–5.3)
LYMPHOCYTES NFR BLD AUTO: 45 %
MCH RBC QN AUTO: 31.9 PG (ref 26.5–33)
MCHC RBC AUTO-ENTMCNC: 33.5 G/DL (ref 31.5–36.5)
MCV RBC AUTO: 95 FL (ref 78–100)
MONOCYTES # BLD AUTO: 0.7 10E3/UL (ref 0–1.3)
MONOCYTES NFR BLD AUTO: 11 %
NEUTROPHILS # BLD AUTO: 2.6 10E3/UL (ref 1.6–8.3)
NEUTROPHILS NFR BLD AUTO: 41 %
NRBC # BLD AUTO: 0 10E3/UL
NRBC BLD AUTO-RTO: 0 /100
PLATELET # BLD AUTO: 248 10E3/UL (ref 150–450)
RBC # BLD AUTO: 5.21 10E6/UL (ref 3.8–5.2)
WBC # BLD AUTO: 6.3 10E3/UL (ref 4–11)

## 2021-12-16 PROCEDURE — 82040 ASSAY OF SERUM ALBUMIN: CPT | Performed by: PATHOLOGY

## 2021-12-16 PROCEDURE — 99214 OFFICE O/P EST MOD 30 MIN: CPT | Performed by: INTERNAL MEDICINE

## 2021-12-16 PROCEDURE — 84450 TRANSFERASE (AST) (SGOT): CPT | Performed by: PATHOLOGY

## 2021-12-16 PROCEDURE — 85025 COMPLETE CBC W/AUTO DIFF WBC: CPT | Performed by: PATHOLOGY

## 2021-12-16 PROCEDURE — 84460 ALANINE AMINO (ALT) (SGPT): CPT | Performed by: PATHOLOGY

## 2021-12-16 PROCEDURE — 36415 COLL VENOUS BLD VENIPUNCTURE: CPT | Performed by: PATHOLOGY

## 2021-12-16 PROCEDURE — 86140 C-REACTIVE PROTEIN: CPT | Performed by: PATHOLOGY

## 2021-12-16 PROCEDURE — 82565 ASSAY OF CREATININE: CPT | Performed by: PATHOLOGY

## 2021-12-16 PROCEDURE — 73130 X-RAY EXAM OF HAND: CPT | Mod: RT | Performed by: RADIOLOGY

## 2021-12-16 PROCEDURE — 85652 RBC SED RATE AUTOMATED: CPT | Performed by: PATHOLOGY

## 2021-12-16 PROCEDURE — G0463 HOSPITAL OUTPT CLINIC VISIT: HCPCS

## 2021-12-16 PROCEDURE — 73562 X-RAY EXAM OF KNEE 3: CPT | Mod: RT | Performed by: RADIOLOGY

## 2021-12-16 PROCEDURE — 73620 X-RAY EXAM OF FOOT: CPT | Mod: LT | Performed by: RADIOLOGY

## 2021-12-16 RX ORDER — METHOTREXATE 2.5 MG/1
7 TABLET ORAL
Qty: 84 TABLET | Refills: 1 | Status: SHIPPED | OUTPATIENT
Start: 2021-12-16 | End: 2022-04-07

## 2021-12-16 NOTE — PROGRESS NOTES
[facility]  Rheumatology Clinic  Figueroa Batista MD  2021     Name: Reina Conway  MRN: [id number]  Age: 64 year old  : 1955  Referring provider: Diana Orantes     Problem List:  1. Seronegative destructive deforming contractures RA (-RF/CCP/LASHAE panel) .   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 Rheumatoid arthritis (RA) activity=low with stable deformities.   High risk medication monitoring, labs every 12 weeks.   2. Neck rigidity no radiculpathy. Declined cervical xrays. Educated her on the s/sx redflags.   3. Alcohol use. Reports abstinent from Alcohol. I discussed the risks with her today on the liver and MTX. She understands.   4. Severe right hand deformities, would not be able to do vial of methotrexate       Assessment and Plan:     Reina Conway is a 64 year old here for ongoing care for rheumatoid arthritis (RA) deforming, with associated Uveitis.   She has persistently active joint disease though her uveitis has been quiet.          Plan/recommendation:    Although she had felt poorly enough on methotrexate previously to want to go down on it and has done better from a side effect standpoint and 6 tablets weekly instead of 7, we went through the options for getting better control of her joints, and after discussion she would like to try going to go back up to 7 tablets once weekly of methotrexate.  We did also discussed the possibility of subcutaneous injection of methotrexate and even the possibility of switching TNF inhibitors, but given that her uveitis has remained very well controlled on Humira, and that it is the only FDA approved drug for controlling the situation, we both decided that it may be best if she can remain on the Humira, while potentially intensifying the rest of her regimen.    She will try the methotrexate into the increased dose for 3 to 4 months and see me back.  In the meantime she will get x-rays of her hands  and feet.  I think we need to know whether she is getting progressive damage there.  Then we can decide whether to do anything additional with her regimen.    EARLINE Batista MD, PhD    Rheumatology      HPI:   Reina Conway is a 64 year old here for transfer of care for rheumatoid arthritis (RA) deforming.     Today, she has had a slight sore throat the past few weeks. She has stopped Humira and methotrexate as a result. She has morning stiffness for 10 to 15 minutes. She occasionally takes Acetaminophen, usually at the end of her Humira cycle, and finds it helpful. She had a recent accident in December 2019 from sitting on a broken chair and subsequently for falling on her hip. She had bed rest for two months. She was using a walker around her house for two months. She no longer uses the cane in her house. She wants to start physical therapy to help her mobility.     She denies Raynaud's color changes. Her hands are worse in the cold due to rheumatoid arthritis She denies difficulty with breathing. She francy sicca symptoms. She has had anterior uveitis years ago. She has been off of methotrexate for three weeks, and is has been irregularly taking Humira recently.      Review-Seen Dr. Frost until 2010 then swtiched to [facility], then re-established 3/2013 (xrays 2/2013 [facility]). Dr. Frost retired 8-2019   Past-pan uveitis in 2011 etanercept (enbrel) uveitis, adalimumab (humira) and methotrexate       HISTORY CARRIED FORWARD FROM [facility].   Prior: Synovitis and joint deformities in 2008 was persistently seronegative but had active synovitis. Methotrexate helped but did not cause a remission/low disease state. She required Prednisone to control symptoms partially but still had significant ADL issues. We had persistently recommended anti TNF therapy which she resisted. She sought a second opinion at [facility] who recommended the same things, and she continued care there as of August 2010, then  switched back to Dr. Frost 3/2013. Begun on Enbrel at [facility]. 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 As no other etiology found it was felt it might be due to Enbrel and she was switched to Humira. She has remained on Methotrexate, primarily 25 mg weekly, decreased 3/2013 (not to go <15mg week due to risk of further deformities or uveitis) . FRAX 32% at [facility]. Prior declined biphosphosphonate, [facility] recommended IV.   Xrays 3/2013 [facility]: See records. Hallux valgus right, hammertoes L>R, hindfoot valgus, pes planus. Dislocated left 2nd MTP, sublux left 3rd MTP, arth 2-3 MTP, rt 1st MTP. Mild DJD hands. Several DIP and 1st CMC. Subluxation left thumb IP and persistant dorsiflexion right wrist. Previous visits discussed stress in her life. Her father in law passed away and there had been a lot of stress and she put her issues on the back burner. She had an episode of chest/epigastric pain and was seen at [facility]. She had apparent negative cardiac evaluation although did not followup with a stress test. She is taking OTC Zantac prn and thinks that helps. Found allergic to Wheat, avoid gluten and sugars. Feels much improved [heartburn, bloating, blood in stools, irregular stools] this really changed her digestive system. She went on a gluten free diet in December, and stopped all RA meds in early Jan 2015. See My Chart message. Restarted Humira early April 2015 every 3 weeks. Noticed more migatory joints pains, swelling. Right hand, right knee, right wrist-associated swelling really in right 2nd MCP. 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 Lack of movement and coordinations, using her wrist brace. Uses this at night and daily prn. At last visit she had continued on Humira without side effects and still feels it is helpful. She does have daily discomfort in several areas with either activity or prior damage, especially knees. She continued to have stress in  "her life but is working through this and \"trying to get back on track\". She stopped drinking any alcohol for the past two months and was congratulated on this. She went to  but does not feel she is alcoholic.  Leonardo going to [place]. She has been  for a long time. Both lost parents, and grieving. She says she's in less denial. She admits  is helping a lot. She called in November due to increased joint pain and we restarted Methotrexate at 10 mg weekly as previously discussed.  She stopped it two weeks ago.  She noted more joint pain and also some nausea on day of dose.  We discussed this at length and unlikely that MTX contributed to more joint symptoms. At January 2016 visit we readded Methotrexate to Humira in hopes of decreasing disease activity, using low dose due to prior side effect complaints.  She called later that month with flare symptoms requiring Prednisone bridge, and we increased Methotrexate to more standard dose of 15 mg weekly. At her April 2016 visit she had started improving.. She was tolerating Methotrexate this time.  She weaned off Prednisone.      July 7, 2020 interval history:    Patient is currently been off of her Humira for several weeks due to a concern of possible infection because of a sore throat.  She believes from looking at her records the [place] 15 was her last dose.  She got particular concerned about the coronavirus because she works at home and has really been pretty much in isolation.    Despite being off it for a few weeks she is noticed only some very slight swelling in her right second and third MCPs.  Morning stiffness remains under 10 to 15 minutes.  Her right hand has been the most problematic and she needs to use that the most so that is of concern to her but she is able to do pretty much everything she needs to do.    She is having some cloudiness in her vision of her right eye.  That is a concern to her because she was diagnosed in 2015 with uveitis " but unlike that time she really has no pain with this.  This is been fairly subacute in its onset going back is much is several months.  She does not have the same thing going on in the other eye however.  It is not getting better although it is not getting a lot worse.    She continues to have some foot problems primarily calluses and will be seeing Dr. Gastelum back in August.    She otherwise feels like she is doing pretty well with her arthritis and denies any other new medical problems.    We do not have any labs for her however now for over 6 months.  She has continued on her methotrexate and has not had any symptomatic toxicity and really does not have a history of any laboratory toxicity with that in the past either.      August 5, 2021 interval history:    Since have last seen the patient she has again been off of both methotrexate and Humira at times.  She was feeling poorly so skipped this on her doses in early July and on July 26 although she took it on July 19.  She is not entirely clear whether she stop both drugs on these occasions are only one of them.    She does think maybe her symptoms of morning stiffness are a little worse off of the drug but otherwise feels like it had not made a big difference.  She says in some respects she felt better.    Going into that further, she does acknowledge that she feels somewhat poorly on the day after the methotrexate.  We increased her to 7 tabs daily last time.  She is not really entirely sure whether the day after symptoms worsened after that but they certainly have not gotten better over time.    December 16, 2021 interval history:    Since we have last seen the patient she continues to feel pretty stable.  She describes morning stiffness lasting for less than 20 minutes and all of her joints.  She actually has not had Humira since November 9 however because she held it for her Covid booster shot on December 7.  She felt only a little poorly the next day.   She has however not yet resumed her Humira.  She has resumed her methotrexate after also stopping that for a week.    She does have still have some hip pain in the right where she had a fall several years ago.  She does not think that is worsened recently however.    She was off of Humira for about 6 weeks earlier this year when she got the original 2 vaccine series and she did get some worsening of her right hand at that time that seems to have persisted.    Her uveitis however, which is a substantial part of the reason she is on Humira specifically has remained stable for years currently.            Review of Systems:   Pertinent items are noted in HPI or as below, remainder of complete ROS is negative.      No recent problems with hearing or vision. No swallowing problems.   No breathing difficulty, shortness of breath, coughing, or wheezing  No chest pain or palpitations  No heart burn, indigestion, abdominal pain, nausea, vomiting, diarrhea  No urination problems, no bloody, cloudy urine, no dysuria  No numbing, tingling, weakness  No headaches or confusion  No rashes. No easy bleeding or bruising.     Active Medications:     Current Outpatient Medications:      acetaminophen (TYLENOL) 325 MG tablet, Take 325-650 mg by mouth every 6 hours as needed, Disp: , Rfl:      adalimumab (HUMIRA PEN) 40 MG/0.8ML pen kit, INJECT THE CONTENTS OF 1 PEN SUBCUTANEOUSLY EVERY OTHER WEEK.  HOLD FOR SIGNS OF INFECTION, THEN SEEK MEDICAL ATTENTION., Disp: 2 each, Rfl: 2     atropine 1 % ophthalmic solution, Place 1 drop into the right eye every 7 days (on day of Methotrexate use), Disp: 5 mL, Rfl: 3     difluprednate (DUREZOL) 0.05 % ophthalmic emulsion, Place 1 drop into the right eye 2 times daily, Disp: 5 mL, Rfl: 4     dorzolamide-timolol (COSOPT) 2-0.5 % ophthalmic solution, Place 1 drop into the right eye 2 times daily, Disp: 10 mL, Rfl: 5     folic acid (FOLVITE) 1 MG tablet, Take 2 tablets (2 mg) by mouth daily, Disp: 180  "tablet, Rfl: 2     methotrexate sodium 2.5 MG TABS, Take 6 tablets (15 mg) by mouth every 7 days, Disp: 24 tablet, Rfl: 5     silver sulfADIAZINE (SILVADENE) 1 % external cream, Apply topically 2 times daily To toenails., Disp: 85 g, Rfl: 3    Current Facility-Administered Medications:      triamcinolone (KENALOG-40) injection 40 mg, 40 mg, Subtenon injection, Q2 Months, Edilson Velazquez MD      Allergies:   Barium sulfate, Fosamax, and No clinical screening - see comments      PMH:  Injury-  Medical-right anterior uveitis (felt secondary to etanercept (enbrel), fibroid  high in 2007 then normal, hyperlipidemia. History grave's, menopause, osteoporosis borderline, rheumatoid arthritis (RA), pharyngeal dysphagia, hammertoe    Surgical-  Fibroid adenoma Left Breast     FH:  No autoimmune disorders, psoriasis, UC, crohn's, SLE, RA, PsA, gout, autoimmune thyroid.  No MS, heart disease in family  Mother-breast cancer, parkinson, osteoporosis , low back issues-passed    Father-anxiety, dementia-passed after hip fracture   Siblings-brother \"not live well\"   Children-None   M aunt breast cancer   Cousin mental illness      SH:  Occasionally moderate Alcohol 1 drink few times a week not M-W . No Smoking. No IVDU. . Retired      Physical Exam:   BP (!) 157/74   Pulse 80   SpO2 96%    Wt Readings from Last 4 Encounters:   08/05/21 74.8 kg (165 lb)   02/25/20 75.1 kg (165 lb 9.6 oz)   09/05/19 74.9 kg (165 lb 3.2 oz)   08/15/19 74.4 kg (164 lb)     Constitutional: Well-developed, appearing stated age; cooperative  Eyes: Normal EOM, PERRLA, vision, conjunctiva, sclera  ENT: Normal external ears, nose, hearing, lips, teeth, gums, throat. No mucous membrane lesions, normal saliva pool  Neck: No mass or thyroid enlargement  Resp: Lungs clear to auscultation, nl to palpation  CV: RRR, no murmurs, rubs or gallops, no edema  GI: No ABD mass or tenderness, no HSM  : Not tested  Lymph: No cervical, " supraclavicular, inguinal or epitrochlear nodes  MS: The TMJ, neck, shoulder, elbow, wrist, MCP/PIP/DIP, spine, hip, knee, ankle, and foot MTP/IP joints were examined and found normal. No active synovitis or altered joint anatomy. Full joint ROM. Normal  strength. No dactylitis,  tenosynovitis, enthesopathy. On exam today she has swelling of bilateral wrists that is 1-2+ in severity right first through third MCPs that is 2+ in severity the right knee with a approximately 15 degree contracture.  She also has at least low-grade synovitis in bilateral elbows worse on the left with contracture there and a decreased range of motion in the right hip with pretty significant pain there.     5 degree contractures in the elbows.   10-15 degree contractures in right knee with moderate size effusions.   1-3rd MCPS with singificant synovitis, at least 1+ maybe 2+ in both right and left hands, worse on the right.   Wrists have 20-30 degrees of extension, and 10-15 degrees of flexion  Some subluxation in both wrists. 2+ synovitis in both wrists  Trace swelling in elbows.   Right sided MCPs with very little extension.   Fibular deviation  2nd to 4th Hammer toes bilateral  Severe onychomycosis of the right great toe  Ulnar deviation  Swelling in the right knee.   Skin: No nail pitting, alopecia, rash, nodules or lesions  Neuro: Normal cranial nerves, strength, sensation, DTRs.   Psych: Normal judgement, orientation, memory, affect.     Laboratory:   RHEUM RESULTS Latest Ref Rng & Units 4/3/2007 8/7/2007 9/11/2007   ALBUMIN 3.4 - 5.0 g/dL - - -   ALT 0 - 50 U/L - - -   AST 0 - 45 U/L - - -   CATALINO INTERPRETATION NEG:Negative - - -   ANAP1 - - - -   ANAT1 - - - -   COMPLEMENT C3 81 - 157 mg/dL - - -   COMPLEMENT C4 13 - 39 mg/dL - - -   CREATININE 0.52 - 1.04 mg/dL 0.94 - -   CRP 0.0 - 8.0 mg/L - - 63.9(H)   DNA <10 IU/mL - - -   FELICE 0 - 1.0 - 1.7 -   GFR ESTIMATE, IF BLACK >60 mL/min/[1.73:m2] 81 - -   GFR ESTIMATE >60  mL/min/1.73m2 67 - -   HEMATOCRIT 35.0 - 47.0 % 46.4 - 42.6   HEMOGLOBIN 11.7 - 15.7 g/dL 15.7 - 14.2   HEPBANG NR:Nonreactive - - -   HCVAB NR:Nonreactive - - -   WBC 4.0 - 11.0 10e3/uL 6.7 - 7.5   RBC 3.80 - 5.20 10e6/uL 5.29(H) - 5.04   RDW 10.0 - 15.0 % 13.2 - 13.2   MCHC 31.5 - 36.5 g/dL 33.8 - 33.3   MCV 78 - 100 fL 88 - 85   PLATELET COUNT 150 - 450 10e3/uL 298 - 368   RHEUMATOID FACTOR <12 IU/mL - <20 -   ESR 0 - 30 mm/hr - 48(H) 65(H)   QUANTIFERON-TB GOLD PLUS RESULT NEG:Negative - - -       Rheumatoid Factor   Date Value Ref Range Status   12/17/2013 <20 <20 IU/mL Final   ,  ,   Cyclic Cit Pept IgG/IgA   Date Value Ref Range Status   12/17/2013 <20  Interpretation:  Negative <20 UNITS Final   ,   Cyclic Citrullinated Peptide IgG   Date Value Ref Range Status   11/18/2009 <2 <5 U/mL Final     Comment:     Interpretation:  Negative   ,  ,   SSA (RO) Antibody IgG   Date Value Ref Range Status   11/18/2009 5  Final     Comment:     Reference range: 0 to 40  Unit: AU/mL  (Note)  REFERENCE INTERVALS: SSA (Ro) (LASHAE) Ab, IgG   29 AU/mL or Less ............. Negative   30 - 40 AU/mL ................ Equivocal   41 AU/mL or Greater .......... Positive    SSA (Ro) antibody is seen in 70-75% of Sjogren syndrome  cases, 30-40% of systemic lupus erythematosus (SLE) and  5-10% of progressive systemic sclerosis (PSS).  Performed by [facility],  [address] [place] [address],[place] [address] [phone]  [email or web address], Anne Arellano MD, Lab. Director     SSB (LA) Antibody IgG   Date Value Ref Range Status   11/18/2009 0  Final     Comment:     Reference range: 0 to 40  Unit: AU/mL  (Note)  REFERENCE INTERVALS: SSB (La) (LASHAE) Ab, IgG   29 AU/mL or Less ............. Negative   30 - 40 AU/mL ................ Equivocal   41 AU/mL or Greater .......... Positive    SSB (La) antibody is seen in 50-60% of Sjogren syndrome  cases and is specific if it is the only LASHAE antibody  present. 15-25% of patients with systemic  lupus  erythematosus (SLE) and 5-10% of patients with progressive  systemic sclerosis (PSS) also have this antibody.  Performed by [facility],  [address] [facility] [address] [phone]  [email or web address], Anne Arellano MD, Lab. Director   ,  ,   CATALINO Screen by E[facility]   Date Value Ref Range Status   11/18/2009 <1.0 0 - 1.0 Final     Comment:     Interpretation:  Negative   ,  ,  ,  ,  ,  ,  ,   Hepatitis B Core Desiree   Date Value Ref Range Status   12/17/2013 Negative NEG Final   ,   Hep B Surface Agn   Date Value Ref Range Status   12/17/2013 Negative NEG Final

## 2021-12-16 NOTE — LETTER
2021         RE: Reina Conway  [address] [place] [address]        Dear Colleague,    Thank you for referring your patient, Reina Conway, to the [facility] FOR LUNG SCIENCE AND [facility] [place]. Please see a copy of my visit note below.    [facility]  Rheumatology Clinic  Figueroa Batista MD  2021     Name: Reina Conway  MRN: [id number]  Age: 64 year old  : 1955  Referring provider: Diana Orantes     Problem List:  1. Seronegative destructive deforming contractures RA (-RF/CCP/LASHAE panel) .   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 Rheumatoid arthritis (RA) activity=low with stable deformities.   High risk medication monitoring, labs every 12 weeks.   2. Neck rigidity no radiculpathy. Declined cervical xrays. Educated her on the s/sx redflags.   3. Alcohol use. Reports abstinent from Alcohol. I discussed the risks with her today on the liver and MTX. She understands.   4. Severe right hand deformities, would not be able to do vial of methotrexate       Assessment and Plan:     Reina Conway is a 64 year old here for ongoing care for rheumatoid arthritis (RA) deforming, with associated Uveitis.   She has persistently active joint disease though her uveitis has been quiet.          Plan/recommendation:    Although she had felt poorly enough on methotrexate previously to want to go down on it and has done better from a side effect standpoint and 6 tablets weekly instead of 7, we went through the options for getting better control of her joints, and after discussion she would like to try going to go back up to 7 tablets once weekly of methotrexate.  We did also discussed the possibility of subcutaneous injection of methotrexate and even the possibility of switching TNF inhibitors, but given that her uveitis has remained very well controlled on Humira, and that it is the only FDA approved drug for  controlling the situation, we both decided that it may be best if she can remain on the Humira, while potentially intensifying the rest of her regimen.    She will try the methotrexate into the increased dose for 3 to 4 months and see me back.  In the meantime she will get x-rays of her hands and feet.  I think we need to know whether she is getting progressive damage there.  Then we can decide whether to do anything additional with her regimen.    EARLINE Batista MD, PhD    Rheumatology      HPI:   Reina Conway is a 64 year old here for transfer of care for rheumatoid arthritis (RA) deforming.     Today, she has had a slight sore throat the past few weeks. She has stopped Humira and methotrexate as a result. She has morning stiffness for 10 to 15 minutes. She occasionally takes Acetaminophen, usually at the end of her Humira cycle, and finds it helpful. She had a recent accident in December 2019 from sitting on a broken chair and subsequently for falling on her hip. She had bed rest for two months. She was using a walker around her house for two months. She no longer uses the cane in her house. She wants to start physical therapy to help her mobility.     She denies Raynaud's color changes. Her hands are worse in the cold due to rheumatoid arthritis She denies difficulty with breathing. She francy sicca symptoms. She has had anterior uveitis years ago. She has been off of methotrexate for three weeks, and is has been irregularly taking Humira recently.      Review-Seen Dr. Frost until 2010 then swtiched to [facility], then re-established 3/2013 (xrays 2/2013 [place]). Dr. Frost retired 8-2019   Past-pan uveitis in 2011 etanercept (enbrel) uveitis, adalimumab (humira) and methotrexate       HISTORY CARRIED FORWARD FROM [facility].   Prior: Synovitis and joint deformities in 2008 was persistently seronegative but had active synovitis. Methotrexate helped but did not cause a remission/low disease  state. She required Prednisone to control symptoms partially but still had significant ADL issues. We had persistently recommended anti TNF therapy which she resisted. She sought a second opinion at [facility] who recommended the same things, and she continued care there as of August 2010, then switched back to Dr. Frost 3/2013. Begun on Enbrel at [facility]. 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 As no other etiology found it was felt it might be due to Enbrel and she was switched to Humira. She has remained on Methotrexate, primarily 25 mg weekly, decreased 3/2013 (not to go <15mg week due to risk of further deformities or uveitis) . FRAX 32% at [facility]. Prior declined biphosphosphonate, [facility] recommended IV.   Xrays 3/2013 [facility]: See records. Hallux valgus right, hammertoes L>R, hindfoot valgus, pes planus. Dislocated left 2nd MTP, sublux left 3rd MTP, arth 2-3 MTP, rt 1st MTP. Mild DJD hands. Several DIP and 1st CMC. Subluxation left thumb IP and persistant dorsiflexion right wrist. Previous visits discussed stress in her life. Her father in law passed away and there had been a lot of stress and she put her issues on the back burner. She had an episode of chest/epigastric pain and was seen at [facility]. She had apparent negative cardiac evaluation although did not followup with a stress test. She is taking OTC Zantac prn and thinks that helps. Found allergic to Wheat, avoid gluten and sugars. Feels much improved [heartburn, bloating, blood in stools, irregular stools] this really changed her digestive system. She went on a gluten free diet in December, and stopped all RA meds in early Jan 2015. See My Chart message. Restarted Humira early April 2015 every 3 weeks. Noticed more migatory joints pains, swelling. Right hand, right knee, right wrist-associated swelling really in right 2nd MCP. 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 Lack of movement and  "coordinations, using her wrist brace. Uses this at night and daily prn. At last visit she had continued on Humira without side effects and still feels it is helpful. She does have daily discomfort in several areas with either activity or prior damage, especially knees. She continued to have stress in her life but is working through this and \"trying to get back on track\". She stopped drinking any alcohol for the past two months and was congratulated on this. She went to  but does not feel she is alcoholic.  Leonardo going to [facility]. She has been  for a long time. Both lost parents, and grieving. She says she's in less denial. She admits  is helping a lot. She called in November due to increased joint pain and we restarted Methotrexate at 10 mg weekly as previously discussed.  She stopped it two weeks ago.  She noted more joint pain and also some nausea on day of dose.  We discussed this at length and unlikely that MTX contributed to more joint symptoms. At January 2016 visit we readded Methotrexate to Humira in hopes of decreasing disease activity, using low dose due to prior side effect complaints.  She called later that month with flare symptoms requiring Prednisone bridge, and we increased Methotrexate to more standard dose of 15 mg weekly. At her April 2016 visit she had started improving.. She was tolerating Methotrexate this time.  She weaned off Prednisone.      July 7, 2020 interval history:    Patient is currently been off of her Humira for several weeks due to a concern of possible infection because of a sore throat.  She believes from looking at her records the Solange 15 was her last dose.  She got particular concerned about the coronavirus because she works at home and has really been pretty much in isolation.    Despite being off it for a few weeks she is noticed only some very slight swelling in her right second and third MCPs.  Morning stiffness remains under 10 to 15 minutes.  Her " right hand has been the most problematic and she needs to use that the most so that is of concern to her but she is able to do pretty much everything she needs to do.    She is having some cloudiness in her vision of her right eye.  That is a concern to her because she was diagnosed in 2015 with uveitis but unlike that time she really has no pain with this.  This is been fairly subacute in its onset going back is much is several months.  She does not have the same thing going on in the other eye however.  It is not getting better although it is not getting a lot worse.    She continues to have some foot problems primarily calluses and will be seeing Dr. Gastelum back in August.    She otherwise feels like she is doing pretty well with her arthritis and denies any other new medical problems.    We do not have any labs for her however now for over 6 months.  She has continued on her methotrexate and has not had any symptomatic toxicity and really does not have a history of any laboratory toxicity with that in the past either.      August 5, 2021 interval history:    Since have last seen the patient she has again been off of both methotrexate and Humira at times.  She was feeling poorly so skipped this on her doses in early July and on July 26 although she took it on July 19.  She is not entirely clear whether she stop both drugs on these occasions are only one of them.    She does think maybe her symptoms of morning stiffness are a little worse off of the drug but otherwise feels like it had not made a big difference.  She says in some respects she felt better.    Going into that further, she does acknowledge that she feels somewhat poorly on the day after the methotrexate.  We increased her to 7 tabs daily last time.  She is not really entirely sure whether the day after symptoms worsened after that but they certainly have not gotten better over time.    December 16, 2021 interval history:    Since we have last seen  the patient she continues to feel pretty stable.  She describes morning stiffness lasting for less than 20 minutes and all of her joints.  She actually has not had Humira since November 9 however because she held it for her Covid booster shot on December 7.  She felt only a little poorly the next day.  She has however not yet resumed her Humira.  She has resumed her methotrexate after also stopping that for a week.    She does have still have some hip pain in the right where she had a fall several years ago.  She does not think that is worsened recently however.    She was off of Humira for about 6 weeks earlier this year when she got the original 2 vaccine series and she did get some worsening of her right hand at that time that seems to have persisted.    Her uveitis however, which is a substantial part of the reason she is on Humira specifically has remained stable for years currently.            Review of Systems:   Pertinent items are noted in HPI or as below, remainder of complete ROS is negative.      No recent problems with hearing or vision. No swallowing problems.   No breathing difficulty, shortness of breath, coughing, or wheezing  No chest pain or palpitations  No heart burn, indigestion, abdominal pain, nausea, vomiting, diarrhea  No urination problems, no bloody, cloudy urine, no dysuria  No numbing, tingling, weakness  No headaches or confusion  No rashes. No easy bleeding or bruising.     Active Medications:     Current Outpatient Medications:      acetaminophen (TYLENOL) 325 MG tablet, Take 325-650 mg by mouth every 6 hours as needed, Disp: , Rfl:      adalimumab (HUMIRA PEN) 40 MG/0.8ML pen kit, INJECT THE CONTENTS OF 1 PEN SUBCUTANEOUSLY EVERY OTHER WEEK.  HOLD FOR SIGNS OF INFECTION, THEN SEEK MEDICAL ATTENTION., Disp: 2 each, Rfl: 2     atropine 1 % ophthalmic solution, Place 1 drop into the right eye every 7 days (on day of Methotrexate use), Disp: 5 mL, Rfl: 3     difluprednate (DUREZOL) 0.05  "% ophthalmic emulsion, Place 1 drop into the right eye 2 times daily, Disp: 5 mL, Rfl: 4     dorzolamide-timolol (COSOPT) 2-0.5 % ophthalmic solution, Place 1 drop into the right eye 2 times daily, Disp: 10 mL, Rfl: 5     folic acid (FOLVITE) 1 MG tablet, Take 2 tablets (2 mg) by mouth daily, Disp: 180 tablet, Rfl: 2     methotrexate sodium 2.5 MG TABS, Take 6 tablets (15 mg) by mouth every 7 days, Disp: 24 tablet, Rfl: 5     silver sulfADIAZINE (SILVADENE) 1 % external cream, Apply topically 2 times daily To toenails., Disp: 85 g, Rfl: 3    Current Facility-Administered Medications:      triamcinolone (KENALOG-40) injection 40 mg, 40 mg, Subtenon injection, Q2 Months, Edilson Velazquez MD      Allergies:   Barium sulfate, Fosamax, and No clinical screening - see comments      PMH:  Injury-  Medical-right anterior uveitis (felt secondary to etanercept (enbrel), fibroid  high in 2007 then normal, hyperlipidemia. History grave's, menopause, osteoporosis borderline, rheumatoid arthritis (RA), pharyngeal dysphagia, hammertoe    Surgical-  Fibroid adenoma Left Breast     FH:  No autoimmune disorders, psoriasis, UC, crohn's, SLE, RA, PsA, gout, autoimmune thyroid.  No MS, heart disease in family  Mother-breast cancer, parkinson, osteoporosis , low back issues-passed    Father-anxiety, dementia-passed after hip fracture   Siblings-brother \"not live well\"   Children-None   M aunt breast cancer   Cousin mental illness      SH:  Occasionally moderate Alcohol 1 drink few times a week not M-W . No Smoking. No IVDU. . Retired      Physical Exam:   BP (!) 157/74   Pulse 80   SpO2 96%    Wt Readings from Last 4 Encounters:   08/05/21 74.8 kg (165 lb)   02/25/20 75.1 kg (165 lb 9.6 oz)   09/05/19 74.9 kg (165 lb 3.2 oz)   08/15/19 74.4 kg (164 lb)     Constitutional: Well-developed, appearing stated age; cooperative  Eyes: Normal EOM, PERRLA, vision, conjunctiva, sclera  ENT: Normal external ears, nose, " hearing, lips, teeth, gums, throat. No mucous membrane lesions, normal saliva pool  Neck: No mass or thyroid enlargement  Resp: Lungs clear to auscultation, nl to palpation  CV: RRR, no murmurs, rubs or gallops, no edema  GI: No ABD mass or tenderness, no HSM  : Not tested  Lymph: No cervical, supraclavicular, inguinal or epitrochlear nodes  MS: The TMJ, neck, shoulder, elbow, wrist, MCP/PIP/DIP, spine, hip, knee, ankle, and foot MTP/IP joints were examined and found normal. No active synovitis or altered joint anatomy. Full joint ROM. Normal  strength. No dactylitis,  tenosynovitis, enthesopathy. On exam today she has swelling of bilateral wrists that is 1-2+ in severity right first through third MCPs that is 2+ in severity the right knee with a approximately 15 degree contracture.  She also has at least low-grade synovitis in bilateral elbows worse on the left with contracture there and a decreased range of motion in the right hip with pretty significant pain there.     5 degree contractures in the elbows.   10-15 degree contractures in right knee with moderate size effusions.   1-3rd MCPS with singificant synovitis, at least 1+ maybe 2+ in both right and left hands, worse on the right.   Wrists have 20-30 degrees of extension, and 10-15 degrees of flexion  Some subluxation in both wrists. 2+ synovitis in both wrists  Trace swelling in elbows.   Right sided MCPs with very little extension.   Fibular deviation  2nd to 4th Hammer toes bilateral  Severe onychomycosis of the right great toe  Ulnar deviation  Swelling in the right knee.   Skin: No nail pitting, alopecia, rash, nodules or lesions  Neuro: Normal cranial nerves, strength, sensation, DTRs.   Psych: Normal judgement, orientation, memory, affect.     Laboratory:   RHEUM RESULTS Latest Ref Rng & Units 4/3/2007 8/7/2007 9/11/2007   ALBUMIN 3.4 - 5.0 g/dL - - -   ALT 0 - 50 U/L - - -   AST 0 - 45 U/L - - -   CATALINO INTERPRETATION NEG:Negative - - -   ANAP1 -  - - -   ANAT1 - - - -   COMPLEMENT C3 81 - 157 mg/dL - - -   COMPLEMENT C4 13 - 39 mg/dL - - -   CREATININE 0.52 - 1.04 mg/dL 0.94 - -   CRP 0.0 - 8.0 mg/L - - 63.9(H)   DNA <10 IU/mL - - -   FELICE 0 - 1.0 - 1.7 -   GFR ESTIMATE, IF BLACK >60 mL/min/[1.73:m2] 81 - -   GFR ESTIMATE >60 mL/min/1.73m2 67 - -   HEMATOCRIT 35.0 - 47.0 % 46.4 - 42.6   HEMOGLOBIN 11.7 - 15.7 g/dL 15.7 - 14.2   HEPBANG NR:Nonreactive - - -   HCVAB NR:Nonreactive - - -   WBC 4.0 - 11.0 10e3/uL 6.7 - 7.5   RBC 3.80 - 5.20 10e6/uL 5.29(H) - 5.04   RDW 10.0 - 15.0 % 13.2 - 13.2   MCHC 31.5 - 36.5 g/dL 33.8 - 33.3   MCV 78 - 100 fL 88 - 85   PLATELET COUNT 150 - 450 10e3/uL 298 - 368   RHEUMATOID FACTOR <12 IU/mL - <20 -   ESR 0 - 30 mm/hr - 48(H) 65(H)   QUANTIFERON-TB GOLD PLUS RESULT NEG:Negative - - -       Rheumatoid Factor   Date Value Ref Range Status   12/17/2013 <20 <20 IU/mL Final   ,  ,   Cyclic Cit Pept IgG/IgA   Date Value Ref Range Status   12/17/2013 <20  Interpretation:  Negative <20 UNITS Final   ,   Cyclic Citrullinated Peptide IgG   Date Value Ref Range Status   11/18/2009 <2 <5 U/mL Final     Comment:     Interpretation:  Negative   ,  ,   SSA (RO) Antibody IgG   Date Value Ref Range Status   11/18/2009 5  Final     Comment:     Reference range: 0 to 40  Unit: AU/mL  (Note)  REFERENCE INTERVALS: SSA (Ro) (LASHAE) Ab, IgG   29 AU/mL or Less ............. Negative   30 - 40 AU/mL ................ Equivocal   41 AU/mL or Greater .......... Positive    SSA (Ro) antibody is seen in 70-75% of Sjogren syndrome  cases, 30-40% of systemic lupus erythematosus (SLE) and  5-10% of progressive systemic sclerosis (PSS).  Performed by [facility],  [address] [place] [address],[place] [address] [phone]  [email or web address], Anne Arellano MD, Lab. Director     SSB (LA) Antibody IgG   Date Value Ref Range Status   11/18/2009 0  Final     Comment:     Reference range: 0 to 40  Unit: AU/mL  (Note)  REFERENCE INTERVALS: SSB (La) (LASHAE) Ab, IgG   29 AU/mL  or Less ............. Negative   30 - 40 AU/mL ................ Equivocal   41 AU/mL or Greater .......... Positive    SSB (La) antibody is seen in 50-60% of Sjogren syndrome  cases and is specific if it is the only LASHAE antibody  present. 15-25% of patients with systemic lupus  erythematosus (SLE) and 5-10% of patients with progressive  systemic sclerosis (PSS) also have this antibody.  Performed by [facility],  [address] [place] [id number] [phone]  [email or web address], Anne Arellano MD, Lab. Director   ,  ,   CATALINO Screen by E[facility]   Date Value Ref Range Status   11/18/2009 <1.0 0 - 1.0 Final     Comment:     Interpretation:  Negative   ,  ,  ,  ,  ,  ,  ,   Hepatitis B Core Desiree   Date Value Ref Range Status   12/17/2013 Negative NEG Final   ,   Hep B Surface Agn   Date Value Ref Range Status   12/17/2013 Negative NEG Final       Again, thank you for allowing me to participate in the care of your patient.        Sincerely,        Figueroa Batista MD

## 2021-12-16 NOTE — NURSING NOTE
Chief Complaint   Patient presents with     Follow Up     4mo f/u     Vitals were taken and medications were reconciled.     ELIZABETH Arrington

## 2021-12-17 ENCOUNTER — TELEPHONE (OUTPATIENT)
Dept: ORTHOPEDICS | Facility: CLINIC | Age: 66
End: 2021-12-17
Payer: MEDICARE

## 2021-12-17 ENCOUNTER — TELEPHONE (OUTPATIENT)
Dept: RHEUMATOLOGY | Facility: CLINIC | Age: 66
End: 2021-12-17
Payer: MEDICARE

## 2021-12-17 NOTE — TELEPHONE ENCOUNTER
Called patient and left a voicemail stating that [facility] [place] [facility] off [address]. Patient should be able to call the pharmacy an get the script. Left clinic call back number if she has any other questions

## 2021-12-17 NOTE — TELEPHONE ENCOUNTER
[facility] Call Center    Phone Message    May a detailed message be left on voicemail: yes     Reason for Call: Medication Question or concern regarding medication   Prescription Clarification  Name of Medication: Silver Sulfadiazine   Prescribing Provider: Dr Gao   Pharmacy: [facility]   What on the order needs clarification?   Patient states she was supposed to get this prescription after her last visit but it was never called in.           Action Taken: Message routed to:  [facility] ortho    Travel Screening: Not Applicable

## 2021-12-17 NOTE — TELEPHONE ENCOUNTER
[facility] Call Center    Phone Message    May a detailed message be left on voicemail: yes     Reason for Call: Other: Pt called in stating that pharmacy told her that methotrexate sodium was never received. Pt requesting it be resent and then call her to confirm     Action Taken: Message routed to:  [facility] pulm    Travel Screening: Not Applicable

## 2021-12-20 NOTE — TELEPHONE ENCOUNTER
Called pt's [facility] pharmacy and provided a verbal refill for her methotrexate. Left voice mail for pt informing her that this has been done.    JAYA Newman, RN  Rheumatology Care Coordinator  [facility]

## 2021-12-21 RX ORDER — SILVER SULFADIAZINE 10 MG/G
CREAM TOPICAL DAILY
Qty: 85 G | Refills: 3 | Status: SHIPPED | OUTPATIENT
Start: 2021-12-21 | End: 2022-01-10

## 2021-12-21 NOTE — TELEPHONE ENCOUNTER
Dr. Gao resent medication to pt's pharmacy. Called pt multiple times. Notified pt via .         -CITLALLI [place]- Orthopedics

## 2021-12-21 NOTE — TELEPHONE ENCOUNTER
[facility] Call Center    Phone Message    May a detailed message be left on voicemail: yes     Reason for Call: Other: patient and her  say the pharmacy did not recieve the script -- I confirmed they [facility] [place] [facility]       Action Taken: Message routed to:  [facility] ortho    Travel Screening: Not Applicable     Please re send the script and call them back to confirm when it's been re sent

## 2021-12-22 PROBLEM — Z79.620 ADALIMUMAB (HUMIRA) LONG-TERM USE: Status: ACTIVE | Noted: 2021-12-22

## 2021-12-22 PROBLEM — Z79.631 METHOTREXATE, LONG TERM, CURRENT USE: Status: ACTIVE | Noted: 2021-12-22

## 2022-01-01 ENCOUNTER — OFFICE VISIT (OUTPATIENT)
Dept: FAMILY MEDICINE | Facility: CLINIC | Age: 67
End: 2022-01-01
Payer: MEDICARE

## 2022-01-01 ENCOUNTER — OFFICE VISIT (OUTPATIENT)
Dept: ORTHOPEDICS | Facility: CLINIC | Age: 67
End: 2022-01-01
Payer: MEDICARE

## 2022-01-01 ENCOUNTER — TELEPHONE (OUTPATIENT)
Dept: OPHTHALMOLOGY | Facility: CLINIC | Age: 67
End: 2022-01-01

## 2022-01-01 ENCOUNTER — ANESTHESIA EVENT (OUTPATIENT)
Dept: SURGERY | Facility: AMBULATORY SURGERY CENTER | Age: 67
End: 2022-01-01
Payer: MEDICARE

## 2022-01-01 ENCOUNTER — HEALTH MAINTENANCE LETTER (OUTPATIENT)
Age: 67
End: 2022-01-01

## 2022-01-01 ENCOUNTER — HOSPITAL ENCOUNTER (OUTPATIENT)
Facility: AMBULATORY SURGERY CENTER | Age: 67
Discharge: HOME | End: 2022-11-14
Attending: OPHTHALMOLOGY
Payer: MEDICARE

## 2022-01-01 ENCOUNTER — OFFICE VISIT (OUTPATIENT)
Dept: OPHTHALMOLOGY | Facility: CLINIC | Age: 67
End: 2022-01-01
Attending: OPHTHALMOLOGY
Payer: MEDICARE

## 2022-01-01 ENCOUNTER — ANESTHESIA (OUTPATIENT)
Dept: SURGERY | Facility: AMBULATORY SURGERY CENTER | Age: 67
End: 2022-01-01
Payer: MEDICARE

## 2022-01-01 ENCOUNTER — TELEPHONE (OUTPATIENT)
Dept: RHEUMATOLOGY | Facility: CLINIC | Age: 67
End: 2022-01-01

## 2022-01-01 VITALS
OXYGEN SATURATION: 97 % | TEMPERATURE: 97.5 F | HEIGHT: 68 IN | DIASTOLIC BLOOD PRESSURE: 82 MMHG | HEART RATE: 50 BPM | BODY MASS INDEX: 22.73 KG/M2 | RESPIRATION RATE: 15 BRPM | WEIGHT: 150 LBS | SYSTOLIC BLOOD PRESSURE: 129 MMHG

## 2022-01-01 VITALS
WEIGHT: 150.1 LBS | TEMPERATURE: 97.4 F | RESPIRATION RATE: 16 BRPM | OXYGEN SATURATION: 98 % | SYSTOLIC BLOOD PRESSURE: 145 MMHG | BODY MASS INDEX: 22.75 KG/M2 | HEART RATE: 65 BPM | DIASTOLIC BLOOD PRESSURE: 97 MMHG | HEIGHT: 68 IN

## 2022-01-01 DIAGNOSIS — Z79.899 HIGH RISK MEDICATION USE: ICD-10-CM

## 2022-01-01 DIAGNOSIS — M06.00 SERONEGATIVE RHEUMATOID ARTHRITIS (H): ICD-10-CM

## 2022-01-01 DIAGNOSIS — M06.072 RHEUMATOID ARTHRITIS INVOLVING BOTH FEET WITH NEGATIVE RHEUMATOID FACTOR (H): ICD-10-CM

## 2022-01-01 DIAGNOSIS — Z96.1 PSEUDOPHAKIA: ICD-10-CM

## 2022-01-01 DIAGNOSIS — M06.071 RHEUMATOID ARTHRITIS INVOLVING BOTH FEET WITH NEGATIVE RHEUMATOID FACTOR (H): ICD-10-CM

## 2022-01-01 DIAGNOSIS — Z13.220 SCREENING FOR HYPERLIPIDEMIA: ICD-10-CM

## 2022-01-01 DIAGNOSIS — M06.09 RHEUMATOID ARTHRITIS OF MULTIPLE SITES WITHOUT RHEUMATOID FACTOR (H): ICD-10-CM

## 2022-01-01 DIAGNOSIS — L98.9 SKIN LESION: Primary | ICD-10-CM

## 2022-01-01 DIAGNOSIS — Z79.899 HIGH RISK MEDICATIONS (NOT ANTICOAGULANTS) LONG-TERM USE: ICD-10-CM

## 2022-01-01 DIAGNOSIS — H40.051 OCULAR HYPERTENSION, RIGHT EYE: ICD-10-CM

## 2022-01-01 DIAGNOSIS — L84 TYLOMA: ICD-10-CM

## 2022-01-01 DIAGNOSIS — H20.00 ACUTE ANTERIOR UVEITIS OF RIGHT EYE: ICD-10-CM

## 2022-01-01 DIAGNOSIS — H26.221 CATARACT IN INFLAMMATORY DISORDER, RIGHT: ICD-10-CM

## 2022-01-01 DIAGNOSIS — H40.051 OCULAR HYPERTENSION, RIGHT EYE: Primary | ICD-10-CM

## 2022-01-01 DIAGNOSIS — H30.21 INTERMEDIATE UVEITIS OF RIGHT EYE: Primary | ICD-10-CM

## 2022-01-01 DIAGNOSIS — B35.1 OM (ONYCHOMYCOSIS): Primary | ICD-10-CM

## 2022-01-01 DIAGNOSIS — H30.21 INTERMEDIATE UVEITIS OF RIGHT EYE: ICD-10-CM

## 2022-01-01 DIAGNOSIS — R76.8 POSITIVE ANA (ANTINUCLEAR ANTIBODY): ICD-10-CM

## 2022-01-01 DIAGNOSIS — H26.9 CATARACT OF RIGHT EYE, UNSPECIFIED CATARACT TYPE: ICD-10-CM

## 2022-01-01 DIAGNOSIS — Z98.890 POST-OPERATIVE STATE: Primary | ICD-10-CM

## 2022-01-01 DIAGNOSIS — Z96.1 PSEUDOPHAKIA: Primary | ICD-10-CM

## 2022-01-01 DIAGNOSIS — Z01.818 PREOPERATIVE EXAMINATION: Primary | ICD-10-CM

## 2022-01-01 DIAGNOSIS — M85.80 OSTEOPENIA, UNSPECIFIED LOCATION: ICD-10-CM

## 2022-01-01 DIAGNOSIS — B35.1 OM (ONYCHOMYCOSIS): ICD-10-CM

## 2022-01-01 LAB
ALBUMIN SERPL-MCNC: 4.1 G/DL (ref 3.4–5)
ALT SERPL W P-5'-P-CCNC: 33 U/L (ref 0–50)
AST SERPL W P-5'-P-CCNC: 17 U/L (ref 0–45)
BASOPHILS # BLD AUTO: 0 10E3/UL (ref 0–0.2)
BASOPHILS NFR BLD AUTO: 0 %
CHOLEST SERPL-MCNC: 221 MG/DL
CREAT SERPL-MCNC: 0.72 MG/DL (ref 0.52–1.04)
CRP SERPL-MCNC: 5.21 MG/L
EOSINOPHIL # BLD AUTO: 0.1 10E3/UL (ref 0–0.7)
EOSINOPHIL NFR BLD AUTO: 1 %
ERYTHROCYTE [DISTWIDTH] IN BLOOD BY AUTOMATED COUNT: 14.1 % (ref 10–15)
ERYTHROCYTE [SEDIMENTATION RATE] IN BLOOD BY WESTERGREN METHOD: 7 MM/HR (ref 0–30)
FASTING STATUS PATIENT QL REPORTED: NO
GFR SERPL CREATININE-BSD FRML MDRD: >90 ML/MIN/1.73M2
HCT VFR BLD AUTO: 50.1 % (ref 35–47)
HDLC SERPL-MCNC: 71 MG/DL
HGB BLD-MCNC: 16.7 G/DL (ref 11.7–15.7)
IMM GRANULOCYTES # BLD: 0 10E3/UL
IMM GRANULOCYTES NFR BLD: 0 %
LDLC SERPL CALC-MCNC: 119 MG/DL
LYMPHOCYTES # BLD AUTO: 3.1 10E3/UL (ref 0.8–5.3)
LYMPHOCYTES NFR BLD AUTO: 42 %
MCH RBC QN AUTO: 33.9 PG (ref 26.5–33)
MCHC RBC AUTO-ENTMCNC: 33.3 G/DL (ref 31.5–36.5)
MCV RBC AUTO: 102 FL (ref 78–100)
MONOCYTES # BLD AUTO: 0.6 10E3/UL (ref 0–1.3)
MONOCYTES NFR BLD AUTO: 8 %
NEUTROPHILS # BLD AUTO: 3.6 10E3/UL (ref 1.6–8.3)
NEUTROPHILS NFR BLD AUTO: 49 %
NONHDLC SERPL-MCNC: 150 MG/DL
PLATELET # BLD AUTO: 261 10E3/UL (ref 150–450)
RBC # BLD AUTO: 4.93 10E6/UL (ref 3.8–5.2)
TRIGL SERPL-MCNC: 154 MG/DL
WBC # BLD AUTO: 7.4 10E3/UL (ref 4–11)

## 2022-01-01 PROCEDURE — 66174 TRLUML DIL AQ O/F CAN W/O ST: CPT | Mod: RT | Performed by: OPHTHALMOLOGY

## 2022-01-01 PROCEDURE — 80061 LIPID PANEL: CPT | Performed by: INTERNAL MEDICINE

## 2022-01-01 PROCEDURE — 99213 OFFICE O/P EST LOW 20 MIN: CPT | Mod: 25 | Performed by: PODIATRIST

## 2022-01-01 PROCEDURE — 99024 POSTOP FOLLOW-UP VISIT: CPT | Performed by: OPHTHALMOLOGY

## 2022-01-01 PROCEDURE — G0463 HOSPITAL OUTPT CLINIC VISIT: HCPCS | Mod: 25

## 2022-01-01 PROCEDURE — 84450 TRANSFERASE (AST) (SGOT): CPT | Performed by: INTERNAL MEDICINE

## 2022-01-01 PROCEDURE — 99213 OFFICE O/P EST LOW 20 MIN: CPT | Performed by: PODIATRIST

## 2022-01-01 PROCEDURE — 82040 ASSAY OF SERUM ALBUMIN: CPT | Performed by: INTERNAL MEDICINE

## 2022-01-01 PROCEDURE — 36415 COLL VENOUS BLD VENIPUNCTURE: CPT | Performed by: INTERNAL MEDICINE

## 2022-01-01 PROCEDURE — 65850 TRABECULOTOMY AB EXTERNO: CPT | Mod: RT

## 2022-01-01 PROCEDURE — 85025 COMPLETE CBC W/AUTO DIFF WBC: CPT | Performed by: INTERNAL MEDICINE

## 2022-01-01 PROCEDURE — 99204 OFFICE O/P NEW MOD 45 MIN: CPT | Performed by: INTERNAL MEDICINE

## 2022-01-01 PROCEDURE — 84460 ALANINE AMINO (ALT) (SGPT): CPT | Performed by: INTERNAL MEDICINE

## 2022-01-01 PROCEDURE — 85652 RBC SED RATE AUTOMATED: CPT | Performed by: INTERNAL MEDICINE

## 2022-01-01 PROCEDURE — 99024 POSTOP FOLLOW-UP VISIT: CPT | Mod: GC | Performed by: OPHTHALMOLOGY

## 2022-01-01 PROCEDURE — 66982 XCAPSL CTRC RMVL CPLX WO ECP: CPT | Mod: RT

## 2022-01-01 PROCEDURE — 11055 PARING/CUTG B9 HYPRKER LES 1: CPT | Mod: Q8 | Performed by: PODIATRIST

## 2022-01-01 PROCEDURE — 92015 DETERMINE REFRACTIVE STATE: CPT | Mod: GY

## 2022-01-01 PROCEDURE — 82565 ASSAY OF CREATININE: CPT | Performed by: INTERNAL MEDICINE

## 2022-01-01 PROCEDURE — 66174 TRLUML DIL AQ O/F CAN W/O ST: CPT | Mod: RT

## 2022-01-01 PROCEDURE — 66982 XCAPSL CTRC RMVL CPLX WO ECP: CPT | Mod: RT | Performed by: OPHTHALMOLOGY

## 2022-01-01 PROCEDURE — 11720 DEBRIDE NAIL 1-5: CPT | Mod: XS | Performed by: PODIATRIST

## 2022-01-01 PROCEDURE — 65850 TRABECULOTOMY AB EXTERNO: CPT | Mod: RT | Performed by: OPHTHALMOLOGY

## 2022-01-01 PROCEDURE — G0463 HOSPITAL OUTPT CLINIC VISIT: HCPCS

## 2022-01-01 PROCEDURE — 92134 CPTRZ OPH DX IMG PST SGM RTA: CPT | Performed by: OPHTHALMOLOGY

## 2022-01-01 PROCEDURE — 86140 C-REACTIVE PROTEIN: CPT | Performed by: INTERNAL MEDICINE

## 2022-01-01 PROCEDURE — C1889 IMPLANT/INSERT DEVICE, NOC: HCPCS

## 2022-01-01 DEVICE — IMPLANTABLE DEVICE: Type: IMPLANTABLE DEVICE | Site: EYE | Status: FUNCTIONAL

## 2022-01-01 RX ORDER — SODIUM CHLORIDE, SODIUM LACTATE, POTASSIUM CHLORIDE, CALCIUM CHLORIDE 600; 310; 30; 20 MG/100ML; MG/100ML; MG/100ML; MG/100ML
INJECTION, SOLUTION INTRAVENOUS CONTINUOUS
Status: CANCELLED | OUTPATIENT
Start: 2022-01-01

## 2022-01-01 RX ORDER — DIFLUPREDNATE OPHTHALMIC 0.5 MG/ML
1 EMULSION OPHTHALMIC 4 TIMES DAILY
Qty: 5 ML | Refills: 4 | Status: SHIPPED | OUTPATIENT
Start: 2022-01-01 | End: 2023-01-01

## 2022-01-01 RX ORDER — MOXIFLOXACIN 5 MG/ML
1 SOLUTION/ DROPS OPHTHALMIC 3 TIMES DAILY
Qty: 3 ML | Refills: 1 | Status: SHIPPED | OUTPATIENT
Start: 2022-01-01 | End: 2023-01-01

## 2022-01-01 RX ORDER — ONDANSETRON 2 MG/ML
4 INJECTION INTRAMUSCULAR; INTRAVENOUS EVERY 30 MIN PRN
Status: CANCELLED | OUTPATIENT
Start: 2022-01-01

## 2022-01-01 RX ORDER — PREDNISOLONE ACETATE 10 MG/ML
1 SUSPENSION/ DROPS OPHTHALMIC 4 TIMES DAILY
Qty: 10 ML | Refills: 1 | Status: SHIPPED | OUTPATIENT
Start: 2022-01-01 | End: 2022-01-01

## 2022-01-01 RX ORDER — DORZOLAMIDE HYDROCHLORIDE AND TIMOLOL MALEATE 20; 5 MG/ML; MG/ML
1 SOLUTION/ DROPS OPHTHALMIC 2 TIMES DAILY
Qty: 10 ML | Refills: 5 | Status: SHIPPED | OUTPATIENT
Start: 2022-01-01 | End: 2023-01-01

## 2022-01-01 RX ORDER — ACETAMINOPHEN 325 MG/1
975 TABLET ORAL ONCE
Status: COMPLETED | OUTPATIENT
Start: 2022-01-01 | End: 2022-01-01

## 2022-01-01 RX ORDER — BALANCED SALT SOLUTION 6.4; .75; .48; .3; 3.9; 1.7 MG/ML; MG/ML; MG/ML; MG/ML; MG/ML; MG/ML
SOLUTION OPHTHALMIC PRN
Status: DISCONTINUED | OUTPATIENT
Start: 2022-01-01 | End: 2022-01-01 | Stop reason: HOSPADM

## 2022-01-01 RX ORDER — TIMOLOL MALEATE 5 MG/ML
SOLUTION/ DROPS OPHTHALMIC PRN
Status: DISCONTINUED | OUTPATIENT
Start: 2022-01-01 | End: 2022-01-01 | Stop reason: HOSPADM

## 2022-01-01 RX ORDER — MOXIFLOXACIN IN NACL,ISO-OS/PF 0.3MG/0.3
SYRINGE (ML) INTRAOCULAR PRN
Status: DISCONTINUED | OUTPATIENT
Start: 2022-01-01 | End: 2022-01-01 | Stop reason: HOSPADM

## 2022-01-01 RX ORDER — LIDOCAINE 40 MG/G
CREAM TOPICAL
Status: DISCONTINUED | OUTPATIENT
Start: 2022-01-01 | End: 2022-01-01 | Stop reason: HOSPADM

## 2022-01-01 RX ORDER — PROPOFOL 10 MG/ML
INJECTION, EMULSION INTRAVENOUS PRN
Status: DISCONTINUED | OUTPATIENT
Start: 2022-01-01 | End: 2022-01-01

## 2022-01-01 RX ORDER — KETOROLAC TROMETHAMINE 4 MG/ML
1 SOLUTION/ DROPS OPHTHALMIC 4 TIMES DAILY
Qty: 5 ML | Refills: 1 | Status: SHIPPED | OUTPATIENT
Start: 2022-01-01 | End: 2023-01-01

## 2022-01-01 RX ORDER — PROPARACAINE HYDROCHLORIDE 5 MG/ML
1 SOLUTION/ DROPS OPHTHALMIC ONCE
Status: COMPLETED | OUTPATIENT
Start: 2022-01-01 | End: 2022-01-01

## 2022-01-01 RX ORDER — MEPERIDINE HYDROCHLORIDE 25 MG/ML
12.5 INJECTION INTRAMUSCULAR; INTRAVENOUS; SUBCUTANEOUS
Status: CANCELLED | OUTPATIENT
Start: 2022-01-01

## 2022-01-01 RX ORDER — SODIUM CHLORIDE, SODIUM LACTATE, POTASSIUM CHLORIDE, CALCIUM CHLORIDE 600; 310; 30; 20 MG/100ML; MG/100ML; MG/100ML; MG/100ML
INJECTION, SOLUTION INTRAVENOUS CONTINUOUS
Status: DISCONTINUED | OUTPATIENT
Start: 2022-01-01 | End: 2022-01-01 | Stop reason: HOSPADM

## 2022-01-01 RX ORDER — TRIAMCINOLONE ACETONIDE 40 MG/ML
INJECTION, SUSPENSION INTRA-ARTICULAR; INTRAMUSCULAR PRN
Status: DISCONTINUED | OUTPATIENT
Start: 2022-01-01 | End: 2022-01-01 | Stop reason: HOSPADM

## 2022-01-01 RX ORDER — CYCLOPENTOLAT/TROPIC/PHENYLEPH 1%-1%-2.5%
1 DROPS (EA) OPHTHALMIC (EYE)
Status: COMPLETED | OUTPATIENT
Start: 2022-01-01 | End: 2022-01-01

## 2022-01-01 RX ORDER — ONDANSETRON 4 MG/1
4 TABLET, ORALLY DISINTEGRATING ORAL EVERY 30 MIN PRN
Status: CANCELLED | OUTPATIENT
Start: 2022-01-01

## 2022-01-01 RX ORDER — LIDOCAINE HYDROCHLORIDE 20 MG/ML
INJECTION, SOLUTION INFILTRATION; PERINEURAL PRN
Status: DISCONTINUED | OUTPATIENT
Start: 2022-01-01 | End: 2022-01-01

## 2022-01-01 RX ADMIN — ACETAMINOPHEN 975 MG: 325 TABLET ORAL at 10:52

## 2022-01-01 RX ADMIN — Medication 1 DROP: at 10:36

## 2022-01-01 RX ADMIN — Medication 1 DROP: at 10:53

## 2022-01-01 RX ADMIN — Medication 1 DROP: at 11:00

## 2022-01-01 RX ADMIN — PROPARACAINE HYDROCHLORIDE 1 DROP: 5 SOLUTION/ DROPS OPHTHALMIC at 10:35

## 2022-01-01 RX ADMIN — PROPOFOL 50 MG: 10 INJECTION, EMULSION INTRAVENOUS at 11:35

## 2022-01-01 RX ADMIN — SODIUM CHLORIDE, SODIUM LACTATE, POTASSIUM CHLORIDE, CALCIUM CHLORIDE: 600; 310; 30; 20 INJECTION, SOLUTION INTRAVENOUS at 11:07

## 2022-01-01 RX ADMIN — LIDOCAINE HYDROCHLORIDE 60 MG: 20 INJECTION, SOLUTION INFILTRATION; PERINEURAL at 11:35

## 2022-01-01 ASSESSMENT — CONF VISUAL FIELD
OS_INFERIOR_TEMPORAL_RESTRICTION: 0
OS_INFERIOR_NASAL_RESTRICTION: 0
METHOD: COUNTING FINGERS
OD_INFERIOR_NASAL_RESTRICTION: 0
METHOD: COUNTING FINGERS
OS_INFERIOR_TEMPORAL_RESTRICTION: 0
OS_NORMAL: 1
OD_INFERIOR_NASAL_RESTRICTION: 0
OD_SUPERIOR_TEMPORAL_RESTRICTION: 0
OD_INFERIOR_TEMPORAL_RESTRICTION: 0
OD_SUPERIOR_TEMPORAL_RESTRICTION: 0
OD_INFERIOR_TEMPORAL_RESTRICTION: 0
OS_NORMAL: 1
OD_SUPERIOR_NASAL_RESTRICTION: 0
OS_NORMAL: 1
OS_INFERIOR_NASAL_RESTRICTION: 0
OD_SUPERIOR_TEMPORAL_RESTRICTION: 0
OS_INFERIOR_TEMPORAL_RESTRICTION: 0
OS_SUPERIOR_NASAL_RESTRICTION: 0
OS_SUPERIOR_TEMPORAL_RESTRICTION: 0
OD_INFERIOR_NASAL_RESTRICTION: 0
OS_SUPERIOR_TEMPORAL_RESTRICTION: 0
OD_SUPERIOR_NASAL_RESTRICTION: 0
OS_INFERIOR_NASAL_RESTRICTION: 0
OS_SUPERIOR_NASAL_RESTRICTION: 0
OS_SUPERIOR_TEMPORAL_RESTRICTION: 0
OD_SUPERIOR_NASAL_RESTRICTION: 0
OD_INFERIOR_TEMPORAL_RESTRICTION: 0
OD_NORMAL: 1
METHOD: COUNTING FINGERS
OS_SUPERIOR_NASAL_RESTRICTION: 0

## 2022-01-01 ASSESSMENT — TONOMETRY
OS_IOP_MMHG: 16
IOP_METHOD: TONOPEN
OD_IOP_MMHG: 16
OS_IOP_MMHG: 18
OD_IOP_MMHG: 17
OS_IOP_MMHG: 20
IOP_METHOD: TONOPEN
OD_IOP_MMHG: 17
OD_IOP_MMHG: 14
OS_IOP_MMHG: 18

## 2022-01-01 ASSESSMENT — VISUAL ACUITY
OS_SC+: +2
METHOD: SNELLEN - LINEAR
OS_SC+: +2
OD_SC+: -1
OD_SC: 20/60
OD_SC+: -1
OD_PH_SC: 20/25
OS_SC: 20/25
OD_PH_SC: 20/30
OS_SC+: +2
OS_SC: 20/25
METHOD: SNELLEN - LINEAR
OD_SC: 20/60
OD_SC+: -2
OD_PH_SC+: -2
OS_SC: 20/30
OD_PH_SC+: -2
OD_SC+: -1
OD_SC: 20/30
METHOD: SNELLEN - LINEAR
OS_SC: 20/25
OD_PH_SC: 20/30
METHOD: SNELLEN - LINEAR
OD_SC: 20/70

## 2022-01-01 ASSESSMENT — CUP TO DISC RATIO
OS_RATIO: 0.45
OS_RATIO: 0.45
OD_RATIO: 0.55
OS_RATIO: 0.45
OD_RATIO: 0.55
OD_RATIO: 0.55

## 2022-01-01 ASSESSMENT — PAIN SCALES - GENERAL: PAINLEVEL: NO PAIN (0)

## 2022-01-01 ASSESSMENT — ENCOUNTER SYMPTOMS
CHILLS: 0
FATIGUE: 1
FEVER: 0
DIZZINESS: 0
PALPITATIONS: 0
LIGHT-HEADEDNESS: 0
SHORTNESS OF BREATH: 0

## 2022-01-01 ASSESSMENT — REFRACTION_MANIFEST
OS_CYLINDER: SPHERE
OD_SPHERE: -1.25
OD_ADD: +3.00
OS_SPHERE: +0.75
OD_CYLINDER: SPHERE
OS_ADD: +3.00

## 2022-01-01 ASSESSMENT — SLIT LAMP EXAM - LIDS
COMMENTS: MILD BLEPHARITIS

## 2022-01-01 ASSESSMENT — EXTERNAL EXAM - RIGHT EYE
OD_EXAM: NORMAL

## 2022-01-01 ASSESSMENT — EXTERNAL EXAM - LEFT EYE
OS_EXAM: NORMAL

## 2022-01-10 RX ORDER — SILVER SULFADIAZINE 10 MG/G
CREAM TOPICAL DAILY
Qty: 85 G | Refills: 3 | Status: SHIPPED | OUTPATIENT
Start: 2022-01-10 | End: 2022-09-07

## 2022-02-17 PROBLEM — H40.051 OCULAR HYPERTENSION, RIGHT EYE: Status: ACTIVE | Noted: 2021-06-14

## 2022-02-19 ENCOUNTER — HEALTH MAINTENANCE LETTER (OUTPATIENT)
Age: 67
End: 2022-02-19

## 2022-02-28 ENCOUNTER — OFFICE VISIT (OUTPATIENT)
Dept: ORTHOPEDICS | Facility: CLINIC | Age: 67
End: 2022-02-28
Payer: MEDICARE

## 2022-02-28 ENCOUNTER — TELEPHONE (OUTPATIENT)
Dept: PULMONOLOGY | Facility: CLINIC | Age: 67
End: 2022-02-28

## 2022-02-28 DIAGNOSIS — M06.071 RHEUMATOID ARTHRITIS INVOLVING BOTH FEET WITH NEGATIVE RHEUMATOID FACTOR (H): ICD-10-CM

## 2022-02-28 DIAGNOSIS — B35.1 OM (ONYCHOMYCOSIS): Primary | ICD-10-CM

## 2022-02-28 DIAGNOSIS — L84 TYLOMA: ICD-10-CM

## 2022-02-28 DIAGNOSIS — M79.672 LEFT FOOT PAIN: ICD-10-CM

## 2022-02-28 DIAGNOSIS — M06.00 SERONEGATIVE RHEUMATOID ARTHRITIS (H): ICD-10-CM

## 2022-02-28 DIAGNOSIS — M06.072 RHEUMATOID ARTHRITIS INVOLVING BOTH FEET WITH NEGATIVE RHEUMATOID FACTOR (H): ICD-10-CM

## 2022-02-28 PROCEDURE — 87075 CULTR BACTERIA EXCEPT BLOOD: CPT | Performed by: PODIATRIST

## 2022-02-28 PROCEDURE — 99213 OFFICE O/P EST LOW 20 MIN: CPT | Performed by: PODIATRIST

## 2022-02-28 PROCEDURE — 87101 SKIN FUNGI CULTURE: CPT | Performed by: PODIATRIST

## 2022-02-28 PROCEDURE — 87070 CULTURE OTHR SPECIMN AEROBIC: CPT | Performed by: PODIATRIST

## 2022-02-28 RX ORDER — SILVER SULFADIAZINE 10 MG/G
CREAM TOPICAL 2 TIMES DAILY
Qty: 85 G | Refills: 3 | Status: SHIPPED | OUTPATIENT
Start: 2022-02-28

## 2022-02-28 NOTE — PROGRESS NOTES
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 Frost     Uveitis      Patient Active Problem List   Diagnosis     Hammer toe     Bunion     Osteopenia     Rheumatoid arthritis of multiple sites without rheumatoid factor (H)     Pain in both wrists     Mechanical limb problems     Pharyngeal dysphagia     Muscle tension dysphonia     Myofascial pain     High risk medication use     Intermediate uveitis of right eye     Cystoid macular edema of right eye     Acute anterior uveitis of right eye     Ocular hypertension, right eye     Methotrexate, long term, current use     Adalimumab (Humira) long-term use     Past Surgical History:   Procedure Laterality Date     Fibroidadenoma Left Breast       NO HISTORY OF SURGERY       Social History     Socioeconomic History     Marital status:      Spouse name: Not on file     Number of children: Not on file     Years of education: Not on file     Highest education level: Not on file   Occupational History     Not on file   Tobacco Use     Smoking status: Never Smoker     Smokeless tobacco: Never Used   Substance and Sexual Activity     Alcohol use: Yes     Alcohol/week: 5.8 - 11.7 standard drinks     Comment: social use prn     Drug use: No     Sexual activity: Not on file   Other Topics Concern     Parent/sibling w/ CABG, MI or angioplasty before 65F 55M? Not Asked   Social History Narrative    How much exercise per week? Walking, stairs    How much calcium per day? 2-3 servings       How much caffeine per day? 2-3 cups coffee    How much vitamin D per day?      Do you/your family  wear seatbelts?  Yes    Do you/your family use safety helmets? No    Do you/your family use sunscreen? No    Do you/your family keep firearms in the home? No    Do you/your family have a smoke detector(s)? Yes        Do you feel safe in your home? Yes    Has anyone ever touched you in an unwanted manner? No     Explain         September 2, 2014 Tamiko Villa LPN             Social Determinants of Health     Financial Resource Strain: Not on file   Food Insecurity: Not on file   Transportation Needs: Not on file   Physical Activity: Not on file   Stress: Not on file   Social Connections: Not on file   Intimate Partner Violence: Not on file   Housing Stability: Not on file     Family History   Problem Relation Age of Onset     Breast Cancer Mother         parkinsons     Osteoporosis Mother      Low Back Problems Mother      Breast Cancer Maternal Aunt      Other - See Comments Other         Inflammation joint in brother, maternal cousin      Diabetes Maternal Grandmother      Anxiety Disorder Father      Skin Cancer Father      Mental Illness Cousin      Alcoholism Paternal Grandfather      Glaucoma No family hx of      Macular Degeneration No family hx of      Retinal detachment No family hx of      Melanoma No family hx of      Hypertension No family hx of      SUBJECTIVE FINDINGS:  A 66-year-old female returns to clinic for painful callus of left foot, onychomycosis and onychauxis.  She relates she has not been using the Nystatin cream, did not get the Silvadene cream and is using the tea tree oil.  Relates the left foot callus hurts when she walks on it and she has been using a non medicated donut pad.  Was wondering if a culture would be ok.     OBJECTIVE FINDINGS:  DP and PT are 2/4 bilaterally.  She has dystrophic, thickened, brittle right hallux nail with hyperkeratosis and subungual debris and dystrophy and discoloration and to a lesser degree, other nails bilaterally.  She has dorsally contracted digits  bilaterally.  She has 2-4 plantar nucleated hyperkeratotic tissue buildup on the left foot.  There is no erythema, no drainage, no odor, no calor.  Minimal peripheral edema bilaterally.     ASSESSMENT AND PLAN:  Onychomycosis, right hallux; onychauxis bilaterally; tyloma of left foot causing pain.  She does have Rheumatoid arthritis.  Diagnosis and treatment discussed with her.  All the nails were debrided or reduced bilaterally upon consent.  Right Hallux toenail debridement tissue sent for Fungal, Anearobic and Aerobic cultures.  The right hallux had some pinpoint bleeding upon debridement.  Local wound care with Betadine and Band-Aid done upon consent and use discussed her.  Tyloma of the left foot was sharp debrided with a 15 blade upon consent.  She will return to clinic and see me in about 2 months.  Prescription for Silvadene cream given and use discussed with her.  Previous notes reviewed.        3/07/22- Called patient and reviewed preliminary culture results with her.  Continue Silvadene cream and follow up as scheduled.

## 2022-02-28 NOTE — TELEPHONE ENCOUNTER
Last Office Visit:  12/16/2021  Next Enc:  Visit date not found  Medication: Humira   Last given: 8/5/21  Qty: 2 with 2 refills  Lab:    Humira refilled per standing orders.    JAY JAY NewmanN, RN  Rheumatology Care Coordinator  [facility]

## 2022-02-28 NOTE — NURSING NOTE
"Reason For Visit:   Chief Complaint   Patient presents with     Follow Up     2 month re-check. Patient stated that she has not been using any medication. Patient is hoping to have a more accurate diagnosis.        Pain Assessment  Patient Currently in Pain: Denies        Allergies   Allergen Reactions     Barium Sulfate      Fosamax      Throat discomfort     No Clinical Screening - See Comments Hives     shrimp  Joints flared after getting possible \"white drink\" after CT/MRI in 2007            Frieda Menchaca LPN    "

## 2022-02-28 NOTE — LETTER
2/28/2022         RE: Reina Conway  [address] [facility] [address]        Dear Colleague,    Thank you for referring your patient, Reina Conway, to the [facility] [place]. Please see a copy of my visit note below.    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 Frost     Uveitis      Patient Active Problem List   Diagnosis     Hammer toe     Bunion     Osteopenia     Rheumatoid arthritis of multiple sites without rheumatoid factor (H)     Pain in both wrists     Mechanical limb problems     Pharyngeal dysphagia     Muscle tension dysphonia     Myofascial pain     High risk medication use     Intermediate uveitis of right eye     Cystoid macular edema of right eye     Acute anterior uveitis of right eye     Ocular hypertension, right eye     Methotrexate, long term, current use     Adalimumab (Humira) long-term use     Past Surgical History:   Procedure Laterality Date     Fibroidadenoma Left Breast       NO HISTORY OF SURGERY       Social History     Socioeconomic History     Marital status:      Spouse name: Not on file     Number of children: Not on file     Years of education: Not on file     Highest education level: Not on file   Occupational History     Not on file   Tobacco Use     Smoking status: Never Smoker     Smokeless tobacco: Never Used   Substance and Sexual Activity     Alcohol use: Yes     Alcohol/week: 5.8 - 11.7 standard drinks     Comment: social use prn     Drug use: No     Sexual activity: Not on file   Other Topics Concern     Parent/sibling w/  CABG, MI or angioplasty before 65F 55M? Not Asked   Social History Narrative    How much exercise per week? Walking, stairs    How much calcium per day? 2-3 servings       How much caffeine per day? 2-3 cups coffee    How much vitamin D per day?      Do you/your family wear seatbelts?  Yes    Do you/your family use safety helmets? No    Do you/your family use sunscreen? No    Do you/your family keep firearms in the home? No    Do you/your family have a smoke detector(s)? Yes        Do you feel safe in your home? Yes    Has anyone ever touched you in an unwanted manner? No     Explain         September 2, 2014 Tamiko Villa LPN             Social Determinants of Health     Financial Resource Strain: Not on file   Food Insecurity: Not on file   Transportation Needs: Not on file   Physical Activity: Not on file   Stress: Not on file   Social Connections: Not on file   Intimate Partner Violence: Not on file   Housing Stability: Not on file     Family History   Problem Relation Age of Onset     Breast Cancer Mother         parkinsons     Osteoporosis Mother      Low Back Problems Mother      Breast Cancer Maternal Aunt      Other - See Comments Other         Inflammation joint in brother, maternal cousin      Diabetes Maternal Grandmother      Anxiety Disorder Father      Skin Cancer Father      Mental Illness Cousin      Alcoholism Paternal Grandfather      Glaucoma No family hx of      Macular Degeneration No family hx of      Retinal detachment No family hx of      Melanoma No family hx of      Hypertension No family hx of      SUBJECTIVE FINDINGS:  A 66-year-old female returns to clinic for painful callus of left foot, onychomycosis and onychauxis.  She relates she has not been using the Nystatin cream, did not get the Silvadene cream and is using the tea tree oil.  Relates the left foot callus hurts when she walks on it and she has been using a non medicated donut pad.  Was wondering if a culture would be  ok.     OBJECTIVE FINDINGS:  DP and PT are 2/4 bilaterally.  She has dystrophic, thickened, brittle right hallux nail with hyperkeratosis and subungual debris and dystrophy and discoloration and to a lesser degree, other nails bilaterally.  She has dorsally contracted digits bilaterally.  She has 2-4 plantar nucleated hyperkeratotic tissue buildup on the left foot.  There is no erythema, no drainage, no odor, no calor.  Minimal peripheral edema bilaterally.     ASSESSMENT AND PLAN:  Onychomycosis, right hallux; onychauxis bilaterally; tyloma of left foot causing pain.  She does have Rheumatoid arthritis.  Diagnosis and treatment discussed with her.  All the nails were debrided or reduced bilaterally upon consent.  Right Hallux toenail debridement tissue sent for Fungal, Anearobic and Aerobic cultures.  The right hallux had some pinpoint bleeding upon debridement.  Local wound care with Betadine and Band-Aid done upon consent and use discussed her.  Tyloma of the left foot was sharp debrided with a 15 blade upon consent.  She will return to clinic and see me in about 2 months.  Prescription for Silvadene cream given and use discussed with her.  Previous notes reviewed.      Again, thank you for allowing me to participate in the care of your patient.        Sincerely,        Kenny Gao DPM

## 2022-03-01 DIAGNOSIS — M06.00 SERONEGATIVE RHEUMATOID ARTHRITIS (H): ICD-10-CM

## 2022-03-01 NOTE — TELEPHONE ENCOUNTER
[facility] never received the Humira script.  Please send ASAP as they need to mail it tomorrow.  Thanks!

## 2022-03-01 NOTE — TELEPHONE ENCOUNTER
HUMIRA PEN 40 MG/0.8ML pen kit 2 each 5 2/28/2022  No   Sig: INJECT THE CONTENTS OF 1 PEN SUBCUTANEOUSLY EVERY OTHER WEEK.  HOLD FOR SIGNS OF INFECTION, THEN SEEK MEDICAL ATTENTION     Resent per pharmacy request

## 2022-03-02 LAB
BACTERIA TISS BX CULT: ABNORMAL
BACTERIA TISS BX CULT: ABNORMAL

## 2022-03-07 LAB — BACTERIA TISS BX CULT: ABNORMAL

## 2022-03-28 LAB — BACTERIA SKIN AEROBE CULT: ABNORMAL

## 2022-04-07 ENCOUNTER — OFFICE VISIT (OUTPATIENT)
Dept: PULMONOLOGY | Facility: CLINIC | Age: 67
End: 2022-04-07
Attending: INTERNAL MEDICINE
Payer: MEDICARE

## 2022-04-07 VITALS
SYSTOLIC BLOOD PRESSURE: 141 MMHG | HEIGHT: 68 IN | HEART RATE: 79 BPM | BODY MASS INDEX: 25.01 KG/M2 | WEIGHT: 165 LBS | DIASTOLIC BLOOD PRESSURE: 92 MMHG | OXYGEN SATURATION: 97 %

## 2022-04-07 DIAGNOSIS — M06.00 SERONEGATIVE RHEUMATOID ARTHRITIS (H): ICD-10-CM

## 2022-04-07 DIAGNOSIS — M06.00 SERONEGATIVE RHEUMATOID ARTHRITIS (H): Primary | ICD-10-CM

## 2022-04-07 DIAGNOSIS — Z79.631 METHOTREXATE, LONG TERM, CURRENT USE: ICD-10-CM

## 2022-04-07 DIAGNOSIS — Z79.899 HIGH RISK MEDICATIONS (NOT ANTICOAGULANTS) LONG-TERM USE: ICD-10-CM

## 2022-04-07 DIAGNOSIS — Z79.620 ADALIMUMAB (HUMIRA) LONG-TERM USE: ICD-10-CM

## 2022-04-07 DIAGNOSIS — M06.09 RHEUMATOID ARTHRITIS OF MULTIPLE SITES WITHOUT RHEUMATOID FACTOR (H): ICD-10-CM

## 2022-04-07 DIAGNOSIS — Z79.899 HIGH RISK MEDICATION USE: ICD-10-CM

## 2022-04-07 PROCEDURE — 99214 OFFICE O/P EST MOD 30 MIN: CPT | Performed by: INTERNAL MEDICINE

## 2022-04-07 PROCEDURE — G0463 HOSPITAL OUTPT CLINIC VISIT: HCPCS

## 2022-04-07 RX ORDER — METHOTREXATE 2.5 MG/1
7 TABLET ORAL
Qty: 84 TABLET | Refills: 1 | Status: SHIPPED | OUTPATIENT
Start: 2022-04-07 | End: 2023-01-01

## 2022-04-07 ASSESSMENT — PAIN SCALES - GENERAL: PAINLEVEL: NO PAIN (0)

## 2022-04-07 NOTE — NURSING NOTE
Chief Complaint   Patient presents with     Follow Up     4 month      Vitals were taken and medications were reconciled.     Enedina Johnston RMA  10:14 AM

## 2022-04-07 NOTE — LETTER
2022     RE: Reina Conway  [address] [facility] [address]    Dear Colleague,    Thank you for referring your patient, Reina Conway, to the [facility] [place]. Please see a copy of my visit note below.    [facility]  Rheumatology Clinic  Figueroa Batista MD  2022     Name: Reina Conway  MRN: [id number]  Age: 64 year old  : 1955  Referring provider: Diana Orantes     Problem List:  1. Seronegative destructive deforming contractures RA (-RF/CCP/LASHAE panel) .   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 Rheumatoid arthritis (RA) activity=low with stable deformities.   High risk medication monitoring, labs every 12 weeks.   2. Neck rigidity no radiculpathy. Declined cervical xrays. Educated her on the s/sx redflags.   3. Alcohol use. Reports abstinent from Alcohol. I discussed the risks with her today on the liver and MTX. She understands.   4. Severe right hand deformities, would not be able to do vial of methotrexate       Assessment and Plan:     Reina Conway is a 64 year old here for ongoing care for rheumatoid arthritis (RA) deforming, with associated Uveitis.   She has persistently active joint disease though her uveitis has been quiet.          Plan/recommendation:    She continues to have active disease based on her exam, where she has clear-cut synovitis in the wrists right greater than left and in the right second and third MCPs in particular.  She has trace synovitis in a few other joints as well.  This is consistent with her symptoms.    Although we have discussed intensifying her therapy she is reluctant to do so and since she feels like her overall function is stable over the last 3 years I think it is probably reasonable to keep her on current dosing.    She is asked for handicap parking sticker today and that is very reasonable.  She is not safe to walk longer  distances and requires a cane even for short distances so we have provided that.    We will get her plugged in with our new nurse practitioner and she can be seen back in 6 months with methotrexate monitoring laboratories every 3 months in the interim.      EARLINE Batista MD, PhD    Rheumatology      HPI:   Reina Conway is a 64 year old here for transfer of care for rheumatoid arthritis (RA) deforming.     Today, she has had a slight sore throat the past few weeks. She has stopped Humira and methotrexate as a result. She has morning stiffness for 10 to 15 minutes. She occasionally takes Acetaminophen, usually at the end of her Humira cycle, and finds it helpful. She had a recent accident in December 2019 from sitting on a broken chair and subsequently for falling on her hip. She had bed rest for two months. She was using a walker around her house for two months. She no longer uses the cane in her house. She wants to start physical therapy to help her mobility.     She denies Raynaud's color changes. Her hands are worse in the cold due to rheumatoid arthritis She denies difficulty with breathing. She francy sicca symptoms. She has had anterior uveitis years ago. She has been off of methotrexate for three weeks, and is has been irregularly taking Humira recently.     April 7, 2022 interval history:    Since we have last seen the patient she continues to have some pain in the right hip although she does not think it is any worse.  She continues to have trouble there really since her fall several years ago and continues to walk with a cane.  She has a significant leg length discrepancy as well.    In other joints she reports that her morning stiffness is less than 20 minutes although her right hand is a little bit worse both in terms of stiffness and in terms of pain.  She does believe that her function has been stable over the last 3 years however, despite the fact that when I have seen her she has  had ongoing evidence of synovitis.    She remains on the Humira plus the methotrexate.  She gets only a little bit of nausea the day following the methotrexate and has no laboratory evidence of toxicity.  This is reviewed today.  She denies any drop-off in Humira efficacy over time and specifically denies any drop-off in efficacy in the 24 to 48 hours prior to the next dose.    She continues to have a problem with her right big toe where she had chronic infection and follow-up post with Dr. Gastelum.     Review-Seen Dr. Frost until 2010 then swtiched to [facility], then re-established 3/2013 (xrays 2/2013 [place]). Dr. Frost retired 8-2019   Past-pan uveitis in 2011 etanercept (enbrel) uveitis, adalimumab (humira) and methotrexate       HISTORY CARRIED FORWARD FROM [facility].   Prior: Synovitis and joint deformities in 2008 was persistently seronegative but had active synovitis. Methotrexate helped but did not cause a remission/low disease state. She required Prednisone to control symptoms partially but still had significant ADL issues. We had persistently recommended anti TNF therapy which she resisted. She sought a second opinion at [place] who recommended the same things, and she continued care there as of August 2010, then switched back to Dr. Frost 3/2013. Begun on Enbrel at [facility]. 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 As no other etiology found it was felt it might be due to Enbrel and she was switched to Humira. She has remained on Methotrexate, primarily 25 mg weekly, decreased 3/2013 (not to go <15mg week due to risk of further deformities or uveitis) . FRAX 32% at [place]. Prior declined biphosphosphonate, [place] recommended IV.   Xrays 3/2013 [place]: See records. Hallux valgus right, hammertoes L>R, hindfoot valgus, pes planus. Dislocated left 2nd MTP, sublux left 3rd MTP, arth 2-3 MTP, rt 1st MTP. Mild DJD hands. Several DIP and 1st CMC.  "Subluxation left thumb IP and persistant dorsiflexion right wrist. Previous visits discussed stress in her life. Her father in law passed away and there had been a lot of stress and she put her issues on the back burner. She had an episode of chest/epigastric pain and was seen at [facility]. She had apparent negative cardiac evaluation although did not followup with a stress test. She is taking OTC Zantac prn and thinks that helps. Found allergic to Wheat, avoid gluten and sugars. Feels much improved [heartburn, bloating, blood in stools, irregular stools] this really changed her digestive system. She went on a gluten free diet in December, and stopped all RA meds in early Jan 2015. See My Chart message. Restarted Humira early April 2015 every 3 weeks. Noticed more migatory joints pains, swelling. Right hand, right knee, right wrist-associated swelling really in right 2nd MCP. 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 Lack of movement and coordinations, using her wrist brace. Uses this at night and daily prn. At last visit she had continued on Humira without side effects and still feels it is helpful. She does have daily discomfort in several areas with either activity or prior damage, especially knees. She continued to have stress in her life but is working through this and \"trying to get back on track\". She stopped drinking any alcohol for the past two months and was congratulated on this. She went to  but does not feel she is alcoholic.  Leonardo going to [facility]. She has been  for a long time. Both lost parents, and grieving. She says she's in less denial. She admits  is helping a lot. She called in November due to increased joint pain and we restarted Methotrexate at 10 mg weekly as previously discussed.  She stopped it two weeks ago.  She noted more joint pain and also some nausea on day of dose.  We discussed this at length and unlikely that MTX contributed to more joint symptoms. At January " 2016 visit we readded Methotrexate to Humira in hopes of decreasing disease activity, using low dose due to prior side effect complaints.  She called later that month with flare symptoms requiring Prednisone bridge, and we increased Methotrexate to more standard dose of 15 mg weekly. At her April 2016 visit she had started improving.. She was tolerating Methotrexate this time.  She weaned off Prednisone.      July 7, 2020 interval history:    Patient is currently been off of her Humira for several weeks due to a concern of possible infection because of a sore throat.  She believes from looking at her records the Solange 15 was her last dose.  She got particular concerned about the coronavirus because she works at home and has really been pretty much in isolation.    Despite being off it for a few weeks she is noticed only some very slight swelling in her right second and third MCPs.  Morning stiffness remains under 10 to 15 minutes.  Her right hand has been the most problematic and she needs to use that the most so that is of concern to her but she is able to do pretty much everything she needs to do.    She is having some cloudiness in her vision of her right eye.  That is a concern to her because she was diagnosed in 2015 with uveitis but unlike that time she really has no pain with this.  This is been fairly subacute in its onset going back is much is several months.  She does not have the same thing going on in the other eye however.  It is not getting better although it is not getting a lot worse.    She continues to have some foot problems primarily calluses and will be seeing Dr. Gastelum back in August.    She otherwise feels like she is doing pretty well with her arthritis and denies any other new medical problems.    We do not have any labs for her however now for over 6 months.  She has continued on her methotrexate and has not had any symptomatic toxicity and really does not have a history of any laboratory  toxicity with that in the past either.      August 5, 2021 interval history:    Since have last seen the patient she has again been off of both methotrexate and Humira at times.  She was feeling poorly so skipped this on her doses in early July and on July 26 although she took it on July 19.  She is not entirely clear whether she stop both drugs on these occasions are only one of them.    She does think maybe her symptoms of morning stiffness are a little worse off of the drug but otherwise feels like it had not made a big difference.  She says in some respects she felt better.    Going into that further, she does acknowledge that she feels somewhat poorly on the day after the methotrexate.  We increased her to 7 tabs daily last time.  She is not really entirely sure whether the day after symptoms worsened after that but they certainly have not gotten better over time.    December 16, 2021 interval history:    Since we have last seen the patient she continues to feel pretty stable.  She describes morning stiffness lasting for less than 20 minutes and all of her joints.  She actually has not had Humira since November 9 however because she held it for her Covid booster shot on December 7.  She felt only a little poorly the next day.  She has however not yet resumed her Humira.  She has resumed her methotrexate after also stopping that for a week.    She does have still have some hip pain in the right where she had a fall several years ago.  She does not think that is worsened recently however.    She was off of Humira for about 6 weeks earlier this year when she got the original 2 vaccine series and she did get some worsening of her right hand at that time that seems to have persisted.    Her uveitis however, which is a substantial part of the reason she is on Humira specifically has remained stable for years currently.            Review of Systems:   Pertinent items are noted in HPI or as below, remainder of complete  ROS is negative.      No recent problems with hearing or vision. No swallowing problems.   No breathing difficulty, shortness of breath, coughing, or wheezing  No chest pain or palpitations  No heart burn, indigestion, abdominal pain, nausea, vomiting, diarrhea  No urination problems, no bloody, cloudy urine, no dysuria  No numbing, tingling, weakness  No headaches or confusion  No rashes. No easy bleeding or bruising.     Active Medications:     Current Outpatient Medications:      acetaminophen (TYLENOL) 325 MG tablet, Take 325-650 mg by mouth every 6 hours as needed, Disp: , Rfl:      adalimumab (HUMIRA PEN) 40 MG/0.8ML pen kit, Inject 0.8 mLs (40 mg) Subcutaneous every 14 days, Disp: 2 each, Rfl: 5     atropine 1 % ophthalmic solution, Place 1 drop into the right eye every 7 days (on day of Methotrexate use), Disp: 5 mL, Rfl: 3     difluprednate (DUREZOL) 0.05 % ophthalmic emulsion, Place 1 drop into the right eye daily, Disp: 5 mL, Rfl: 4     dorzolamide-timolol (COSOPT) 2-0.5 % ophthalmic solution, Place 1 drop into the right eye 2 times daily, Disp: 10 mL, Rfl: 5     folic acid (FOLVITE) 1 MG tablet, Take 2 tablets (2 mg) by mouth daily, Disp: 180 tablet, Rfl: 2     methotrexate sodium 2.5 MG TABS, Take 7 tablets (17.5 mg) by mouth every 7 days, Disp: 84 tablet, Rfl: 1     silver sulfADIAZINE (SILVADENE) 1 % external cream, Apply topically 2 times daily To right big toenail., Disp: 85 g, Rfl: 3     silver sulfADIAZINE (SILVADENE) 1 % external cream, Apply topically daily To feet and toenails., Disp: 85 g, Rfl: 3    Current Facility-Administered Medications:      triamcinolone (KENALOG-40) injection 40 mg, 40 mg, Subtenon injection, Q2 Months, Edilson Velazquez MD      Allergies:   Barium sulfate, Fosamax, and No clinical screening - see comments      PMH:  Injury-  Medical-right anterior uveitis (felt secondary to etanercept (enbrel), fibroid  high in 2007 then normal, hyperlipidemia. History  "grave's, menopause, osteoporosis borderline, rheumatoid arthritis (RA), pharyngeal dysphagia, hammertoe    Surgical-  Fibroid adenoma Left Breast     FH:  No autoimmune disorders, psoriasis, UC, crohn's, SLE, RA, PsA, gout, autoimmune thyroid.  No MS, heart disease in family  Mother-breast cancer, parkinson, osteoporosis , low back issues-passed    Father-anxiety, dementia-passed after hip fracture   Siblings-brother \"not live well\"   Children-None   M aunt breast cancer   Cousin mental illness      SH:  Occasionally moderate Alcohol 1 drink few times a week not M-W . No Smoking. No IVDU. . Retired      Physical Exam:   BP (!) 141/92 (BP Location: Right arm, Patient Position: Chair, Cuff Size: Adult Regular)   Pulse 79   Ht 1.727 m (5' 8\")   Wt 74.8 kg (165 lb)   SpO2 97%   BMI 25.09 kg/m     Wt Readings from Last 4 Encounters:   04/07/22 74.8 kg (165 lb)   08/05/21 74.8 kg (165 lb)   02/25/20 75.1 kg (165 lb 9.6 oz)   09/05/19 74.9 kg (165 lb 3.2 oz)     Constitutional: Well-developed, appearing stated age; cooperative  Eyes: Normal EOM, PERRLA, vision, conjunctiva, sclera  ENT: Normal external ears, nose, hearing, lips, teeth, gums, throat. No mucous membrane lesions, normal saliva pool  Neck: No mass or thyroid enlargement  Resp: Lungs clear to auscultation, nl to palpation  CV: RRR, no murmurs, rubs or gallops, no edema  GI: No ABD mass or tenderness, no HSM  : Not tested  Lymph: No cervical, supraclavicular, inguinal or epitrochlear nodes  MS: The TMJ, neck, shoulder, elbow, wrist, MCP/PIP/DIP, spine, hip, knee, ankle, and foot MTP/IP joints were examined and found normal. No active synovitis or altered joint anatomy. Full joint ROM. Normal  strength. No dactylitis,  tenosynovitis, enthesopathy. On exam today she has swelling of bilateral wrists that is 1-2+ in severity right first through third MCPs that is trace in severity the right knee with a approximately 15 degree contracture.  She also " has at least low-grade synovitis in bilateral elbows worse on the left with contracture there and a decreased range of motion in the right hip with pretty significant pain there.  This is all essentially stable.     5 degree contractures in the elbows.   10-15 degree contractures in right knee with moderate size effusions.   1-3rd MCPS with singificant synovitis, at least 1+ maybe 2+ in both right and left hands, worse on the right.   Wrists have 20-30 degrees of extension, and 10-15 degrees of flexion  Some subluxation in both wrists. 2+ synovitis in both wrists  Trace swelling in elbows.   Right sided MCPs with very little extension.   Fibular deviation  2nd to 4th Hammer toes bilateral  Severe onychomycosis of the right great toe  Ulnar deviation  Swelling in the right knee.   Skin: No nail pitting, alopecia, rash, nodules or lesions  Neuro: Normal cranial nerves, strength, sensation, DTRs.   Psych: Normal judgement, orientation, memory, affect.     Laboratory:   RHEUM RESULTS Latest Ref Rng & Units 4/3/2007 8/7/2007 9/11/2007   ALBUMIN 3.4 - 5.0 g/dL - - -   ALT 0 - 50 U/L - - -   AST 0 - 45 U/L - - -   CATALINO INTERPRETATION NEG:Negative - - -   ANAP1 - - - -   ANAT1 - - - -   COMPLEMENT C3 81 - 157 mg/dL - - -   COMPLEMENT C4 13 - 39 mg/dL - - -   CREATININE 0.52 - 1.04 mg/dL 0.94 - -   CRP 0.0 - 8.0 mg/L - - 63.9(H)   DNA <10 IU/mL - - -   FELICE 0 - 1.0 - 1.7 -   GFR ESTIMATE, IF BLACK >60 mL/min/[1.73:m2] 81 - -   GFR ESTIMATE >60 mL/min/1.73m2 67 - -   HEMATOCRIT 35.0 - 47.0 % 46.4 - 42.6   HEMOGLOBIN 11.7 - 15.7 g/dL 15.7 - 14.2   HEPBANG NR:Nonreactive - - -   HCVAB NR:Nonreactive - - -   WBC 4.0 - 11.0 10e3/uL 6.7 - 7.5   RBC 3.80 - 5.20 10e6/uL 5.29(H) - 5.04   RDW 10.0 - 15.0 % 13.2 - 13.2   MCHC 31.5 - 36.5 g/dL 33.8 - 33.3   MCV 78 - 100 fL 88 - 85   PLATELET COUNT 150 - 450 10e3/uL 298 - 368   RHEUMATOID FACTOR <12 IU/mL - <20 -   ESR 0 - 30 mm/hr - 48(H) 65(H)   QUANTIFERON-TB GOLD PLUS RESULT  NEG:Negative - - -       Rheumatoid Factor   Date Value Ref Range Status   12/17/2013 <20 <20 IU/mL Final   ,  ,   Cyclic Cit Pept IgG/IgA   Date Value Ref Range Status   12/17/2013 <20  Interpretation:  Negative <20 UNITS Final   ,   Cyclic Citrullinated Peptide IgG   Date Value Ref Range Status   11/18/2009 <2 <5 U/mL Final     Comment:     Interpretation:  Negative   ,  ,   SSA (RO) Antibody IgG   Date Value Ref Range Status   11/18/2009 5  Final     Comment:     Reference range: 0 to 40  Unit: AU/mL  (Note)  REFERENCE INTERVALS: SSA (Ro) (LASHAE) Ab, IgG   29 AU/mL or Less ............. Negative   30 - 40 AU/mL ................ Equivocal   41 AU/mL or Greater .......... Positive    SSA (Ro) antibody is seen in 70-75% of Sjogren syndrome  cases, 30-40% of systemic lupus erythematosus (SLE) and  5-10% of progressive systemic sclerosis (PSS).  Performed by [facility],  [address],[place] [id number] [phone]  [email or web address], Anne Arellano MD, Lab. Director     SSB (LA) Antibody IgG   Date Value Ref Range Status   11/18/2009 0  Final     Comment:     Reference range: 0 to 40  Unit: AU/mL  (Note)  REFERENCE INTERVALS: SSB (La) (LASHAE) Ab, IgG   29 AU/mL or Less ............. Negative   30 - 40 AU/mL ................ Equivocal   41 AU/mL or Greater .......... Positive    SSB (La) antibody is seen in 50-60% of Sjogren syndrome  cases and is specific if it is the only LASHAE antibody  present. 15-25% of patients with systemic lupus  erythematosus (SLE) and 5-10% of patients with progressive  systemic sclerosis (PSS) also have this antibody.  Performed by [facility],  [address],[place] [id number] [phone]  [email or web address], Anne Arellano MD, Lab. Director   ,  ,   CATALINO Screen by E[facility]   Date Value Ref Range Status   11/18/2009 <1.0 0 - 1.0 Final     Comment:     Interpretation:  Negative   ,  ,  ,  ,  ,  ,  ,   Hepatitis B Core Desiree   Date Value Ref Range Status   12/17/2013 Negative NEG Final   ,    Hep B Surface Agn   Date Value Ref Range Status   12/17/2013 Negative NEG Final     Again, thank you for allowing me to participate in the care of your patient.      Sincerely,    Figueroa Batista MD

## 2022-04-07 NOTE — PROGRESS NOTES
[facility]  Rheumatology Clinic  Figueroa Batista MD  2022     Name: Reina Conway  MRN: [id number]  Age: 64 year old  : 1955  Referring provider: Diana Oranets     Problem List:  1. Seronegative destructive deforming contractures RA (-RF/CCP/LASHAE panel) .   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 Rheumatoid arthritis (RA) activity=low with stable deformities.   High risk medication monitoring, labs every 12 weeks.   2. Neck rigidity no radiculpathy. Declined cervical xrays. Educated her on the s/sx redflags.   3. Alcohol use. Reports abstinent from Alcohol. I discussed the risks with her today on the liver and MTX. She understands.   4. Severe right hand deformities, would not be able to do vial of methotrexate       Assessment and Plan:     Reina Conway is a 64 year old here for ongoing care for rheumatoid arthritis (RA) deforming, with associated Uveitis.   She has persistently active joint disease though her uveitis has been quiet.          Plan/recommendation:    She continues to have active disease based on her exam, where she has clear-cut synovitis in the wrists right greater than left and in the right second and third MCPs in particular.  She has trace synovitis in a few other joints as well.  This is consistent with her symptoms.    Although we have discussed intensifying her therapy she is reluctant to do so and since she feels like her overall function is stable over the last 3 years I think it is probably reasonable to keep her on current dosing.    She is asked for handicap parking sticker today and that is very reasonable.  She is not safe to walk longer distances and requires a cane even for short distances so we have provided that.    We will get her plugged in with our new nurse practitioner and she can be seen back in 6 months with methotrexate monitoring laboratories every 3 months in the interim.      EARLINE Batista MD,  PhD    Rheumatology      HPI:   Reina Conway is a 64 year old here for transfer of care for rheumatoid arthritis (RA) deforming.     Today, she has had a slight sore throat the past few weeks. She has stopped Humira and methotrexate as a result. She has morning stiffness for 10 to 15 minutes. She occasionally takes Acetaminophen, usually at the end of her Humira cycle, and finds it helpful. She had a recent accident in December 2019 from sitting on a broken chair and subsequently for falling on her hip. She had bed rest for two months. She was using a walker around her house for two months. She no longer uses the cane in her house. She wants to start physical therapy to help her mobility.     She denies Raynaud's color changes. Her hands are worse in the cold due to rheumatoid arthritis She denies difficulty with breathing. She francy sicca symptoms. She has had anterior uveitis years ago. She has been off of methotrexate for three weeks, and is has been irregularly taking Humira recently.     April 7, 2022 interval history:    Since we have last seen the patient she continues to have some pain in the right hip although she does not think it is any worse.  She continues to have trouble there really since her fall several years ago and continues to walk with a cane.  She has a significant leg length discrepancy as well.    In other joints she reports that her morning stiffness is less than 20 minutes although her right hand is a little bit worse both in terms of stiffness and in terms of pain.  She does believe that her function has been stable over the last 3 years however, despite the fact that when I have seen her she has had ongoing evidence of synovitis.    She remains on the Humira plus the methotrexate.  She gets only a little bit of nausea the day following the methotrexate and has no laboratory evidence of toxicity.  This is reviewed today.  She denies any drop-off in Humira efficacy  over time and specifically denies any drop-off in efficacy in the 24 to 48 hours prior to the next dose.    She continues to have a problem with her right big toe where she had chronic infection and follow-up post with Dr. Gastelum.     Review-Seen Dr. Frost until 2010 then swtiched to [facility], then re-established 3/2013 (xrays 2/2013 [facility]). Dr. Frost retired 8-2019   Past-pan uveitis in 2011 etanercept (enbrel) uveitis, adalimumab (humira) and methotrexate       HISTORY CARRIED FORWARD FROM [facility].   Prior: Synovitis and joint deformities in 2008 was persistently seronegative but had active synovitis. Methotrexate helped but did not cause a remission/low disease state. She required Prednisone to control symptoms partially but still had significant ADL issues. We had persistently recommended anti TNF therapy which she resisted. She sought a second opinion at [facility] who recommended the same things, and she continued care there as of August 2010, then switched back to Dr. Frost 3/2013. Begun on Enbrel at [facility]. 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 As no other etiology found it was felt it might be due to Enbrel and she was switched to Humira. She has remained on Methotrexate, primarily 25 mg weekly, decreased 3/2013 (not to go <15mg week due to risk of further deformities or uveitis) . FRAX 32% at [facility]. Prior declined biphosphosphonate, [facility] recommended IV.   Xrays 3/2013 [facility]: See records. Hallux valgus right, hammertoes L>R, hindfoot valgus, pes planus. Dislocated left 2nd MTP, sublux left 3rd MTP, arth 2-3 MTP, rt 1st MTP. Mild DJD hands. Several DIP and 1st CMC. Subluxation left thumb IP and persistant dorsiflexion right wrist. Previous visits discussed stress in her life. Her father in law passed away and there had been a lot of stress and she put her issues on the back burner. She had an episode of chest/epigastric pain and was seen at  "[facility]. She had apparent negative cardiac evaluation although did not followup with a stress test. She is taking OTC Zantac prn and thinks that helps. Found allergic to Wheat, avoid gluten and sugars. Feels much improved [heartburn, bloating, blood in stools, irregular stools] this really changed her digestive system. She went on a gluten free diet in December, and stopped all RA meds in early Jan 2015. See My Chart message. Restarted Humira early April 2015 every 3 weeks. Noticed more migatory joints pains, swelling. Right hand, right knee, right wrist-associated swelling really in right 2nd MCP. 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 Lack of movement and coordinations, using her wrist brace. Uses this at night and daily prn. At last visit she had continued on Humira without side effects and still feels it is helpful. She does have daily discomfort in several areas with either activity or prior damage, especially knees. She continued to have stress in her life but is working through this and \"trying to get back on track\". She stopped drinking any alcohol for the past two months and was congratulated on this. She went to  but does not feel she is alcoholic.  Leonardo going to [facility]. She has been  for a long time. Both lost parents, and grieving. She says she's in less denial. She admits  is helping a lot. She called in November due to increased joint pain and we restarted Methotrexate at 10 mg weekly as previously discussed.  She stopped it two weeks ago.  She noted more joint pain and also some nausea on day of dose.  We discussed this at length and unlikely that MTX contributed to more joint symptoms. At January 2016 visit we readded Methotrexate to Humira in hopes of decreasing disease activity, using low dose due to prior side effect complaints.  She called later that month with flare symptoms requiring Prednisone bridge, and we increased Methotrexate to more standard dose of 15 mg " weekly. At her April 2016 visit she had started improving.. She was tolerating Methotrexate this time.  She weaned off Prednisone.      July 7, 2020 interval history:    Patient is currently been off of her Humira for several weeks due to a concern of possible infection because of a sore throat.  She believes from looking at her records the Solange 15 was her last dose.  She got particular concerned about the coronavirus because she works at home and has really been pretty much in isolation.    Despite being off it for a few weeks she is noticed only some very slight swelling in her right second and third MCPs.  Morning stiffness remains under 10 to 15 minutes.  Her right hand has been the most problematic and she needs to use that the most so that is of concern to her but she is able to do pretty much everything she needs to do.    She is having some cloudiness in her vision of her right eye.  That is a concern to her because she was diagnosed in 2015 with uveitis but unlike that time she really has no pain with this.  This is been fairly subacute in its onset going back is much is several months.  She does not have the same thing going on in the other eye however.  It is not getting better although it is not getting a lot worse.    She continues to have some foot problems primarily calluses and will be seeing Dr. Gastelum back in August.    She otherwise feels like she is doing pretty well with her arthritis and denies any other new medical problems.    We do not have any labs for her however now for over 6 months.  She has continued on her methotrexate and has not had any symptomatic toxicity and really does not have a history of any laboratory toxicity with that in the past either.      August 5, 2021 interval history:    Since have last seen the patient she has again been off of both methotrexate and Humira at times.  She was feeling poorly so skipped this on her doses in early July and on July 26 although she took  it on July 19.  She is not entirely clear whether she stop both drugs on these occasions are only one of them.    She does think maybe her symptoms of morning stiffness are a little worse off of the drug but otherwise feels like it had not made a big difference.  She says in some respects she felt better.    Going into that further, she does acknowledge that she feels somewhat poorly on the day after the methotrexate.  We increased her to 7 tabs daily last time.  She is not really entirely sure whether the day after symptoms worsened after that but they certainly have not gotten better over time.    December 16, 2021 interval history:    Since we have last seen the patient she continues to feel pretty stable.  She describes morning stiffness lasting for less than 20 minutes and all of her joints.  She actually has not had Humira since November 9 however because she held it for her Covid booster shot on December 7.  She felt only a little poorly the next day.  She has however not yet resumed her Humira.  She has resumed her methotrexate after also stopping that for a week.    She does have still have some hip pain in the right where she had a fall several years ago.  She does not think that is worsened recently however.    She was off of Humira for about 6 weeks earlier this year when she got the original 2 vaccine series and she did get some worsening of her right hand at that time that seems to have persisted.    Her uveitis however, which is a substantial part of the reason she is on Humira specifically has remained stable for years currently.            Review of Systems:   Pertinent items are noted in HPI or as below, remainder of complete ROS is negative.      No recent problems with hearing or vision. No swallowing problems.   No breathing difficulty, shortness of breath, coughing, or wheezing  No chest pain or palpitations  No heart burn, indigestion, abdominal pain, nausea, vomiting, diarrhea  No urination  problems, no bloody, cloudy urine, no dysuria  No numbing, tingling, weakness  No headaches or confusion  No rashes. No easy bleeding or bruising.     Active Medications:     Current Outpatient Medications:      acetaminophen (TYLENOL) 325 MG tablet, Take 325-650 mg by mouth every 6 hours as needed, Disp: , Rfl:      adalimumab (HUMIRA PEN) 40 MG/0.8ML pen kit, Inject 0.8 mLs (40 mg) Subcutaneous every 14 days, Disp: 2 each, Rfl: 5     atropine 1 % ophthalmic solution, Place 1 drop into the right eye every 7 days (on day of Methotrexate use), Disp: 5 mL, Rfl: 3     difluprednate (DUREZOL) 0.05 % ophthalmic emulsion, Place 1 drop into the right eye daily, Disp: 5 mL, Rfl: 4     dorzolamide-timolol (COSOPT) 2-0.5 % ophthalmic solution, Place 1 drop into the right eye 2 times daily, Disp: 10 mL, Rfl: 5     folic acid (FOLVITE) 1 MG tablet, Take 2 tablets (2 mg) by mouth daily, Disp: 180 tablet, Rfl: 2     methotrexate sodium 2.5 MG TABS, Take 7 tablets (17.5 mg) by mouth every 7 days, Disp: 84 tablet, Rfl: 1     silver sulfADIAZINE (SILVADENE) 1 % external cream, Apply topically 2 times daily To right big toenail., Disp: 85 g, Rfl: 3     silver sulfADIAZINE (SILVADENE) 1 % external cream, Apply topically daily To feet and toenails., Disp: 85 g, Rfl: 3    Current Facility-Administered Medications:      triamcinolone (KENALOG-40) injection 40 mg, 40 mg, Subtenon injection, Q2 Months, Edilson Velazquez MD      Allergies:   Barium sulfate, Fosamax, and No clinical screening - see comments      PMH:  Injury-  Medical-right anterior uveitis (felt secondary to etanercept (enbrel), fibroid  high in 2007 then normal, hyperlipidemia. History grave's, menopause, osteoporosis borderline, rheumatoid arthritis (RA), pharyngeal dysphagia, hammertoe    Surgical-  Fibroid adenoma Left Breast     FH:  No autoimmune disorders, psoriasis, UC, crohn's, SLE, RA, PsA, gout, autoimmune thyroid.  No MS, heart disease in  "family  Mother-breast cancer, parkinson, osteoporosis , low back issues-passed    Father-anxiety, dementia-passed after hip fracture   Siblings-brother \"not live well\"   Children-None   M aunt breast cancer   Cousin mental illness      SH:  Occasionally moderate Alcohol 1 drink few times a week not M-W . No Smoking. No IVDU. . Retired      Physical Exam:   BP (!) 141/92 (BP Location: Right arm, Patient Position: Chair, Cuff Size: Adult Regular)   Pulse 79   Ht 1.727 m (5' 8\")   Wt 74.8 kg (165 lb)   SpO2 97%   BMI 25.09 kg/m     Wt Readings from Last 4 Encounters:   04/07/22 74.8 kg (165 lb)   08/05/21 74.8 kg (165 lb)   02/25/20 75.1 kg (165 lb 9.6 oz)   09/05/19 74.9 kg (165 lb 3.2 oz)     Constitutional: Well-developed, appearing stated age; cooperative  Eyes: Normal EOM, PERRLA, vision, conjunctiva, sclera  ENT: Normal external ears, nose, hearing, lips, teeth, gums, throat. No mucous membrane lesions, normal saliva pool  Neck: No mass or thyroid enlargement  Resp: Lungs clear to auscultation, nl to palpation  CV: RRR, no murmurs, rubs or gallops, no edema  GI: No ABD mass or tenderness, no HSM  : Not tested  Lymph: No cervical, supraclavicular, inguinal or epitrochlear nodes  MS: The TMJ, neck, shoulder, elbow, wrist, MCP/PIP/DIP, spine, hip, knee, ankle, and foot MTP/IP joints were examined and found normal. No active synovitis or altered joint anatomy. Full joint ROM. Normal  strength. No dactylitis,  tenosynovitis, enthesopathy. On exam today she has swelling of bilateral wrists that is 1-2+ in severity right first through third MCPs that is trace in severity the right knee with a approximately 15 degree contracture.  She also has at least low-grade synovitis in bilateral elbows worse on the left with contracture there and a decreased range of motion in the right hip with pretty significant pain there.  This is all essentially stable.     5 degree contractures in the elbows.   10-15 degree " contractures in right knee with moderate size effusions.   1-3rd MCPS with singificant synovitis, at least 1+ maybe 2+ in both right and left hands, worse on the right.   Wrists have 20-30 degrees of extension, and 10-15 degrees of flexion  Some subluxation in both wrists. 2+ synovitis in both wrists  Trace swelling in elbows.   Right sided MCPs with very little extension.   Fibular deviation  2nd to 4th Hammer toes bilateral  Severe onychomycosis of the right great toe  Ulnar deviation  Swelling in the right knee.   Skin: No nail pitting, alopecia, rash, nodules or lesions  Neuro: Normal cranial nerves, strength, sensation, DTRs.   Psych: Normal judgement, orientation, memory, affect.     Laboratory:   RHEUM RESULTS Latest Ref Rng & Units 4/3/2007 8/7/2007 9/11/2007   ALBUMIN 3.4 - 5.0 g/dL - - -   ALT 0 - 50 U/L - - -   AST 0 - 45 U/L - - -   CATALINO INTERPRETATION NEG:Negative - - -   ANAP1 - - - -   ANAT1 - - - -   COMPLEMENT C3 81 - 157 mg/dL - - -   COMPLEMENT C4 13 - 39 mg/dL - - -   CREATININE 0.52 - 1.04 mg/dL 0.94 - -   CRP 0.0 - 8.0 mg/L - - 63.9(H)   DNA <10 IU/mL - - -   FELICE 0 - 1.0 - 1.7 -   GFR ESTIMATE, IF BLACK >60 mL/min/[1.73:m2] 81 - -   GFR ESTIMATE >60 mL/min/1.73m2 67 - -   HEMATOCRIT 35.0 - 47.0 % 46.4 - 42.6   HEMOGLOBIN 11.7 - 15.7 g/dL 15.7 - 14.2   HEPBANG NR:Nonreactive - - -   HCVAB NR:Nonreactive - - -   WBC 4.0 - 11.0 10e3/uL 6.7 - 7.5   RBC 3.80 - 5.20 10e6/uL 5.29(H) - 5.04   RDW 10.0 - 15.0 % 13.2 - 13.2   MCHC 31.5 - 36.5 g/dL 33.8 - 33.3   MCV 78 - 100 fL 88 - 85   PLATELET COUNT 150 - 450 10e3/uL 298 - 368   RHEUMATOID FACTOR <12 IU/mL - <20 -   ESR 0 - 30 mm/hr - 48(H) 65(H)   QUANTIFERON-TB GOLD PLUS RESULT NEG:Negative - - -       Rheumatoid Factor   Date Value Ref Range Status   12/17/2013 <20 <20 IU/mL Final   ,  ,   Cyclic Cit Pept IgG/IgA   Date Value Ref Range Status   12/17/2013 <20  Interpretation:  Negative <20 UNITS Final   ,   Cyclic Citrullinated Peptide IgG   Date  Value Ref Range Status   11/18/2009 <2 <5 U/mL Final     Comment:     Interpretation:  Negative   ,  ,   SSA (RO) Antibody IgG   Date Value Ref Range Status   11/18/2009 5  Final     Comment:     Reference range: 0 to 40  Unit: AU/mL  (Note)  REFERENCE INTERVALS: SSA (Ro) (LASHAE) Ab, IgG   29 AU/mL or Less ............. Negative   30 - 40 AU/mL ................ Equivocal   41 AU/mL or Greater .......... Positive    SSA (Ro) antibody is seen in 70-75% of Sjogren syndrome  cases, 30-40% of systemic lupus erythematosus (SLE) and  5-10% of progressive systemic sclerosis (PSS).  Performed by [facility],  [address] [id number] [phone]  [email or web address], Anne Arellano MD, Lab. Director     SSB (LA) Antibody IgG   Date Value Ref Range Status   11/18/2009 0  Final     Comment:     Reference range: 0 to 40  Unit: AU/mL  (Note)  REFERENCE INTERVALS: SSB (La) (LASHAE) Ab, IgG   29 AU/mL or Less ............. Negative   30 - 40 AU/mL ................ Equivocal   41 AU/mL or Greater .......... Positive    SSB (La) antibody is seen in 50-60% of Sjogren syndrome  cases and is specific if it is the only LASHAE antibody  present. 15-25% of patients with systemic lupus  erythematosus (SLE) and 5-10% of patients with progressive  systemic sclerosis (PSS) also have this antibody.  Performed by [facility],  [address] [facility],[place] [id number] [phone]  [email or web address], Anne Arellano MD, Lab. Director   ,  ,   CATALINO Screen by E[facility]   Date Value Ref Range Status   11/18/2009 <1.0 0 - 1.0 Final     Comment:     Interpretation:  Negative   ,  ,  ,  ,  ,  ,  ,   Hepatitis B Core Desiree   Date Value Ref Range Status   12/17/2013 Negative NEG Final   ,   Hep B Surface Agn   Date Value Ref Range Status   12/17/2013 Negative NEG Final

## 2022-04-11 ENCOUNTER — OFFICE VISIT (OUTPATIENT)
Dept: OPHTHALMOLOGY | Facility: CLINIC | Age: 67
End: 2022-04-11
Attending: OPHTHALMOLOGY
Payer: MEDICARE

## 2022-04-11 DIAGNOSIS — Z79.899 HIGH RISK MEDICATION USE: ICD-10-CM

## 2022-04-11 DIAGNOSIS — H20.00 ACUTE ANTERIOR UVEITIS OF RIGHT EYE: ICD-10-CM

## 2022-04-11 DIAGNOSIS — H40.051 OCULAR HYPERTENSION, RIGHT EYE: ICD-10-CM

## 2022-04-11 DIAGNOSIS — H35.351 CYSTOID MACULAR EDEMA OF RIGHT EYE: ICD-10-CM

## 2022-04-11 DIAGNOSIS — H30.21 INTERMEDIATE UVEITIS OF RIGHT EYE: Primary | ICD-10-CM

## 2022-04-11 DIAGNOSIS — H26.221 CATARACT IN INFLAMMATORY DISORDER, RIGHT: ICD-10-CM

## 2022-04-11 PROCEDURE — 99214 OFFICE O/P EST MOD 30 MIN: CPT | Performed by: OPHTHALMOLOGY

## 2022-04-11 PROCEDURE — 92134 CPTRZ OPH DX IMG PST SGM RTA: CPT | Performed by: OPHTHALMOLOGY

## 2022-04-11 PROCEDURE — 92133 CPTRZD OPH DX IMG PST SGM ON: CPT | Performed by: OPHTHALMOLOGY

## 2022-04-11 PROCEDURE — G0463 HOSPITAL OUTPT CLINIC VISIT: HCPCS

## 2022-04-11 RX ORDER — LIDOCAINE HYDROCHLORIDE 20 MG/ML
5 INJECTION, SOLUTION EPIDURAL; INFILTRATION; INTRACAUDAL; PERINEURAL
Status: DISCONTINUED | OUTPATIENT
Start: 2022-04-11 | End: 2022-04-11 | Stop reason: CLARIF

## 2022-04-11 RX ORDER — DORZOLAMIDE HYDROCHLORIDE AND TIMOLOL MALEATE 20; 5 MG/ML; MG/ML
1 SOLUTION/ DROPS OPHTHALMIC DAILY
Qty: 10 ML | Refills: 5 | Status: SHIPPED | OUTPATIENT
Start: 2022-04-11 | End: 2022-01-01

## 2022-04-11 RX ORDER — DIFLUPREDNATE OPHTHALMIC 0.5 MG/ML
1 EMULSION OPHTHALMIC DAILY
Qty: 5 ML | Refills: 4 | Status: SHIPPED | OUTPATIENT
Start: 2022-04-11 | End: 2022-01-01

## 2022-04-11 ASSESSMENT — VISUAL ACUITY
METHOD: SNELLEN - LINEAR
OS_SC: 20/25
OD_SC: 20/125
OD_PH_SC: 20/50
OS_SC+: -2

## 2022-04-11 ASSESSMENT — SLIT LAMP EXAM - LIDS
COMMENTS: MILD BLEPHARITIS
COMMENTS: MILD BLEPHARITIS

## 2022-04-11 ASSESSMENT — CONF VISUAL FIELD
METHOD: COUNTING FINGERS
OD_INFERIOR_TEMPORAL_RESTRICTION: 0
OS_INFERIOR_NASAL_RESTRICTION: 0
OS_INFERIOR_TEMPORAL_RESTRICTION: 0
OD_NORMAL: 1
OD_SUPERIOR_NASAL_RESTRICTION: 0
OS_SUPERIOR_TEMPORAL_RESTRICTION: 0
OD_INFERIOR_NASAL_RESTRICTION: 0
OS_SUPERIOR_NASAL_RESTRICTION: 0
OD_SUPERIOR_TEMPORAL_RESTRICTION: 0
OS_NORMAL: 1

## 2022-04-11 ASSESSMENT — TONOMETRY
IOP_METHOD: TONOPEN
OD_IOP_MMHG: 16
OS_IOP_MMHG: 17

## 2022-04-11 ASSESSMENT — EXTERNAL EXAM - RIGHT EYE: OD_EXAM: NORMAL

## 2022-04-11 ASSESSMENT — EXTERNAL EXAM - LEFT EYE: OS_EXAM: NORMAL

## 2022-04-11 ASSESSMENT — CUP TO DISC RATIO
OD_RATIO: 0.55
OS_RATIO: 0.45

## 2022-04-11 NOTE — PATIENT INSTRUCTIONS
Recommend additional testing: CBC, CMP, ESR, CRP to be done on day of Podiatry. You can ask the lab to do Dr. Batista's orders if they have those.   Continue these medications: Humira 40 mg for 14 days, oral methotrexate 15 mg (6 pills weekly). Okay to stay at 6 pills weekly but up to Dr. Batista  Continue these drops: Cosopt now daily right eye, Durezol now daily right eye (no atropine weekly. We would recommend continuing for now without taper.  When COVID booster gets done this week, okay to hold off methotrexate this week and restart next week.   You can look up about cataract surgery which we might recommend in a few months

## 2022-04-11 NOTE — LETTER
4/11/2022       RE: Reina Conway  [address] [facility] [address]     Dear Colleague,    Thank you for referring your patient, Reina Conway, to the [facility] [place] at [facility]. Please see a copy of my visit note below.    Chief Complaint/Presenting Concern: Uveitis follow-up    Interval History of Present Ocular Illness:  Reina Conway is a 66 year old patient who returns for follow up of her uveitis and cystoid macular edema of the right eye.  She was last seen on December 13, 2021 at which time there is no recurrence of the uveitis nor macular edema.  Recommended resuming Humira 40 mg every 2 weeks and oral methotrexate 15 mg (6 tablets) weekly.  We restarted Durezol, atropine,Cosopt in the right eye and recommended holding off on prednisone pills.     reports things are getting better with less floaters but some blurriness. Left eye reports some intermittent shooting pain on left side of left eyelids.     Interval Updates to Medical/Family/Social History:    Spoke to Dr. Batista about medications who recommended continuing systemic medications and was opened to having us taper  steroid drop. She tried taking 7 pills of methotrexate but this caused nausea, so dose reduced.     Relevant Review of Systems Updates:  Not much nausea on 6 pills of methotrexate, some joint pains.     Laboratory Testing: None recently relevant    Current eye related medications: Humira 40 mg for 14 days, oral methotrexate 15 mg (6 pills weekly)--with plans to increase, Cosopt now daily right eye, Durezol now daily right eye (no atropine weekly)      Retina/Uveitis Imaging:   OCT Spectralis Macula April 11, 2022  right eye: Hazier media, no macular edema  left eye: Clear media, no macular edema    OCT Optic Nerve RNFL Spectralis April 11, 2022  right eye: Avg thickness 99 microns, no thinning.   left eye: Avg thickness 89 microns, no  thinning.     Assessment:     1. Intermediate uveitis of right eye  No haze today    2. Acute anterior uveitis of right eye  Few cells today    3. Cystoid macular edema of right eye  No recurrence    4. Ocular hypertension, right eye  IOP and RNFL normal    5. Cataract in inflammatory disorder, right  This is progressing    6. High risk medication use  Humira 40 mg for 14 days, oral methotrexate 15 mg (6 pills weekly)    Plan/Recommendations:      Discussed findings with patient. There is minimal anterior chamber cell, but no active intermediate uveitis and no macular edema. As such, no steroid injections needed at this time.     Eye pressure is controlled and RNFL without signs of glaucoma    Recommend additional testing: CBC, CMP, ESR, CRP to be done on day of Podiatry. You can ask the lab to do Dr. Batista's orders if they have those.     Continue these medications: Humira 40 mg for 14 days, oral methotrexate 15 mg (6 pills weekly). Okay to stay at 6 pills weekly but up to Dr. Batista    Continue these drops: Cosopt now daily right eye, Durezol now daily right eye (no atropine weekly. We would recommend continuing for now without taper as this could worsen the uveitis. Although cataract progressing, it is already there, so tapering is not likely to improve vision as much as continuing treatment to prevent uveitis can help get us closer to cataract surgery in the future.     When COVID booster gets done this week, okay to hold off methotrexate this week and restart next week.     You can look up about cataract surgery which we might recommend in a few months    RTC 3 months applanate, dilate right eye and OCT (ordered) ?Kenalog right eye             Physician Attestation     Attending Physician Attestation:  Complete documentation of historical and exam elements from today's encounter can be found in the full encounter summary report (not reduplicated in this progress note). I personally obtained the chief  complaint(s) and history of present illness. I confirmed and edited as necessary the review of systems, past medical/surgical history, family history, social history, and examination findings as documented by others; and I examined the patient myself. I personally reviewed the relevant tests, images, and reports as documented above. I formulated and edited as necessary the assessment and plan and discussed the findings and management plan with the patient and family members present at the time of this visit.  Edilson Velazquez M.D., Uveitis and Medical Retina, April 11, 2022     Sincerely,    Edilson Velazquez MD  [facility] Dept of Ophthalmology  Uveitis and Medical Retina

## 2022-04-11 NOTE — NURSING NOTE
Chief Complaints and History of Present Illnesses   Patient presents with     Uveitis Follow-Up     Chief Complaint(s) and History of Present Illness(es)     Uveitis Follow-Up     Laterality: right eye    Onset: gradual    Quality: Feels the va has improved     Severity: moderate    Frequency: intermittently    Pain scale: 0/10              Comments     Here for Intermediate uveitis of right eye  Cosopt every day right eye  Durezol every day right eye  Atropine discontinue last week  Rosa Lopez COT 12:22 PM April 11, 2022

## 2022-04-11 NOTE — PROGRESS NOTES
Chief Complaint/Presenting Concern: Uveitis follow-up    Interval History of Present Ocular Illness:  Reina Conway is a 66 year old patient who returns for follow up of her uveitis and cystoid macular edema of the right eye.  She was last seen on December 13, 2021 at which time there is no recurrence of the uveitis nor macular edema.  Recommended resuming Humira 40 mg every 2 weeks and oral methotrexate 15 mg (6 tablets) weekly.  We restarted Durezol, atropine,Cosopt in the right eye and recommended holding off on prednisone pills.     reports things are getting better with less floaters but some blurriness. Left eye reports some intermittent shooting pain on left side of left eyelids.     Interval Updates to Medical/Family/Social History:    Spoke to Dr. Batista about medications who recommended continuing systemic medications and was opened to having us taper  steroid drop. She tried taking 7 pills of methotrexate but this caused nausea, so dose reduced.     Relevant Review of Systems Updates:  Not much nausea on 6 pills of methotrexate, some joint pains.     Laboratory Testing: None recently relevant    Current eye related medications: Humira 40 mg for 14 days, oral methotrexate 15 mg (6 pills weekly)--with plans to increase, Cosopt now daily right eye, Durezol now daily right eye (no atropine weekly)    Retina/Uveitis Imaging:   OCT Spectralis Macula April 11, 2022  right eye: Hazier media, no macular edema  left eye: Clear media, no macular edema    OCT Optic Nerve RNFL Spectralis April 11, 2022  right eye: Avg thickness 99 microns, no thinning.   left eye: Avg thickness 89 microns, no thinning.     Assessment:     1. Intermediate uveitis of right eye  No haze today    2. Acute anterior uveitis of right eye  Few cells today    3. Cystoid macular edema of right eye  No recurrence    4. Ocular hypertension, right eye  IOP and RNFL normal    5. Cataract in inflammatory disorder, right  This is  progressing    6. High risk medication use  Humira 40 mg for 14 days, oral methotrexate 15 mg (6 pills weekly)    Plan/Recommendations:      Discussed findings with patient. There is minimal anterior chamber cell, but no active intermediate uveitis and no macular edema. As such, no steroid injections needed at this time.     Eye pressure is controlled and RNFL without signs of glaucoma    Recommend additional testing: CBC, CMP, ESR, CRP to be done on day of Podiatry. You can ask the lab to do Dr. Batista's orders if they have those.     Continue these medications: Humira 40 mg for 14 days, oral methotrexate 15 mg (6 pills weekly). Okay to stay at 6 pills weekly but up to Dr. Batista    Continue these drops: Cosopt now daily right eye, Durezol now daily right eye (no atropine weekly. We would recommend continuing for now without taper as this could worsen the uveitis. Although cataract progressing, it is already there, so tapering is not likely to improve vision as much as continuing treatment to prevent uveitis can help get us closer to cataract surgery in the future.     When COVID booster gets done this week, okay to hold off methotrexate this week and restart next week.     You can look up about cataract surgery which we might recommend in a few months    RTC 3 months applanate, dilate right eye and OCT (ordered) ?Kenalog right eye     Physician Attestation     Attending Physician Attestation:  Complete documentation of historical and exam elements from today's encounter can be found in the full encounter summary report (not reduplicated in this progress note). I personally obtained the chief complaint(s) and history of present illness. I confirmed and edited as necessary the review of systems, past medical/surgical history, family history, social history, and examination findings as documented by others; and I examined the patient myself. I personally reviewed the relevant tests, images, and reports as  documented above. I formulated and edited as necessary the assessment and plan and discussed the findings and management plan with the patient and family members present at the time of this visit.  Edilson Velazquez M.D., Uveitis and Medical Retina, April 11, 2022

## 2022-04-13 ENCOUNTER — IMMUNIZATION (OUTPATIENT)
Dept: NURSING | Facility: CLINIC | Age: 67
End: 2022-04-13
Payer: MEDICARE

## 2022-04-13 PROCEDURE — 91306 COVID-19,PF,MODERNA (18+ YRS BOOSTER .25ML): CPT

## 2022-04-13 PROCEDURE — 0064A COVID-19,PF,MODERNA (18+ YRS BOOSTER .25ML): CPT

## 2022-04-26 DIAGNOSIS — M06.09 RHEUMATOID ARTHRITIS OF MULTIPLE SITES WITHOUT RHEUMATOID FACTOR (H): ICD-10-CM

## 2022-04-26 DIAGNOSIS — Z79.899 HIGH RISK MEDICATIONS (NOT ANTICOAGULANTS) LONG-TERM USE: ICD-10-CM

## 2022-04-28 RX ORDER — FOLIC ACID 1 MG/1
2000 TABLET ORAL DAILY
Qty: 180 TABLET | Refills: 3 | Status: SHIPPED | OUTPATIENT
Start: 2022-04-28

## 2022-04-28 NOTE — TELEPHONE ENCOUNTER
Last Clinic Visit: 4/7/2022  [facility]

## 2022-05-23 ENCOUNTER — OFFICE VISIT (OUTPATIENT)
Dept: ORTHOPEDICS | Facility: CLINIC | Age: 67
End: 2022-05-23
Payer: MEDICARE

## 2022-05-23 DIAGNOSIS — B35.1 OM (ONYCHOMYCOSIS): Primary | ICD-10-CM

## 2022-05-23 DIAGNOSIS — M06.071 RHEUMATOID ARTHRITIS INVOLVING BOTH FEET WITH NEGATIVE RHEUMATOID FACTOR (H): ICD-10-CM

## 2022-05-23 DIAGNOSIS — M06.072 RHEUMATOID ARTHRITIS INVOLVING BOTH FEET WITH NEGATIVE RHEUMATOID FACTOR (H): ICD-10-CM

## 2022-05-23 DIAGNOSIS — L84 TYLOMA: ICD-10-CM

## 2022-05-23 PROCEDURE — 11055 PARING/CUTG B9 HYPRKER LES 1: CPT | Performed by: PODIATRIST

## 2022-05-23 PROCEDURE — 11720 DEBRIDE NAIL 1-5: CPT | Mod: XS | Performed by: PODIATRIST

## 2022-05-23 NOTE — LETTER
5/23/2022         RE: Reina Conway  [address] [facility] [address]        Dear Colleague,    Thank you for referring your patient, Reina Conway, to the [facility] [place]. Please see a copy of my visit note below.    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 Frost     Uveitis      Patient Active Problem List   Diagnosis     Hammer toe     Bunion     Osteopenia     Rheumatoid arthritis of multiple sites without rheumatoid factor (H)     Pain in both wrists     Mechanical limb problems     Pharyngeal dysphagia     Muscle tension dysphonia     Myofascial pain     High risk medication use     Intermediate uveitis of right eye     Cystoid macular edema of right eye     Acute anterior uveitis of right eye     Ocular hypertension, right eye     Methotrexate, long term, current use     Adalimumab (Humira) long-term use     Past Surgical History:   Procedure Laterality Date     Fibroidadenoma Left Breast       NO HISTORY OF SURGERY       Social History     Socioeconomic History     Marital status:      Spouse name: Not on file     Number of children: Not on file     Years of education: Not on file     Highest education level: Not on file   Occupational History     Not on file   Tobacco Use     Smoking status: Never Smoker     Smokeless tobacco: Never Used   Substance and Sexual Activity     Alcohol use: Yes     Alcohol/week: 5.8 - 11.7 standard drinks     Comment: social use prn     Drug use: No     Sexual activity: Not on file   Other Topics Concern     Parent/sibling w/  CABG, MI or angioplasty before 65F 55M? Not Asked   Social History Narrative    How much exercise per week? Walking, stairs    How much calcium per day? 2-3 servings       How much caffeine per day? 2-3 cups coffee    How much vitamin D per day?      Do you/your family wear seatbelts?  Yes    Do you/your family use safety helmets? No    Do you/your family use sunscreen? No    Do you/your family keep firearms in the home? No    Do you/your family have a smoke detector(s)? Yes        Do you feel safe in your home? Yes    Has anyone ever touched you in an unwanted manner? No     Explain         September 2, 2014 Taimko Villa LPN             Social Determinants of Health     Financial Resource Strain: Not on file   Food Insecurity: Not on file   Transportation Needs: Not on file   Physical Activity: Not on file   Stress: Not on file   Social Connections: Not on file   Intimate Partner Violence: Not on file   Housing Stability: Not on file     Family History   Problem Relation Age of Onset     Breast Cancer Mother         parkinsons     Osteoporosis Mother      Low Back Problems Mother      Breast Cancer Maternal Aunt      Other - See Comments Other         Inflammation joint in brother, maternal cousin      Diabetes Maternal Grandmother      Anxiety Disorder Father      Skin Cancer Father      Mental Illness Cousin      Alcoholism Paternal Grandfather      Glaucoma No family hx of      Macular Degeneration No family hx of      Retinal detachment No family hx of      Melanoma No family hx of      Hypertension No family hx of      SUBJECTIVE FINDINGS:  A 67-year-old female returns to clinic for onychomycosis, right hallux.  Tyloma, left foot, causing pain.  She is wearing a wide shoe with some over-the-counter insoles.  She relates she is using the Silvadene cream, but not all the time.  She is inconsistently using it.  She did the foot soaks with vinegar water a couple times, but she has not done that,  either.    OBJECTIVE FINDINGS:  Vascular status intact bilaterally.  She has nucleated, hyperkeratotic tissue buildup, plantar left 2-4 MPJs, with pain on palpation.  There is no erythema, no drainage, no odor, no calor bilaterally.  She has a right hallux nail that is dystrophic and thickened with subungual debris, dystrophy, discoloration and brittleness to it with some nail border erythema.  This is decreased from previous.    ASSESSMENT AND PLAN:  Onychomycosis, right hallux.  Tyloma, left foot, causing pain.  She has rheumatoid arthritis.  Diagnosis and treatment options discussed with her.  I debrided the left foot tyloma with a 15 blade upon consent.  I debrided the right hallux nail upon consent.  I advised her to continue the Silvadene cream on the right hallux.  If she wants to use a Band-Aid, that is fine.  I advised her on vinegar water soaks.  I reviewed her 02/28/2022 aerobic, anaerobic and fungal yeast cultures.  Return to clinic and see me in 3 months.      Again, thank you for allowing me to participate in the care of your patient.        Sincerely,        Kenny Gao DPM

## 2022-05-23 NOTE — PROGRESS NOTES
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 Frost     Uveitis      Patient Active Problem List   Diagnosis     Hammer toe     Bunion     Osteopenia     Rheumatoid arthritis of multiple sites without rheumatoid factor (H)     Pain in both wrists     Mechanical limb problems     Pharyngeal dysphagia     Muscle tension dysphonia     Myofascial pain     High risk medication use     Intermediate uveitis of right eye     Cystoid macular edema of right eye     Acute anterior uveitis of right eye     Ocular hypertension, right eye     Methotrexate, long term, current use     Adalimumab (Humira) long-term use     Past Surgical History:   Procedure Laterality Date     Fibroidadenoma Left Breast       NO HISTORY OF SURGERY       Social History     Socioeconomic History     Marital status:      Spouse name: Not on file     Number of children: Not on file     Years of education: Not on file     Highest education level: Not on file   Occupational History     Not on file   Tobacco Use     Smoking status: Never Smoker     Smokeless tobacco: Never Used   Substance and Sexual Activity     Alcohol use: Yes     Alcohol/week: 5.8 - 11.7 standard drinks     Comment: social use prn     Drug use: No     Sexual activity: Not on file   Other Topics Concern     Parent/sibling w/ CABG, MI or angioplasty before 65F 55M? Not Asked   Social History Narrative    How much exercise per week? Walking, stairs    How much calcium per day? 2-3 servings       How much caffeine per day? 2-3 cups coffee    How much vitamin D per day?      Do you/your family  wear seatbelts?  Yes    Do you/your family use safety helmets? No    Do you/your family use sunscreen? No    Do you/your family keep firearms in the home? No    Do you/your family have a smoke detector(s)? Yes        Do you feel safe in your home? Yes    Has anyone ever touched you in an unwanted manner? No     Explain         September 2, 2014 Tamiko Villa LPN             Social Determinants of Health     Financial Resource Strain: Not on file   Food Insecurity: Not on file   Transportation Needs: Not on file   Physical Activity: Not on file   Stress: Not on file   Social Connections: Not on file   Intimate Partner Violence: Not on file   Housing Stability: Not on file     Family History   Problem Relation Age of Onset     Breast Cancer Mother         parkinsons     Osteoporosis Mother      Low Back Problems Mother      Breast Cancer Maternal Aunt      Other - See Comments Other         Inflammation joint in brother, maternal cousin      Diabetes Maternal Grandmother      Anxiety Disorder Father      Skin Cancer Father      Mental Illness Cousin      Alcoholism Paternal Grandfather      Glaucoma No family hx of      Macular Degeneration No family hx of      Retinal detachment No family hx of      Melanoma No family hx of      Hypertension No family hx of      SUBJECTIVE FINDINGS:  A 67-year-old female returns to clinic for onychomycosis, right hallux.  Tyloma, left foot, causing pain.  She is wearing a wide shoe with some over-the-counter insoles.  She relates she is using the Silvadene cream, but not all the time.  She is inconsistently using it.  She did the foot soaks with vinegar water a couple times, but she has not done that, either.    OBJECTIVE FINDINGS:  Vascular status intact bilaterally.  She has nucleated, hyperkeratotic tissue buildup, plantar left 2-4 MPJs, with pain on palpation.  There is no erythema, no drainage, no odor, no calor bilaterally.  She has a right hallux nail that is dystrophic and  thickened with subungual debris, dystrophy, discoloration and brittleness to it with some nail border erythema.  This is decreased from previous.    ASSESSMENT AND PLAN:  Onychomycosis, right hallux.  Tyloma, left foot, causing pain.  She has rheumatoid arthritis.  Diagnosis and treatment options discussed with her.  I debrided the left foot tyloma with a 15 blade upon consent.  I debrided the right hallux nail upon consent.  I advised her to continue the Silvadene cream on the right hallux.  If she wants to use a Band-Aid, that is fine.  I advised her on vinegar water soaks.  I reviewed her 02/28/2022 aerobic, anaerobic and fungal yeast cultures.  Return to clinic and see me in 3 months.

## 2022-07-11 ENCOUNTER — OFFICE VISIT (OUTPATIENT)
Dept: ORTHOPEDICS | Facility: CLINIC | Age: 67
End: 2022-07-11
Payer: MEDICARE

## 2022-07-11 ENCOUNTER — OFFICE VISIT (OUTPATIENT)
Dept: OPHTHALMOLOGY | Facility: CLINIC | Age: 67
End: 2022-07-11
Attending: OPHTHALMOLOGY
Payer: MEDICARE

## 2022-07-11 DIAGNOSIS — H20.11 CHRONIC ANTERIOR UVEITIS OF RIGHT EYE: ICD-10-CM

## 2022-07-11 DIAGNOSIS — H26.221 CATARACT IN INFLAMMATORY DISORDER, RIGHT: ICD-10-CM

## 2022-07-11 DIAGNOSIS — M06.071 RHEUMATOID ARTHRITIS INVOLVING BOTH FEET WITH NEGATIVE RHEUMATOID FACTOR (H): ICD-10-CM

## 2022-07-11 DIAGNOSIS — H30.21 INTERMEDIATE UVEITIS OF RIGHT EYE: Primary | ICD-10-CM

## 2022-07-11 DIAGNOSIS — L84 TYLOMA: ICD-10-CM

## 2022-07-11 DIAGNOSIS — H35.351 CYSTOID MACULAR EDEMA OF RIGHT EYE: ICD-10-CM

## 2022-07-11 DIAGNOSIS — Z79.899 HIGH RISK MEDICATION USE: ICD-10-CM

## 2022-07-11 DIAGNOSIS — B35.1 OM (ONYCHOMYCOSIS): Primary | ICD-10-CM

## 2022-07-11 DIAGNOSIS — M06.072 RHEUMATOID ARTHRITIS INVOLVING BOTH FEET WITH NEGATIVE RHEUMATOID FACTOR (H): ICD-10-CM

## 2022-07-11 DIAGNOSIS — H40.051 OCULAR HYPERTENSION, RIGHT EYE: ICD-10-CM

## 2022-07-11 PROCEDURE — 99214 OFFICE O/P EST MOD 30 MIN: CPT | Performed by: OPHTHALMOLOGY

## 2022-07-11 PROCEDURE — G0463 HOSPITAL OUTPT CLINIC VISIT: HCPCS | Mod: 25

## 2022-07-11 PROCEDURE — 99213 OFFICE O/P EST LOW 20 MIN: CPT | Performed by: PODIATRIST

## 2022-07-11 PROCEDURE — 92134 CPTRZ OPH DX IMG PST SGM RTA: CPT | Performed by: OPHTHALMOLOGY

## 2022-07-11 RX ORDER — LIDOCAINE HYDROCHLORIDE 20 MG/ML
5 INJECTION, SOLUTION EPIDURAL; INFILTRATION; INTRACAUDAL; PERINEURAL
Status: DISCONTINUED | OUTPATIENT
Start: 2022-07-11 | End: 2022-07-11 | Stop reason: CLARIF

## 2022-07-11 ASSESSMENT — SLIT LAMP EXAM - LIDS
COMMENTS: MILD BLEPHARITIS
COMMENTS: MILD BLEPHARITIS

## 2022-07-11 ASSESSMENT — VISUAL ACUITY
METHOD: SNELLEN - LINEAR
OS_SC: 20/25-
OD_PH_SC: 20/300
OD_SC: 20/500

## 2022-07-11 ASSESSMENT — CONF VISUAL FIELD
METHOD: COUNTING FINGERS
OS_NORMAL: 1
OD_NORMAL: 1

## 2022-07-11 ASSESSMENT — CUP TO DISC RATIO
OD_RATIO: 0.55
OS_RATIO: 0.45

## 2022-07-11 ASSESSMENT — EXTERNAL EXAM - RIGHT EYE: OD_EXAM: NORMAL

## 2022-07-11 ASSESSMENT — TONOMETRY
IOP_METHOD: TONOPEN
OD_IOP_MMHG: 23
OS_IOP_MMHG: 18

## 2022-07-11 ASSESSMENT — EXTERNAL EXAM - LEFT EYE: OS_EXAM: NORMAL

## 2022-07-11 NOTE — NURSING NOTE
"Reason For Visit:   Chief Complaint   Patient presents with     Follow Up     Callus of left foot, onychomycosis and onychauxis.       Pain Assessment  Patient Currently in Pain: Yes  Primary Pain Location: Foot        Allergies   Allergen Reactions     Barium Sulfate      Fosamax      Throat discomfort     No Clinical Screening - See Comments Hives     shrimp  Joints flared after getting possible \"white drink\" after CT/MRI in 2007            Frieda Menchaca LPN    "

## 2022-07-11 NOTE — LETTER
7/11/2022         RE: Reina Conway  [address] [place] [address]        Dear Colleague,    Thank you for referring your patient, Reina Conway, to the [facility] [place]. Please see a copy of my visit note below.    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 Frost     Uveitis      Patient Active Problem List   Diagnosis     Hammer toe     Bunion     Osteopenia     Rheumatoid arthritis of multiple sites without rheumatoid factor (H)     Pain in both wrists     Mechanical limb problems     Pharyngeal dysphagia     Muscle tension dysphonia     Myofascial pain     High risk medication use     Intermediate uveitis of right eye     Cystoid macular edema of right eye     Acute anterior uveitis of right eye     Ocular hypertension, right eye     Methotrexate, long term, current use     Adalimumab (Humira) long-term use     Past Surgical History:   Procedure Laterality Date     Fibroidadenoma Left Breast       NO HISTORY OF SURGERY       Social History     Socioeconomic History     Marital status:      Spouse name: Not on file     Number of children: Not on file     Years of education: Not on file     Highest education level: Not on file   Occupational History     Not on file   Tobacco Use     Smoking status: Never Smoker     Smokeless tobacco: Never Used   Substance and Sexual Activity     Alcohol use: Yes     Alcohol/week: 5.8 - 11.7 standard drinks     Comment: social use prn     Drug use: No     Sexual activity: Not on file   Other Topics Concern     Parent/sibling w/  CABG, MI or angioplasty before 65F 55M? Not Asked   Social History Narrative    How much exercise per week? Walking, stairs    How much calcium per day? 2-3 servings       How much caffeine per day? 2-3 cups coffee    How much vitamin D per day?      Do you/your family wear seatbelts?  Yes    Do you/your family use safety helmets? No    Do you/your family use sunscreen? No    Do you/your family keep firearms in the home? No    Do you/your family have a smoke detector(s)? Yes        Do you feel safe in your home? Yes    Has anyone ever touched you in an unwanted manner? No     Explain         September 2, 2014 Tamiko Villa LPN             Social Determinants of Health     Financial Resource Strain: Not on file   Food Insecurity: Not on file   Transportation Needs: Not on file   Physical Activity: Not on file   Stress: Not on file   Social Connections: Not on file   Intimate Partner Violence: Not on file   Housing Stability: Not on file     Family History   Problem Relation Age of Onset     Breast Cancer Mother         parkinsons     Osteoporosis Mother      Low Back Problems Mother      Breast Cancer Maternal Aunt      Other - See Comments Other         Inflammation joint in brother, maternal cousin      Diabetes Maternal Grandmother      Anxiety Disorder Father      Skin Cancer Father      Mental Illness Cousin      Alcoholism Paternal Grandfather      Glaucoma No family hx of      Macular Degeneration No family hx of      Retinal detachment No family hx of      Melanoma No family hx of      Hypertension No family hx of      SUBJECTIVE FINDINGS:  A 67 year old returns to clinic for onychomycosis, right hallux.  Tyloma, left foot.  She relates she is concerned about the toe, and she wants to make sure it is getting better.  She also relates she has a skin lesion that is red on her dorsal foot on the right.  She is concerned about getting some fungus in her left hallux.    OBJECTIVE FINDINGS:  Vascular status intact  bilaterally.  She has left plantar 2-4 MPJs with minimal callus.  She is using a donut pad on this.  She has some left hallux thickening, dystrophy and subungual debris that is mild.  She has a right hallux nail that is thick, dystrophic with subungual debris, dystrophy, discoloration and some surrounding dermatitis, which is decreased from previous.  There is no erythema, no drainage, no odor, no calor.  She has a small, brown, nonraised discoloration, dorsal right foot.    ASSESSMENT AND PLAN:  Onychomycosis, right hallux.  Tyloma, left foot.  She is getting some early onychomycosis, left hallux.  She has rheumatoid arthritis.  She has a skin lesion on her dorsal foot.  Diagnosis and treatment options discussed with her.  I am going to continue the vinegar water soaks and the Silvadene cream.  She has been doing this fairly regularly.  This is improving.  Return to clinic and see me in 2-3 months.  She opted for no Dermatology referral today.  We will observe the right foot lesion.  Previous notes reviewed.    Again, thank you for allowing me to participate in the care of your patient.        Sincerely,        Kenny Gao DPM

## 2022-07-11 NOTE — PROGRESS NOTES
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 Frost     Uveitis      Patient Active Problem List   Diagnosis     Hammer toe     Bunion     Osteopenia     Rheumatoid arthritis of multiple sites without rheumatoid factor (H)     Pain in both wrists     Mechanical limb problems     Pharyngeal dysphagia     Muscle tension dysphonia     Myofascial pain     High risk medication use     Intermediate uveitis of right eye     Cystoid macular edema of right eye     Acute anterior uveitis of right eye     Ocular hypertension, right eye     Methotrexate, long term, current use     Adalimumab (Humira) long-term use     Past Surgical History:   Procedure Laterality Date     Fibroidadenoma Left Breast       NO HISTORY OF SURGERY       Social History     Socioeconomic History     Marital status:      Spouse name: Not on file     Number of children: Not on file     Years of education: Not on file     Highest education level: Not on file   Occupational History     Not on file   Tobacco Use     Smoking status: Never Smoker     Smokeless tobacco: Never Used   Substance and Sexual Activity     Alcohol use: Yes     Alcohol/week: 5.8 - 11.7 standard drinks     Comment: social use prn     Drug use: No     Sexual activity: Not on file   Other Topics Concern     Parent/sibling w/ CABG, MI or angioplasty before 65F 55M? Not Asked   Social History Narrative    How much exercise per week? Walking, stairs    How much calcium per day? 2-3 servings       How much caffeine per day? 2-3 cups coffee    How much vitamin D per day?      Do you/your family  wear seatbelts?  Yes    Do you/your family use safety helmets? No    Do you/your family use sunscreen? No    Do you/your family keep firearms in the home? No    Do you/your family have a smoke detector(s)? Yes        Do you feel safe in your home? Yes    Has anyone ever touched you in an unwanted manner? No     Explain         September 2, 2014 Tamiko Villa LPN             Social Determinants of Health     Financial Resource Strain: Not on file   Food Insecurity: Not on file   Transportation Needs: Not on file   Physical Activity: Not on file   Stress: Not on file   Social Connections: Not on file   Intimate Partner Violence: Not on file   Housing Stability: Not on file     Family History   Problem Relation Age of Onset     Breast Cancer Mother         parkinsons     Osteoporosis Mother      Low Back Problems Mother      Breast Cancer Maternal Aunt      Other - See Comments Other         Inflammation joint in brother, maternal cousin      Diabetes Maternal Grandmother      Anxiety Disorder Father      Skin Cancer Father      Mental Illness Cousin      Alcoholism Paternal Grandfather      Glaucoma No family hx of      Macular Degeneration No family hx of      Retinal detachment No family hx of      Melanoma No family hx of      Hypertension No family hx of      SUBJECTIVE FINDINGS:  A 67 year old returns to clinic for onychomycosis, right hallux.  Tyloma, left foot.  She relates she is concerned about the toe, and she wants to make sure it is getting better.  She also relates she has a skin lesion that is red on her dorsal foot on the right.  She is concerned about getting some fungus in her left hallux.    OBJECTIVE FINDINGS:  Vascular status intact bilaterally.  She has left plantar 2-4 MPJs with minimal callus.  She is using a donut pad on this.  She has some left hallux thickening, dystrophy and subungual debris that is mild.  She has a right hallux nail that is thick, dystrophic with subungual debris, dystrophy,  discoloration and some surrounding dermatitis, which is decreased from previous.  There is no erythema, no drainage, no odor, no calor.  She has a small, brown, nonraised discoloration, dorsal right foot.    ASSESSMENT AND PLAN:  Onychomycosis, right hallux.  Tyloma, left foot.  She is getting some early onychomycosis, left hallux.  She has rheumatoid arthritis.  She has a skin lesion on her dorsal foot.  Diagnosis and treatment options discussed with her.  I am going to continue the vinegar water soaks and the Silvadene cream.  She has been doing this fairly regularly.  This is improving.  Return to clinic and see me in 2-3 months.  She opted for no Dermatology referral today.  We will observe the right foot lesion.  Previous notes reviewed.

## 2022-07-11 NOTE — PATIENT INSTRUCTIONS
Recommend additional testing: CBC, CMP, ESR, CRP sometime this month  You can ask Rheumatology about  seeing Dr. Ady Alexander, Dr. Rose Mary Garvin or Dr. Rick Hagen  Continue these medications: Humira 40 mg every 14 days, oral methotrexate 17.5 mg (7 pills weekly), Cosopt now daily right eye, Durezol now daily right eye   No oral prednisone or eye injections now. Would recommend a perioperative course of oral prednisone as well as sub-tenon's kenalog injection right eye day of surgery and frequent Durezol. We can discuss after your next visit

## 2022-07-11 NOTE — PROGRESS NOTES
Chief Complaint/Presenting Concern: Uveitis follow-up    Interval History of Present Ocular Illness:  Reina Conway is a 67 year old patient who returns for follow up of her intermediate uveitis of the right eye.  We last saw each other on April 11, 2022 at which time there was minimal inflammation back on medications and drops.  We discussed continued treatment and possibly steroid eye injection if there were any sign of recurrence.    Since then, Ms. Conway reports the right eye has been getting blurrier. She has some intermittent discomfort on the upper eyelid of the right eye. She has been doing medications regularly and drops fairly consisently.     Interval Updates to Medical/Family/Social History:  Held one dose of Humira and methotrexate when felt cold symptoms, will resume this week.     Relevant Review of Systems Updates: Cold symptoms recently otherwise doing okay. Right hip pain managed with cane.     Laboratory Testing: None since last visit    Current eye related medications: Humira 40 mg every 14 days, oral methotrexate 17.5 mg (7 pills weekly), Cosopt now daily right eye, Durezol now daily right eye --missing drops some times    Retina/Uveitis Imaging:   OCT Spectralis Macula July 11, 2022  right eye: Some increased media haze, but no macular edema.  left eye: Normal contour, no macular edema    RNFL July 11, 2022   right eye: Avg thickness 131 microns, thick but no thinning.   left eye: Avg thickness 89 microns, stable    Assessment:     1. Intermediate uveitis of right eye  No active haze    2. Chronic anterior uveitis of right eye  Inactive today    3. Cystoid macular edema of right eye  No recurrence    4. Ocular hypertension, right eye  Slightly elevated. Optic nerve scan with thickening but not thinning.     5. 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 High risk medication use  Humira 40 mg every 14 days, oral methotrexate 17.5 mg (7 pills  weekly)    Plan/Recommendations:      Discussed findings with patient. There is no active inflammation in the right eye and no macular edema. The combination of treatments has helped to prevent recurrence. As such, no steroid injection recommended today    The cataract in the right eye is progressing and given improving inflammation, we recommend referral to one of my partners and also to consider procedure to help lower eye pressure    Eye pressure is elevated slightly in the right eye, but we can continue Cosopt daily for now.      Recommend additional testing: CBC, CMP, ESR, CRP    Continue these medications: Humira 40 mg every 14 days, oral methotrexate 17.5 mg (7 pills weekly), Cosopt now daily right eye, Durezol now daily right eye     No oral prednisone or eye injections now. Would recommend a perioperative course of oral prednisone as well as sub-tenon's kenalog injection right eye day of surgery and frequent Durezol. We can discuss after your next visit    RTC   1. Dr. Melendez next avail cataract/MIGS eval right eye    2. Caroline HAYWARDD based on that visit.     Physician Attestation     Attending Physician Attestation:  Complete documentation of historical and exam elements from today's encounter can be found in the full encounter summary report (not reduplicated in this progress note). I personally obtained the chief complaint(s) and history of present illness. I confirmed and edited as necessary the review of systems, past medical/surgical history, family history, social history, and examination findings as documented by others; and I examined the patient myself. I personally reviewed the relevant tests, images, and reports as documented above. I formulated and edited as necessary the assessment and plan and discussed the findings and management plan with the patient and family members present at the time of this visit.  Edilson Velazquez M.D., Uveitis and Medical Retina, July 11, 2022

## 2022-07-11 NOTE — LETTER
7/11/2022       RE: Reina Conway  [address] [place] [address]     Dear Colleague,    Thank you for referring your patient, Reina Conway, to the [facility] [place] at [facility]. Please see a copy of my visit note below.    Chief Complaint/Presenting Concern: Uveitis follow-up.    Interval History of Present Ocular Illness:  Reina Conway is a 67 year old patient who returns for follow up of her intermediate uveitis of the right eye.  We last saw each other on April 11, 2022 at which time there was minimal inflammation back on medications and drops.  We discussed continued treatment and possibly steroid eye injection if there were any sign of recurrence.    Since then, Ms. Conway reports the right eye has been getting blurrier. She has some intermittent discomfort on the upper eyelid of the right eye. She has been doing medications regularly and drops fairly consisently.     Interval Updates to Medical/Family/Social History:  Held one dose of Humira and methotrexate when felt cold symptoms, will resume this week.     Relevant Review of Systems Updates: Cold symptoms recently otherwise doing okay. Right hip pain managed with cane.     Laboratory Testing: None since last visit.    Current eye related medications: Humira 40 mg every 14 days, oral methotrexate 17.5 mg (7 pills weekly), Cosopt now daily right eye, Durezol now daily right eye --missing drops some times.    Retina/Uveitis Imaging:   OCT Spectralis Macula July 11, 2022  right eye: Some increased media haze, but no macular edema.  left eye: Normal contour, no macular edema    RNFL July 11, 2022   right eye: Avg thickness 131 microns, thick but no thinning.   left eye: Avg thickness 89 microns, stable    Assessment:     1. Intermediate uveitis of right eye  No active haze.    2. Chronic anterior uveitis of right eye  Inactive today.    3. Cystoid macular edema of right  eye  No recurrence.    4. Ocular hypertension, right eye  Slightly elevated. Optic nerve scan with thickening but not thinning.     5. 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    6. High risk medication use  Humira 40 mg every 14 days, oral methotrexate 17.5 mg (7 pills weekly).    Plan/Recommendations:      Discussed findings with patient. There is no active inflammation in the right eye and no macular edema. The combination of treatments has helped to prevent recurrence. As such, no steroid injection recommended today.    The cataract in the right eye is progressing and given improving inflammation, we recommend referral to one of my partners and also to consider procedure to help lower eye pressure.    Eye pressure is elevated slightly in the right eye, but we can continue Cosopt daily for now.      Recommend additional testing: CBC, CMP, ESR, CRP.    Continue these medications: Humira 40 mg every 14 days, oral methotrexate 17.5 mg (7 pills weekly), Cosopt now daily right eye, Durezol now daily right eye.    No oral prednisone or eye injections now. Would recommend a perioperative course of oral prednisone as well as sub-tenon's kenalog injection right eye day of surgery and frequent Durezol. We can discuss after your next visit.    RTC   1. Dr. Melendez next avail cataract/MIGS eval right eye    2. Caroline TBD based on that visit.     Physician Attestation     Attending Physician Attestation:  Complete documentation of historical and exam elements from today's encounter can be found in the full encounter summary report (not reduplicated in this progress note). I personally obtained the chief complaint(s) and history of present illness. I confirmed and edited as necessary the review of systems, past medical/surgical history, family history, social history, and examination findings as documented by others; and I examined the patient myself. I personally reviewed the relevant tests,  images, and reports as documented above. I formulated and edited as necessary the assessment and plan and discussed the findings and management plan with the patient and family members present at the time of this visit.  Edilson Velazquez M.D., Uveitis and Medical Retina, July 11, 2022     Again, thank you for allowing me to participate in the care of your patient.    Sincerely,    Edilson Velazquez MD  [facility] Dept of Ophthalmology  Uveitis and Medical Retina

## 2022-07-19 ENCOUNTER — TELEPHONE (OUTPATIENT)
Dept: PULMONOLOGY | Facility: CLINIC | Age: 67
End: 2022-07-19

## 2022-07-19 NOTE — TELEPHONE ENCOUNTER
[facility] Call Center    Phone Message    May a detailed message be left on voicemail: yes     Reason for Call: Other: Per pt is there any way to get an appt with  before next year? Writer help schedule appt for 01/19/23 but pt need something sooner to ask a few questions and some other concerns. Pleae call pt back. Thank you.      Action Taken: Message routed to:  [facility] ([facility] pulm    Travel Screening: Not Applicable

## 2022-07-20 NOTE — TELEPHONE ENCOUNTER
Placed call to patient to explain that Dr. Batista is limiting his practice to patients with scleroderma (as discussed during her visit on 4/7/22) and has recommended that she transfer her care to Roseline Arambula NP at the [facility]. Detailed message left explaining that clinic scheduled will contact her to assist in scheduling with NP.   Xochitl Payton RN  Adult Rheumatology Clinic

## 2022-07-22 NOTE — TELEPHONE ENCOUNTER
LMTCB x2    Placed call to patient to explain that Dr. Batista is limiting his practice to patients with scleroderma (as discussed during her visit on 4/7/22) and has recommended that she transfer her care to Roseline Arambula NP at the [facility]. Detailed message left explaining that clinic scheduled will contact her to assist in scheduling with NP.   Xochitl Payton RN  Adult Rheumatology Clinic

## 2022-08-06 ENCOUNTER — HEALTH MAINTENANCE LETTER (OUTPATIENT)
Age: 67
End: 2022-08-06

## 2022-08-30 NOTE — TELEPHONE ENCOUNTER
Pt's  Leonardo is calling. Pt would like to transfer care to either Dr Hagne or Dr Aponte at the [facility] since Dr Batista is limiting his practice now. Would either provider see this pt at the [facility]?     Please advise on when we can schedule the pt.

## 2022-09-01 NOTE — TELEPHONE ENCOUNTER
Left message for patient explaining that because I have not received a confirmation that she is able to see Dr. Hagen on 9/2 at 8 am as offered, the appointment has not been scheduled. Detailed [email or web address] message sent to patient about future scheduling options, explaining that clinic coordinator will be contacting her to assist in scheduling a future appointment.   Xochitl Payton RN  Adult Rheumatology Clinic

## 2022-09-01 NOTE — TELEPHONE ENCOUNTER
Per Dr. Batista, care can be transferred to either Dr. Hagen or Dr. Aponte and can be offered next available appointment with either provider. Placed call to patient to offer an appointment with Dr. Hagen tomorrow 9/2/22 at 8 am, detailed message left requesting return call to confirm appointment.  Xochitl Payton RN  Adult Rheumatology Clinic

## 2022-09-02 NOTE — TELEPHONE ENCOUNTER
LMTCB x1     Xochitl Payton RN  Scheduling Adult Rheumatology 16 hours ago (5:06 PM)     SS    Please contact her to help her get scheduled with Dr. Hagen or Fadi in their next available return slot- I did send a Keko message to her explaining that they are booking out pretty far and encouraging her to see Roseline if she doesn't want to wait to see someone at the [facility].  Thank you!!!

## 2022-09-07 ENCOUNTER — OFFICE VISIT (OUTPATIENT)
Dept: OPHTHALMOLOGY | Facility: CLINIC | Age: 67
End: 2022-09-07
Attending: OPHTHALMOLOGY
Payer: MEDICARE

## 2022-09-07 DIAGNOSIS — H40.051 OCULAR HYPERTENSION, RIGHT EYE: ICD-10-CM

## 2022-09-07 DIAGNOSIS — H26.221 CATARACT IN INFLAMMATORY DISORDER, RIGHT: Primary | ICD-10-CM

## 2022-09-07 DIAGNOSIS — H30.21 INTERMEDIATE UVEITIS OF RIGHT EYE: ICD-10-CM

## 2022-09-07 DIAGNOSIS — H26.221 CATARACT IN INFLAMMATORY DISORDER, RIGHT: ICD-10-CM

## 2022-09-07 DIAGNOSIS — H40.051 OCULAR HYPERTENSION, RIGHT EYE: Primary | ICD-10-CM

## 2022-09-07 DIAGNOSIS — M06.09 RHEUMATOID ARTHRITIS OF MULTIPLE SITES WITHOUT RHEUMATOID FACTOR (H): ICD-10-CM

## 2022-09-07 PROCEDURE — 92025 CPTRIZED CORNEAL TOPOGRAPHY: CPT | Performed by: OPHTHALMOLOGY

## 2022-09-07 PROCEDURE — 92133 CPTRZD OPH DX IMG PST SGM ON: CPT | Performed by: OPHTHALMOLOGY

## 2022-09-07 PROCEDURE — 99214 OFFICE O/P EST MOD 30 MIN: CPT | Performed by: OPHTHALMOLOGY

## 2022-09-07 PROCEDURE — G0463 HOSPITAL OUTPT CLINIC VISIT: HCPCS

## 2022-09-07 PROCEDURE — 92083 EXTENDED VISUAL FIELD XM: CPT | Performed by: OPHTHALMOLOGY

## 2022-09-07 PROCEDURE — 76519 ECHO EXAM OF EYE: CPT | Performed by: OPHTHALMOLOGY

## 2022-09-07 ASSESSMENT — VISUAL ACUITY
METHOD: SNELLEN - LINEAR
OD_SC: CF @ 2'
OS_PH_SC: 20/20
OS_SC: 20/30

## 2022-09-07 ASSESSMENT — EXTERNAL EXAM - RIGHT EYE: OD_EXAM: NORMAL

## 2022-09-07 ASSESSMENT — CUP TO DISC RATIO: OS_RATIO: 0.45

## 2022-09-07 ASSESSMENT — PACHYMETRY
OS_CT(UM): 600
OD_CT(UM): 551

## 2022-09-07 ASSESSMENT — CONF VISUAL FIELD
METHOD: COUNTING FINGERS
OD_INFERIOR_TEMPORAL_RESTRICTION: 3
OD_INFERIOR_NASAL_RESTRICTION: 3
OS_NORMAL: 1

## 2022-09-07 ASSESSMENT — TONOMETRY
IOP_METHOD: TONOPEN
OS_IOP_MMHG: 19
OD_IOP_MMHG: 24

## 2022-09-07 ASSESSMENT — SLIT LAMP EXAM - LIDS
COMMENTS: MILD BLEPHARITIS
COMMENTS: MILD BLEPHARITIS

## 2022-09-07 ASSESSMENT — EXTERNAL EXAM - LEFT EYE: OS_EXAM: NORMAL

## 2022-09-07 NOTE — PROGRESS NOTES
Chief Complaint(s) and History of Present Illness(es)     Cataract Evaluation     Laterality: both eyes    Associated symptoms: blurred vision (RE)    Severity: mild    Onset: gradual    Frequency: constant    Course: gradually worsening    Pain scale: 0/10              Comments     Reina is here for a cataract evaluation right eye. Her vision RE has   worsened over the past several months.     Gordon Padron COT 1:04 PM September 7, 2022               Review of systems for the eyes was negative other than the pertinent positives/negatives listed in the HPI.      Assessment & Plan      Reina Conway is a 67 year old female with the following diagnoses:   1. Cataract in inflammatory disorder, right    2. Ocular hypertension, right eye    3. Intermediate uveitis of right eye    4. Rheumatoid arthritis of multiple sites without rheumatoid factor (H)         Referral from  for cataract right eye and Ocular hypertension  Using durezol and Cosopt daily in the right eye only   Visual field nonspecific  OCT limited 2/2 to cataract   Intraocular pressure remains elevated with current drops  Cataract, right eye  Visually significant  Risks, benefits and alternatives to cataract extraction/IOL implantation + OMNI discussed; consent obtained.  Will schedule surgery today    Special equipment/needs:    Anesthesia:MAC with block  Dilation:Moderate  Iris expansion:Synechialysis, possible hooks  Pseudoexfoliation: No pseudoexfoliation  Trypan Blue: Yes   Possible GSL  Possible OMNI  Goal emmetropia  PSTK  Will coordinate periop steroids with JY once scheduled           Attending Physician Attestation:  Complete documentation of historical and exam elements from today's encounter can be found in the full encounter summary report (not reduplicated in this progress note).  I personally obtained the chief complaint(s) and history of present illness.  I confirmed and edited as necessary the review of systems, past medical/surgical  history, family history, social history, and examination findings as documented by others; and I examined the patient myself.  I personally reviewed the relevant tests, images, and reports as documented above.  I formulated and edited as necessary the assessment and plan and discussed the findings and management plan with the patient and family. Today with Reina Conway  and her , I reviewed the indications, risks, benefits, and alternatives of the proposed surgical procedure including, but not limited to, failure obtain the desired result  and need for additional surgery, bleeding, infection, loss of vision, loss of the eye, and the remote possibility of permanent damage to any organ system or death with the use of anesthesia.  I provided multiple opportunities for the questions, answered all questions to the best of my ability, and confirmed that my answers and my discussion were understood.     . - Maximilian Melendez MD

## 2022-09-07 NOTE — NURSING NOTE
Chief Complaints and History of Present Illnesses   Patient presents with     Cataract Evaluation     Chief Complaint(s) and History of Present Illness(es)     Cataract Evaluation     Laterality: both eyes    Associated symptoms: blurred vision (RE)    Severity: mild    Onset: gradual    Frequency: constant    Course: gradually worsening    Pain scale: 0/10              Comments     Reina is here for a cataract evaluation right eye. Her vision RE has worsened over the past several months.     Gordon Padron COT 1:04 PM September 7, 2022

## 2022-09-08 ENCOUNTER — TELEPHONE (OUTPATIENT)
Dept: OPHTHALMOLOGY | Facility: CLINIC | Age: 67
End: 2022-09-08

## 2022-09-08 PROBLEM — H26.221: Status: ACTIVE | Noted: 2022-09-08

## 2022-09-08 NOTE — TELEPHONE ENCOUNTER
Called patient to schedule surgery with Dr. Melendez    Date of Surgery: 10/3    Location of surgery: [facility]    Pre-Op H&P: PCP    Pre/Post Imaging:  No    Discussed COVID-19 Testing: Yes    Post-Op Appt Date: 10/27    Surgery Packet Mailed: Yes      Additional comments: Spoke with patient and , they are aware of above dates, will review packet and call with any questions           Anna C. Schoenecker on 9/8/2022 at 12:39 PM

## 2022-09-12 NOTE — PROGRESS NOTES
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, right     Past Surgical History:   Procedure Laterality Date     Fibroidadenoma Left Breast       NO HISTORY OF SURGERY       Social History     Socioeconomic History     Marital status:      Spouse name: Not on file     Number of children: Not on file     Years of education: Not on file     Highest education level: Not on file   Occupational History     Not on file   Tobacco Use     Smoking status: Never Smoker     Smokeless tobacco: Never Used   Substance and Sexual Activity     Alcohol use: Yes     Alcohol/week: 5.8 - 11.7 standard drinks     Comment: social use prn     Drug use: No     Sexual activity: Not on file   Other Topics Concern     Parent/sibling w/ CABG, MI or angioplasty before 65F 55M? Not Asked   Social History Narrative    How much exercise per week? Walking, stairs    How much calcium per day? 2-3 servings       How much caffeine per day? 2-3 cups coffee    How much  vitamin D per day?      Do you/your family wear seatbelts?  Yes    Do you/your family use safety helmets? No    Do you/your family use sunscreen? No    Do you/your family keep firearms in the home? No    Do you/your family have a smoke detector(s)? Yes        Do you feel safe in your home? Yes    Has anyone ever touched you in an unwanted manner? No     Explain         September 2, 2014 Tamiko Villa LPN             Social Determinants of Health     Financial Resource Strain: Not on file   Food Insecurity: Not on file   Transportation Needs: Not on file   Physical Activity: Not on file   Stress: Not on file   Social Connections: Not on file   Intimate Partner Violence: Not on file   Housing Stability: Not on file     Family History   Problem Relation Age of Onset     Breast Cancer Mother         parkinsons     Osteoporosis Mother      Low Back Problems Mother      Breast Cancer Maternal Aunt      Other - See Comments Other         Inflammation joint in brother, maternal cousin      Diabetes Maternal Grandmother      Anxiety Disorder Father      Skin Cancer Father      Mental Illness Cousin      Alcoholism Paternal Grandfather      Glaucoma No family hx of      Macular Degeneration No family hx of      Retinal detachment No family hx of      Melanoma No family hx of      Hypertension No family hx of      SUBJECTIVE FINDINGS:  A 67 year old returns to clinic for onychomycosis, right hallux.  Tyloma, left foot.  She relates she is not using the Silvadene cream and intermittently uses tea tree oil.  She also relates she has a skin lesion on dorsal right foot that is improving.      OBJECTIVE FINDINGS:  Vascular status intact bilaterally.  She has left plantar 2-4 MPJs with nucleated callus.  She is using a donut pad on this.  She has left hallux thickening, dystrophy and subungual debris and brittlenes.  She has a right hallux nail that is thick, dystrophic with subungual debris, dystrophy, discoloration and some  surrounding dermatitis.  There is no erythema, no drainage, no odor, no calor.  She has incurvated dyscolered thickened toenails bilaterally to differing degrees.  She has a small, brown, nonraised discoloration, dorsal right foot.     ASSESSMENT AND PLAN:  Onychomycosis, right hallux.  Onychocryptosis and Onychomycosis bilaterally.  Tyloma, left foot.  She has rheumatoid arthritis.  She has a skin lesion on her dorsal foot.  Diagnosis and treatment options discussed with her.  I advised her to start the Silvadene cream again, she related she has this and will use it.  All the toenails were debrided or reduced bilaterally upon consent.  The left Hallux nail with pinpoint bleeding upon debridement, local wound care with Betadine and bandaid done upon consent and use discussed with her.  Tyloma left foot was sharp debrided with a 10 blade upon consent.  Return to clinic and see me in 2-3 months.  She opted for no Dermatology referral today.  We will observe the right foot lesion.  Previous notes reviewed.

## 2022-09-12 NOTE — NURSING NOTE
"Reason For Visit:   Chief Complaint   Patient presents with     RECHECK     2 month follow up.                 Allergies   Allergen Reactions     Barium Sulfate      Fosamax      Throat discomfort     No Clinical Screening - See Comments Hives     shrimp  Joints flared after getting possible \"white drink\" after CT/MRI in 2007            Frieda Menchaca LPN    "

## 2022-09-23 NOTE — TELEPHONE ENCOUNTER
Patient wanted to reschedule do to being sick, rescheduled patient and they are aware of the new date 11/14    Anna C. Schoenecker on 9/23/2022 at 12:58 PM

## 2022-10-19 PROBLEM — Z13.220 SCREENING FOR HYPERLIPIDEMIA: Status: ACTIVE | Noted: 2022-01-01

## 2022-10-19 PROBLEM — Z01.818 PREOPERATIVE EXAMINATION: Status: ACTIVE | Noted: 2022-01-01

## 2022-10-19 PROBLEM — R76.8 POSITIVE ANA (ANTINUCLEAR ANTIBODY): Status: ACTIVE | Noted: 2022-01-01

## 2022-10-19 PROBLEM — Z79.899 HIGH RISK MEDICATIONS (NOT ANTICOAGULANTS) LONG-TERM USE: Status: ACTIVE | Noted: 2022-01-01

## 2022-10-19 PROBLEM — M06.00 SERONEGATIVE RHEUMATOID ARTHRITIS (H): Status: ACTIVE | Noted: 2022-01-01

## 2022-10-19 PROBLEM — H26.9 CATARACT OF RIGHT EYE, UNSPECIFIED CATARACT TYPE: Status: ACTIVE | Noted: 2022-01-01

## 2022-10-19 NOTE — PROGRESS NOTES
[address], [address]  [facility] [address]  Phone: [phone]  Primary Provider: Diana Orantes  Pre-op Performing Provider: ALBA NAVA      PREOPERATIVE EVALUATION:  Today's date: 10/19/2022    Reina Conway is a 67 year old female who presents for a preoperative evaluation.    Surgical Information:  Surgery/Procedure: RIGHT EYE PHACOEMULSIFICATION, COMPLEX CATARACT, WITH STANDARD INTRAOCULAR LENS IMPLANT INSERTION  Synechialysis, possible goniosynechialysis right eye  RIGHT EYE MICROCATHETERIZATION, VISCODILATION OF SCHLEMM'S CANAL  Surgery Location: [facility]  Surgeon:   Surgery Date: 11/14/22  Time of Surgery: TBD  Where patient plans to recover: At home with family  Fax number for surgical facility: Note does not need to be faxed, will be available electronically in Epic.    Type of Anesthesia Anticipated: Local with MAC    Assessment & Plan     The proposed surgical procedure is considered LOW risk.    Preoperative examination  medically optimized for the procedure.    Screening for hyperlipidemia  - Lipid Profile    Osteopenia, unspecified location  Stable.    Seronegative rheumatoid arthritis (H)  Stable.     Cataract of right eye, unspecified cataract type  Scheduled for surgery    Rheumatoid arthritis of multiple sites without rheumatoid factor (H)  Stable. Continue methotrexate and folic acid   - CBC with platelets differential  - AST  - ALT  - Albumin level  - Creatinine  - CRP inflammation  - Erythrocyte sedimentation rate auto    Positive CATALINO (antinuclear antibody)  - CBC with platelets differential  - AST  - ALT  - Albumin level  - Creatinine  - CRP inflammation  - Erythrocyte sedimentation rate auto    Acute anterior uveitis of right eye  - CBC with platelets differential  - AST  - ALT  - Albumin level  - Creatinine  - CRP inflammation  - Erythrocyte sedimentation rate auto    High risk medications (not anticoagulants) long-term use  - CBC with  platelets differential  - AST  - ALT  - Albumin level  - Creatinine  - CRP inflammation  - Erythrocyte sedimentation rate auto    Risks and Recommendations:  The patient has the following additional risks and recommendations for perioperative complications:   - No identified additional risk factors other than previously addressed    Medication Instructions:  .  Avoid aspirin 7 days before the surgery. Avoid nonsteroidal anti-inflammatory pain medication like ibuprofen, Motrin, or Aleve 7 days before the surgery.  Tylenol can be used for pain.  Avoid any over the counter multivitamins or herbal supplement 7 days before surgery   You can resume these medications after surgery    Take Humira on 11/1/2022 and resume after the surgery per your surgeons recommendation.     RECOMMENDATION:  APPROVAL GIVEN to proceed with proposed procedure, without further diagnostic evaluation.    Subjective     HPI related to upcoming procedure:   Reina is a very pleasant 67 year old who presented to the clinic for preop.   She has RA and takes Humira and methotrexate   Patient denies chest pain, shortness of breath, palpitations, headache, lightheadedness, syncope, fever or chills. Patient is able to climb 1 flight of stairs without feeling short of breath or having chest pain.  Patient denies personal or family history of complications with anesthesia. Patient does not have a history of heart failure or TIA/stroke. Patient does not smoke and drinks alcohol - 6 glasses of wine per week      Preop Questions 10/18/2022   1. Have you ever had a heart attack or stroke? No   2. Have you ever had surgery on your heart or blood vessels, such as a stent placement, a coronary artery bypass, or surgery on an artery in your head, neck, heart, or legs? No   3. Do you have chest pain with activity? No   4. Do you have a history of  heart failure? No   5. Do you currently have a cold, bronchitis or symptoms of other infection? No   6. Do you have a  cough, shortness of breath, or wheezing? No   7. Do you or anyone in your family have previous history of blood clots? No   8. Do you or does anyone in your family have a serious bleeding problem such as prolonged bleeding following surgeries or cuts? No   9. Have you ever had problems with anemia or been told to take iron pills? No   10. Have you had any abnormal blood loss such as black, tarry or bloody stools, or abnormal vaginal bleeding? YES - blood in stool    11. Have you ever had a blood transfusion? No   12. Are you willing to have a blood transfusion if it is medically needed before, during, or after your surgery? Yes   13. Have you or any of your relatives ever had problems with anesthesia? No   14. Do you have sleep apnea, excessive snoring or daytime drowsiness? No   15. Do you have any artifical heart valves or other implanted medical devices like a pacemaker, defibrillator, or continuous glucose monitor? No   16. Do you have artificial joints? No   17. Are you allergic to latex? No       Health Care Directive:  Patient does not have a Health Care Directive or Living Will:     Preoperative Review of :   reviewed - no record of controlled substances prescribed.    Review of Systems   Constitutional: Positive for fatigue. Negative for chills and fever.   Respiratory: Negative for shortness of breath.    Cardiovascular: Negative for chest pain and palpitations.   Neurological: Negative for dizziness, syncope and light-headedness.       Patient Active Problem List    Diagnosis Date Noted     Preoperative examination 10/19/2022     Priority: Medium     Seronegative rheumatoid arthritis (H) 10/19/2022     Priority: Medium     -RF -[facility] -LASHAE panel  Enbrel 8/2010 to 2/2012 (stopped d/t to right anterior uveitis felt due to)  Humira started 2/2012  [place] until 3/2013 (see records)  Xrays        Screening for hyperlipidemia 10/19/2022     Priority: Medium     Cataract of right eye, unspecified cataract type  10/19/2022     Priority: Medium     Positive CATALINO (antinuclear antibody) 10/19/2022     Priority: Medium     High risk medications (not anticoagulants) long-term use 10/19/2022     Priority: Medium     Cataract in inflammatory disorder, right 09/08/2022     Priority: Medium     Added automatically from request for surgery [id number]       Methotrexate, long term, current use 12/22/2021     Priority: Medium     Adalimumab (Humira) long-term use 12/22/2021     Priority: Medium     Ocular hypertension, right eye 06/14/2021     Priority: Medium       IOP low 20s Apr 2021, restarted Cosopt. 6/21: IOP normal and RNFL normal. Keep Cosopt daily even 4/11/22 as RNFL stable        Acute anterior uveitis of right eye 09/22/2020     Priority: Medium       Treated 2011 with STK, then on prednisone 2011 for short period. Resolved on Humira and Methotrexate    8/2020: Flare with KP, 2+AC Cell, post synechiae, Int Uveitis. Keep Methotrexate, Humira, try Pred 20/10, and PF QID    10/2020: Improved KP, 1-2+ Cell, vit haze better. Taper off pred 5 x 2 wks, then ?increase Methotrexate if okay with Rheum. 4x/day x 2, 3x/day x 2, 2x/day until next visit    1/5/2021: Flare with large and small KP 2-3+ cell, IOP controlled off all systemic meds x 1 month. Start Durezol BID, stopped drops 3/2021 for COVID vaccine. 4/21: looks same as 1/21, restart Durezol BID used until 5/24. 6/21: Still active with 2+ Cell. Restart Durezol BID. 8/21: 1+ cell but more CME. Rec oral pred, did not do but better by 10/18/21. Continue DUrezol BID. 12/13/21: Off systemic therapy and drops for 1 to 2 weeks for Covid booster. Rare cell. Resume drops including Durezol twice daily until the end of January then daily    4/11/22: Trace cell on Durezol daily for 1 week. CPM with systemics. 7/11/22: Rare cell, keep CPM       High risk medication use 09/21/2020     Priority: Medium       Enbrel: Caused uveitis right eye in 2011?    Prednisone: Bursts, including for uveitis.  9/2020: Try 20/10 x 2, possible increase Methotrexate? ,still waiting on Rheum, taper to 5, off end Oct 2020. 8/30/21; Start 30/20/10. 9/2022:Periop pred 30 before phaco right eye     Humira 40mg q 14 days.Start 2011 off humira and MTX in May 2020-[place] 2020 x 6 weeks (3 doses) sore throat. Resumed Mid July 2020. Stopped early 12/20 due to cost, off 1/2021, then resumed 2/21, stopped 3/21 for COVID vaccine, then restarted 4/21    Methotrexate 15mg q 7 days since 2011, maybe up to 20 mg weekly, mild nausea. (Dr. Batista/Tyson Alicea plan to increase dose). Might add Plaquenil 10/2020 if need more coverage. Stopped Methotrexate early 12/20 due to cost, Dr. Batista recs resumed 2/21, stopped 3/21 for COVID vaccine, then restarted 4/21. Dr. Batista would like to reduce from 17.5 to 15. I asked to continue. Lester reduced to 15 late 9/2021. 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     9/2020: Active on exam, FA, angiographic CME with active anterior uveitis. Try Pred 20/10, increase methotrexate? (still waiting on Rheum), improved vitreous and AC. Taper to 5 x 2 weeks,     1/5/20: 1+ cell, 1+ haze, very active Ant Uveitis off systemic meds 1 month. Start Durezol BID used stopped drops and IMT 3/20 for COVID Vaccine. 4/21: Same as 1/21, no CME. Restart Drops (on IMT 2 wks). 6/21: One cyst, int uveitis better. Restart Durezol BID. 8/21: less haze, but more CME. Start pred 30/20/10--did not start. Stable 10/2021 and 12/2021       Cystoid macular edema of right eye 09/21/2020     Priority: Medium     Diagnosed previously in 2011, treated with STK.   9/2020: None by OCT, but present by FA. Try  pred 20/10. 1/5/2021: early subfoveal disruption, no maricruz CME. Start Durezol BID. 4/22: No CME but anterior/intermediate  still active. (off drops 1 month, off IMT 1 month). Restart Durezol BID. Used 1 month, but stopped 5/24. 6/21: One small cyst. Restart BID. Stopped drops 1 month more CME 8/30/21. Restart pred 30/20/10.-- did not do oral pred, but CME better on drops 10/18/21. CPM, no STK, no prednisone. 12/13/21: No recurrence. Continue Humira, methotrexate. Durezol twice daily until the end of January 2022 then daily. 4/11/22: NO cell on drops daily  7/11/22: NO cell on Durezol daily.        Muscle tension dysphonia 08/16/2019     Priority: Medium     Myofascial pain 08/16/2019     Priority: Medium     Pharyngeal dysphagia 08/15/2019     Priority: Medium     Mechanical limb problems 10/14/2016     Priority: Medium     Pain in both wrists 10/07/2016     Priority: Medium     Rheumatoid arthritis of multiple sites without rheumatoid factor (H) 02/19/2016     Priority: Medium     Osteopenia 01/14/2016     Priority: Medium     Bunion 09/02/2014     Priority: Medium     Hammer toe 02/21/2013     Priority: Medium      Past Medical History:   Diagnosis Date     Anterior uveitis     secondary to Enbrel     Fibroid      high in 2007 then normalized     History of Graves' disease      Hyperlipidemia LDL goal < 130     declined meication     Menopause 2008     Osteopenia 1/14/2016     Osteoporosis     Osteopenia borderline osteoporosis     RA (rheumatoid arthritis) (H)      Right posterior uveitis      Seronegative rheumatoid arthritis (H)     dx Dr. Frost     Uveitis      Past Surgical History:   Procedure Laterality Date     Fibroidadenoma Left Breast       NO HISTORY OF SURGERY       Current Outpatient Medications   Medication Sig Dispense Refill     adalimumab (HUMIRA PEN) 40 MG/0.8ML pen kit Inject 0.8 mLs (40 mg) Subcutaneous every 14 days 2 each 5     difluprednate (DUREZOL) 0.05 % ophthalmic emulsion Place 1 drop into the right eye in the morning. 5 mL 4     dorzolamide-timolol (COSOPT) 2-0.5 % ophthalmic solution Place 1 drop  "into the right eye in the morning. 10 mL 5     folic acid (FOLVITE) 1 MG tablet Take 2 tablets (2,000 mcg) by mouth daily 180 tablet 3     methotrexate sodium 2.5 MG TABS Take 7 tablets (17.5 mg) by mouth every 7 days (Patient taking differently: Take 6 tablets by mouth every 7 days) 84 tablet 1     predniSONE (DELTASONE) 10 MG tablet Starting 3 days before surgery, take 30 mg (3 pills) each AM. On the day of surgery, take the full dose with your first meal after surgery. Then continue 30 mg (3 pills) each AM until the next visit with Dr. Velazquez 90 tablet 0     silver sulfADIAZINE (SILVADENE) 1 % external cream Apply topically 2 times daily To right big toenail. 85 g 3       Allergies   Allergen Reactions     Barium Sulfate      Fosamax      Throat discomfort     No Clinical Screening - See Comments Hives     shrimp  Joints flared after getting possible \"white drink\" after CT/MRI in 2007         Social History     Tobacco Use     Smoking status: Never     Smokeless tobacco: Never   Substance Use Topics     Alcohol use: Yes     Alcohol/week: 5.8 - 11.7 standard drinks     Comment: social use prn     History   Drug Use No         Objective     BP (!) 145/97   Pulse 65   Temp 97.4  F (36.3  C) (Oral)   Resp 16   Ht 1.727 m (5' 7.99\")   Wt 68.1 kg (150 lb 1.6 oz)   SpO2 98%   BMI 22.83 kg/m      Physical Exam  Vitals reviewed.   Constitutional:       Appearance: Normal appearance.   HENT:      Mouth/Throat:      Mouth: Mucous membranes are moist.      Pharynx: Oropharynx is clear. No oropharyngeal exudate or posterior oropharyngeal erythema.   Cardiovascular:      Rate and Rhythm: Normal rate and regular rhythm.      Heart sounds: Normal heart sounds. No murmur heard.    No gallop.   Pulmonary:      Effort: Pulmonary effort is normal. No respiratory distress.      Breath sounds: Normal breath sounds. No stridor. No wheezing, rhonchi or rales.   Musculoskeletal:         General: Normal range of motion.   Skin:   "   General: Skin is warm and dry.   Neurological:      General: No focal deficit present.      Mental Status: She is alert.   Psychiatric:         Mood and Affect: Mood normal.       Recent Labs   Lab Test 12/16/21  1103 08/05/21  1202   HGB 16.6* 17.6*    264   CR 0.72 0.73        Diagnostics:  Labs pending at this time.  Results will be reviewed when available.   No EKG required, no history of coronary heart disease, significant arrhythmia, peripheral arterial disease or other structural heart disease.    Revised Cardiac Risk Index (RCRI):  The patient has the following serious cardiovascular risks for perioperative complications:   - No serious cardiac risks = 0 points     RCRI Interpretation: 0 points: Class I (very low risk - 0.4% complication rate)       Signed Electronically by: ALBA NAVA MD  Copy of this evaluation report is provided to requesting physician.

## 2022-10-19 NOTE — H&P (VIEW-ONLY)
[address], [address]  [facility] [address]  Phone: [phone]  Primary Provider: Diana Orantes  Pre-op Performing Provider: ALBA NAVA      PREOPERATIVE EVALUATION:  Today's date: 10/19/2022    Reina Conway is a 67 year old female who presents for a preoperative evaluation.    Surgical Information:  Surgery/Procedure: RIGHT EYE PHACOEMULSIFICATION, COMPLEX CATARACT, WITH STANDARD INTRAOCULAR LENS IMPLANT INSERTION  Synechialysis, possible goniosynechialysis right eye  RIGHT EYE MICROCATHETERIZATION, VISCODILATION OF SCHLEMM'S CANAL  [place] [facility] [place]:   Surgery Date: 11/14/22  Time of Surgery: TBD  Where patient plans to recover: At home with family  Fax number for surgical facility: Note does not need to be faxed, will be available electronically in Epic.    Type of Anesthesia Anticipated: Local with MAC    Assessment & Plan     The proposed surgical procedure is considered LOW risk.    Preoperative examination  medically optimized for the procedure.    Screening for hyperlipidemia  - Lipid Profile    Osteopenia, unspecified location  Stable.    Seronegative rheumatoid arthritis (H)  Stable.     Cataract of right eye, unspecified cataract type  Scheduled for surgery    Rheumatoid arthritis of multiple sites without rheumatoid factor (H)  Stable. Continue methotrexate and folic acid   - CBC with platelets differential  - AST  - ALT  - Albumin level  - Creatinine  - CRP inflammation  - Erythrocyte sedimentation rate auto    Positive CATALINO (antinuclear antibody)  - CBC with platelets differential  - AST  - ALT  - Albumin level  - Creatinine  - CRP inflammation  - Erythrocyte sedimentation rate auto    Acute anterior uveitis of right eye  - CBC with platelets differential  - AST  - ALT  - Albumin level  - Creatinine  - CRP inflammation  - Erythrocyte sedimentation rate auto    High risk medications (not anticoagulants) long-term use  - CBC with  platelets differential  - AST  - ALT  - Albumin level  - Creatinine  - CRP inflammation  - Erythrocyte sedimentation rate auto    Risks and Recommendations:  The patient has the following additional risks and recommendations for perioperative complications:   - No identified additional risk factors other than previously addressed    Medication Instructions:  .  Avoid aspirin 7 days before the surgery. Avoid nonsteroidal anti-inflammatory pain medication like ibuprofen, Motrin, or Aleve 7 days before the surgery.  Tylenol can be used for pain.  Avoid any over the counter multivitamins or herbal supplement 7 days before surgery   You can resume these medications after surgery    Take Humira on 11/1/2022 and resume after the surgery per your surgeons recommendation.     RECOMMENDATION:  APPROVAL GIVEN to proceed with proposed procedure, without further diagnostic evaluation.    Subjective     HPI related to upcoming procedure:   Reina is a very pleasant 67 year old who presented to the clinic for preop.   She has RA and takes Humira and methotrexate   Patient denies chest pain, shortness of breath, palpitations, headache, lightheadedness, syncope, fever or chills. Patient is able to climb 1 flight of stairs without feeling short of breath or having chest pain.  Patient denies personal or family history of complications with anesthesia. Patient does not have a history of heart failure or TIA/stroke. Patient does not smoke and drinks alcohol - 6 glasses of wine per week      Preop Questions 10/18/2022   1. Have you ever had a heart attack or stroke? No   2. Have you ever had surgery on your heart or blood vessels, such as a stent placement, a coronary artery bypass, or surgery on an artery in your head, neck, heart, or legs? No   3. Do you have chest pain with activity? No   4. Do you have a history of  heart failure? No   5. Do you currently have a cold, bronchitis or symptoms of other infection? No   6. Do you have a  cough, shortness of breath, or wheezing? No   7. Do you or anyone in your family have previous history of blood clots? No   8. Do you or does anyone in your family have a serious bleeding problem such as prolonged bleeding following surgeries or cuts? No   9. Have you ever had problems with anemia or been told to take iron pills? No   10. Have you had any abnormal blood loss such as black, tarry or bloody stools, or abnormal vaginal bleeding? YES - blood in stool    11. Have you ever had a blood transfusion? No   12. Are you willing to have a blood transfusion if it is medically needed before, during, or after your surgery? Yes   13. Have you or any of your relatives ever had problems with anesthesia? No   14. Do you have sleep apnea, excessive snoring or daytime drowsiness? No   15. Do you have any artifical heart valves or other implanted medical devices like a pacemaker, defibrillator, or continuous glucose monitor? No   16. Do you have artificial joints? No   17. Are you allergic to latex? No       Health Care Directive:  Patient does not have a Health Care Directive or Living Will:     Preoperative Review of :   reviewed - no record of controlled substances prescribed.    Review of Systems   Constitutional: Positive for fatigue. Negative for chills and fever.   Respiratory: Negative for shortness of breath.    Cardiovascular: Negative for chest pain and palpitations.   Neurological: Negative for dizziness, syncope and light-headedness.       Patient Active Problem List    Diagnosis Date Noted     Preoperative examination 10/19/2022     Priority: Medium     Seronegative rheumatoid arthritis (H) 10/19/2022     Priority: Medium     -RF -[facility]P -LASHAE panel  Enbrel 8/2010 to 2/2012 (stopped d/t to right anterior uveitis felt due to)  Humira started 2/2012  [place] until 3/2013 (see records)  Xrays        Screening for hyperlipidemia 10/19/2022     Priority: Medium     Cataract of right eye, unspecified cataract type  10/19/2022     Priority: Medium     Positive CATALINO (antinuclear antibody) 10/19/2022     Priority: Medium     High risk medications (not anticoagulants) long-term use 10/19/2022     Priority: Medium     Cataract in inflammatory disorder, right 09/08/2022     Priority: Medium     Added automatically from request for surgery [id number]       Methotrexate, long term, current use 12/22/2021     Priority: Medium     Adalimumab (Humira) long-term use 12/22/2021     Priority: Medium     Ocular hypertension, right eye 06/14/2021     Priority: Medium       IOP low 20s Apr 2021, restarted Cosopt. 6/21: IOP normal and RNFL normal. Keep Cosopt daily even 4/11/22 as RNFL stable        Acute anterior uveitis of right eye 09/22/2020     Priority: Medium       Treated 2011 with STK, then on prednisone 2011 for short period. Resolved on Humira and Methotrexate    8/2020: Flare with KP, 2+AC Cell, post synechiae, Int Uveitis. Keep Methotrexate, Humira, try Pred 20/10, and PF QID    10/2020: Improved KP, 1-2+ Cell, vit haze better. Taper off pred 5 x 2 wks, then ?increase Methotrexate if okay with Rheum. 4x/day x 2, 3x/day x 2, 2x/day until next visit    1/5/2021: Flare with large and small KP 2-3+ cell, IOP controlled off all systemic meds x 1 month. Start Durezol BID, stopped drops 3/2021 for COVID vaccine. 4/21: looks same as 1/21, restart Durezol BID used until 5/24. 6/21: Still active with 2+ Cell. Restart Durezol BID. 8/21: 1+ cell but more CME. Rec oral pred, did not do but better by 10/18/21. Continue DUrezol BID. 12/13/21: Off systemic therapy and drops for 1 to 2 weeks for Covid booster. Rare cell. Resume drops including Durezol twice daily until the end of January then daily    4/11/22: Trace cell on Durezol daily for 1 week. CPM with systemics. 7/11/22: Rare cell, keep CPM       High risk medication use 09/21/2020     Priority: Medium       Enbrel: Caused uveitis right eye in 2011?    Prednisone: Bursts, including for uveitis.  9/2020: Try 20/10 x 2, possible increase Methotrexate? ,still waiting on Rheum, taper to 5, off end Oct 2020. 8/30/21; Start 30/20/10. 9/2022:Periop pred 30 before phaco right eye     Humira 40mg q 14 days.Start 2011 off humira and MTX in May 2020-Solange 2020 x 6 weeks (3 doses) sore throat. Resumed Mid July 2020. Stopped early 12/20 due to cost, off 1/2021, then resumed 2/21, stopped 3/21 for COVID vaccine, then restarted 4/21    Methotrexate 15mg q 7 days since 2011, maybe up to 20 mg weekly, mild nausea. (Dr. Batista/Tyson Alicea plan to increase dose). Might add Plaquenil 10/2020 if need more coverage. Stopped Methotrexate early 12/20 due to cost, Dr. Batista recs resumed 2/21, stopped 3/21 for COVID vaccine, then restarted 4/21. Dr. Batista would like to reduce from 17.5 to 15. I asked to continue. Lester reduced to 15 late 9/2021. 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     9/2020: Active on exam, FA, angiographic CME with active anterior uveitis. Try Pred 20/10, increase methotrexate? (still waiting on Rheum), improved vitreous and AC. Taper to 5 x 2 weeks,     1/5/20: 1+ cell, 1+ haze, very active Ant Uveitis off systemic meds 1 month. Start Durezol BID used stopped drops and IMT 3/20 for COVID Vaccine. 4/21: Same as 1/21, no CME. Restart Drops (on IMT 2 wks). 6/21: One cyst, int uveitis better. Restart Durezol BID. 8/21: less haze, but more CME. Start pred 30/20/10--did not start. Stable 10/2021 and 12/2021       Cystoid macular edema of right eye 09/21/2020     Priority: Medium     Diagnosed previously in 2011, treated with STK.   9/2020: None by OCT, but present by FA. Try  pred 20/10. 1/5/2021: early subfoveal disruption, no maricruz CME. Start Durezol BID. 4/22: No CME but anterior/intermediate  still active. (off drops 1 month, off IMT 1 month). Restart Durezol BID. Used 1 month, but stopped 5/24. 6/21: One small cyst. Restart BID. Stopped drops 1 month more CME 8/30/21. Restart pred 30/20/10.-- did not do oral pred, but CME better on drops 10/18/21. CPM, no STK, no prednisone. 12/13/21: No recurrence. Continue Humira, methotrexate. Durezol twice daily until the end of January 2022 then daily. 4/11/22: NO cell on drops daily  7/11/22: NO cell on Durezol daily.        Muscle tension dysphonia 08/16/2019     Priority: Medium     Myofascial pain 08/16/2019     Priority: Medium     Pharyngeal dysphagia 08/15/2019     Priority: Medium     Mechanical limb problems 10/14/2016     Priority: Medium     Pain in both wrists 10/07/2016     Priority: Medium     Rheumatoid arthritis of multiple sites without rheumatoid factor (H) 02/19/2016     Priority: Medium     Osteopenia 01/14/2016     Priority: Medium     Bunion 09/02/2014     Priority: Medium     Hammer toe 02/21/2013     Priority: Medium      Past Medical History:   Diagnosis Date     Anterior uveitis     secondary to Enbrel     Fibroid      high in 2007 then normalized     History of Graves' disease      Hyperlipidemia LDL goal < 130     declined meication     Menopause 2008     Osteopenia 1/14/2016     Osteoporosis     Osteopenia borderline osteoporosis     RA (rheumatoid arthritis) (H)      Right posterior uveitis      Seronegative rheumatoid arthritis (H)     dx Dr. Frost     Uveitis      Past Surgical History:   Procedure Laterality Date     Fibroidadenoma Left Breast       NO HISTORY OF SURGERY       Current Outpatient Medications   Medication Sig Dispense Refill     adalimumab (HUMIRA PEN) 40 MG/0.8ML pen kit Inject 0.8 mLs (40 mg) Subcutaneous every 14 days 2 each 5     difluprednate (DUREZOL) 0.05 % ophthalmic emulsion Place 1 drop into the right eye in the morning. 5 mL 4     dorzolamide-timolol (COSOPT) 2-0.5 % ophthalmic solution Place 1 drop  "into the right eye in the morning. 10 mL 5     folic acid (FOLVITE) 1 MG tablet Take 2 tablets (2,000 mcg) by mouth daily 180 tablet 3     methotrexate sodium 2.5 MG TABS Take 7 tablets (17.5 mg) by mouth every 7 days (Patient taking differently: Take 6 tablets by mouth every 7 days) 84 tablet 1     predniSONE (DELTASONE) 10 MG tablet Starting 3 days before surgery, take 30 mg (3 pills) each AM. On the day of surgery, take the full dose with your first meal after surgery. Then continue 30 mg (3 pills) each AM until the next visit with Dr. Velazquez 90 tablet 0     silver sulfADIAZINE (SILVADENE) 1 % external cream Apply topically 2 times daily To right big toenail. 85 g 3       Allergies   Allergen Reactions     Barium Sulfate      Fosamax      Throat discomfort     No Clinical Screening - See Comments Hives     shrimp  Joints flared after getting possible \"white drink\" after CT/MRI in 2007         Social History     Tobacco Use     Smoking status: Never     Smokeless tobacco: Never   Substance Use Topics     Alcohol use: Yes     Alcohol/week: 5.8 - 11.7 standard drinks     Comment: social use prn     History   Drug Use No         Objective     BP (!) 145/97   Pulse 65   Temp 97.4  F (36.3  C) (Oral)   Resp 16   Ht 1.727 m (5' 7.99\")   Wt 68.1 kg (150 lb 1.6 oz)   SpO2 98%   BMI 22.83 kg/m      Physical Exam  Vitals reviewed.   Constitutional:       Appearance: Normal appearance.   HENT:      Mouth/Throat:      Mouth: Mucous membranes are moist.      Pharynx: Oropharynx is clear. No oropharyngeal exudate or posterior oropharyngeal erythema.   Cardiovascular:      Rate and Rhythm: Normal rate and regular rhythm.      Heart sounds: Normal heart sounds. No murmur heard.    No gallop.   Pulmonary:      Effort: Pulmonary effort is normal. No respiratory distress.      Breath sounds: Normal breath sounds. No stridor. No wheezing, rhonchi or rales.   Musculoskeletal:         General: Normal range of motion.   Skin:   "   General: Skin is warm and dry.   Neurological:      General: No focal deficit present.      Mental Status: She is alert.   Psychiatric:         Mood and Affect: Mood normal.       Recent Labs   Lab Test 12/16/21  1103 08/05/21  1202   HGB 16.6* 17.6*    264   CR 0.72 0.73        Diagnostics:  Labs pending at this time.  Results will be reviewed when available.   No EKG required, no history of coronary heart disease, significant arrhythmia, peripheral arterial disease or other structural heart disease.    Revised Cardiac Risk Index (RCRI):  The patient has the following serious cardiovascular risks for perioperative complications:   - No serious cardiac risks = 0 points     RCRI Interpretation: 0 points: Class I (very low risk - 0.4% complication rate)       Signed Electronically by: ALBA NAVA MD  Copy of this evaluation report is provided to requesting physician.

## 2022-10-19 NOTE — PATIENT INSTRUCTIONS
Avoid aspirin 7 days before the surgery. Avoid nonsteroidal anti-inflammatory pain medication like ibuprofen, Motrin, or Aleve 7 days before the surgery.  Tylenol can be used for pain.  Avoid any over the counter multivitamins or herbal supplement 7 days before surgery   You can resume these medications after surgery    Take Humira on 11/1/2022 and resume after the surgery per your surgeons recommendation.

## 2022-11-02 NOTE — TELEPHONE ENCOUNTER
Left message around 1515 after review with Dr. Melendez and Dr. Velazquez    Recommend resuming the methotrexate and humira this week in lieu of any new illness.    Provided information Dr. Velazquez wanting to start oral prednisone 3 days before surgery and ask to call to review if was able to  the prescription for oral prednisone and review and questions    Direct number was provided    Leonardo Girard RN 3:55 PM 11/02/22            [facility] Call Center    Phone Message    May a detailed message be left on voicemail: yes     Reason for Call: Other: Pt has surgery on the 14th and skipped her Humira and Methotrexate last week since she wasn't feeling well. But she is wondering if she should skip the medication these next few weeks since it's 2 weeks before her surgery.  Please reach out to pt for some advise.    Than You!    Action Taken: Message routed to:  [facility] ([facility] eye    Travel Screening: Not Applicable

## 2022-11-08 NOTE — TELEPHONE ENCOUNTER
Last Clinic Visit: 4/7/2022 [facility]

## 2022-11-09 NOTE — TELEPHONE ENCOUNTER
Prior Authorization Not Needed per Insurance    Medication: Humira  Insurance Company:    Expected CoPay:      Pharmacy Filling the Rx: [facility] - [place], [place] - [address]  Pharmacy Notified: Yes  Patient Notified: Yes    PRIOR AUTHORIZATION EXPIRES ON 01/07/24   Pt. Cleared for discharge. Verbalizes understanding of discharge instructions. Left ambulatory, alert, oriented and in no acute distress.

## 2022-11-14 NOTE — BRIEF OP NOTE
[facility] Brief Operative Note    Pre-operative diagnosis: Cataract in inflammatory disorder, right [H26.221]  Intermediate uveitis of right eye [H30.21]  Ocular hypertension, right eye [H40.051]   Post-operative diagnosis Pseudophakia, Right eye    Procedure: Procedure(s):  RIGHT EYE PHACOEMULSIFICATION, COMPLEX CATARACT, WITH STANDARD INTRAOCULAR LENS IMPLANT INSERTION  Synechialysis, goniosynechialysis right eye  RIGHT EYE MICROCATHETERIZATION, VISCODILATION OF SCHLEMM'S CANAL   Surgeon(s): Surgeon(s) and Role:     * Maximilian Melendez MD - Primary     * Mamta Addison MD - Resident - Observing   Estimated blood loss: < 1 mL     Specimens: * No specimens in log *   Findings: Synechiae

## 2022-11-14 NOTE — OP NOTE
PREOPERATIVE DIAGNOSIS:   1. Ocular hypertension, right eye    2.  3. Cataract in inflammatory disorder, right   Intermediate uveitis, right eye    POSTOPERATIVE DIAGNOSIS: Same   PROCEDURES:   1. Cataract extraction with intraocular lens implant right eye   2. Synechialysis, right eye   3. Goniosynechialysis, right eye   4. Viscodilation of Schlemm's Canal, right eye   5. Trabeculotomy, right eye   SURGEON: Maximilian Melendez M.D.  Assistant: Mamta Addison MD   INDICATIONS: The patient Reina Conway presented to the eye clinic with decreased vision secondary to cataract in the right eye. The risks, benefits and alternatives to cataract extraction were discussed. The patient elected to proceed. All questions were answered to the patient's satisfaction.   DESCRIPTION OF PROCEDURE:Prior to the procedure, appropriate cardiac and respiratory monitors were applied to the patient.  In the pre-operative holding area, under monitored anesthesia care, a retrobulbar injection of 2% lidocaine with hyaluronidase was given.  The patient was brought to the operating room where a surgical pause was carried out to identify with all members of the surgical team the correct surgical site.  With adequate anesthesia and akinesia, the right eye was prepped and draped in the usual sterile fashion. A lid speculum was placed, and the operating microscope was rotated into position. A paracentesis was created.  Marginal iris synechiae were lysed with a cannula and epinephrine injected into the anterior chamber.  Through this limbal paracentesis, the anterior chamber was inflated with viscoelastic in an Arshinoff shell technique. A temporal wound was created at the limbus using a 2.5 mm blade. Due to poor mydriasis, the iris was gently streched with two Kuglen hooks.  Persistent miosis was noted and it was elected to place four iris hooks for further pupillary expansion. The cohesive viscoelastic was rinsed from the eye.  Trypan blue  stain was injected under air to stain the anterior lens capsule.  Trypan blue stain was elected due to a poor red reflex in the setting of dense cataract. A capsulorrhexis was initiated using a bent 25-gauge needle and was completed in continuous and circular fashion using the capsulorrhexis forceps. The lens nucleus was hydrodissected using balanced salt solution.  The lens nucleus was rotated and removed using phacoemulsification in a stop and chop technique.  Residual cortical material was removed using irrigation-aspiration.  The capsular bag was reinflated to its maximal extent with cohesive viscoelastic.  A 23.5 diopter [id number] was inserted into the capsular bag.  The lens power selected was reviewed using the intraocular lens power measurements that were obtained preoperatively to confirm that the correct lens was selected for the desired post-operative refractive state. The iris hooks were removed.  The patient and the operating microscope were repositioned to allow for direct gonioscopy.  A [place]-Sam lens was placed to visualize the nasal angle anatomy.  The intended insertion site at the trabecular meshwork was identified.  The Schlemm's canal was cannulated and the OMNI microstent was gently advanced into the canal to complete 360 degrees of viscodilation.  The device was redeployed nasally and a 90 degree trabeculotomy was created.  A microforceps was used to lyse a broad area of peripheral anterior synechiae superonasally.  The remaining viscoelastic was removed from the anterior chamber in its entirety, and the wounds were hydrated and found to be self-sealing.  During anterior chamber filling, the nasal conjunctiva was observed to rodrigo well.  Intracameral moxifloxacin was administered. Tactile pressure was confirmed to be in a normal range.  SubTenon kenalog (20 mg) was injected..  The lid speculum was removed and a patch and shield were applied.  The patient tolerated the procedure well, and there  were no complications.     PLAN: The patient will be discharged to home and will follow up tomorrow morning in the eye clinic.  EBL:  < 1 mL   Complications:  None    Implant Name Type Inv. Item Serial No.  Lot No. LRB No. Used Action   EYE IMP IOL QUINTEN ACRYSOF UV SA60WF 23.5D - [id number] Lens/Eye Implant EYE IMP IOL QUINTEN ACRYSOF UV SA60WF 23.5D [id number] [employer]  Right 1 Implanted         Attending Physician Procedure Attestation: I was present for the entire procedure       Maximilian Melendez MD  , Comprehensive Ophthalmology  Department of Ophthalmology and Visual Neurosciences  [facility]

## 2022-11-14 NOTE — ANESTHESIA PREPROCEDURE EVALUATION
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 Frost     Uveitis       Past Surgical History:   Procedure Laterality Date     Fibroidadenoma Left Breast       NO HISTORY OF SURGERY        Allergies   Allergen Reactions     Barium Sulfate      Fosamax      Throat discomfort     No Clinical Screening - See Comments Hives     shrimp  Joints flared after getting possible \"white drink\" after CT/MRI in        Social History     Tobacco Use     Smoking status: Never     Smokeless tobacco: Never   Substance Use Topics     Alcohol use: Yes     Alcohol/week: 5.8 - 11.7 standard drinks     Comment: social use prn      Wt Readings from Last 1 Encounters:   22 68 kg (150 lb)        Anesthesia Evaluation            ROS/MED HX  ENT/Pulmonary:       Neurologic:     (+) peripheral neuropathy, - LBP.     Cardiovascular:       METS/Exercise Tolerance:     Hematologic:       Musculoskeletal:   (+) arthritis,     GI/Hepatic:       Renal/Genitourinary:       Endo:     (+) thyroid problem, hypothyroidism hyperthyroidism,     Psychiatric/Substance Use:       Infectious Disease:       Malignancy:       Other:      (+) , H/O Chronic Pain,           OUTSIDE LABS:  CBC:   Lab Results   Component " Value Date    WBC 7.4 10/19/2022    WBC 6.3 12/16/2021    HGB 16.7 (H) 10/19/2022    HGB 16.6 (H) 12/16/2021    HCT 50.1 (H) 10/19/2022    HCT 49.5 (H) 12/16/2021     10/19/2022     12/16/2021     BMP:   Lab Results   Component Value Date     09/14/2010     10/06/2009    POTASSIUM 4.7 09/14/2010    POTASSIUM 4.2 10/06/2009    CHLORIDE 103 09/14/2010    CHLORIDE 105 10/06/2009    CO2 31 09/14/2010    CO2 27 10/06/2009    BUN 13 09/14/2010    BUN 20 10/06/2009    CR 0.72 10/19/2022    CR 0.72 12/16/2021    GLC 87 02/26/2015    GLC 99 09/14/2010     COAGS: No results found for: PTT, INR, FIBR  POC: No results found for: BGM, HCG, HCGS  HEPATIC:   Lab Results   Component Value Date    ALBUMIN 4.1 10/19/2022    PROTTOTAL 8.5 12/22/2010    ALT 33 10/19/2022    AST 17 10/19/2022    GGT 32 09/25/2014    ALKPHOS 82 09/25/2014    BILITOTAL 1.7 (H) 12/22/2010     OTHER:   Lab Results   Component Value Date    A1C 5.6 02/26/2015    MICHELLE 9.2 09/14/2010    TSH 0.72 04/21/2021    T4 1.35 09/14/2010    T3 122 04/03/2007    CRP 5.21 (H) 10/19/2022    SED 7 10/19/2022       Anesthesia Plan    ASA Status:  2      Anesthesia Type: MAC.     - Reason for MAC: immobility needed, straight local not clinically adequate              Consents    Anesthesia Plan(s) and associated risks, benefits, and realistic alternatives discussed. Questions answered and patient/representative(s) expressed understanding.     - Discussed: Risks, Benefits and Alternatives for BOTH SEDATION and the PROCEDURE were discussed     - Discussed with:  Patient      - Extended Intubation/Ventilatory Support Discussed: No.      - Patient is DNR/DNI Status: No    Use of blood products discussed: No .     Postoperative Care    Pain management: IV analgesics, Oral pain medications.        Comments:                David Zendejas MD, MD

## 2022-11-14 NOTE — ANESTHESIA CARE TRANSFER NOTE
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    Anesthesia Type:   MAC     Note:    Oropharynx: oropharynx clear of all foreign objects and spontaneously breathing  Level of Consciousness: awake  Oxygen Supplementation: room air    Independent Airway: airway patency satisfactory and stable  Dentition: dentition unchanged  Vital Signs Stable: post-procedure vital signs reviewed and stable  Report to RN Given: handoff report given  Patient transferred to: Phase II    Handoff Report: Identifed the Patient, Identified the Reponsible Provider, Reviewed the pertinent medical history, Discussed the surgical course, Reviewed Intra-OP anesthesia mangement and issues during anesthesia, Set expectations for post-procedure period and Allowed opportunity for questions and acknowledgement of understanding      Vitals:  Vitals Value Taken Time   /76 11/14/22 1225   Temp 36.3  C (97.4  F) 11/14/22 1225   Pulse 52 11/14/22 1225   Resp 14 11/14/22 1225   SpO2 98 % 11/14/22 1225       Electronically Signed By: ANDREEA WEISS  November 14, 2022  12:27 PM

## 2022-11-14 NOTE — DISCHARGE INSTRUCTIONS
[facility]  Home Care Following Anesthesia  For 24 hours after surgery:  Get plenty of rest.  A responsible adult must stay with you for at least 24 hours after you leave the surgery center.  Do not drive or use heavy equipment.  If you have weakness or tingling, don't drive or use heavy equipment until this feeling goes away.   Do not drink alcohol.   Avoid strenuous or risky activities.  Ask for help when climbing stairs.  You may feel lightheaded.  IF so, sit for a few minutes before standing.  Have someone help you get up.   If you have nausea (feel sick to your stomach): Drink only clear liquids such as apple juice, ginger ale, broth or 7-Up.  Rest may also help.  Be sure to drink enough fluids.  Move to a regular diet as you feel able.   You may have a slight fever.  Call the doctor if your fever is over 100 F (37.7 C) (taken under the tongue) or lasts longer than 24 hours.  You may have a dry mouth, a sore throat, muscle aches or trouble sleeping. These should go away after 24 hours.  Do not make important or legal decisions.   It is recommended to avoid smoking.               Tips for taking pain medications  To get the best pain relief possible, remember these points:  Take pain medications as directed, before pain becomes severe.  Pain medication can upset your stomach: taking it with food may help.  Constipation is a common side effect of pain medication. Drink plenty of  fluids.  Eat foods high in fiber. Take a stool softener if recommended by your doctor or pharmacist.  Do not drink alcohol, drive or operate machinery while taking pain medications.  Ask about other ways to control pain, such as with heat, ice or relaxation.    Tylenol/Acetaminophen Consumption  To help encourage the safe use of acetaminophen, the makers of TYLENOL  have lowered the maximum daily dose for single-ingredient Extra Strength TYLENOL  (acetaminophen) products sold in the [place] from 8 pills  per day (4,000 mg) to 6 pills per day (3,000 mg). The dosing interval has also changed from 2 pills every 4-6 hours to 2 pills every 6 hours.  If you feel your pain relief is insufficient, you may take Tylenol/Acetaminophen in addition to your narcotic pain medication.   Be careful not to exceed 3,000 mg of Tylenol/Acetaminophen in a 24 hour period from all sources.  If you are taking extra strength Tylenol/acetaminophen (500 mg), the maximum dose is 6 tablets in 24 hours.  If you are taking regular strength acetaminophen (325 mg), the maximum dose is 9 tablets in 24 hours.    Call a doctor for any of the following:  Signs of infection (fever, growing tenderness at the surgery site, a large amount of drainage or bleeding, severe pain, foul-smelling drainage, redness, swelling).  It has been over 8 to 10 hours since surgery and you are still not able to urinate (pass water).  Headache for over 24 hours.  Numbness, tingling or weakness the day after surgery (if you had spinal anesthesia).  Signs of Covid-19 infection (temperature over 100 degrees, shortness of breath, cough, loss of taste/smell, generalized body aches, persistent headache, chills, sore throat, nausea/vomiting/diarrhea)  Your doctor is:       Dr. Maximilian Melendez, Ophthalmology: [phone]               Or dial [phone] and ask for the resident on call for:  Ophthalmology  For emergency care, call the:  [facility]:  [phone] (TTY for hearing impaired: [phone])

## 2022-11-14 NOTE — ANESTHESIA POSTPROCEDURE EVALUATION
Patient: Reina Conway    Procedure: Procedure(s):  RIGHT EYE PHACOEMULSIFICATION, COMPLEX CATARACT, WITH STANDARD INTRAOCULAR LENS IMPLANT INSERTION  Synechialysis, goniosynechialysis right eye  RIGHT EYE MICROCATHETERIZATION, VISCODILATION OF SCHLEMM'S CANAL       Anesthesia Type:  MAC    Note:  Disposition: Outpatient   Postop Pain Control: Uneventful            Sign Out: Well controlled pain   PONV: No   Neuro/Psych: Uneventful            Sign Out: Acceptable/Baseline neuro status   Airway/Respiratory: Uneventful            Sign Out: Acceptable/Baseline resp. status   CV/Hemodynamics: Uneventful            Sign Out: Acceptable CV status; No obvious hypovolemia; No obvious fluid overload   Other NRE: NONE   DID A NON-ROUTINE EVENT OCCUR? No           Last vitals:  Vitals Value Taken Time   /82 11/14/22 1243   Temp 36.4  C (97.5  F) 11/14/22 1243   Pulse 50 11/14/22 1243   Resp 15 11/14/22 1243   SpO2 97 % 11/14/22 1243       Electronically Signed By: David Zenedjas MD, MD  November 14, 2022  2:20 PM

## 2022-11-15 NOTE — PROGRESS NOTES
Chief Complaint(s) and History of Present Illness(es)     Post Op (Ophthalmology) Right Eye            Comments: RIGHT EYE PHACOEMULSIFICATION, COMPLEX CATARACT, WITH STANDARD   INTRAOCULAR LENS IMPLANT INSERTION   Synechialysis, goniosynechialysis   MICROCATHETERIZATION, VISCODILATION OF SCHLEMM'S CANAL  11/14/22              Comments    Day 1 PO  Pt doing well    Liss Melendez COT 10:09 AM November 15, 2022                  Review of systems for the eyes was negative other than the pertinent positives/negatives listed in the HPI.      Assessment & Plan      Reina Conway is a 67 year old female with the following diagnoses:   1. Post-operative state    2. Pseudophakia - Right Eye    3. Ocular hypertension, right eye    4. Intermediate uveitis of right eye         PostOp, right eye, day 1  S/P complex cataract extraction with synechialysis/GSL, iris hooks, OMNI 360+90, PSTK    Doing well  Intraocular pressure acceptable  Keep patch in place at night for 5 days  HOB elevated  Start post-operative drops   Continue prednisone 30 mg orally daily   Post-operative do's and don'ts reviewed, questions answered      Patient disposition:   Return in about 1 week (around 11/22/2022) for VT only.           Attending Physician Attestation:  Complete documentation of historical and exam elements from today's encounter can be found in the full encounter summary report (not reduplicated in this progress note).  I personally obtained the chief complaint(s) and history of present illness.  I confirmed and edited as necessary the review of systems, past medical/surgical history, family history, social history, and examination findings as documented by others; and I examined the patient myself.  I personally reviewed the relevant tests, images, and reports as documented above.  I formulated and edited as necessary the assessment and plan and discussed the findings and management plan with the patient and family. . - Maximilian Melendez MD

## 2022-11-15 NOTE — NURSING NOTE
Chief Complaints and History of Present Illnesses   Patient presents with     Post Op (Ophthalmology) Right Eye     RIGHT EYE PHACOEMULSIFICATION, COMPLEX CATARACT, WITH STANDARD INTRAOCULAR LENS IMPLANT INSERTION   Synechialysis, goniosynechialysis   MICROCATHETERIZATION, VISCODILATION OF SCHLEMM'S CANAL  11/14/22         Chief Complaint(s) and History of Present Illness(es)     Post Op (Ophthalmology) Right Eye            Comments: RIGHT EYE PHACOEMULSIFICATION, COMPLEX CATARACT, WITH STANDARD INTRAOCULAR LENS IMPLANT INSERTION   Synechialysis, goniosynechialysis   MICROCATHETERIZATION, VISCODILATION OF SCHLEMM'S CANAL  11/14/22              Comments    Day 1 PO  Pt doing well    Liss Melendez COT 10:09 AM November 15, 2022

## 2022-11-23 NOTE — NURSING NOTE
Chief Complaints and History of Present Illnesses   Patient presents with     Post Op (Ophthalmology) Right Eye     Post Op (Ophthalmology) Right Eye     Chief Complaint(s) and History of Present Illness(es)     Post Op (Ophthalmology) Right Eye            Laterality: right eye    Comments: Post Op (Ophthalmology) Right Eye          Comments    Here for Intermediate uveitis of right eye   Pts RE is still cloudy.  No flashes or floaters in either eye.  No pain in each eye.  Pt gets a little tearing once in awhile in her RE.     Ocular meds:  moxifloxacin (VIGAMOX) 3 TIMES DAILY  ketorolac tromethamine (ACULAR-LS) 1 drop, Ophthalmic, 4 TIMES DAILY   difluprednate (DUREZOL) 1 drop, Right Eye, DAILY    dorzolamide-timolol (COSOPT) 1 drop, Right Eye, DAILY   Prednisone QDAY in am after breakfast.           JORDYN MARVIN COA November 23, 2022 9:29 AM

## 2022-11-23 NOTE — PROGRESS NOTES
Chief Complaint(s) and History of Present Illness(es)     Post Op (Ophthalmology) Right Eye            Laterality: right eye    Comments: Post Op (Ophthalmology) Right Eye          Comments    Here for Intermediate uveitis of right eye   Pts RE is still cloudy.  No flashes or floaters in either eye.  No pain in each eye.  Pt gets a little tearing once in awhile in her RE.     Ocular meds:  moxifloxacin (VIGAMOX) 3 TIMES DAILY  ketorolac tromethamine (ACULAR-LS) 1 drop, Ophthalmic, 4 TIMES DAILY   difluprednate (DUREZOL) 1 drop, Right Eye,  DAILY    dorzolamide-timolol (COSOPT) 1 drop, Right Eye, DAILY   Prednisone QDAY in am after breakfast.           JORDYN SÁNCHEZ November 23, 2022 9:29 AM       Current meds taking:  - Vigamox TID right eye  - Acular QID right eye  - Durezol QID right eye  - Cosopt BID right   - Oral prednisone 30 mg per day.            Review of systems for the eyes was negative other than the pertinent positives/negatives listed in the HPI.      Assessment & Plan      Reina Conway is a 67 year old female with the following diagnoses:   1. Pseudophakia - Right Eye    2. Ocular hypertension, right eye    3. Intermediate uveitis of right eye         Postoperative week 1, S/P right complex cataract extraction with synechialysis/GSL, iris hooks, OMNI 360+90, PSTK.     Patient saw Dr. Velazquez this morning.     Doing well.  Intraocular pressure acceptable.  Small hyphema in right eye s/p OMNI.     PLAN:  - Oral prednisone, durezol, ketorolac per JY  - Stop moxi drop.   - Continue Cosopt twice a day right eye for now.   - Post-operative do's and don'ts reviewed, questions answered.       Patient disposition:   Return in about 2 weeks (around 12/7/2022) for Refraction, DFE, OCT Macula.           Kristopher Mathew MD  Ophthalmology, PGY-3    Attending Physician Attestation:  Complete documentation of historical and exam elements from today's encounter can be found in the full encounter summary  report (not reduplicated in this progress note).  I personally obtained the chief complaint(s) and history of present illness.  I confirmed and edited as necessary the review of systems, past medical/surgical history, family history, social history, and examination findings as documented by others; and I examined the patient myself.  I personally reviewed the relevant tests, images, and reports as documented above.  I formulated and edited as necessary the assessment and plan and discussed the findings and management plan with the patient and family. . - Maximilian Melendez MD

## 2022-11-23 NOTE — PATIENT INSTRUCTIONS
For the Prednisone, continue 30 mg daily for one more week (until 11/30/22), then reduce to 20 mg each AM until next visit with Dr. Melendez.   Restart Methotrexate 6 pills (15 mg) weekly beginning this week. Okay to resume Humira 40 mg every two weeks beginning next week.   For the drops,continue unchanged unless specified by Dr. Melendez: Moxifloxacin (VIGAMOX) 3 TIMES DAILY right eye, ketorolac 4x/day right eye, Durezol 4x/day, dorzolamide-timolol (COSOPT) 2x/day right eye,    RTC Dr. Melendez on 12/8/22 as scheduled. If doing well, okay to taper prednisone to 10 mg daily and then schedule JY visit late Dec-early Jan with OCT

## 2022-11-23 NOTE — PROGRESS NOTES
Chief Complaint/Presenting Concern:  Uveitis post op    Interval History of Present Ocular Illness:  Reina Conway is a 67 year old patient who returns for follow up of after her cataract surgery/OMNI/kenalog injection right eye last week with Dr. Melendez. We recommended prednisone to begin before surgery and she was informed to hold methotrexate and humira and has not used for a few weeks.    Ms. oCnway reports things are still blurry right eye but no pain.     Interval Updates to Medical/Family/Social History:    1. Has been on Humira 40 mg every 14 days and now Methotrexate   2. Ms. Conway has not used her Humira and Methotrexate for the last two weeks and is scheduled    Relevant Review of Systems Updates:  Still bruising on right arm, joints feeling okay     Current eye related medications: *No Humira and methotrexate for few weeks per other MD recs*    Drops right eye: Moxifloxacin (VIGAMOX) 3 TIMES DAILY right eye, ketorolac 4x/day right eye, Durezol 4x/day,  dorzolamide-timolol (COSOPT) 2x/day right eye,  Prednisone 30 mg daily    Retina/Uveitis Imaging: None today    Assessment:     1. Intermediate uveitis of right eye  Appropriate degree of inflammation     2. Pseudophakia - Right Eye  Well positioned     3. Ocular hypertension, right eye  Controlled after OMNI     4. High risk medication use  Prednisone 30 mg daily. Off methotrexate and Humira last few weeks    Plan/Recommendations:      Discussed findings with patient and her . The eye is doing well at one week after OMNI and cataract surgery. Things should improve with more time and treatment.     Eye pressure is 17,18    Recommend additional testing: None at this time    For the Prednisone, continue 30 mg daily for one more week (until 11/30/22), then reduce to 20 mg each AM until next visit with Dr. Melendez.     Restart Methotrexate 6 pills (15 mg) weekly beginning this week. Okay to resume Humira 40 mg every two weeks beginning next week.      For the drops,continue unchanged unless specified by Dr. Melendez: Moxifloxacin (VIGAMOX) 3 TIMES DAILY right eye, ketorolac 4x/day right eye, Durezol 4x/day, dorzolamide-timolol (COSOPT) 2x/day right eye,    RTC Dr. Melendez on 12/8/22 as scheduled. If doing well, okay to taper prednisone to 10 mg daily and then schedule JY visit late Dec-early Jan with OCT    Physician Attestation     Attending Physician Attestation:  Complete documentation of historical and exam elements from today's encounter can be found in the full encounter summary report (not reduplicated in this progress note). I personally obtained the chief complaint(s) and history of present illness. I confirmed and edited as necessary the review of systems, past medical/surgical history, family history, social history, and examination findings as documented by others; and I examined the patient myself. I personally reviewed the relevant tests, images, and reports as documented above. I formulated and edited as necessary the assessment and plan and discussed the findings and management plan with the patient and family members present at the time of this visit.  Edilson Velazquez M.D., Uveitis and Medical Retina, November 23, 2022

## 2022-11-23 NOTE — NURSING NOTE
Chief Complaints and History of Present Illnesses   Patient presents with     Uveitis Follow-Up     Chief Complaint(s) and History of Present Illness(es)     Uveitis Follow-Up            Laterality: right eye    Onset: gradual    Onset: months ago    Severity: moderate    Frequency: intermittently          Comments    Here for Intermediate uveitis of right eye   Pts RE is still cloudy.  No flashes or floaters in either eye.  No pain in each eye.  Pt gets a little tearing once in awhile in her RE.     Ocular meds:  moxifloxacin (VIGAMOX) 3 TIMES DAILY  ketorolac tromethamine (ACULAR-LS) 1 drop, Ophthalmic, 4 TIMES DAILY   difluprednate (DUREZOL) 1 drop, Right Eye, DAILY    dorzolamide-timolol (COSOPT) 1 drop, Right Eye, DAILY   Prednisone QDAY in am after breakfast.           JORDYN MARVIN COA November 23, 2022 9:29 AM

## 2022-11-28 NOTE — LETTER
11/28/2022         RE: Reina Conway  [address] [facility] [address]        Dear Colleague,    Thank you for referring your patient, Reina Conway, to the [facility] [place]. Please see a copy of my visit note below.    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, right     Preoperative examination     Seronegative rheumatoid arthritis (H)     Screening for hyperlipidemia     Cataract of right eye, unspecified cataract type     Positive CATALINO (antinuclear antibody)     High risk medications (not anticoagulants) long-term use     Past Surgical History:   Procedure Laterality Date     Fibroidadenoma Left Breast       MICROCATHETERIZATION, VISCODILATION OF SCHLEMM'S CANAL Right 11/14/2022    Procedure: RIGHT EYE MICROCATHETERIZATION, VISCODILATION OF SCHLEMM'S CANAL;  Surgeon: Maximilian Melendez MD;  Location: [facility] OR     NO HISTORY OF SURGERY       PHACOEMULSIFICATION WITH STANDARD  INTRAOCULAR LENS IMPLANT Right 11/14/2022    Procedure: RIGHT EYE PHACOEMULSIFICATION, COMPLEX CATARACT, WITH STANDARD INTRAOCULAR LENS IMPLANT INSERTION;  Surgeon: Maximilian Melendez MD;  Location: [facility] OR     REPAIR IRIS Right 11/14/2022    Procedure: Synechialysis, goniosynechialysis right eye;  Surgeon: Maximilian Melendez MD;  Location: [facility] OR     Social History     Socioeconomic History     Marital status:      Spouse name: Not on file     Number of children: Not on file     Years of education: Not on file     Highest education level: Not on file   Occupational History     Not on file   Tobacco Use     Smoking status: Never     Smokeless tobacco: Never   Substance and Sexual Activity     Alcohol use: Yes     Alcohol/week: 5.8 - 11.7 standard drinks     Comment: social use prn     Drug use: No     Sexual activity: Not on file   Other Topics Concern     Parent/sibling w/ CABG, MI or angioplasty before 65F 55M? Not Asked   Social History Narrative    How much exercise per week? Walking, stairs    How much calcium per day? 2-3 servings       How much caffeine per day? 2-3 cups coffee    How much vitamin D per day?      Do you/your family wear seatbelts?  Yes    Do you/your family use safety helmets? No    Do you/your family use sunscreen? No    Do you/your family keep firearms in the home? No    Do you/your family have a smoke detector(s)? Yes        Do you feel safe in your home? Yes    Has anyone ever touched you in an unwanted manner? No     Explain         September 2, 2014 Tamiko Villa LPN             Social Determinants of Health     Financial Resource Strain: Not on file   Food Insecurity: Not on file   Transportation Needs: Not on file   Physical Activity: Not on file   Stress: Not on file   Social Connections: Not on file   Intimate Partner Violence: Not on file   Housing Stability: Not on file     Family History   Problem Relation Age of Onset     Breast Cancer Mother         parkinsons      Osteoporosis Mother      Low Back Problems Mother      Breast Cancer Maternal Aunt      Other - See Comments Other         Inflammation joint in brother, maternal cousin      Diabetes Maternal Grandmother      Anxiety Disorder Father      Skin Cancer Father      Mental Illness Cousin      Alcoholism Paternal Grandfather      Glaucoma No family hx of      Macular Degeneration No family hx of      Retinal detachment No family hx of      Melanoma No family hx of      Hypertension No family hx of      SUBJECTIVE FINDINGS:  A 67-year-old returns to clinic for onychomycosis, right hallux, onychocryptosis and onychomycosis bilaterally.  She relates she has been intermittently using the Silvadene cream.  She was, but she has not been using it lately.  She also relates her callus hurts.  She needs that trimmed down.    OBJECTIVE FINDINGS:  Vascular status intact bilaterally.  She has left plantar 2-4 MPJ nucleated hyperkeratotic tissue buildup with ecchymosis and pain on palpation.  She is using a donut pad on this.  There is no erythema, no drainage, no odor, no calor there.  She has dystrophic, thickened nails with subungual debris, brittleness, dystrophy and discoloration, right hallux nail greater than the rest to differing degrees bilaterally.  She has dorsally contracted digits bilaterally.  Laterally deviated hallux bilaterally.  She has a dorsal right foot.  Brownish skin discolored lesions, nonraised; it is moderately well-defined.    ASSESSMENT AND PLAN:  Onychomycosis, right hallux.  Onychomycosis and onychocryptosis bilaterally.  Tyloma, left foot, causing pain.  She has rheumatoid arthritis, dorsal right foot lesion.  Diagnosis and treatment options discussed with her.  All the toenails were debrided or reduced bilaterally upon consent.  The tyloma on the left foot was sharp debrided with a 10 blade upon consent.  Referral to Dermatology given and use discussed with her.  I advised her on using the Silvadene  cream and she has some dark dry skin bilaterally as well.  I advised her on moisturizing lotion use.  Return to clinic and see me in 3 months.  Previous notes reviewed.    Again, thank you for allowing me to participate in the care of your patient.        Sincerely,        Kenny Gao DPM

## 2022-11-28 NOTE — PROGRESS NOTES
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, right     Preoperative examination     Seronegative rheumatoid arthritis (H)     Screening for hyperlipidemia     Cataract of right eye, unspecified cataract type     Positive CATALINO (antinuclear antibody)     High risk medications (not anticoagulants) long-term use     Past Surgical History:   Procedure Laterality Date     Fibroidadenoma Left Breast       MICROCATHETERIZATION, VISCODILATION OF SCHLEMM'S CANAL Right 11/14/2022    Procedure: RIGHT EYE MICROCATHETERIZATION, VISCODILATION OF SCHLEMM'S CANAL;  Surgeon: Maximilian Melendez MD;  Location: [facility] OR     NO HISTORY OF SURGERY       PHACOEMULSIFICATION WITH STANDARD INTRAOCULAR LENS IMPLANT Right 11/14/2022    Procedure: RIGHT EYE PHACOEMULSIFICATION, COMPLEX CATARACT, WITH STANDARD INTRAOCULAR LENS IMPLANT INSERTION;  Surgeon: Maximilian Melendez MD;  Location: [facility] OR     REPAIR IRIS Right 11/14/2022    Procedure: Synechialysis,  goniosynechialysis right eye;  Surgeon: Maximilian Melendez MD;  Location: [facility] OR     Social History     Socioeconomic History     Marital status:      Spouse name: Not on file     Number of children: Not on file     Years of education: Not on file     Highest education level: Not on file   Occupational History     Not on file   Tobacco Use     Smoking status: Never     Smokeless tobacco: Never   Substance and Sexual Activity     Alcohol use: Yes     Alcohol/week: 5.8 - 11.7 standard drinks     Comment: social use prn     Drug use: No     Sexual activity: Not on file   Other Topics Concern     Parent/sibling w/ CABG, MI or angioplasty before 65F 55M? Not Asked   Social History Narrative    How much exercise per week? Walking, stairs    How much calcium per day? 2-3 servings       How much caffeine per day? 2-3 cups coffee    How much vitamin D per day?      Do you/your family wear seatbelts?  Yes    Do you/your family use safety helmets? No    Do you/your family use sunscreen? No    Do you/your family keep firearms in the home? No    Do you/your family have a smoke detector(s)? Yes        Do you feel safe in your home? Yes    Has anyone ever touched you in an unwanted manner? No     Explain         September 2, 2014 Tamiko Villa LPN             Social Determinants of Health     Financial Resource Strain: Not on file   Food Insecurity: Not on file   Transportation Needs: Not on file   Physical Activity: Not on file   Stress: Not on file   Social Connections: Not on file   Intimate Partner Violence: Not on file   Housing Stability: Not on file     Family History   Problem Relation Age of Onset     Breast Cancer Mother         parkinsons     Osteoporosis Mother      Low Back Problems Mother      Breast Cancer Maternal Aunt      Other - See Comments Other         Inflammation joint in brother, maternal cousin      Diabetes Maternal Grandmother      Anxiety Disorder Father      Skin Cancer Father      Mental  Illness Cousin      Alcoholism Paternal Grandfather      Glaucoma No family hx of      Macular Degeneration No family hx of      Retinal detachment No family hx of      Melanoma No family hx of      Hypertension No family hx of      SUBJECTIVE FINDINGS:  A 67-year-old returns to clinic for onychomycosis, right hallux, onychocryptosis and onychomycosis bilaterally.  She relates she has been intermittently using the Silvadene cream.  She was, but she has not been using it lately.  She also relates her callus hurts.  She needs that trimmed down.    OBJECTIVE FINDINGS:  Vascular status intact bilaterally.  She has left plantar 2-4 MPJ nucleated hyperkeratotic tissue buildup with ecchymosis and pain on palpation.  She is using a donut pad on this.  There is no erythema, no drainage, no odor, no calor there.  She has dystrophic, thickened nails with subungual debris, brittleness, dystrophy and discoloration, right hallux nail greater than the rest to differing degrees bilaterally.  She has dorsally contracted digits bilaterally.  Laterally deviated hallux bilaterally.  She has a dorsal right foot.  Brownish skin discolored lesions, nonraised; it is moderately well-defined.    ASSESSMENT AND PLAN:  Onychomycosis, right hallux.  Onychomycosis and onychocryptosis bilaterally.  Tyloma, left foot, causing pain.  She has rheumatoid arthritis, dorsal right foot lesion.  Diagnosis and treatment options discussed with her.  All the toenails were debrided or reduced bilaterally upon consent.  The tyloma on the left foot was sharp debrided with a 10 blade upon consent.  Referral to Dermatology given and use discussed with her.  I advised her on using the Silvadene cream and she has some dark dry skin bilaterally as well.  I advised her on moisturizing lotion use.  Return to clinic and see me in 3 months.  Previous notes reviewed.

## 2022-12-08 NOTE — PROGRESS NOTES
Chief Complaint(s) and History of Present Illness(es)     Pseudophakia Follow Up            Laterality: right eye    Pain scale: 0/10    Comments: Pseudophakia - Right Eye          Comments    Pt thinks her vision is getting slowly better.   No flashes or flaoters in either eye.   Pink, blue and gray colored caps are still being used.     Ocular meds:  ketorolac tromethamine (ACULAR-LS) 1 drop, Ophthalmic, 4 TIMES DAILY     difluprednate (DUREZOL) 1 drop, Right Eye,  DAILY                                dorzolamide-timolol (COSOPT) 1 drop, Right Eye, DAILY       Prednisone QDAY in am after breakfast.      JORDYN SÁNCHEZ December 8, 2022 2:40 PM     Vision has been improving since surgery. No new eye pain, flashes, or change in floaters. Tolerating topical and oral therapies well.    Review of systems for the eyes was negative other than the pertinent positives/negatives listed in the HPI.      Assessment & Plan      Reina Conway is a 67 year old female with the following diagnoses:   1. Post-operative state    2. Pseudophakia - Right Eye    3. Acute anterior uveitis of right eye    4. High risk medication use       Doing well  Intraocular pressure remains within normal limits   Ok to resume normal activities    In the operative eye:  Continue cosopt BID  Stop ketorolac  Continue durezol QID until seen in uveitis clinic  Start frequent artificial tears each eye     Currently on prednisone 20 mg. Per Dr. Velazquez instructions, will taper prednisone to 10 mg daily as patient is doing well. Follow up in uveitis clinic in 3-4 weeks.     Glasses prescription updated    Patient disposition:   Follow up in uveitis clinic in 3-4 weeks  Follow up in general clinic in 6-8 weeks    Michelle Collins MD  Ophthalmology Resident, PGY-4    Attending Physician Attestation:  Complete documentation of historical and exam elements from today's encounter can be found in the full encounter summary report (not reduplicated in this  progress note).  I personally obtained the chief complaint(s) and history of present illness.  I confirmed and edited as necessary the review of systems, past medical/surgical history, family history, social history, and examination findings as documented by others; and I examined the patient myself.  I personally reviewed the relevant tests, images, and reports as documented above.  I formulated and edited as necessary the assessment and plan and discussed the findings and management plan with the patient and family. . - Maximilian Melendez MD

## 2022-12-08 NOTE — NURSING NOTE
Chief Complaints and History of Present Illnesses   Patient presents with     Pseudophakia Follow Up     Pseudophakia - Right Eye     Chief Complaint(s) and History of Present Illness(es)     Pseudophakia Follow Up            Laterality: right eye    Pain scale: 0/10    Comments: Pseudophakia - Right Eye          Comments    Pt thinks her vision is getting slowly better.   No flashes or flaoters in either eye.   Pink, blue and gray colored caps are still being used.     Ocular meds:  ketorolac tromethamine (ACULAR-LS) 1 drop, Ophthalmic, 4 TIMES DAILY     difluprednate (DUREZOL) 1 drop, Right Eye,  DAILY                              dorzolamide-timolol (COSOPT) 1 drop, Right Eye, DAILY       Prednisone QDAY in am after breakfast.      JORDYN MARVIN COA December 8, 2022 2:40 PM

## 2022-12-08 NOTE — PATIENT INSTRUCTIONS
Decrease your oral prednisone to 10 mg daily.    Stop ketorolac in the left eye  Continue durezol four times daily in the left eye  Continue cosopt twice daily in the left eye  Start artificial tears in both eyes

## 2023-01-01 ENCOUNTER — HEALTH MAINTENANCE LETTER (OUTPATIENT)
Age: 68
End: 2023-01-01

## 2023-01-01 ENCOUNTER — LAB REQUISITION (OUTPATIENT)
Dept: LAB | Facility: CLINIC | Age: 68
End: 2023-01-01
Payer: MEDICARE

## 2023-01-01 ENCOUNTER — TELEPHONE (OUTPATIENT)
Dept: RHEUMATOLOGY | Facility: CLINIC | Age: 68
End: 2023-01-01
Payer: MEDICARE

## 2023-01-01 ENCOUNTER — OFFICE VISIT (OUTPATIENT)
Dept: ORTHOPEDICS | Facility: CLINIC | Age: 68
End: 2023-01-01
Payer: MEDICARE

## 2023-01-01 ENCOUNTER — OFFICE VISIT (OUTPATIENT)
Dept: OPHTHALMOLOGY | Facility: CLINIC | Age: 68
End: 2023-01-01
Attending: OPHTHALMOLOGY
Payer: MEDICARE

## 2023-01-01 ENCOUNTER — TRANSFERRED RECORDS (OUTPATIENT)
Dept: HEALTH INFORMATION MANAGEMENT | Facility: CLINIC | Age: 68
End: 2023-01-01
Payer: MEDICARE

## 2023-01-01 ENCOUNTER — TELEPHONE (OUTPATIENT)
Dept: FAMILY MEDICINE | Facility: CLINIC | Age: 68
End: 2023-01-01

## 2023-01-01 DIAGNOSIS — Z98.890 POST-OPERATIVE STATE: ICD-10-CM

## 2023-01-01 DIAGNOSIS — M06.09 RHEUMATOID ARTHRITIS OF MULTIPLE SITES WITHOUT RHEUMATOID FACTOR (H): ICD-10-CM

## 2023-01-01 DIAGNOSIS — H35.351 CYSTOID MACULAR EDEMA OF RIGHT EYE: ICD-10-CM

## 2023-01-01 DIAGNOSIS — M06.071 RHEUMATOID ARTHRITIS INVOLVING BOTH FEET WITH NEGATIVE RHEUMATOID FACTOR (H): ICD-10-CM

## 2023-01-01 DIAGNOSIS — M06.072 RHEUMATOID ARTHRITIS INVOLVING BOTH FEET WITH NEGATIVE RHEUMATOID FACTOR (H): ICD-10-CM

## 2023-01-01 DIAGNOSIS — M79.672 LEFT FOOT PAIN: ICD-10-CM

## 2023-01-01 DIAGNOSIS — H20.11 CHRONIC ANTERIOR UVEITIS OF RIGHT EYE: Primary | ICD-10-CM

## 2023-01-01 DIAGNOSIS — B35.1 OM (ONYCHOMYCOSIS): Primary | ICD-10-CM

## 2023-01-01 DIAGNOSIS — D72.829 ELEVATED WHITE BLOOD CELL COUNT, UNSPECIFIED: ICD-10-CM

## 2023-01-01 DIAGNOSIS — Z79.899 HIGH RISK MEDICATION USE: ICD-10-CM

## 2023-01-01 DIAGNOSIS — H20.00 ACUTE ANTERIOR UVEITIS OF RIGHT EYE: ICD-10-CM

## 2023-01-01 DIAGNOSIS — H20.11 CHRONIC ANTERIOR UVEITIS OF RIGHT EYE: ICD-10-CM

## 2023-01-01 DIAGNOSIS — L84 TYLOMA: ICD-10-CM

## 2023-01-01 DIAGNOSIS — I10 ESSENTIAL (PRIMARY) HYPERTENSION: ICD-10-CM

## 2023-01-01 DIAGNOSIS — R79.9 ABNORMAL FINDING OF BLOOD CHEMISTRY, UNSPECIFIED: ICD-10-CM

## 2023-01-01 DIAGNOSIS — M06.00 SERONEGATIVE RHEUMATOID ARTHRITIS (H): ICD-10-CM

## 2023-01-01 DIAGNOSIS — H30.21 INTERMEDIATE UVEITIS OF RIGHT EYE: ICD-10-CM

## 2023-01-01 DIAGNOSIS — H40.051 OCULAR HYPERTENSION, RIGHT EYE: ICD-10-CM

## 2023-01-01 DIAGNOSIS — R19.5 OTHER FECAL ABNORMALITIES: ICD-10-CM

## 2023-01-01 DIAGNOSIS — H40.051 OCULAR HYPERTENSION, RIGHT EYE: Primary | ICD-10-CM

## 2023-01-01 DIAGNOSIS — D64.9 ANEMIA, UNSPECIFIED: ICD-10-CM

## 2023-01-01 DIAGNOSIS — Z79.899 HIGH RISK MEDICATIONS (NOT ANTICOAGULANTS) LONG-TERM USE: ICD-10-CM

## 2023-01-01 LAB
ALBUMIN UR-MCNC: 20 MG/DL
ANION GAP SERPL CALCULATED.3IONS-SCNC: 13 MMOL/L (ref 7–15)
APPEARANCE UR: CLEAR
BACTERIA BLD CULT: NO GROWTH
BASOPHILS # BLD AUTO: 0 10E3/UL (ref 0–0.2)
BASOPHILS # BLD AUTO: 0.1 10E3/UL (ref 0–0.2)
BASOPHILS NFR BLD AUTO: 0 %
BASOPHILS NFR BLD AUTO: 1 %
BILIRUB UR QL STRIP: ABNORMAL
BUN SERPL-MCNC: 9.6 MG/DL (ref 8–23)
CALCIUM SERPL-MCNC: 9.3 MG/DL (ref 8.8–10.2)
CHLORIDE SERPL-SCNC: 99 MMOL/L (ref 98–107)
COLOR UR AUTO: YELLOW
CREAT SERPL-MCNC: 0.57 MG/DL (ref 0.51–0.95)
DEPRECATED HCO3 PLAS-SCNC: 23 MMOL/L (ref 22–29)
EOSINOPHIL # BLD AUTO: 0 10E3/UL (ref 0–0.7)
EOSINOPHIL # BLD AUTO: 0.1 10E3/UL (ref 0–0.7)
EOSINOPHIL NFR BLD AUTO: 0 %
EOSINOPHIL NFR BLD AUTO: 2 %
ERYTHROCYTE [DISTWIDTH] IN BLOOD BY AUTOMATED COUNT: 13.1 % (ref 10–15)
ERYTHROCYTE [DISTWIDTH] IN BLOOD BY AUTOMATED COUNT: 13.3 % (ref 10–15)
ERYTHROCYTE [DISTWIDTH] IN BLOOD BY AUTOMATED COUNT: 13.7 % (ref 10–15)
ERYTHROCYTE [DISTWIDTH] IN BLOOD BY AUTOMATED COUNT: 14.3 % (ref 10–15)
GFR SERPL CREATININE-BSD FRML MDRD: >90 ML/MIN/1.73M2
GLUCOSE SERPL-MCNC: 81 MG/DL (ref 70–99)
GLUCOSE UR STRIP-MCNC: NEGATIVE MG/DL
HCT VFR BLD AUTO: 41.7 % (ref 35–47)
HCT VFR BLD AUTO: 43.8 % (ref 35–47)
HCT VFR BLD AUTO: 46.4 % (ref 35–47)
HCT VFR BLD AUTO: 47 % (ref 35–47)
HGB BLD-MCNC: 13 G/DL (ref 11.7–15.7)
HGB BLD-MCNC: 13.1 G/DL (ref 11.7–15.7)
HGB BLD-MCNC: 14.3 G/DL (ref 11.7–15.7)
HGB BLD-MCNC: 15 G/DL (ref 11.7–15.7)
HGB BLD-MCNC: 15.2 G/DL (ref 11.7–15.7)
HGB UR QL STRIP: NEGATIVE
IMM GRANULOCYTES # BLD: 0 10E3/UL
IMM GRANULOCYTES # BLD: 0.1 10E3/UL
IMM GRANULOCYTES NFR BLD: 0 %
IMM GRANULOCYTES NFR BLD: 1 %
KETONES UR STRIP-MCNC: 80 MG/DL
LEUKOCYTE ESTERASE UR QL STRIP: NEGATIVE
LYMPHOCYTES # BLD AUTO: 2.2 10E3/UL (ref 0.8–5.3)
LYMPHOCYTES # BLD AUTO: 2.6 10E3/UL (ref 0.8–5.3)
LYMPHOCYTES NFR BLD AUTO: 19 %
LYMPHOCYTES NFR BLD AUTO: 44 %
MCH RBC QN AUTO: 31.3 PG (ref 26.5–33)
MCH RBC QN AUTO: 32.3 PG (ref 26.5–33)
MCH RBC QN AUTO: 32.6 PG (ref 26.5–33)
MCH RBC QN AUTO: 32.8 PG (ref 26.5–33)
MCHC RBC AUTO-ENTMCNC: 31.2 G/DL (ref 31.5–36.5)
MCHC RBC AUTO-ENTMCNC: 32.3 G/DL (ref 31.5–36.5)
MCHC RBC AUTO-ENTMCNC: 32.3 G/DL (ref 31.5–36.5)
MCHC RBC AUTO-ENTMCNC: 32.6 G/DL (ref 31.5–36.5)
MCV RBC AUTO: 101 FL (ref 78–100)
MCV RBC AUTO: 101 FL (ref 78–100)
MCV RBC AUTO: 104 FL (ref 78–100)
MCV RBC AUTO: 97 FL (ref 78–100)
MONOCYTES # BLD AUTO: 0.5 10E3/UL (ref 0–1.3)
MONOCYTES # BLD AUTO: 1 10E3/UL (ref 0–1.3)
MONOCYTES NFR BLD AUTO: 8 %
MONOCYTES NFR BLD AUTO: 9 %
MUCOUS THREADS #/AREA URNS LPF: PRESENT /LPF
NEUTROPHILS # BLD AUTO: 2.7 10E3/UL (ref 1.6–8.3)
NEUTROPHILS # BLD AUTO: 8.2 10E3/UL (ref 1.6–8.3)
NEUTROPHILS NFR BLD AUTO: 44 %
NEUTROPHILS NFR BLD AUTO: 72 %
NITRATE UR QL: NEGATIVE
NRBC # BLD AUTO: 0 10E3/UL
NRBC # BLD AUTO: 0 10E3/UL
NRBC BLD AUTO-RTO: 0 /100
NRBC BLD AUTO-RTO: 0 /100
PH UR STRIP: 6 [PH] (ref 5–7)
PLATELET # BLD AUTO: 264 10E3/UL (ref 150–450)
PLATELET # BLD AUTO: 335 10E3/UL (ref 150–450)
PLATELET # BLD AUTO: 340 10E3/UL (ref 150–450)
PLATELET # BLD AUTO: 353 10E3/UL (ref 150–450)
POTASSIUM SERPL-SCNC: 4.4 MMOL/L (ref 3.4–5.3)
PROCALCITONIN SERPL IA-MCNC: 0.36 NG/ML
RBC # BLD AUTO: 4.02 10E6/UL (ref 3.8–5.2)
RBC # BLD AUTO: 4.36 10E6/UL (ref 3.8–5.2)
RBC # BLD AUTO: 4.66 10E6/UL (ref 3.8–5.2)
RBC # BLD AUTO: 4.79 10E6/UL (ref 3.8–5.2)
RBC URINE: 2 /HPF
SODIUM SERPL-SCNC: 135 MMOL/L (ref 136–145)
SP GR UR STRIP: 1.02 (ref 1–1.03)
SQUAMOUS EPITHELIAL: 1 /HPF
UROBILINOGEN UR STRIP-MCNC: 2 MG/DL
WBC # BLD AUTO: 11.6 10E3/UL (ref 4–11)
WBC # BLD AUTO: 5.8 10E3/UL (ref 4–11)
WBC # BLD AUTO: 6 10E3/UL (ref 4–11)
WBC # BLD AUTO: 7.4 10E3/UL (ref 4–11)
WBC URINE: <1 /HPF

## 2023-01-01 PROCEDURE — 85027 COMPLETE CBC AUTOMATED: CPT | Mod: ORL

## 2023-01-01 PROCEDURE — 81001 URINALYSIS AUTO W/SCOPE: CPT | Mod: ORL

## 2023-01-01 PROCEDURE — G0463 HOSPITAL OUTPT CLINIC VISIT: HCPCS | Performed by: OPHTHALMOLOGY

## 2023-01-01 PROCEDURE — 92133 CPTRZD OPH DX IMG PST SGM ON: CPT | Mod: 26 | Performed by: OPHTHALMOLOGY

## 2023-01-01 PROCEDURE — 36415 COLL VENOUS BLD VENIPUNCTURE: CPT | Mod: ORL

## 2023-01-01 PROCEDURE — 80048 BASIC METABOLIC PNL TOTAL CA: CPT | Mod: ORL

## 2023-01-01 PROCEDURE — 87040 BLOOD CULTURE FOR BACTERIA: CPT | Mod: ORL

## 2023-01-01 PROCEDURE — 84145 PROCALCITONIN (PCT): CPT | Mod: ORL

## 2023-01-01 PROCEDURE — 36415 COLL VENOUS BLD VENIPUNCTURE: CPT

## 2023-01-01 PROCEDURE — P9603 ONE-WAY ALLOW PRORATED MILES: HCPCS | Mod: ORL

## 2023-01-01 PROCEDURE — 85018 HEMOGLOBIN: CPT | Mod: ORL

## 2023-01-01 PROCEDURE — 99213 OFFICE O/P EST LOW 20 MIN: CPT | Mod: 25 | Performed by: PODIATRIST

## 2023-01-01 PROCEDURE — 92134 CPTRZ OPH DX IMG PST SGM RTA: CPT | Performed by: OPHTHALMOLOGY

## 2023-01-01 PROCEDURE — 11055 PARING/CUTG B9 HYPRKER LES 1: CPT | Mod: Q8 | Performed by: PODIATRIST

## 2023-01-01 PROCEDURE — 85025 COMPLETE CBC W/AUTO DIFF WBC: CPT | Mod: ORL

## 2023-01-01 PROCEDURE — 99214 OFFICE O/P EST MOD 30 MIN: CPT | Mod: 24 | Performed by: OPHTHALMOLOGY

## 2023-01-01 PROCEDURE — 85027 COMPLETE CBC AUTOMATED: CPT

## 2023-01-01 PROCEDURE — 92133 CPTRZD OPH DX IMG PST SGM ON: CPT | Performed by: OPHTHALMOLOGY

## 2023-01-01 PROCEDURE — 99024 POSTOP FOLLOW-UP VISIT: CPT | Mod: GC | Performed by: OPHTHALMOLOGY

## 2023-01-01 PROCEDURE — P9603 ONE-WAY ALLOW PRORATED MILES: HCPCS

## 2023-01-01 PROCEDURE — 11720 DEBRIDE NAIL 1-5: CPT | Mod: XS | Performed by: PODIATRIST

## 2023-01-01 PROCEDURE — G0463 HOSPITAL OUTPT CLINIC VISIT: HCPCS | Mod: 25

## 2023-01-01 PROCEDURE — P9604 ONE-WAY ALLOW PRORATED TRIP: HCPCS | Mod: ORL

## 2023-01-01 RX ORDER — DIFLUPREDNATE OPHTHALMIC 0.5 MG/ML
1 EMULSION OPHTHALMIC 3 TIMES DAILY
Qty: 5 ML | Refills: 4 | Status: SHIPPED | OUTPATIENT
Start: 2023-01-01

## 2023-01-01 RX ORDER — DORZOLAMIDE HYDROCHLORIDE AND TIMOLOL MALEATE 20; 5 MG/ML; MG/ML
1 SOLUTION/ DROPS OPHTHALMIC 2 TIMES DAILY
Qty: 10 ML | Refills: 5 | Status: SHIPPED | OUTPATIENT
Start: 2023-01-01

## 2023-01-01 RX ORDER — DIFLUPREDNATE OPHTHALMIC 0.5 MG/ML
1 EMULSION OPHTHALMIC 4 TIMES DAILY
Qty: 5 ML | Refills: 4 | Status: SHIPPED | OUTPATIENT
Start: 2023-01-01 | End: 2023-01-01

## 2023-01-01 RX ORDER — DORZOLAMIDE HYDROCHLORIDE AND TIMOLOL MALEATE 20; 5 MG/ML; MG/ML
1 SOLUTION/ DROPS OPHTHALMIC 2 TIMES DAILY
Qty: 10 ML | Refills: 5 | Status: SHIPPED | OUTPATIENT
Start: 2023-01-01 | End: 2023-01-01

## 2023-01-01 RX ORDER — METHOTREXATE 2.5 MG/1
6 TABLET ORAL
Qty: 72 TABLET | Refills: 1 | Status: SHIPPED | OUTPATIENT
Start: 2023-01-01

## 2023-01-01 ASSESSMENT — CONF VISUAL FIELD
OS_NORMAL: 1
OD_INFERIOR_TEMPORAL_RESTRICTION: 0
OS_INFERIOR_TEMPORAL_RESTRICTION: 0
OD_SUPERIOR_TEMPORAL_RESTRICTION: 0
OD_SUPERIOR_NASAL_RESTRICTION: 0
OS_SUPERIOR_TEMPORAL_RESTRICTION: 0
METHOD: COUNTING FINGERS
OD_NORMAL: 1
OS_SUPERIOR_NASAL_RESTRICTION: 0
OD_INFERIOR_NASAL_RESTRICTION: 0
OS_INFERIOR_NASAL_RESTRICTION: 0

## 2023-01-01 ASSESSMENT — VISUAL ACUITY
OD_PH_SC: 20/25
OS_SC: 20/20
OD_SC: 20/30
OS_SC+: -2
OS_SC: 20/20
OD_PH_SC+: -1
OS_SC+: -2
METHOD: SNELLEN - LINEAR
OD_SC+: -3
OD_SC: 20/20
METHOD: SNELLEN - LINEAR

## 2023-01-01 ASSESSMENT — EXTERNAL EXAM - LEFT EYE
OS_EXAM: NORMAL
OS_EXAM: NORMAL

## 2023-01-01 ASSESSMENT — SLIT LAMP EXAM - LIDS
COMMENTS: MILD BLEPHARITIS

## 2023-01-01 ASSESSMENT — CUP TO DISC RATIO
OD_RATIO: 0.55
OS_RATIO: 0.45

## 2023-01-01 ASSESSMENT — TONOMETRY
OS_IOP_MMHG: 17
OS_IOP_MMHG: 21
IOP_METHOD: TONOPEN
OD_IOP_MMHG: 18
OD_IOP_MMHG: 16
IOP_METHOD: TONOPEN

## 2023-01-01 ASSESSMENT — EXTERNAL EXAM - RIGHT EYE
OD_EXAM: NORMAL
OD_EXAM: NORMAL

## 2023-01-02 PROBLEM — Z79.899 HIGH RISK MEDICATION USE: Status: ACTIVE | Noted: 2020-09-21

## 2023-01-02 NOTE — PATIENT INSTRUCTIONS
Restart Humira 40 mg every 14 days, oral methotrexate 15 mg weekly tomorrow),  folic acid daily  For the prednisone, reduce to half tablet (5 mg) each AM until 1/9/23,  Then stop.   Continue these Drops unchanged: Dorzolamide timolol 2 times a day right eye, Durezol 4 times a day right eye  No other treatments needed

## 2023-01-02 NOTE — NURSING NOTE
Chief Complaints and History of Present Illnesses   Patient presents with     Uveitis Follow-Up     Intermediate uveitis of right eye     Chief Complaint(s) and History of Present Illness(es)     Uveitis Follow-Up            Laterality: right eye    Course: gradually improving    Associated symptoms: floaters (one spot in her right eye).  Negative for eye pain, redness and photophobia    Treatments tried: eye drops    Pain scale: 0/10    Comments: Intermediate uveitis of right eye          Comments    She states that her vision seems improved in her right eye since her last exam.      She is using:  Cosopt BID right eye   Durezol QID right eye   Prednisone (oral) 10 mg daily    BROOK Caballero 12:15 PM  January 2, 2023

## 2023-01-02 NOTE — LETTER
1/2/2023       RE: Reina Conway  [address] [facility] [address]     Dear Colleague,    Thank you for referring your patient, Reina Conway, to the [facility] [place] at [facility]. Please see a copy of my visit note below.    Chief Complaint/Presenting Concern:   Uveitis follow-up    Interval History of Present Ocular Illness:  Reina Conway is a 67 year old patient who returns for follow up of her intermediate uveitis of the right eye.  We last saw each other on November 23, 2022 at which time the right eye was doing well after cataract surgery with OMNI.  We recommended restarting methotrexate and Humira and tapering prednisone while continuing drops.    Ms. Conway saw Dr. Melendez on December 8, 2022 at which time inflammation and eye pressure were doing well. Dr. Melendez tapered the prednisone to 10 mg daily recommended this follow-up today. Ms. Conway reports things are doing well.     Interval Updates to Medical/Family/Social History:    Restarted Humira and Methotrexate which were restarted and then paused due to holidays, not illness.    Relevant Review of Systems Updates:  No new health updates    Laboratory Testing: None recently     Current eye related medications:   Systemic: Prednisone 10 mg daily (will restart Humira 40 mg every 14 days, oral methotrexate 15 mg weekly tomorrow),  folic acid daily  Drops: Dorzolamide timolol 2 times a day right eye, Durezol 4 times a day right eye        Retina/Uveitis Imaging:   OCT Spectralis Macula January 2, 2023  right eye: No recurrent macular edema  left eye: Normal contour, no fluid    Assessment:     1. Chronic anterior uveitis of right eye  Improving     2. Intermediate uveitis of right eye  No haze    3. Cystoid macular edema of right eye  No recurrence    4. Ocular hypertension, right eye  IOP controlled after OMNI and on Cosopt only    5. High risk medication use  Prednisone  10 mg daily (will restart Humira 40 mg every 14 days, oral methotrexate 15 mg weekly tomorrow),     Plan/Recommendations:      Discussed findings with patient and her . The right eye is doing very well with minimal inflammation on less prednisone. There is no macular edema. We can taper off prednisone    Eye pressure is 16 in the right eye and 21 in the left eye.  We can continue dorzolamide timolol in the right eye only    Restart Humira 40 mg every 14 days, oral methotrexate 15 mg weekly tomorrow),  folic acid daily    For the prednisone, reduce to half tablet (5 mg) each AM until 1/9/23,  Then stop.     Continue these Drops unchanged: Dorzolamide timolol 2 times a day right eye, Durezol 4 times a day right eye    No other treatments needed    RTC    1. Dr. Batista as scheduled in a few weeks on 1/19/23    2. Dr. Melendez as scheduled 2/9/23. If doing well, could taper Durezol to 3x/day     3. Caroline May 2023 applanate, dilate right eye and OCT (ordered)    Physician Attestation     Attending Physician Attestation:  Complete documentation of historical and exam elements from today's encounter can be found in the full encounter summary report (not reduplicated in this progress note). I personally obtained the chief complaint(s) and history of present illness. I confirmed and edited as necessary the review of systems, past medical/surgical history, family history, social history, and examination findings as documented by others; and I examined the patient myself. I personally reviewed the relevant tests, images, and reports as documented above. I formulated and edited as necessary the assessment and plan and discussed the findings and management plan with the patient and family members present at the time of this visit.  Edilson Velazquez M.D., Uveitis and Medical Retina, January 2, 2023       Again, thank you for allowing me to participate in the care of your patient.      Sincerely,    Edilson Velazquez,  MD  [facility] Dept of Ophthalmology  Uveitis and Medical Retina

## 2023-01-02 NOTE — PROGRESS NOTES
Chief Complaint/Presenting Concern:   Uveitis follow-up    Interval History of Present Ocular Illness:  Reina Conway is a 67 year old patient who returns for follow up of her intermediate uveitis of the right eye.  We last saw each other on November 23, 2022 at which time the right eye was doing well after cataract surgery with OMNI.  We recommended restarting methotrexate and Humira and tapering prednisone while continuing drops.    Ms. Conway saw Dr. Melendez on December 8, 2022 at which time inflammation and eye pressure were doing well. Dr. Melendez tapered the prednisone to 10 mg daily recommended this follow-up today. Ms. Conway reports things are doing well.     Interval Updates to Medical/Family/Social History:    Restarted Humira and Methotrexate which were restarted and then paused due to holidays, not illness.    Relevant Review of Systems Updates:  No new health updates    Laboratory Testing: None recently     Current eye related medications:   Systemic: Prednisone 10 mg daily (will restart Humira 40 mg every 14 days, oral methotrexate 15 mg weekly tomorrow),  folic acid daily  Drops: Dorzolamide timolol 2 times a day right eye, Durezol 4 times a day right eye    Retina/Uveitis Imaging:   OCT Spectralis Macula January 2, 2023  right eye: No recurrent macular edema  left eye: Normal contour, no fluid    Assessment:     1. Chronic anterior uveitis of right eye  Improving     2. Intermediate uveitis of right eye  No haze    3. Cystoid macular edema of right eye  No recurrence    4. Ocular hypertension, right eye  IOP controlled after OMNI and on Cosopt only    5. High risk medication use  Prednisone 10 mg daily (will restart Humira 40 mg every 14 days, oral methotrexate 15 mg weekly tomorrow),     Plan/Recommendations:      Discussed findings with patient and her . The right eye is doing very well with minimal inflammation on less prednisone. There is no macular edema. We can taper off  prednisone    Eye pressure is 16 in the right eye and 21 in the left eye.  We can continue dorzolamide timolol in the right eye only    Restart Humira 40 mg every 14 days, oral methotrexate 15 mg weekly tomorrow),  folic acid daily    For the prednisone, reduce to half tablet (5 mg) each AM until 1/9/23,  Then stop.     Continue these Drops unchanged: Dorzolamide timolol 2 times a day right eye, Durezol 4 times a day right eye    No other treatments needed    RTC    1. Dr. Batista as scheduled in a few weeks on 1/19/23    2. Dr. Melendez as scheduled 2/9/23. If doing well, could taper Durezol to 3x/day     3. Caroline May 2023 applanate, dilate right eye and OCT (ordered)    Physician Attestation     Attending Physician Attestation:  Complete documentation of historical and exam elements from today's encounter can be found in the full encounter summary report (not reduplicated in this progress note). I personally obtained the chief complaint(s) and history of present illness. I confirmed and edited as necessary the review of systems, past medical/surgical history, family history, social history, and examination findings as documented by others; and I examined the patient myself. I personally reviewed the relevant tests, images, and reports as documented above. I formulated and edited as necessary the assessment and plan and discussed the findings and management plan with the patient and family members present at the time of this visit.  Edilson Velazquez M.D., Uveitis and Medical Retina, January 2, 2023

## 2023-02-08 NOTE — TELEPHONE ENCOUNTER
" Methotrexate Sodium Oral Tablet 2.5 MG   Take 7 tablets (17.5 mg) by mouth every 7 days    Last Written Prescription Date:  4/7/22  Last Fill Quantity: 84,   # refills: 1  Last Office Visit: 4/7/22  Future Office visit:  Per 7/19/22 notes>Plan transfer of care to [facility]> no appt in place    CBC RESULTS: Recent Labs   Lab Test 10/19/22  1143   WBC 7.4   RBC 4.93   HGB 16.7*   HCT 50.1*   *   MCH 33.9*   MCHC 33.3   RDW 14.1          Creatinine   Date Value Ref Range Status   10/19/2022 0.72 0.52 - 1.04 mg/dL Final   04/21/2021 0.76 0.52 - 1.04 mg/dL Final   ]    Liver Function Studies -   Recent Labs   Lab Test 10/19/22  1143   ALBUMIN 4.1   AST 17   ALT 33       Routing refill request to provider/ clinic for review/approval because:  DMARd, last labs 10/19/22( Care everywhere reviewed, appt due> referred to [facility]- no appt yet in place)  Scheduling has been notified to contact the pt for appointment.> addendum:per scheduling\" Patient stated that she will call back when she is ready to schedule. \"                    "

## 2023-02-09 NOTE — PROGRESS NOTES
Chief Complaint(s) and History of Present Illness(es)    No visit information to display     Review of systems for the eyes was negative other than the pertinent positives/negatives listed in the HPI.      Patient has a history of intermediate uveitis of the right eye, and is s/p CE IOL right eye on 11/14/22. Here for 3 month post-op visit. Slow taper on steroid.    Patient states no eye pain, no photophobia. Compliant with drops.     Imaging  OCT nerve (2/9/23):  right eye: shadow inferior half of view, with new inferior thinning identified today.  left eye: normal     Assessment & Plan      Reina Conway is a 67 year old female with the following diagnoses:   1. Ocular hypertension, right eye    2. Chronic anterior uveitis of right eye    3. Post-operative state      s/p CE IOL right eye on 11/14/22  -Doing well.  -VA 20/20 right eye  -1 individual cell/hpf on exam  - Decrease durezol to 3x/day  -Continue dorzolamide-timolol, BID right eye  -Continue Humira 40 mg every 14 days, oral methotrexate 15mg weekly, folic acid daily    -Right eye inferior thinning on OCT RNFL today. Moderate reliability of imaging today. Will follow closely, if evidence of progressive thinning will consider adding another pressure drop.     - RTC same day with Dr. Velazquez, with OCT RNFL.       Dermatochalasis  Ptosis  -Not bothered by ptosis  -Monitor      Return in about 3 months (around 5/22/2023) for VT only, OCT NFL (Same day as JY).         Attending Physician Attestation:  Complete documentation of historical and exam elements from today's encounter can be found in the full encounter summary report (not reduplicated in this progress note).  I personally obtained the chief complaint(s) and history of present illness.  I confirmed and edited as necessary the review of systems, past medical/surgical history, family history, social history, and examination findings as documented by others; and I examined the patient myself.  I  personally reviewed the relevant tests, images, and reports as documented above.  I formulated and edited as necessary the assessment and plan and discussed the findings and management plan with the patient and family. . - Maximilian Melendez MD

## 2023-03-13 NOTE — LETTER
3/13/2023         RE: Reina Conway  [address] [facility] [address]        Dear Colleague,    Thank you for referring your patient, Reina Conway, to the [facility] [place]. Please see a copy of my visit note below.    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, right     Preoperative examination     Seronegative rheumatoid arthritis (H)     Screening for hyperlipidemia     Cataract of right eye, unspecified cataract type     Positive CATALINO (antinuclear antibody)     High risk medications (not anticoagulants) long-term use     Past Surgical History:   Procedure Laterality Date     Fibroidadenoma Left Breast       MICROCATHETERIZATION, VISCODILATION OF SCHLEMM'S CANAL Right 11/14/2022    Procedure: RIGHT EYE MICROCATHETERIZATION, VISCODILATION OF SCHLEMM'S CANAL;  Surgeon: Maximilian Melendez MD;  Location: [facility] OR     NO HISTORY OF SURGERY       PHACOEMULSIFICATION WITH STANDARD  INTRAOCULAR LENS IMPLANT Right 11/14/2022    Procedure: RIGHT EYE PHACOEMULSIFICATION, COMPLEX CATARACT, WITH STANDARD INTRAOCULAR LENS IMPLANT INSERTION;  Surgeon: Maximilian Melendez MD;  Location: [facility] OR     REPAIR IRIS Right 11/14/2022    Procedure: Synechialysis, goniosynechialysis right eye;  Surgeon: Maximilian Melendez MD;  Location: [facility] [place]     Social History     Socioeconomic History     Marital status:      Spouse name: Not on file     Number of children: Not on file     Years of education: Not on file     Highest education level: Not on file   Occupational History     Not on file   Tobacco Use     Smoking status: Never     Smokeless tobacco: Never   Substance and Sexual Activity     Alcohol use: Yes     Alcohol/week: 5.8 - 11.7 standard drinks     Comment: social use prn     Drug use: No     Sexual activity: Not on file   Other Topics Concern     Parent/sibling w/ CABG, MI or angioplasty before 65F 55M? Not Asked   Social History Narrative    How much exercise per week? Walking, stairs    How much calcium per day? 2-3 servings       How much caffeine per day? 2-3 cups coffee    How much vitamin D per day?      Do you/your family wear seatbelts?  Yes    Do you/your family use safety helmets? No    Do you/your family use sunscreen? No    Do you/your family keep firearms in the home? No    Do you/your family have a smoke detector(s)? Yes        Do you feel safe in your home? Yes    Has anyone ever touched you in an unwanted manner? No     Explain         September 2, 2014 Tamiko Villa LPN             Social Determinants of Health     Financial Resource Strain: Not on file   Food Insecurity: Not on file   Transportation Needs: Not on file   Physical Activity: Not on file   Stress: Not on file   Social Connections: Not on file   Intimate Partner Violence: Not on file   Housing Stability: Not on file     Family History   Problem Relation Age of Onset     Breast Cancer Mother         parkinsons      Osteoporosis Mother      Low Back Problems Mother      Breast Cancer Maternal Aunt      Other - See Comments Other         Inflammation joint in brother, maternal cousin      Diabetes Maternal Grandmother      Anxiety Disorder Father      Skin Cancer Father      Mental Illness Cousin      Alcoholism Paternal Grandfather      Glaucoma No family hx of      Macular Degeneration No family hx of      Retinal detachment No family hx of      Melanoma No family hx of      Hypertension No family hx of      SUBJECTIVE FINDINGS:  A 67-year-old returns to clinic for onychomycosis, right hallux, onychocryptosis and onychomycosis bilaterally.  She relates she has been using the Silvadene cream once daily and gets it on most days.  She also relates her callus hurts.  She needs that trimmed down.     OBJECTIVE FINDINGS:  Vascular status intact bilaterally.  She has left plantar 2-4 MPJ nucleated hyperkeratotic tissue buildup with ecchymosis and pain on palpation.  She is using a donut pad on this.  There is no erythema, no drainage, no odor, no calor there.  She has dystrophic, thickened nails with subungual debris, brittleness, dystrophy and discoloration, right hallux nail greater than the rest to differing degrees bilaterally.  She has dorsally contracted digits bilaterally.  Laterally deviated hallux bilaterally.     ASSESSMENT AND PLAN:  Onychomycosis, right hallux.  Onychomycosis and onychocryptosis bilaterally.  Tyloma, left foot, causing pain.  She has rheumatoid arthritis.  Diagnosis and treatment options discussed with her.  All the toenails were debrided or reduced bilaterally upon consent.  The right Hallux nail bled upon debridement and local wound care with Betadine and bandaid done upon consent and use discussed with her.  The tyloma on the left foot was sharp debrided with a 15 blade upon consent.  I advised her on bid Silvadene cream use and vinegar water soaks.  Return to clinic and see me in 3 months.  Previous  notes reviewed.          Low level of medical decision making.        Again, thank you for allowing me to participate in the care of your patient.        Sincerely,        Kenny Gao DPM

## 2023-03-13 NOTE — PROGRESS NOTES
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, right     Preoperative examination     Seronegative rheumatoid arthritis (H)     Screening for hyperlipidemia     Cataract of right eye, unspecified cataract type     Positive CATALINO (antinuclear antibody)     High risk medications (not anticoagulants) long-term use     Past Surgical History:   Procedure Laterality Date     Fibroidadenoma Left Breast       MICROCATHETERIZATION, VISCODILATION OF SCHLEMM'S CANAL Right 11/14/2022    Procedure: RIGHT EYE MICROCATHETERIZATION, VISCODILATION OF SCHLEMM'S CANAL;  Surgeon: Maximilian Melendez MD;  Location: [facility] OR     NO HISTORY OF SURGERY       PHACOEMULSIFICATION WITH STANDARD INTRAOCULAR LENS IMPLANT Right 11/14/2022    Procedure: RIGHT EYE PHACOEMULSIFICATION, COMPLEX CATARACT, WITH STANDARD INTRAOCULAR LENS IMPLANT INSERTION;  Surgeon: Maximilian Melendez MD;  [place] [facility] [place]     REPAIR IRIS Right 11/14/2022    Procedure: Synechialysis,  goniosynechialysis right eye;  Surgeon: Maximilian Melendez MD;  [place] [facility]     Social History     Socioeconomic History     Marital status:      Spouse name: Not on file     Number of children: Not on file     Years of education: Not on file     Highest education level: Not on file   Occupational History     Not on file   Tobacco Use     Smoking status: Never     Smokeless tobacco: Never   Substance and Sexual Activity     Alcohol use: Yes     Alcohol/week: 5.8 - 11.7 standard drinks     Comment: social use prn     Drug use: No     Sexual activity: Not on file   Other Topics Concern     Parent/sibling w/ CABG, MI or angioplasty before 65F 55M? Not Asked   Social History Narrative    How much exercise per week? Walking, stairs    How much calcium per day? 2-3 servings       How much caffeine per day? 2-3 cups coffee    How much vitamin D per day?      Do you/your family wear seatbelts?  Yes    Do you/your family use safety helmets? No    Do you/your family use sunscreen? No    Do you/your family keep firearms in the home? No    Do you/your family have a smoke detector(s)? Yes        Do you feel safe in your home? Yes    Has anyone ever touched you in an unwanted manner? No     Explain         September 2, 2014 Tamiko Villa LPN             Social Determinants of Health     Financial Resource Strain: Not on file   Food Insecurity: Not on file   Transportation Needs: Not on file   Physical Activity: Not on file   Stress: Not on file   Social Connections: Not on file   Intimate Partner Violence: Not on file   Housing Stability: Not on file     Family History   Problem Relation Age of Onset     Breast Cancer Mother         parkinsons     Osteoporosis Mother      Low Back Problems Mother      Breast Cancer Maternal Aunt      Other - See Comments Other         Inflammation joint in brother, maternal cousin      Diabetes Maternal Grandmother      Anxiety Disorder Father      Skin Cancer Father      Mental  Illness Cousin      Alcoholism Paternal Grandfather      Glaucoma No family hx of      Macular Degeneration No family hx of      Retinal detachment No family hx of      Melanoma No family hx of      Hypertension No family hx of      SUBJECTIVE FINDINGS:  A 67-year-old returns to clinic for onychomycosis, right hallux, onychocryptosis and onychomycosis bilaterally.  She relates she has been using the Silvadene cream once daily and gets it on most days.  She also relates her callus hurts.  She needs that trimmed down.     OBJECTIVE FINDINGS:  Vascular status intact bilaterally.  She has left plantar 2-4 MPJ nucleated hyperkeratotic tissue buildup with ecchymosis and pain on palpation.  She is using a donut pad on this.  There is no erythema, no drainage, no odor, no calor there.  She has dystrophic, thickened nails with subungual debris, brittleness, dystrophy and discoloration, right hallux nail greater than the rest to differing degrees bilaterally.  She has dorsally contracted digits bilaterally.  Laterally deviated hallux bilaterally.     ASSESSMENT AND PLAN:  Onychomycosis, right hallux.  Onychomycosis and onychocryptosis bilaterally.  Tyloma, left foot, causing pain.  She has rheumatoid arthritis.  Diagnosis and treatment options discussed with her.  All the toenails were debrided or reduced bilaterally upon consent.  The right Hallux nail bled upon debridement and local wound care with Betadine and bandaid done upon consent and use discussed with her.  The tyloma on the left foot was sharp debrided with a 15 blade upon consent.  I advised her on bid Silvadene cream use and vinegar water soaks.  Return to clinic and see me in 3 months.  Previous notes reviewed.          Low level of medical decision making.

## 2023-05-03 NOTE — TELEPHONE ENCOUNTER
Medication/Dose: Humira  Last Written Prescription Date: 11/8/22  Last Fill Quantity: 2 pens, # refills: 4  Last Office Visit:  4/7/2022  Next Enc:  10/10/23    Refill per protocol until patient established with Dr. Aponte on 10/10/23.    Xochitl Payton RN  [facility]

## 2023-06-09 NOTE — TELEPHONE ENCOUNTER
Patient Quality Outreach    Patient is due for the following:   Breast Cancer Screening - Mammogram    Next Steps:   No follow up needed at this time.    Type of outreach:    Sent [employer] message.      Questions for provider review:    None           Karen Leung CMA

## 2023-08-15 NOTE — CONFIDENTIAL NOTE
NOTES Status Details   OFFICE NOTE from referring provider     OFFICE NOTE from other specialist Internal 03.13.2023 Kenny Gao DPM     Internal  Figueroa Batista MD    DISCHARGE SUMMARY from hospital     DISCHARGE REPORT from the ER     MEDICATION LIST Internal    LABS (Any and all labs)      Internal    Biopsy/pathology (Anything related to diagnoses I.e. fluid aspirations, lip biopsy, muscle biopsy)               Imaging (All imaging related to diagnoses)     Echo     HRCT     CXR     EMG                    Scleroderma/Dermatomyositis diagnoses     Previous Cardiology notes      Previous Pulmonary notes     Previous Dermatology notes     Previous GI notes     Lupus diagnoses     Previous Nephrology notes     Previous Dermatology notes     Previous Cardiology notes

## 2023-08-18 NOTE — TELEPHONE ENCOUNTER
[facility] calling for orders to resume humira every 14 day dosing.  Patient is there for rehab d/t femur fracture in July.  Patient was told to hold the medication prior to surgery by [facility].  [facility] are following her at the facility and they state the order to resume needs to come from rheumatology.  The  has been giving her the injections from home, but the facility still needs an order.  She has not seen you since 5/5/21.  Due to see Dr Aponte in Oct 23.    Paulette Grossman RN

## 2023-08-19 NOTE — TELEPHONE ENCOUNTER
If her surgeons are comfortable that her wounds are epithelialized and showing no signs of infection, she may resume.

## 2023-09-28 NOTE — LETTER
9/12/2022         RE: Reina Conway  [address] [facility] [address]        Dear Colleague,    Thank you for referring your patient, Reina Conway, to the [facility] [place]. Please see a copy of my visit note below.    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, right     Past Surgical History:   Procedure Laterality Date     Fibroidadenoma Left Breast       NO HISTORY OF SURGERY       Social History     Socioeconomic History     Marital status:      Spouse name: Not on file     Number of children: Not on file     Years of education: Not on file     Highest education level: Not on file   Occupational History     Not on file   Tobacco Use     Smoking status: Never Smoker     Smokeless tobacco: Never Used   Substance and Sexual Activity     Alcohol use: Yes     Alcohol/week: 5.8 - 11.7 standard drinks     Comment: social use prn     Drug use: No     Sexual activity: Not on file    Other Topics Concern     Parent/sibling w/ CABG, MI or angioplasty before 65F 55M? Not Asked   Social History Narrative    How much exercise per week? Walking, stairs    How much calcium per day? 2-3 servings       How much caffeine per day? 2-3 cups coffee    How much vitamin D per day?      Do you/your family wear seatbelts?  Yes    Do you/your family use safety helmets? No    Do you/your family use sunscreen? No    Do you/your family keep firearms in the home? No    Do you/your family have a smoke detector(s)? Yes        Do you feel safe in your home? Yes    Has anyone ever touched you in an unwanted manner? No     Explain         September 2, 2014 Tamiko Villa LPN             Social Determinants of Health     Financial Resource Strain: Not on file   Food Insecurity: Not on file   Transportation Needs: Not on file   Physical Activity: Not on file   Stress: Not on file   Social Connections: Not on file   Intimate Partner Violence: Not on file   Housing Stability: Not on file     Family History   Problem Relation Age of Onset     Breast Cancer Mother         parkinsons     Osteoporosis Mother      Low Back Problems Mother      Breast Cancer Maternal Aunt      Other - See Comments Other         Inflammation joint in brother, maternal cousin      Diabetes Maternal Grandmother      Anxiety Disorder Father      Skin Cancer Father      Mental Illness Cousin      Alcoholism Paternal Grandfather      Glaucoma No family hx of      Macular Degeneration No family hx of      Retinal detachment No family hx of      Melanoma No family hx of      Hypertension No family hx of      SUBJECTIVE FINDINGS:  A 67 year old returns to clinic for onychomycosis, right hallux.  Tyloma, left foot.  She relates she is not using the Silvadene cream and intermittently uses tea tree oil.  She also relates she has a skin lesion on dorsal right foot that is improving.      OBJECTIVE FINDINGS:  Vascular status intact bilaterally.  She has left  plantar 2-4 MPJs with nucleated callus.  She is using a donut pad on this.  She has left hallux thickening, dystrophy and subungual debris and brittlenes.  She has a right hallux nail that is thick, dystrophic with subungual debris, dystrophy, discoloration and some surrounding dermatitis.  There is no erythema, no drainage, no odor, no calor.  She has incurvated dyscolered thickened toenails bilaterally to differing degrees.  She has a small, brown, nonraised discoloration, dorsal right foot.     ASSESSMENT AND PLAN:  Onychomycosis, right hallux.  Onychocryptosis and Onychomycosis bilaterally.  Tyloma, left foot.  She has rheumatoid arthritis.  She has a skin lesion on her dorsal foot.  Diagnosis and treatment options discussed with her.  I advised her to start the Silvadene cream again, she related she has this and will use it.  All the toenails were debrided or reduced bilaterally upon consent.  The left Hallux nail with pinpoint bleeding upon debridement, local wound care with Betadine and bandaid done upon consent and use discussed with her.  Tyloma left foot was sharp debrided with a 10 blade upon consent.  Return to clinic and see me in 2-3 months.  She opted for no Dermatology referral today.  We will observe the right foot lesion.  Previous notes reviewed.      Again, thank you for allowing me to participate in the care of your patient.        Sincerely,        Kenny Gao DPM     Admitted

## 2023-10-10 ENCOUNTER — PRE VISIT (OUTPATIENT)
Dept: RHEUMATOLOGY | Facility: CLINIC | Age: 68
End: 2023-10-10
Payer: MEDICARE

## 2024-06-26 NOTE — RESULT ENCOUNTER NOTE
-[facility] -RF -TB test -hep B/C, normal (CBC, ESR, liver and kidney tests)  +CATALINO homogenous 1:320   +CRP 8 (improved from  at 14) Improved

## (undated) DEVICE — Device

## (undated) DEVICE — EYE TIP IRRIGATION & ASPIRATION POLYMER CVD 0.3MM 8065751512

## (undated) DEVICE — EYE CANN IRR 27GA ANTERIOR CHAMBER 581280

## (undated) DEVICE — OMNI SURGICAL SYSTEM

## (undated) DEVICE — GLOVE PROTEXIS MICRO 7.5  2D73PM75

## (undated) DEVICE — EYE CANN IRR 25GA CYSTOTOME 581610

## (undated) DEVICE — EYE KNIFE SLIT XSTAR VISITEC 2.6MM 45DEG 373726

## (undated) DEVICE — PACK CATARACT CUSTOM ASC SEY15CPUMC

## (undated) DEVICE — SOL WATER IRRIG 500ML BOTTLE 2F7113

## (undated) DEVICE — LINEN TOWEL PACK X5 5464

## (undated) DEVICE — EYE SHIELD PLASTIC

## (undated) DEVICE — EYE KNIFE STILETTO VISITEC 1.1MM ANG 45DEG SIDEPORT 376620

## (undated) DEVICE — EYE PACK CUSTOM ANTERIOR 30DEG TIP CENTURION PPK6682-04

## (undated) RX ORDER — ACETAMINOPHEN 325 MG/1
TABLET ORAL
Status: DISPENSED
Start: 2022-01-01

## (undated) RX ORDER — LIDOCAINE HYDROCHLORIDE 20 MG/ML
INJECTION, SOLUTION EPIDURAL; INFILTRATION; INTRACAUDAL; PERINEURAL
Status: DISPENSED
Start: 2022-01-01

## (undated) RX ORDER — PROPOFOL 10 MG/ML
INJECTION, EMULSION INTRAVENOUS
Status: DISPENSED
Start: 2022-01-01